# Patient Record
Sex: FEMALE | Race: WHITE | NOT HISPANIC OR LATINO
[De-identification: names, ages, dates, MRNs, and addresses within clinical notes are randomized per-mention and may not be internally consistent; named-entity substitution may affect disease eponyms.]

---

## 2015-12-22 RX ORDER — OXYCODONE HYDROCHLORIDE 5 MG/1
1 TABLET ORAL
Qty: 0 | Refills: 0 | DISCHARGE
Start: 2015-12-22

## 2019-09-07 ENCOUNTER — TRANSCRIPTION ENCOUNTER (OUTPATIENT)
Age: 64
End: 2019-09-07

## 2019-09-07 ENCOUNTER — EMERGENCY (EMERGENCY)
Facility: HOSPITAL | Age: 64
LOS: 1 days | Discharge: ANOTHER HOSPITAL | End: 2019-09-07
Attending: STUDENT IN AN ORGANIZED HEALTH CARE EDUCATION/TRAINING PROGRAM
Payer: COMMERCIAL

## 2019-09-07 ENCOUNTER — INPATIENT (INPATIENT)
Facility: HOSPITAL | Age: 64
LOS: 2 days | Discharge: HOME | DRG: 511 | End: 2019-09-10
Attending: ORTHOPAEDIC SURGERY | Admitting: ORTHOPAEDIC SURGERY
Payer: COMMERCIAL

## 2019-09-07 VITALS
WEIGHT: 134.92 LBS | SYSTOLIC BLOOD PRESSURE: 111 MMHG | TEMPERATURE: 98 F | HEART RATE: 66 BPM | OXYGEN SATURATION: 98 % | DIASTOLIC BLOOD PRESSURE: 65 MMHG | RESPIRATION RATE: 17 BRPM

## 2019-09-07 VITALS
SYSTOLIC BLOOD PRESSURE: 108 MMHG | DIASTOLIC BLOOD PRESSURE: 67 MMHG | OXYGEN SATURATION: 95 % | HEART RATE: 82 BPM | RESPIRATION RATE: 20 BRPM | TEMPERATURE: 98 F | WEIGHT: 130.07 LBS

## 2019-09-07 DIAGNOSIS — S52.91XA UNSPECIFIED FRACTURE OF RIGHT FOREARM, INITIAL ENCOUNTER FOR CLOSED FRACTURE: ICD-10-CM

## 2019-09-07 LAB
ALBUMIN SERPL ELPH-MCNC: 3.5 G/DL — SIGNIFICANT CHANGE UP (ref 3.5–5)
ALBUMIN SERPL ELPH-MCNC: 4 G/DL — SIGNIFICANT CHANGE UP (ref 3.3–5)
ALP SERPL-CCNC: 127 U/L — HIGH (ref 40–120)
ALP SERPL-CCNC: 145 U/L — HIGH (ref 40–120)
ALT FLD-CCNC: 19 U/L — SIGNIFICANT CHANGE UP (ref 10–45)
ALT FLD-CCNC: 28 U/L DA — SIGNIFICANT CHANGE UP (ref 10–60)
ANION GAP SERPL CALC-SCNC: 11 MMOL/L — SIGNIFICANT CHANGE UP (ref 5–17)
ANION GAP SERPL CALC-SCNC: 5 MMOL/L — SIGNIFICANT CHANGE UP (ref 5–17)
APTT BLD: 30.2 SEC — SIGNIFICANT CHANGE UP (ref 27.5–36.3)
APTT BLD: 32.4 SEC — SIGNIFICANT CHANGE UP (ref 27.5–36.3)
AST SERPL-CCNC: 20 U/L — SIGNIFICANT CHANGE UP (ref 10–40)
AST SERPL-CCNC: 22 U/L — SIGNIFICANT CHANGE UP (ref 10–40)
BASOPHILS # BLD AUTO: 0 K/UL — SIGNIFICANT CHANGE UP (ref 0–0.2)
BASOPHILS # BLD AUTO: 0.02 K/UL — SIGNIFICANT CHANGE UP (ref 0–0.2)
BASOPHILS NFR BLD AUTO: 0.2 % — SIGNIFICANT CHANGE UP (ref 0–2)
BASOPHILS NFR BLD AUTO: 0.7 % — SIGNIFICANT CHANGE UP (ref 0–2)
BILIRUB SERPL-MCNC: 0.2 MG/DL — SIGNIFICANT CHANGE UP (ref 0.2–1.2)
BILIRUB SERPL-MCNC: 0.3 MG/DL — SIGNIFICANT CHANGE UP (ref 0.2–1.2)
BLD GP AB SCN SERPL QL: NEGATIVE — SIGNIFICANT CHANGE UP
BUN SERPL-MCNC: 28 MG/DL — HIGH (ref 7–23)
BUN SERPL-MCNC: 31 MG/DL — HIGH (ref 7–18)
CALCIUM SERPL-MCNC: 8.6 MG/DL — SIGNIFICANT CHANGE UP (ref 8.4–10.5)
CALCIUM SERPL-MCNC: 8.8 MG/DL — SIGNIFICANT CHANGE UP (ref 8.4–10.5)
CHLORIDE SERPL-SCNC: 101 MMOL/L — SIGNIFICANT CHANGE UP (ref 96–108)
CHLORIDE SERPL-SCNC: 104 MMOL/L — SIGNIFICANT CHANGE UP (ref 96–108)
CO2 SERPL-SCNC: 26 MMOL/L — SIGNIFICANT CHANGE UP (ref 22–31)
CO2 SERPL-SCNC: 30 MMOL/L — SIGNIFICANT CHANGE UP (ref 22–31)
CREAT SERPL-MCNC: 0.68 MG/DL — SIGNIFICANT CHANGE UP (ref 0.5–1.3)
CREAT SERPL-MCNC: 0.8 MG/DL — SIGNIFICANT CHANGE UP (ref 0.5–1.3)
EOSINOPHIL # BLD AUTO: 0.1 K/UL — SIGNIFICANT CHANGE UP (ref 0–0.5)
EOSINOPHIL # BLD AUTO: 0.33 K/UL — SIGNIFICANT CHANGE UP (ref 0–0.5)
EOSINOPHIL NFR BLD AUTO: 1.8 % — SIGNIFICANT CHANGE UP (ref 0–6)
EOSINOPHIL NFR BLD AUTO: 3.6 % — SIGNIFICANT CHANGE UP (ref 0–6)
GLUCOSE SERPL-MCNC: 132 MG/DL — HIGH (ref 70–99)
GLUCOSE SERPL-MCNC: 87 MG/DL — SIGNIFICANT CHANGE UP (ref 70–99)
HCG SERPL-ACNC: <2 MIU/ML — SIGNIFICANT CHANGE UP
HCT VFR BLD CALC: 34 % — LOW (ref 34.5–45)
HCT VFR BLD CALC: 35.4 % — SIGNIFICANT CHANGE UP (ref 34.5–45)
HGB BLD-MCNC: 11.1 G/DL — LOW (ref 11.5–15.5)
HGB BLD-MCNC: 11.3 G/DL — LOW (ref 11.5–15.5)
IMM GRANULOCYTES NFR BLD AUTO: 0.3 % — SIGNIFICANT CHANGE UP (ref 0–1.5)
INR BLD: 0.95 RATIO — SIGNIFICANT CHANGE UP (ref 0.88–1.16)
INR BLD: 1.02 RATIO — SIGNIFICANT CHANGE UP (ref 0.88–1.16)
LYMPHOCYTES # BLD AUTO: 1 K/UL — SIGNIFICANT CHANGE UP (ref 1–3.3)
LYMPHOCYTES # BLD AUTO: 1.1 K/UL — SIGNIFICANT CHANGE UP (ref 1–3.3)
LYMPHOCYTES # BLD AUTO: 11.9 % — LOW (ref 13–44)
LYMPHOCYTES # BLD AUTO: 13.7 % — SIGNIFICANT CHANGE UP (ref 13–44)
MCHC RBC-ENTMCNC: 29.6 PG — SIGNIFICANT CHANGE UP (ref 27–34)
MCHC RBC-ENTMCNC: 30.9 PG — SIGNIFICANT CHANGE UP (ref 27–34)
MCHC RBC-ENTMCNC: 31.9 GM/DL — LOW (ref 32–36)
MCHC RBC-ENTMCNC: 32.7 GM/DL — SIGNIFICANT CHANGE UP (ref 32–36)
MCV RBC AUTO: 92.7 FL — SIGNIFICANT CHANGE UP (ref 80–100)
MCV RBC AUTO: 94.4 FL — SIGNIFICANT CHANGE UP (ref 80–100)
MONOCYTES # BLD AUTO: 0.4 K/UL — SIGNIFICANT CHANGE UP (ref 0–0.9)
MONOCYTES # BLD AUTO: 0.69 K/UL — SIGNIFICANT CHANGE UP (ref 0–0.9)
MONOCYTES NFR BLD AUTO: 5.8 % — SIGNIFICANT CHANGE UP (ref 2–14)
MONOCYTES NFR BLD AUTO: 7.4 % — SIGNIFICANT CHANGE UP (ref 2–14)
NEUTROPHILS # BLD AUTO: 5.5 K/UL — SIGNIFICANT CHANGE UP (ref 1.8–7.4)
NEUTROPHILS # BLD AUTO: 7.1 K/UL — SIGNIFICANT CHANGE UP (ref 1.8–7.4)
NEUTROPHILS NFR BLD AUTO: 76.6 % — SIGNIFICANT CHANGE UP (ref 43–77)
NEUTROPHILS NFR BLD AUTO: 78 % — HIGH (ref 43–77)
NRBC # BLD: 0 /100 WBCS — SIGNIFICANT CHANGE UP (ref 0–0)
PLATELET # BLD AUTO: 232 K/UL — SIGNIFICANT CHANGE UP (ref 150–400)
PLATELET # BLD AUTO: 235 K/UL — SIGNIFICANT CHANGE UP (ref 150–400)
POTASSIUM SERPL-MCNC: 3.7 MMOL/L — SIGNIFICANT CHANGE UP (ref 3.5–5.3)
POTASSIUM SERPL-MCNC: 4 MMOL/L — SIGNIFICANT CHANGE UP (ref 3.5–5.3)
POTASSIUM SERPL-SCNC: 3.7 MMOL/L — SIGNIFICANT CHANGE UP (ref 3.5–5.3)
POTASSIUM SERPL-SCNC: 4 MMOL/L — SIGNIFICANT CHANGE UP (ref 3.5–5.3)
PROT SERPL-MCNC: 6.6 G/DL — SIGNIFICANT CHANGE UP (ref 6–8.3)
PROT SERPL-MCNC: 7.1 G/DL — SIGNIFICANT CHANGE UP (ref 6–8.3)
PROTHROM AB SERPL-ACNC: 10.5 SEC — SIGNIFICANT CHANGE UP (ref 10–12.9)
PROTHROM AB SERPL-ACNC: 11.6 SEC — SIGNIFICANT CHANGE UP (ref 10–12.9)
RBC # BLD: 3.61 M/UL — LOW (ref 3.8–5.2)
RBC # BLD: 3.82 M/UL — SIGNIFICANT CHANGE UP (ref 3.8–5.2)
RBC # FLD: 12.4 % — SIGNIFICANT CHANGE UP (ref 10.3–14.5)
RBC # FLD: 13.9 % — SIGNIFICANT CHANGE UP (ref 10.3–14.5)
RH IG SCN BLD-IMP: POSITIVE — SIGNIFICANT CHANGE UP
RH IG SCN BLD-IMP: POSITIVE — SIGNIFICANT CHANGE UP
SODIUM SERPL-SCNC: 138 MMOL/L — SIGNIFICANT CHANGE UP (ref 135–145)
SODIUM SERPL-SCNC: 139 MMOL/L — SIGNIFICANT CHANGE UP (ref 135–145)
WBC # BLD: 7 K/UL — SIGNIFICANT CHANGE UP (ref 3.8–10.5)
WBC # BLD: 9.27 K/UL — SIGNIFICANT CHANGE UP (ref 3.8–10.5)
WBC # FLD AUTO: 7 K/UL — SIGNIFICANT CHANGE UP (ref 3.8–10.5)
WBC # FLD AUTO: 9.27 K/UL — SIGNIFICANT CHANGE UP (ref 3.8–10.5)

## 2019-09-07 PROCEDURE — 96376 TX/PRO/DX INJ SAME DRUG ADON: CPT

## 2019-09-07 PROCEDURE — 73090 X-RAY EXAM OF FOREARM: CPT

## 2019-09-07 PROCEDURE — 80053 COMPREHEN METABOLIC PANEL: CPT

## 2019-09-07 PROCEDURE — 71045 X-RAY EXAM CHEST 1 VIEW: CPT | Mod: 26

## 2019-09-07 PROCEDURE — 36415 COLL VENOUS BLD VENIPUNCTURE: CPT

## 2019-09-07 PROCEDURE — 73110 X-RAY EXAM OF WRIST: CPT

## 2019-09-07 PROCEDURE — 86850 RBC ANTIBODY SCREEN: CPT

## 2019-09-07 PROCEDURE — 86900 BLOOD TYPING SEROLOGIC ABO: CPT

## 2019-09-07 PROCEDURE — 86901 BLOOD TYPING SEROLOGIC RH(D): CPT

## 2019-09-07 PROCEDURE — 85730 THROMBOPLASTIN TIME PARTIAL: CPT

## 2019-09-07 PROCEDURE — 73110 X-RAY EXAM OF WRIST: CPT | Mod: 26,RT

## 2019-09-07 PROCEDURE — 73090 X-RAY EXAM OF FOREARM: CPT | Mod: 26,LT

## 2019-09-07 PROCEDURE — 96374 THER/PROPH/DIAG INJ IV PUSH: CPT

## 2019-09-07 PROCEDURE — 73090 X-RAY EXAM OF FOREARM: CPT | Mod: 26,LT,77

## 2019-09-07 PROCEDURE — 93010 ELECTROCARDIOGRAM REPORT: CPT | Mod: NC

## 2019-09-07 PROCEDURE — 99285 EMERGENCY DEPT VISIT HI MDM: CPT

## 2019-09-07 PROCEDURE — 85610 PROTHROMBIN TIME: CPT

## 2019-09-07 PROCEDURE — 99285 EMERGENCY DEPT VISIT HI MDM: CPT | Mod: 25

## 2019-09-07 PROCEDURE — 73080 X-RAY EXAM OF ELBOW: CPT

## 2019-09-07 PROCEDURE — 73080 X-RAY EXAM OF ELBOW: CPT | Mod: 26,RT

## 2019-09-07 PROCEDURE — 96375 TX/PRO/DX INJ NEW DRUG ADDON: CPT

## 2019-09-07 PROCEDURE — 85027 COMPLETE CBC AUTOMATED: CPT

## 2019-09-07 PROCEDURE — 73110 X-RAY EXAM OF WRIST: CPT | Mod: 26,RT,77

## 2019-09-07 RX ORDER — CLONAZEPAM 1 MG
0.5 TABLET ORAL EVERY 12 HOURS
Refills: 0 | Status: DISCONTINUED | OUTPATIENT
Start: 2019-09-07 | End: 2019-09-08

## 2019-09-07 RX ORDER — MORPHINE SULFATE 50 MG/1
2 CAPSULE, EXTENDED RELEASE ORAL ONCE
Refills: 0 | Status: DISCONTINUED | OUTPATIENT
Start: 2019-09-07 | End: 2019-09-07

## 2019-09-07 RX ORDER — HYDROMORPHONE HYDROCHLORIDE 2 MG/ML
1 INJECTION INTRAMUSCULAR; INTRAVENOUS; SUBCUTANEOUS EVERY 4 HOURS
Refills: 0 | Status: DISCONTINUED | OUTPATIENT
Start: 2019-09-07 | End: 2019-09-08

## 2019-09-07 RX ORDER — CEFAZOLIN SODIUM 1 G
2000 VIAL (EA) INJECTION EVERY 8 HOURS
Refills: 0 | Status: DISCONTINUED | OUTPATIENT
Start: 2019-09-07 | End: 2019-09-08

## 2019-09-07 RX ORDER — MORPHINE SULFATE 50 MG/1
4 CAPSULE, EXTENDED RELEASE ORAL ONCE
Refills: 0 | Status: DISCONTINUED | OUTPATIENT
Start: 2019-09-07 | End: 2019-09-07

## 2019-09-07 RX ORDER — SODIUM CHLORIDE 9 MG/ML
1000 INJECTION, SOLUTION INTRAVENOUS
Refills: 0 | Status: COMPLETED | OUTPATIENT
Start: 2019-09-07 | End: 2019-09-08

## 2019-09-07 RX ORDER — OXYCODONE HYDROCHLORIDE 5 MG/1
10 TABLET ORAL EVERY 4 HOURS
Refills: 0 | Status: DISCONTINUED | OUTPATIENT
Start: 2019-09-07 | End: 2019-09-08

## 2019-09-07 RX ORDER — HYDROMORPHONE HYDROCHLORIDE 2 MG/ML
1 INJECTION INTRAMUSCULAR; INTRAVENOUS; SUBCUTANEOUS ONCE
Refills: 0 | Status: DISCONTINUED | OUTPATIENT
Start: 2019-09-07 | End: 2019-09-07

## 2019-09-07 RX ORDER — CEFAZOLIN SODIUM 1 G
2000 VIAL (EA) INJECTION ONCE
Refills: 0 | Status: COMPLETED | OUTPATIENT
Start: 2019-09-07 | End: 2019-09-07

## 2019-09-07 RX ORDER — OXYCODONE HYDROCHLORIDE 5 MG/1
40 TABLET ORAL EVERY 12 HOURS
Refills: 0 | Status: DISCONTINUED | OUTPATIENT
Start: 2019-09-07 | End: 2019-09-08

## 2019-09-07 RX ORDER — OXYCODONE HYDROCHLORIDE 5 MG/1
5 TABLET ORAL EVERY 4 HOURS
Refills: 0 | Status: DISCONTINUED | OUTPATIENT
Start: 2019-09-07 | End: 2019-09-08

## 2019-09-07 RX ORDER — BUPROPION HYDROCHLORIDE 150 MG/1
150 TABLET, EXTENDED RELEASE ORAL DAILY
Refills: 0 | Status: DISCONTINUED | OUTPATIENT
Start: 2019-09-07 | End: 2019-09-08

## 2019-09-07 RX ORDER — LEVOTHYROXINE SODIUM 125 MCG
25 TABLET ORAL DAILY
Refills: 0 | Status: DISCONTINUED | OUTPATIENT
Start: 2019-09-07 | End: 2019-09-08

## 2019-09-07 RX ORDER — LAMOTRIGINE 25 MG/1
200 TABLET, ORALLY DISINTEGRATING ORAL AT BEDTIME
Refills: 0 | Status: DISCONTINUED | OUTPATIENT
Start: 2019-09-07 | End: 2019-09-08

## 2019-09-07 RX ORDER — FENTANYL CITRATE 50 UG/ML
50 INJECTION INTRAVENOUS ONCE
Refills: 0 | Status: DISCONTINUED | OUTPATIENT
Start: 2019-09-07 | End: 2019-09-07

## 2019-09-07 RX ORDER — CEFAZOLIN SODIUM 1 G
VIAL (EA) INJECTION
Refills: 0 | Status: DISCONTINUED | OUTPATIENT
Start: 2019-09-07 | End: 2019-09-08

## 2019-09-07 RX ADMIN — MORPHINE SULFATE 2 MILLIGRAM(S): 50 CAPSULE, EXTENDED RELEASE ORAL at 14:00

## 2019-09-07 RX ADMIN — MORPHINE SULFATE 4 MILLIGRAM(S): 50 CAPSULE, EXTENDED RELEASE ORAL at 12:47

## 2019-09-07 RX ADMIN — SODIUM CHLORIDE 100 MILLILITER(S): 9 INJECTION, SOLUTION INTRAVENOUS at 17:07

## 2019-09-07 RX ADMIN — Medication 100 MILLIGRAM(S): at 13:59

## 2019-09-07 RX ADMIN — SODIUM CHLORIDE 100 MILLILITER(S): 9 INJECTION, SOLUTION INTRAVENOUS at 22:53

## 2019-09-07 RX ADMIN — OXYCODONE HYDROCHLORIDE 10 MILLIGRAM(S): 5 TABLET ORAL at 23:15

## 2019-09-07 RX ADMIN — Medication 100 MILLIGRAM(S): at 22:25

## 2019-09-07 RX ADMIN — Medication 2000 MILLIGRAM(S): at 14:10

## 2019-09-07 RX ADMIN — FENTANYL CITRATE 50 MICROGRAM(S): 50 INJECTION INTRAVENOUS at 14:15

## 2019-09-07 RX ADMIN — OXYCODONE HYDROCHLORIDE 10 MILLIGRAM(S): 5 TABLET ORAL at 22:26

## 2019-09-07 RX ADMIN — MORPHINE SULFATE 2 MILLIGRAM(S): 50 CAPSULE, EXTENDED RELEASE ORAL at 13:44

## 2019-09-07 RX ADMIN — OXYCODONE HYDROCHLORIDE 40 MILLIGRAM(S): 5 TABLET ORAL at 16:32

## 2019-09-07 RX ADMIN — MORPHINE SULFATE 4 MILLIGRAM(S): 50 CAPSULE, EXTENDED RELEASE ORAL at 13:17

## 2019-09-07 RX ADMIN — FENTANYL CITRATE 50 MICROGRAM(S): 50 INJECTION INTRAVENOUS at 14:17

## 2019-09-07 RX ADMIN — LAMOTRIGINE 200 MILLIGRAM(S): 25 TABLET, ORALLY DISINTEGRATING ORAL at 22:52

## 2019-09-07 RX ADMIN — HYDROMORPHONE HYDROCHLORIDE 1 MILLIGRAM(S): 2 INJECTION INTRAMUSCULAR; INTRAVENOUS; SUBCUTANEOUS at 16:32

## 2019-09-07 NOTE — ED PROVIDER NOTE - CLINICAL SUMMARY MEDICAL DECISION MAKING FREE TEXT BOX
63F w/ hx of osteogenesis imperfecta presents as a transfer from [facility] for a R forearm fracture. WIll be admitted to ortho for operative repair. will attempt pain control with long and short acting opiates. will repeat xrays.

## 2019-09-07 NOTE — H&P ADULT - HISTORY OF PRESENT ILLNESS
63y Female RHD presents c/o R forearm pain sp mechanical fall earlier today.  Was seen at [place] and transferred to [facility]. Denies HS/LOC. Denies numbness/tingling. Denies fever/chills. Denies pain/injury elsewhere. No other complaints.     Patient has hx osteogenesis imperfect with >30 fractures. Bilateral TKAs and L CHAYO, R DHS done at [facility] for prior fractures.

## 2019-09-07 NOTE — ED PROVIDER NOTE - CLINICAL SUMMARY MEDICAL DECISION MAKING FREE TEXT BOX
62 y/o F patient presents to the ED w/ deformity. Concern for fracture. Will get X-ray. Will give pain control. Will reassess.

## 2019-09-07 NOTE — ED PROVIDER NOTE - PRINCIPAL DIAGNOSIS
Forearm fracture, right, closed, initial encounter Forearm fracture, right, open type I or II, initial encounter

## 2019-09-07 NOTE — H&P ADULT - NSHPLABSRESULTS_GEN_ALL_CORE
Xrays demonstrating comminuted both bone forearm fracture                        11.1   7.0   )-----------( 235      ( 07 Sep 2019 16:23 )             34.0       09-07    138  |  101  |  28<H>  ----------------------------<  132<H>  3.7   |  26  |  0.68    Ca    8.8      07 Sep 2019 16:23    TPro  6.6  /  Alb  4.0  /  TBili  0.2  /  DBili  x   /  AST  20  /  ALT  19  /  AlkPhos  127<H>  09-07                  PT/INR - ( 07 Sep 2019 16:21 )   PT: 11.6 sec;   INR: 1.02 ratio         PTT - ( 07 Sep 2019 16:21 )  PTT:30.2 sec    Lactate Trend            CAPILLARY BLOOD GLUCOSE

## 2019-09-07 NOTE — ED ADULT NURSE REASSESSMENT NOTE - NS ED NURSE REASSESS COMMENT FT1
Attempted to call nursing report to [facility] but nurses refused to take report, telling ED to call back after [facility] nurses are finished with their own nursing report. ED charge RN is aware of the situation. Attempted to call nursing report to [facility] but nurses refused to take report, telling ED to call back after [facility] nurses are finished with their own nursing report. ED charge RN is aware of the situation.    2015: [facility] called for report after refusing report earlier. Pt already upstairs by time report was able to be given. Report given to SHLOMO Marin Attempted to call nursing report to [facility] but nurses refused to take report, telling ED to call back after [facility] nurses are finished with their own nursing report. ED charge RN is aware of the situation.    SHLOMO Yancey @ 20:15 -  [facility] called for report after refusing report earlier. Pt already upstairs by time report was able to be given. Report given to SHLOMO Marin.

## 2019-09-07 NOTE — ED PROVIDER NOTE - PHYSICAL EXAMINATION
General: WN/WD NAD  HEENT: PERRLA, EOMI, moist mucous membranes  Neurology: A&Ox3, nonfocal, SIMMONS x 4  Respiratory: CTA B/L, normal respiratory effort, no wheezes, crackles, rales  CV: RRR, S1S2, no murmurs, rubs or gallops  Abdominal: Soft, NT, ND +BS, Last BM  Extremities: No edema, + peripheral pulses, RUE in splint, WWP, cap refil <2 sec  Tubes: PIV in LAC

## 2019-09-07 NOTE — ED PROVIDER NOTE - OBJECTIVE STATEMENT
63F w/ hx of osteogenesis imperfecta presents as a transfer from [facility] for a R forearm fracture. Pt tripped at home over a box of tiles and landed on her R arm. xrays at [facility] showed "Badly comminuted fracture of the proximal third of the radius and ulna. Gas in the soft tissues suggest an open fracture." The patient had a splint placed there and transferred here for ortho eval. Pt complains of current pain refractory to the morphine and fentanyl that she got at [facility]. Pt takes 40mg oxycontin BID as well as 8mg PO dilauded every 4hours PRN.    PMH: OI  Meds:   Allergies:  PMD:

## 2019-09-07 NOTE — ED PROVIDER NOTE - CARE PLAN
Principal Discharge DX:	Forearm fracture, right, closed, initial encounter Principal Discharge DX:	Forearm fracture, right, open type I or II, initial encounter

## 2019-09-07 NOTE — H&P ADULT - NSHPPHYSICALEXAM_GEN_ALL_CORE
Physical Exam  Gen: NAD    R UE: <1cm pokehole over proximal forearm, +deformity, no ttp wrist, +ttp radius/ulna, +u/m/ain function, EPL intact, sensation over radial distribution intact, no active extension of wrist or digits 3-5, SILT, radial pulse intact, compartments soft/compressible, warm/well perfused    Procedure: Splint removed from OSH, xeroform applied over wound, Closed reduction performed. Placed in well padded sugartong splint. Post procedure xrays obtained. Post procedure exam unchanged, NV intact except for extension of wrist and digits 3-5. Patient tolerated well.

## 2019-09-07 NOTE — ED PROVIDER NOTE - PROGRESS NOTE DETAILS
discussed with dr hoang who recommended transfer due to complex fracture. transfer initiated to dr cooper

## 2019-09-07 NOTE — ED PROVIDER NOTE - OBJECTIVE STATEMENT
62 y/o F patient w/ PMHx of Osteogenesis Imperfecta presents to the ED w/ R arm pain due to mechanical fall backwards that occurred today(9/7/2019). Patient states she hit her R arm into the kitchen table. Patient denies head injuries, pelvic pain, or any other complaints. NKDA.

## 2019-09-07 NOTE — ED PROVIDER NOTE - NS ED ROS FT
Gen: No fever, normal appetite  Eyes: No eye irritation or discharge  ENT: No ear pain, congestion, sore throat  Resp: No cough or trouble breathing  Cardiovascular: No chest pain or palpitation  Gastroenteric: No nausea/vomiting, diarrhea, constipation  :  No change in urine output; no dysuria  Skin: No rashes  Neuro: No headache; no abnormal movements  Remainder negative, except as per the HPI

## 2019-09-07 NOTE — CONSULT NOTE ADULT - SUBJECTIVE AND OBJECTIVE BOX
The patient was seen and examined tonight after an accidental fall with a right proximal 1/3 radius and ulna fractures that require orthopedic ORIF. Her comorbidities include osteogenesis imperfecta. controlled hypothyoidism, controlled familial hypercholesterolism, chronic anxiey, depression, Dilaudid and oxycontin narcotic dependency for diffuse osteoarthritis. Exam, lab, EKG and CXR are stable. The patient is medically stable, medically optimized and has no medical contraindication to surgery tomorrow as required. Exam time 70 minutes including > than 50 % for bedside discussion and counseling. Comprehensive consultation dictated #[id number]. The patient was seen and examined tonight after an accidental fall with a right proximal 1/3 radius and ulna fractures that require orthopedic ORIF. Her comorbidities include osteogenesis imperfecta. controlled hypothyoidism, controlled familial hypercholesterolism, chronic anxiety,  depression, Dilaudid and oxycontin narcotic dependency for diffuse osteoarthritis. Exam, lab, EKG and CXR are stable. The patient is medically stable, medically optimized and has no medical contraindication to surgery tomorrow as required. Exam time 70 minutes including > than 50 % for bedside discussion and counseling. Comprehensive consultation dictated #[id number].      HISTORY OF PRESENT ILLNESS:  The patient is a 63-year-old female who was in her apartment with boxes on the ground.  They were placed there by her .  She inadvertently stepped backwards, fell over the box and landed on her right arm.  She had difficulty rising and significant pain.  She went to [facility] and x-ray showed a proximal ulna and proximal radius fracture that required ORIF.  Because of the complexity of the fracture, she was transferred from [place] to [place] for definitive surgical intervention.    MEDICATIONS:  The patient's medications include Synthroid 0.25 mg once a day, Crestor 10 mg once a day, Prozac 20 mg twice a day, Wellbutrin 150 mg XL once a day, Lamictal 200 mg at bedtime, Prozac 20 mg twice a day, and Klonopin 0.5 mg twice a day.  She takes Dilaudid 0.5 mg four times a day and OxyContin 40 mg twice a day for diffuse bony pain in her wrists, elbows, knees, and feet.    PAST MEDICAL HISTORY:  The patient's past medical history is significant for osteogenesis imperfecta.  She has had multiple orthopedic surgeries secondary to fractures over the years, and as a result of her condition, she has developed significant osteoarthritis which is narcotic dependent in all of her joints.  She has hypercholesterolemia, depression, anxiety, and chronic pain disorder.    PAST SURGICAL HISTORY:  Her past surgical history is she has had most recently a right total hip replacement in 2017 with a complication of a shattered femoral shaft fracture requiring OR stimulation of her right femoral shaft.  In the past, she has had bilateral knee replacements and bilateral hip replacements.    FAMILY HISTORY:  Her family history is positive for father and sister with osteogenesis imperfecta.  Her brother, mother, and daughter all have arteriosclerotic heart disease.    DIET:  The patient's diet is normal.  She presently weighs 125 pounds.  She has lost 100 pounds through [employer].    SOCIAL HISTORY:  Socially, she is retired and lives with her .    REVIEW OF SYSTEMS:  Her review of systems, she has no headaches, no dizziness.  Positive bilateral hearing aids for deafness.  She has no visual difficulties.  She has no sinusitis.  She has positive dental disease with partial dentures.  She has no difficulty with swallowing.  She has no difficulty with breathing.  She has no cough, congestion or wheezing.  She has no chest congestion at rest or with exertion.  Her cardiac examination is normal.  Also, she has no gallops or murmurs.  She has normal sinus rhythm at the present time with normal valvular function and no murmurs.  She has diffuse joint pains as stated including her spine and all of her extremities.  She has no neurological issues with no strokes, seizures or neuropathies.    PHYSICAL EXAMINATION:  VITAL SIGNS:  At this time shows a blood pressure of 100/60, pulse of 70, respirations are 18.  She is afebrile.  HEENT:  Head and neck examination was normal.  3+ carotids with good upstrokes and no bruits.  There is no thyromegaly even though she has recent diagnosis having hypothyroidism, possibly Hashimoto's.  LUNGS:  She has good breath sounds bilaterally with no cough, congestion or wheezing.  She has also a large house next to her which has a side entrance and allowed parking.  HEART:  There is no gallops or murmurs.  ABDOMEN:  Soft with no masses, tenderness, guarding or rebound.  EXTREMITIES:  Without clubbing, cyanosis or edema at the present time.  NEUROLOGICAL:  Shows no evidence of any focal deficit.      LABORATORY DATA:  The patient had laboratories, CBC obtained shows hemoglobin of 11.1, hematocrit 34.0, white count of 7.0, platelet count is 235,000.  INR is 1.02.  PTT is 30.02.  Sodium 138, potassium 3.7, chloride 101, CO2 is 26, BUN is 28, creatinine is 0.68, blood sugar is 132.  Liver function tests are normal.  Alkaline phosphatase is minimally elevated consistent with an impacted fracture.    DIAGNOSTIC DATA:  EKG was performed with normal sinus rhythm and heart rate of 70.  There was biphasic T-waves which were nonspecific and V1 and V2. Chest x-ray was performed and lungs were clear.      ASSESSMENT AND PLAN:  The impression at this time is the patient has osteogenesis imperfecta and requires open reduction and internal fixation for her acute fall with fracture of her right proximal ulna and radius.  She is to continue her present medications without interval change except be n.p.o. after midnight and have insulin diabetic coverage.  The patient has no medical contraindications to surgery tomorrow as is required.  Examination time was 70 minutes of which greater than 50% was necessary for bedside discussion and counseling.  The patient will continue to have medical supervision throughout the course of the treatment.      _______________________    DICT:	MATHEW FREY MD ([id number]) 09/08/2019 01:13 AM  TRANS:	[id number] 09/08/2019 01:18 AM  JOB:	[id number]     Electronically Signed by:  MATHEW FREY 09/08/2019 11:02:22 AM

## 2019-09-07 NOTE — ED PROVIDER NOTE - ATTENDING CONTRIBUTION TO CARE
63y F hx of OI presented as transfer from [facility] for open comminuted radius and ulna fractures sp mechanical fall, tripped over box tiles landing on R forearm, no head strike or LOC. Currently at baseline mentation. Periph pulses intact. Able to wiggle fingers. Splinted from OSH. Will leave splint in place pending xrays and ortho eval. S/p Ancef at OSH. Ortho aware of transfer. TBA for surgical repair. Pt on chronic pain meds including LA opioids which is due now.

## 2019-09-07 NOTE — ED ADULT NURSE NOTE - NSIMPLEMENTINTERV_GEN_ALL_ED
Implemented All Fall with Harm Risk Interventions:  [place] to call system. Call bell, personal items and telephone within reach. Instruct patient to call for assistance. Room bathroom lighting operational. Non-slip footwear when patient is off stretcher. Physically safe environment: no spills, clutter or unnecessary equipment. Stretcher in lowest position, wheels locked, appropriate side rails in place. Provide visual cue, wrist band, yellow gown, etc. Monitor gait and stability. Monitor for mental status changes and reorient to person, place, and time. Review medications for side effects contributing to fall risk. Reinforce activity limits and safety measures with patient and family. Provide visual clues: red socks.

## 2019-09-07 NOTE — ED ADULT NURSE NOTE - OBJECTIVE STATEMENT
64 y/o female hx of osteogenesis imperfecta BIBA from [place] [facility] for right ulnar and radial fx secondary to a mechanical fall. As per EMS, fracture is open with small opening at site. RUE is currently stabilized in a splint that was placed at [facility]. Denies any numbness or tingling. Able to move all fingers, no discoloration of fingers noted. Patient reports she takes oxycontin and dilaudid po at home daily due to chronic pain and due to multiple fractures in the past.

## 2019-09-07 NOTE — H&P ADULT - ATTENDING COMMENTS
Pt seen and examined.  Exam and plan as above.  For I and D, ORIF.  Pt w PIN palsy preop.  Risk to PIN discussed

## 2019-09-08 LAB
ANION GAP SERPL CALC-SCNC: 11 MMOL/L — SIGNIFICANT CHANGE UP (ref 5–17)
APPEARANCE UR: CLEAR — SIGNIFICANT CHANGE UP
APTT BLD: 25.3 SEC — LOW (ref 27.5–36.3)
BACTERIA # UR AUTO: NEGATIVE — SIGNIFICANT CHANGE UP
BILIRUB UR-MCNC: NEGATIVE — SIGNIFICANT CHANGE UP
BUN SERPL-MCNC: 13 MG/DL — SIGNIFICANT CHANGE UP (ref 7–23)
CALCIUM SERPL-MCNC: 9.2 MG/DL — SIGNIFICANT CHANGE UP (ref 8.4–10.5)
CHLORIDE SERPL-SCNC: 101 MMOL/L — SIGNIFICANT CHANGE UP (ref 96–108)
CO2 SERPL-SCNC: 26 MMOL/L — SIGNIFICANT CHANGE UP (ref 22–31)
COLOR SPEC: YELLOW — SIGNIFICANT CHANGE UP
CREAT SERPL-MCNC: 0.66 MG/DL — SIGNIFICANT CHANGE UP (ref 0.5–1.3)
DIFF PNL FLD: NEGATIVE — SIGNIFICANT CHANGE UP
EPI CELLS # UR: 3 /HPF — SIGNIFICANT CHANGE UP
GLUCOSE SERPL-MCNC: 93 MG/DL — SIGNIFICANT CHANGE UP (ref 70–99)
GLUCOSE UR QL: NEGATIVE — SIGNIFICANT CHANGE UP
HCG UR QL: NEGATIVE — SIGNIFICANT CHANGE UP
HCT VFR BLD CALC: 32.3 % — LOW (ref 34.5–45)
HCV AB S/CO SERPL IA: 0.25 S/CO — SIGNIFICANT CHANGE UP (ref 0–0.99)
HCV AB SERPL-IMP: SIGNIFICANT CHANGE UP
HGB BLD-MCNC: 11.2 G/DL — LOW (ref 11.5–15.5)
HYALINE CASTS # UR AUTO: 0 /LPF — SIGNIFICANT CHANGE UP (ref 0–2)
INR BLD: 1.07 RATIO — SIGNIFICANT CHANGE UP (ref 0.88–1.16)
KETONES UR-MCNC: NEGATIVE — SIGNIFICANT CHANGE UP
LEUKOCYTE ESTERASE UR-ACNC: ABNORMAL
MCHC RBC-ENTMCNC: 32 PG — SIGNIFICANT CHANGE UP (ref 27–34)
MCHC RBC-ENTMCNC: 34.5 GM/DL — SIGNIFICANT CHANGE UP (ref 32–36)
MCV RBC AUTO: 92.5 FL — SIGNIFICANT CHANGE UP (ref 80–100)
NITRITE UR-MCNC: NEGATIVE — SIGNIFICANT CHANGE UP
PH UR: 6 — SIGNIFICANT CHANGE UP (ref 5–8)
PLATELET # BLD AUTO: 199 K/UL — SIGNIFICANT CHANGE UP (ref 150–400)
POTASSIUM SERPL-MCNC: 3.2 MMOL/L — LOW (ref 3.5–5.3)
POTASSIUM SERPL-SCNC: 3.2 MMOL/L — LOW (ref 3.5–5.3)
PROT UR-MCNC: ABNORMAL
PROTHROM AB SERPL-ACNC: 12.2 SEC — SIGNIFICANT CHANGE UP (ref 10–12.9)
RBC # BLD: 3.49 M/UL — LOW (ref 3.8–5.2)
RBC # FLD: 12.3 % — SIGNIFICANT CHANGE UP (ref 10.3–14.5)
RBC CASTS # UR COMP ASSIST: 4 /HPF — SIGNIFICANT CHANGE UP (ref 0–4)
SODIUM SERPL-SCNC: 138 MMOL/L — SIGNIFICANT CHANGE UP (ref 135–145)
SP GR SPEC: 1.03 — HIGH (ref 1.01–1.02)
UROBILINOGEN FLD QL: NEGATIVE — SIGNIFICANT CHANGE UP
WBC # BLD: 5.3 K/UL — SIGNIFICANT CHANGE UP (ref 3.8–10.5)
WBC # FLD AUTO: 5.3 K/UL — SIGNIFICANT CHANGE UP (ref 3.8–10.5)
WBC UR QL: 10 /HPF — HIGH (ref 0–5)

## 2019-09-08 PROCEDURE — 25575 OPTX RDL&ULN SHFT FX RDS&ULN: CPT | Mod: RT

## 2019-09-08 PROCEDURE — 11012 DEB SKIN BONE AT FX SITE: CPT | Mod: RT

## 2019-09-08 PROCEDURE — 64708 REVISE ARM/LEG NERVE: CPT | Mod: RT,59

## 2019-09-08 RX ORDER — CHLORHEXIDINE GLUCONATE 213 G/1000ML
1 SOLUTION TOPICAL DAILY
Refills: 0 | Status: DISCONTINUED | OUTPATIENT
Start: 2019-09-08 | End: 2019-09-08

## 2019-09-08 RX ORDER — FOLIC ACID 0.8 MG
1 TABLET ORAL DAILY
Refills: 0 | Status: DISCONTINUED | OUTPATIENT
Start: 2019-09-08 | End: 2019-09-10

## 2019-09-08 RX ORDER — ASPIRIN/CALCIUM CARB/MAGNESIUM 324 MG
325 TABLET ORAL
Refills: 0 | Status: DISCONTINUED | OUTPATIENT
Start: 2019-09-08 | End: 2019-09-10

## 2019-09-08 RX ORDER — CLONAZEPAM 1 MG
0.5 TABLET ORAL EVERY 12 HOURS
Refills: 0 | Status: DISCONTINUED | OUTPATIENT
Start: 2019-09-08 | End: 2019-09-10

## 2019-09-08 RX ORDER — ACETAMINOPHEN 500 MG
650 TABLET ORAL EVERY 6 HOURS
Refills: 0 | Status: DISCONTINUED | OUTPATIENT
Start: 2019-09-08 | End: 2019-09-10

## 2019-09-08 RX ORDER — SODIUM CHLORIDE 9 MG/ML
1000 INJECTION, SOLUTION INTRAVENOUS
Refills: 0 | Status: DISCONTINUED | OUTPATIENT
Start: 2019-09-08 | End: 2019-09-10

## 2019-09-08 RX ORDER — POTASSIUM CHLORIDE 20 MEQ
10 PACKET (EA) ORAL
Refills: 0 | Status: DISCONTINUED | OUTPATIENT
Start: 2019-09-08 | End: 2019-09-08

## 2019-09-08 RX ORDER — ASCORBIC ACID 60 MG
500 TABLET,CHEWABLE ORAL
Refills: 0 | Status: DISCONTINUED | OUTPATIENT
Start: 2019-09-08 | End: 2019-09-10

## 2019-09-08 RX ORDER — ENOXAPARIN SODIUM 100 MG/ML
40 INJECTION SUBCUTANEOUS EVERY 24 HOURS
Refills: 0 | Status: DISCONTINUED | OUTPATIENT
Start: 2019-09-08 | End: 2019-09-08

## 2019-09-08 RX ORDER — CEFAZOLIN SODIUM 1 G
2000 VIAL (EA) INJECTION EVERY 8 HOURS
Refills: 0 | Status: COMPLETED | OUTPATIENT
Start: 2019-09-08 | End: 2019-09-09

## 2019-09-08 RX ORDER — LAMOTRIGINE 25 MG/1
200 TABLET, ORALLY DISINTEGRATING ORAL AT BEDTIME
Refills: 0 | Status: DISCONTINUED | OUTPATIENT
Start: 2019-09-08 | End: 2019-09-10

## 2019-09-08 RX ORDER — ONDANSETRON 8 MG/1
4 TABLET, FILM COATED ORAL EVERY 6 HOURS
Refills: 0 | Status: DISCONTINUED | OUTPATIENT
Start: 2019-09-08 | End: 2019-09-10

## 2019-09-08 RX ORDER — FERROUS SULFATE 325(65) MG
325 TABLET ORAL
Refills: 0 | Status: DISCONTINUED | OUTPATIENT
Start: 2019-09-08 | End: 2019-09-10

## 2019-09-08 RX ORDER — DIPHENHYDRAMINE HCL 50 MG
25 CAPSULE ORAL AT BEDTIME
Refills: 0 | Status: DISCONTINUED | OUTPATIENT
Start: 2019-09-08 | End: 2019-09-10

## 2019-09-08 RX ORDER — ASPIRIN/CALCIUM CARB/MAGNESIUM 324 MG
325 TABLET ORAL DAILY
Refills: 0 | Status: DISCONTINUED | OUTPATIENT
Start: 2019-09-08 | End: 2019-09-08

## 2019-09-08 RX ORDER — LEVOTHYROXINE SODIUM 125 MCG
25 TABLET ORAL DAILY
Refills: 0 | Status: DISCONTINUED | OUTPATIENT
Start: 2019-09-08 | End: 2019-09-10

## 2019-09-08 RX ORDER — CEFAZOLIN SODIUM 1 G
2000 VIAL (EA) INJECTION EVERY 8 HOURS
Refills: 0 | Status: DISCONTINUED | OUTPATIENT
Start: 2019-09-08 | End: 2019-09-08

## 2019-09-08 RX ORDER — OXYCODONE HYDROCHLORIDE 5 MG/1
5 TABLET ORAL EVERY 4 HOURS
Refills: 0 | Status: DISCONTINUED | OUTPATIENT
Start: 2019-09-08 | End: 2019-09-10

## 2019-09-08 RX ORDER — BUPROPION HYDROCHLORIDE 150 MG/1
150 TABLET, EXTENDED RELEASE ORAL DAILY
Refills: 0 | Status: DISCONTINUED | OUTPATIENT
Start: 2019-09-08 | End: 2019-09-10

## 2019-09-08 RX ORDER — HYDROMORPHONE HYDROCHLORIDE 2 MG/ML
0.5 INJECTION INTRAMUSCULAR; INTRAVENOUS; SUBCUTANEOUS
Refills: 0 | Status: DISCONTINUED | OUTPATIENT
Start: 2019-09-08 | End: 2019-09-09

## 2019-09-08 RX ORDER — OXYCODONE HYDROCHLORIDE 5 MG/1
10 TABLET ORAL EVERY 4 HOURS
Refills: 0 | Status: DISCONTINUED | OUTPATIENT
Start: 2019-09-08 | End: 2019-09-10

## 2019-09-08 RX ORDER — DOCUSATE SODIUM 100 MG
100 CAPSULE ORAL THREE TIMES A DAY
Refills: 0 | Status: DISCONTINUED | OUTPATIENT
Start: 2019-09-08 | End: 2019-09-10

## 2019-09-08 RX ORDER — BENZOCAINE AND MENTHOL 5; 1 G/100ML; G/100ML
1 LIQUID ORAL EVERY 4 HOURS
Refills: 0 | Status: DISCONTINUED | OUTPATIENT
Start: 2019-09-08 | End: 2019-09-10

## 2019-09-08 RX ORDER — HYDROMORPHONE HYDROCHLORIDE 2 MG/ML
0.25 INJECTION INTRAMUSCULAR; INTRAVENOUS; SUBCUTANEOUS
Refills: 0 | Status: DISCONTINUED | OUTPATIENT
Start: 2019-09-08 | End: 2019-09-08

## 2019-09-08 RX ADMIN — Medication 325 MILLIGRAM(S): at 21:09

## 2019-09-08 RX ADMIN — Medication 0.5 MILLIGRAM(S): at 21:09

## 2019-09-08 RX ADMIN — HYDROMORPHONE HYDROCHLORIDE 0.5 MILLIGRAM(S): 2 INJECTION INTRAMUSCULAR; INTRAVENOUS; SUBCUTANEOUS at 15:25

## 2019-09-08 RX ADMIN — Medication 100 MILLIEQUIVALENT(S): at 10:11

## 2019-09-08 RX ADMIN — Medication 100 MILLIGRAM(S): at 21:11

## 2019-09-08 RX ADMIN — OXYCODONE HYDROCHLORIDE 10 MILLIGRAM(S): 5 TABLET ORAL at 09:26

## 2019-09-08 RX ADMIN — OXYCODONE HYDROCHLORIDE 10 MILLIGRAM(S): 5 TABLET ORAL at 03:00

## 2019-09-08 RX ADMIN — HYDROMORPHONE HYDROCHLORIDE 1 MILLIGRAM(S): 2 INJECTION INTRAMUSCULAR; INTRAVENOUS; SUBCUTANEOUS at 03:27

## 2019-09-08 RX ADMIN — OXYCODONE HYDROCHLORIDE 10 MILLIGRAM(S): 5 TABLET ORAL at 23:38

## 2019-09-08 RX ADMIN — Medication 100 MILLIGRAM(S): at 21:09

## 2019-09-08 RX ADMIN — HYDROMORPHONE HYDROCHLORIDE 0.5 MILLIGRAM(S): 2 INJECTION INTRAMUSCULAR; INTRAVENOUS; SUBCUTANEOUS at 22:30

## 2019-09-08 RX ADMIN — HYDROMORPHONE HYDROCHLORIDE 0.5 MILLIGRAM(S): 2 INJECTION INTRAMUSCULAR; INTRAVENOUS; SUBCUTANEOUS at 22:02

## 2019-09-08 RX ADMIN — CHLORHEXIDINE GLUCONATE 1 APPLICATION(S): 213 SOLUTION TOPICAL at 08:20

## 2019-09-08 RX ADMIN — Medication 100 MILLIGRAM(S): at 05:55

## 2019-09-08 RX ADMIN — OXYCODONE HYDROCHLORIDE 10 MILLIGRAM(S): 5 TABLET ORAL at 02:27

## 2019-09-08 RX ADMIN — OXYCODONE HYDROCHLORIDE 40 MILLIGRAM(S): 5 TABLET ORAL at 06:35

## 2019-09-08 RX ADMIN — SODIUM CHLORIDE 75 MILLILITER(S): 9 INJECTION, SOLUTION INTRAVENOUS at 17:14

## 2019-09-08 RX ADMIN — HYDROMORPHONE HYDROCHLORIDE 1 MILLIGRAM(S): 2 INJECTION INTRAMUSCULAR; INTRAVENOUS; SUBCUTANEOUS at 04:00

## 2019-09-08 RX ADMIN — HYDROMORPHONE HYDROCHLORIDE 0.25 MILLIGRAM(S): 2 INJECTION INTRAMUSCULAR; INTRAVENOUS; SUBCUTANEOUS at 15:10

## 2019-09-08 RX ADMIN — OXYCODONE HYDROCHLORIDE 40 MILLIGRAM(S): 5 TABLET ORAL at 05:55

## 2019-09-08 RX ADMIN — LAMOTRIGINE 200 MILLIGRAM(S): 25 TABLET, ORALLY DISINTEGRATING ORAL at 21:09

## 2019-09-08 RX ADMIN — SODIUM CHLORIDE 100 MILLILITER(S): 9 INJECTION, SOLUTION INTRAVENOUS at 05:54

## 2019-09-08 RX ADMIN — HYDROMORPHONE HYDROCHLORIDE 0.25 MILLIGRAM(S): 2 INJECTION INTRAMUSCULAR; INTRAVENOUS; SUBCUTANEOUS at 15:25

## 2019-09-08 RX ADMIN — ONDANSETRON 4 MILLIGRAM(S): 8 TABLET, FILM COATED ORAL at 17:14

## 2019-09-08 RX ADMIN — OXYCODONE HYDROCHLORIDE 5 MILLIGRAM(S): 5 TABLET ORAL at 18:40

## 2019-09-08 RX ADMIN — Medication 25 MICROGRAM(S): at 05:54

## 2019-09-08 RX ADMIN — BUPROPION HYDROCHLORIDE 150 MILLIGRAM(S): 150 TABLET, EXTENDED RELEASE ORAL at 21:10

## 2019-09-08 NOTE — BRIEF OPERATIVE NOTE - NSICDXBRIEFPREOP_GEN_ALL_CORE_FT
PRE-OP DIAGNOSIS:  Open fracture of part of radius with ulna, right, type I or II, with nonunion, subsequent encounter 08-Sep-2019 15:15:04  James Garcia

## 2019-09-08 NOTE — PROGRESS NOTE ADULT - SUBJECTIVE AND OBJECTIVE BOX
ORTHO POST OP CHECK    S: Pt seen and examined on floors. Complains of full body pain. Tolerating diet       O:   PE:  Gen: NAD  Resp: Unlabored breathing  MSK:  RUE:  Dressing c/d/i  AIN/U nerve intact, PIN not intact  SILT M/U, reports numbness in radial nerve distribution   Fingers WWP, brisk cap refill  No pain on passive stretch of fingers                          11.2   5.3   )-----------( 199      ( 08 Sep 2019 07:22 )             32.3     09-08    138  |  101  |  13  ----------------------------<  93  3.2<L>   |  26  |  0.66    Ca    9.2      08 Sep 2019 07:22    TPro  6.6  /  Alb  4.0  /  TBili  0.2  /  DBili  x   /  AST  20  /  ALT  19  /  AlkPhos  127<H>  09-07      Vital Signs Last 24 Hrs  T(C): 36.7 (08 Sep 2019 16:55), Max: 37.1 (07 Sep 2019 20:50)  T(F): 98.1 (08 Sep 2019 16:55), Max: 98.7 (07 Sep 2019 20:50)  HR: 81 (08 Sep 2019 16:55) (68 - 87)  BP: 137/79 (08 Sep 2019 16:55) (98/60 - 157/73)  BP(mean): 96 (08 Sep 2019 16:30) (89 - 113)  RR: 16 (08 Sep 2019 16:55) (14 - 18)  SpO2: 90% (08 Sep 2019 16:55) (90% - 100%)  I&O's Summary    07 Sep 2019 07:01  -  08 Sep 2019 07:00  --------------------------------------------------------  IN: 1340 mL / OUT: 400 mL / NET: 940 mL    08 Sep 2019 07:01  -  08 Sep 2019 17:33  --------------------------------------------------------  IN: 0 mL / OUT: 1100 mL / NET: -1100 mL        A/P: 63yoF s/p I and D, ORIF  R Radius and ulna open fracture and neurolysis PIN    Patient with pre and post op PIN palsy  Bulky dressing  elevation  sling  NWB RUE  Ancef x 24h   BID for DVT ppx  ROM as tolerated RUE  PT/OT consult  Ortho

## 2019-09-08 NOTE — PROGRESS NOTE ADULT - ASSESSMENT
64 y/o FM with  right proximal 1/3 radius and ulna open fractures for operative fixation today, NPO  Evy Knox PA-C  Orthopaedic Surgery  Team pager [phone]  [facility] [phone]  [phone]

## 2019-09-08 NOTE — PROGRESS NOTE ADULT - SUBJECTIVE AND OBJECTIVE BOX
Patient is a 63y old  Female who presents with a chief complaint of right proximal 1/3 radius and ulna open fractures   Patient for operative fixation today.  Patient resting without complaints.  No chest pain, SOB, N/V.    T(C): 36.9 (09-08-19 @ 05:00), Max: 37.1 (09-07-19 @ 20:50)  HR: 68 (09-08-19 @ 05:00) (66 - 82)  BP: 123/73 (09-08-19 @ 05:00) (98/60 - 127/64)  RR: 18 (09-08-19 @ 05:00) (17 - 20)  SpO2: 100% (09-08-19 @ 05:00) (95% - 100%)    Exam:  Alert and Oriented, No Acute Distress  Cards: +S1/S2, RRR  Pulm: CTAB  Right U/E in sugar tong splint, moving digits  Lower Extremities:  Calves Soft, Non-tender bilaterally  +PF/DF/EHL/FHL  SILT  +DP Pulse                        11.1   7.0   )-----------( 235      ( 07 Sep 2019 16:23 )             34.0    09-07  138  |  101  |  28<H>  ----------------------------<  132<H>  3.7   |  26  |  0.68  Ca    8.8      07 Sep 2019 16:23  TPro  6.6  /  Alb  4.0  /  TBili  0.2  /  DBili  x   /  AST  20  /  ALT  19  /  AlkPhos  127<H>  09-07

## 2019-09-08 NOTE — PROGRESS NOTE ADULT - SUBJECTIVE AND OBJECTIVE BOX
Patient is a 63y old  Female who presents with a chief complaint of right open both bone forearm fracture (08 Sep 2019 15:00)        MEDICATIONS  (STANDING):  ascorbic acid 500 milliGRAM(s) Oral two times a day  aspirin 325 milliGRAM(s) Oral two times a day  buPROPion XL . 150 milliGRAM(s) Oral daily  ceFAZolin   IVPB 2000 milliGRAM(s) IV Intermittent every 8 hours  docusate sodium 100 milliGRAM(s) Oral three times a day  ferrous    sulfate 325 milliGRAM(s) Oral three times a day with meals  folic acid 1 milliGRAM(s) Oral daily  lactated ringers. 1000 milliLiter(s) (75 mL/Hr) IV Continuous <Continuous>  lamoTRIgine 200 milliGRAM(s) Oral at bedtime  levothyroxine 25 MICROGram(s) Oral daily  multivitamin 1 Tablet(s) Oral daily    MEDICATIONS  (PRN):  acetaminophen   Tablet .. 650 milliGRAM(s) Oral every 6 hours PRN Temp greater or equal to 38C (100.4F)  benzocaine 15 mG/menthol 3.6 mG Lozenge 1 Lozenge Oral every 4 hours PRN Sore Throat  clonazePAM  Tablet 0.5 milliGRAM(s) Oral every 12 hours PRN anxiety  diphenhydrAMINE 25 milliGRAM(s) Oral at bedtime PRN Insomnia  HYDROmorphone  Injectable 0.5 milliGRAM(s) IV Push every 3 hours PRN Severe Pain (7 - 10)  HYDROmorphone  Injectable 0.25 milliGRAM(s) IV Push every 10 minutes PRN Moderate Pain (4 - 6)  ondansetron Injectable 4 milliGRAM(s) IV Push every 6 hours PRN Nausea and/or Vomiting  oxyCODONE    IR 10 milliGRAM(s) Oral every 4 hours PRN Moderate Pain (4 - 6)  oxyCODONE    IR 5 milliGRAM(s) Oral every 4 hours PRN Mild Pain (1 - 3)      Allergies    No Known Allergies    Intolerances    Vital Signs Last 24 Hrs  T(C): 37.1 (08 Sep 2019 11:06), Max: 37.1 (07 Sep 2019 20:50)  T(F): 98.7 (08 Sep 2019 08:29), Max: 98.7 (07 Sep 2019 20:50)  HR: 87 (08 Sep 2019 11:06) (68 - 87)  BP: 122/77 (08 Sep 2019 11:06) (98/60 - 127/64)  BP(mean): --  RR: 18 (08 Sep 2019 11:06) (18 - 18)  SpO2: 95% (08 Sep 2019 11:06) (95% - 100%)      PHYSICAL EXAM:  GENERAL: NAD, well nourished and conversant  HEAD:  Atraumatic  EYES: EOM, PERRLA, conjunctiva pink and sclera white  ENT: No tonsillar erythema, exudates, or enlargement, moist mucous membranes, good dentition, no lesions  NECK: Supple, No JVD, normal thyroid, carotids with normal upstrokes and no bruits  CHEST/LUNG: Clear to auscultation bilaterally, No rales, rhonchi, wheezing, or rubs  HEART: Regular rate and rhythm, No murmurs, rubs, or gallops  ABDOMEN: Soft, nondistended, no masses, guarding, tenderness or rebound, bowel sounds present  EXTREMITIES:  2+ Peripheral Pulses, No clubbing, cyanosis, or edema. No arthritis of shoulders, elbows, hands, hips, knees, ankles, or feet. No DJD C spine, T spine, or L/S spine  LYMPH: No lymphadenopathy noted  SKIN: No rashes or lesions  NERVOUS SYSTEM:  Alert & Oriented X3, normal cognitive function. Motor Strength 5/5 right upper and right lower.  5/5 left upper and left lower extremities, DTRs 2+ intact and symmetric    LABS:        CBC Full  -  ( 08 Sep 2019 07:22 )  WBC Count : 5.3 K/uL  RBC Count : 3.49 M/uL  Hemoglobin : 11.2 g/dL  Hematocrit : 32.3 %  Platelet Count - Automated : 199 K/uL  Mean Cell Volume : 92.5 fl  Mean Cell Hemoglobin : 32.0 pg  Mean Cell Hemoglobin Concentration : 34.5 gm/dL  Auto Neutrophil # : x  Auto Lymphocyte # : x  Auto Monocyte # : x  Auto Eosinophil # : x  Auto Basophil # : x  Auto Neutrophil % : x  Auto Lymphocyte % : x  Auto Monocyte % : x  Auto Eosinophil % : x  Auto Basophil % : x        138  |  101  |  13  ----------------------------<  93  3.2<L>   |  26  |  0.66    Ca    9.2      08 Sep 2019 07:22    TPro  6.6  /  Alb  4.0  /  TBili  0.2  /  DBili  x   /  AST  20  /  ALT  19  /  AlkPhos  127<H>      LIVER FUNCTIONS - ( 07 Sep 2019 16:23 )  Alb: 4.0 g/dL / Pro: 6.6 g/dL / ALK PHOS: 127 U/L / ALT: 19 U/L / AST: 20 U/L / GGT: x           PT/INR - ( 08 Sep 2019 07:23 )   PT: 12.2 sec;   INR: 1.07 ratio         PTT - ( 08 Sep 2019 07:23 )  PTT:25.3 sec  Urinalysis Basic - ( 08 Sep 2019 00:12 )    Color: Yellow / Appearance: Clear / S.032 / pH: x  Gluc: x / Ketone: Negative  / Bili: Negative / Urobili: Negative   Blood: x / Protein: Trace / Nitrite: Negative   Leuk Esterase: Large / RBC: 4 /hpf / WBC 10 /HPF   Sq Epi: x / Non Sq Epi: 3 /hpf / Bacteria: Negative      CAPILLARY BLOOD GLUCOSE          RADIOLOGY & ADDITIONAL TESTS:      Consultant(s):    Care Discussed with Consultants/Other Providers [ ] YES  [ ] NO The patient is a 63-year-old female who was in her apartment with boxes on the ground.  They were placed there by her .  She inadvertently stepped backwards, fell over the box and landed on her right arm.  She had difficulty rising and significant pain.  She went to [place] [facility] and x-ray showed a proximal ulna and proximal radius fracture that required ORIF.  Because of the complexity of the fracture, she was transferred from [place] to [place] for definitive surgical intervention. The patient underwent Right radius and ulna ORIF on 19 and was seen post -op with severe pain and to receive Dilaudid IV for pain control.           MEDICATIONS  (STANDING):  ascorbic acid 500 milliGRAM(s) Oral two times a day  aspirin 325 milliGRAM(s) Oral two times a day  buPROPion XL . 150 milliGRAM(s) Oral daily  ceFAZolin   IVPB 2000 milliGRAM(s) IV Intermittent every 8 hours  docusate sodium 100 milliGRAM(s) Oral three times a day  ferrous    sulfate 325 milliGRAM(s) Oral three times a day with meals  folic acid 1 milliGRAM(s) Oral daily  lactated ringers. 1000 milliLiter(s) (75 mL/Hr) IV Continuous <Continuous>  lamoTRIgine 200 milliGRAM(s) Oral at bedtime  levothyroxine 25 MICROGram(s) Oral daily  multivitamin 1 Tablet(s) Oral daily    MEDICATIONS  (PRN):  acetaminophen   Tablet .. 650 milliGRAM(s) Oral every 6 hours PRN Temp greater or equal to 38C (100.4F)  benzocaine 15 mG/menthol 3.6 mG Lozenge 1 Lozenge Oral every 4 hours PRN Sore Throat  clonazePAM  Tablet 0.5 milliGRAM(s) Oral every 12 hours PRN anxiety  diphenhydrAMINE 25 milliGRAM(s) Oral at bedtime PRN Insomnia  HYDROmorphone  Injectable 0.5 milliGRAM(s) IV Push every 3 hours PRN Severe Pain (7 - 10)  HYDROmorphone  Injectable 0.25 milliGRAM(s) IV Push every 10 minutes PRN Moderate Pain (4 - 6)  ondansetron Injectable 4 milliGRAM(s) IV Push every 6 hours PRN Nausea and/or Vomiting  oxyCODONE    IR 10 milliGRAM(s) Oral every 4 hours PRN Moderate Pain (4 - 6)  oxyCODONE    IR 5 milliGRAM(s) Oral every 4 hours PRN Mild Pain (1 - 3)      Allergies    No Known Allergies    Intolerances    Vital Signs Last 24 Hrs  T(C): 37.1 (08 Sep 2019 11:06), Max: 37.1 (07 Sep 2019 20:50)  T(F): 98.7 (08 Sep 2019 08:29), Max: 98.7 (07 Sep 2019 20:50)  HR: 87 (08 Sep 2019 11:06) (68 - 87)  BP: 122/77 (08 Sep 2019 11:06) (98/60 - 127/64)  BP(mean): --  RR: 18 (08 Sep 2019 11:06) (18 - 18)  SpO2: 95% (08 Sep 2019 11:06) (95% - 100%)      PHYSICAL EXAM:  GENERAL: NAD, well nourished and conversant  HEAD:  Atraumatic  EYES: EOM, PERRLA, conjunctiva pink and sclera white  ENT: No tonsillar erythema, exudates, or enlargement, moist mucous membranes, good dentition, no lesions  NECK: Supple, No JVD, normal thyroid, carotids with normal upstrokes and no bruits  CHEST/LUNG: Clear to auscultation bilaterally, No rales, rhonchi, wheezing, or rubs  HEART: Regular rate and rhythm, No murmurs, rubs, or gallops  ABDOMEN: Soft, nondistended, no masses, guarding, tenderness or rebound, bowel sounds present  EXTREMITIES:  2+ Peripheral Pulses, No clubbing, cyanosis, or edema. No arthritis of shoulders, elbows, hands, hips, knees, ankles, or feet. No DJD C spine, T spine, or L/S spine  LYMPH: No lymphadenopathy noted  SKIN: No rashes or lesions  NERVOUS SYSTEM:  Alert & Oriented X3, normal cognitive function. Motor Strength 5/5 right upper and right lower.  5/5 left upper and left lower extremities, DTRs 2+ intact and symmetric    LABS:        CBC Full  -  ( 08 Sep 2019 07:22 )  WBC Count : 5.3 K/uL  RBC Count : 3.49 M/uL  Hemoglobin : 11.2 g/dL  Hematocrit : 32.3 %  Platelet Count - Automated : 199 K/uL  Mean Cell Volume : 92.5 fl  Mean Cell Hemoglobin : 32.0 pg  Mean Cell Hemoglobin Concentration : 34.5 gm/dL  Auto Neutrophil # : x  Auto Lymphocyte # : x  Auto Monocyte # : x  Auto Eosinophil # : x  Auto Basophil # : x  Auto Neutrophil % : x  Auto Lymphocyte % : x  Auto Monocyte % : x  Auto Eosinophil % : x  Auto Basophil % : x        138  |  101  |  13  ----------------------------<  93  3.2<L>   |  26  |  0.66    Ca    9.2      08 Sep 2019 07:22    TPro  6.6  /  Alb  4.0  /  TBili  0.2  /  DBili  x   /  AST  20  /  ALT  19  /  AlkPhos  127<H>      LIVER FUNCTIONS - ( 07 Sep 2019 16:23 )  Alb: 4.0 g/dL / Pro: 6.6 g/dL / ALK PHOS: 127 U/L / ALT: 19 U/L / AST: 20 U/L / GGT: x           PT/INR - ( 08 Sep 2019 07:23 )   PT: 12.2 sec;   INR: 1.07 ratio         PTT - ( 08 Sep 2019 07:23 )  PTT:25.3 sec  Urinalysis Basic - ( 08 Sep 2019 00:12 )    Color: Yellow / Appearance: Clear / S.032 / pH: x  Gluc: x / Ketone: Negative  / Bili: Negative / Urobili: Negative   Blood: x / Protein: Trace / Nitrite: Negative   Leuk Esterase: Large / RBC: 4 /hpf / WBC 10 /HPF   Sq Epi: x / Non Sq Epi: 3 /hpf / Bacteria: Negative      CAPILLARY BLOOD GLUCOSE          RADIOLOGY & ADDITIONAL TESTS:      Consultant(s):    Care Discussed with Consultants/Other Providers [ ] YES  [ ] NO

## 2019-09-08 NOTE — PROGRESS NOTE ADULT - SUBJECTIVE AND OBJECTIVE BOX
ORTHO ATTENDING POST OP    s/p I and D, ORIF  R Radius and ulna open fracture and neurolysis PIN  Pt w pre op PIN palsy  Bulky dressing  elevation  keep dressing clean and dry until office visit  maximo LOPES RUE  Ancef x 24h   BID for DVT ppx  ROM as tolerated RUE  PT/OT consult  f/u 2 weeks

## 2019-09-08 NOTE — BRIEF OPERATIVE NOTE - NSICDXBRIEFPOSTOP_GEN_ALL_CORE_FT
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

## 2019-09-08 NOTE — PROGRESS NOTE ADULT - ASSESSMENT
Because of the complexity of the fracture, she was transferred from [place] to [place] for definitive surgical intervention. The patient underwent Right radius and ulna ORIF on 09/08/19 and was seen post -op with severe pain and to receive Dilaudid IV for pain control.

## 2019-09-09 ENCOUNTER — TRANSCRIPTION ENCOUNTER (OUTPATIENT)
Age: 64
End: 2019-09-09

## 2019-09-09 LAB
ANION GAP SERPL CALC-SCNC: 12 MMOL/L — SIGNIFICANT CHANGE UP (ref 5–17)
BASOPHILS # BLD AUTO: 0.01 K/UL — SIGNIFICANT CHANGE UP (ref 0–0.2)
BASOPHILS NFR BLD AUTO: 0.2 % — SIGNIFICANT CHANGE UP (ref 0–2)
BUN SERPL-MCNC: 5 MG/DL — LOW (ref 7–23)
CALCIUM SERPL-MCNC: 8.3 MG/DL — LOW (ref 8.4–10.5)
CHLORIDE SERPL-SCNC: 98 MMOL/L — SIGNIFICANT CHANGE UP (ref 96–108)
CO2 SERPL-SCNC: 26 MMOL/L — SIGNIFICANT CHANGE UP (ref 22–31)
CREAT SERPL-MCNC: 0.47 MG/DL — LOW (ref 0.5–1.3)
EOSINOPHIL # BLD AUTO: 0.02 K/UL — SIGNIFICANT CHANGE UP (ref 0–0.5)
EOSINOPHIL NFR BLD AUTO: 0.3 % — SIGNIFICANT CHANGE UP (ref 0–6)
GLUCOSE SERPL-MCNC: 133 MG/DL — HIGH (ref 70–99)
HCT VFR BLD CALC: 28.4 % — LOW (ref 34.5–45)
HGB BLD-MCNC: 9.2 G/DL — LOW (ref 11.5–15.5)
IMM GRANULOCYTES NFR BLD AUTO: 0.2 % — SIGNIFICANT CHANGE UP (ref 0–1.5)
LYMPHOCYTES # BLD AUTO: 1.33 K/UL — SIGNIFICANT CHANGE UP (ref 1–3.3)
LYMPHOCYTES # BLD AUTO: 22.5 % — SIGNIFICANT CHANGE UP (ref 13–44)
MCHC RBC-ENTMCNC: 29 PG — SIGNIFICANT CHANGE UP (ref 27–34)
MCHC RBC-ENTMCNC: 32.4 GM/DL — SIGNIFICANT CHANGE UP (ref 32–36)
MCV RBC AUTO: 89.6 FL — SIGNIFICANT CHANGE UP (ref 80–100)
MONOCYTES # BLD AUTO: 0.73 K/UL — SIGNIFICANT CHANGE UP (ref 0–0.9)
MONOCYTES NFR BLD AUTO: 12.4 % — SIGNIFICANT CHANGE UP (ref 2–14)
NEUTROPHILS # BLD AUTO: 3.81 K/UL — SIGNIFICANT CHANGE UP (ref 1.8–7.4)
NEUTROPHILS NFR BLD AUTO: 64.4 % — SIGNIFICANT CHANGE UP (ref 43–77)
PLATELET # BLD AUTO: 213 K/UL — SIGNIFICANT CHANGE UP (ref 150–400)
POTASSIUM SERPL-MCNC: 3.3 MMOL/L — LOW (ref 3.5–5.3)
POTASSIUM SERPL-SCNC: 3.3 MMOL/L — LOW (ref 3.5–5.3)
RBC # BLD: 3.17 M/UL — LOW (ref 3.8–5.2)
RBC # FLD: 13.4 % — SIGNIFICANT CHANGE UP (ref 10.3–14.5)
SODIUM SERPL-SCNC: 136 MMOL/L — SIGNIFICANT CHANGE UP (ref 135–145)
WBC # BLD: 5.91 K/UL — SIGNIFICANT CHANGE UP (ref 3.8–10.5)
WBC # FLD AUTO: 5.91 K/UL — SIGNIFICANT CHANGE UP (ref 3.8–10.5)

## 2019-09-09 RX ORDER — POTASSIUM CHLORIDE 20 MEQ
20 PACKET (EA) ORAL
Refills: 0 | Status: COMPLETED | OUTPATIENT
Start: 2019-09-09 | End: 2019-09-09

## 2019-09-09 RX ORDER — DOCUSATE SODIUM 100 MG
1 CAPSULE ORAL
Qty: 0 | Refills: 0 | DISCHARGE
Start: 2019-09-09

## 2019-09-09 RX ORDER — HYDROMORPHONE HYDROCHLORIDE 2 MG/ML
4 INJECTION INTRAMUSCULAR; INTRAVENOUS; SUBCUTANEOUS EVERY 4 HOURS
Refills: 0 | Status: DISCONTINUED | OUTPATIENT
Start: 2019-09-09 | End: 2019-09-10

## 2019-09-09 RX ORDER — ACETAMINOPHEN 500 MG
2 TABLET ORAL
Qty: 0 | Refills: 0 | DISCHARGE
Start: 2019-09-09

## 2019-09-09 RX ORDER — OXYCODONE HYDROCHLORIDE 5 MG/1
1 TABLET ORAL
Qty: 0 | Refills: 0 | DISCHARGE
Start: 2019-09-09

## 2019-09-09 RX ORDER — ACETAMINOPHEN 500 MG
1000 TABLET ORAL ONCE
Refills: 0 | Status: COMPLETED | OUTPATIENT
Start: 2019-09-09 | End: 2019-09-09

## 2019-09-09 RX ORDER — HYDROMORPHONE HYDROCHLORIDE 2 MG/ML
8 INJECTION INTRAMUSCULAR; INTRAVENOUS; SUBCUTANEOUS EVERY 6 HOURS
Refills: 0 | Status: DISCONTINUED | OUTPATIENT
Start: 2019-09-09 | End: 2019-09-10

## 2019-09-09 RX ORDER — FAMOTIDINE 10 MG/ML
20 INJECTION INTRAVENOUS
Refills: 0 | Status: DISCONTINUED | OUTPATIENT
Start: 2019-09-09 | End: 2019-09-10

## 2019-09-09 RX ORDER — OXYCODONE HYDROCHLORIDE 5 MG/1
40 TABLET ORAL EVERY 12 HOURS
Refills: 0 | Status: DISCONTINUED | OUTPATIENT
Start: 2019-09-09 | End: 2019-09-10

## 2019-09-09 RX ORDER — HYDROMORPHONE HYDROCHLORIDE 2 MG/ML
0.5 INJECTION INTRAMUSCULAR; INTRAVENOUS; SUBCUTANEOUS
Refills: 0 | Status: DISCONTINUED | OUTPATIENT
Start: 2019-09-09 | End: 2019-09-10

## 2019-09-09 RX ADMIN — Medication 400 MILLIGRAM(S): at 10:27

## 2019-09-09 RX ADMIN — OXYCODONE HYDROCHLORIDE 40 MILLIGRAM(S): 5 TABLET ORAL at 01:10

## 2019-09-09 RX ADMIN — Medication 1 MILLIGRAM(S): at 11:46

## 2019-09-09 RX ADMIN — OXYCODONE HYDROCHLORIDE 40 MILLIGRAM(S): 5 TABLET ORAL at 00:31

## 2019-09-09 RX ADMIN — Medication 325 MILLIGRAM(S): at 17:06

## 2019-09-09 RX ADMIN — Medication 325 MILLIGRAM(S): at 11:45

## 2019-09-09 RX ADMIN — OXYCODONE HYDROCHLORIDE 10 MILLIGRAM(S): 5 TABLET ORAL at 00:10

## 2019-09-09 RX ADMIN — Medication 25 MICROGRAM(S): at 05:47

## 2019-09-09 RX ADMIN — Medication 500 MILLIGRAM(S): at 17:06

## 2019-09-09 RX ADMIN — Medication 1 TABLET(S): at 11:45

## 2019-09-09 RX ADMIN — OXYCODONE HYDROCHLORIDE 40 MILLIGRAM(S): 5 TABLET ORAL at 17:06

## 2019-09-09 RX ADMIN — Medication 100 MILLIGRAM(S): at 22:31

## 2019-09-09 RX ADMIN — Medication 100 MILLIGRAM(S): at 05:51

## 2019-09-09 RX ADMIN — OXYCODONE HYDROCHLORIDE 40 MILLIGRAM(S): 5 TABLET ORAL at 06:20

## 2019-09-09 RX ADMIN — Medication 500 MILLIGRAM(S): at 05:47

## 2019-09-09 RX ADMIN — Medication 20 MILLIEQUIVALENT(S): at 10:27

## 2019-09-09 RX ADMIN — OXYCODONE HYDROCHLORIDE 40 MILLIGRAM(S): 5 TABLET ORAL at 17:26

## 2019-09-09 RX ADMIN — Medication 325 MILLIGRAM(S): at 05:48

## 2019-09-09 RX ADMIN — HYDROMORPHONE HYDROCHLORIDE 0.5 MILLIGRAM(S): 2 INJECTION INTRAMUSCULAR; INTRAVENOUS; SUBCUTANEOUS at 20:15

## 2019-09-09 RX ADMIN — OXYCODONE HYDROCHLORIDE 40 MILLIGRAM(S): 5 TABLET ORAL at 05:48

## 2019-09-09 RX ADMIN — Medication 400 MILLIGRAM(S): at 00:30

## 2019-09-09 RX ADMIN — FAMOTIDINE 20 MILLIGRAM(S): 10 INJECTION INTRAVENOUS at 18:13

## 2019-09-09 RX ADMIN — Medication 30 MILLILITER(S): at 18:57

## 2019-09-09 RX ADMIN — HYDROMORPHONE HYDROCHLORIDE 0.5 MILLIGRAM(S): 2 INJECTION INTRAMUSCULAR; INTRAVENOUS; SUBCUTANEOUS at 20:00

## 2019-09-09 RX ADMIN — HYDROMORPHONE HYDROCHLORIDE 0.5 MILLIGRAM(S): 2 INJECTION INTRAMUSCULAR; INTRAVENOUS; SUBCUTANEOUS at 06:58

## 2019-09-09 RX ADMIN — BUPROPION HYDROCHLORIDE 150 MILLIGRAM(S): 150 TABLET, EXTENDED RELEASE ORAL at 11:45

## 2019-09-09 RX ADMIN — Medication 325 MILLIGRAM(S): at 08:17

## 2019-09-09 RX ADMIN — Medication 20 MILLIEQUIVALENT(S): at 11:45

## 2019-09-09 RX ADMIN — HYDROMORPHONE HYDROCHLORIDE 8 MILLIGRAM(S): 2 INJECTION INTRAMUSCULAR; INTRAVENOUS; SUBCUTANEOUS at 16:50

## 2019-09-09 RX ADMIN — LAMOTRIGINE 200 MILLIGRAM(S): 25 TABLET, ORALLY DISINTEGRATING ORAL at 22:31

## 2019-09-09 RX ADMIN — HYDROMORPHONE HYDROCHLORIDE 8 MILLIGRAM(S): 2 INJECTION INTRAMUSCULAR; INTRAVENOUS; SUBCUTANEOUS at 08:39

## 2019-09-09 RX ADMIN — Medication 1000 MILLIGRAM(S): at 10:45

## 2019-09-09 RX ADMIN — HYDROMORPHONE HYDROCHLORIDE 8 MILLIGRAM(S): 2 INJECTION INTRAMUSCULAR; INTRAVENOUS; SUBCUTANEOUS at 16:19

## 2019-09-09 RX ADMIN — HYDROMORPHONE HYDROCHLORIDE 8 MILLIGRAM(S): 2 INJECTION INTRAMUSCULAR; INTRAVENOUS; SUBCUTANEOUS at 22:31

## 2019-09-09 RX ADMIN — Medication 1000 MILLIGRAM(S): at 01:10

## 2019-09-09 RX ADMIN — Medication 0.5 MILLIGRAM(S): at 22:37

## 2019-09-09 RX ADMIN — HYDROMORPHONE HYDROCHLORIDE 0.5 MILLIGRAM(S): 2 INJECTION INTRAMUSCULAR; INTRAVENOUS; SUBCUTANEOUS at 07:30

## 2019-09-09 RX ADMIN — Medication 100 MILLIGRAM(S): at 05:47

## 2019-09-09 NOTE — DISCHARGE NOTE PROVIDER - HOSPITAL COURSE
This is a 63 year old Female with PMHx of Osteogenesis Imperfecta, Depression, Chronic Pain, R hip DHS, LTHA, Bilat TKA's admitted to [facility] on 9/7/19 with an open both bone forearm fracture.  Patient evaluated and cleared by Medicine for operative procedure.  On 9/8, patient underwent an uncomplicated ORIF.  Evaluated and treated by PT, recommended for Home with outpatient PT.  Remain of hospital stay unremarkable, and patient discharged to home when PT cleared. This is a 63 year old Female with PMHx of Osteogenesis Imperfecta, Depression, Chronic Pain, R hip DHS, LTHA, Bilat TKA's admitted to [facility] on 9/7/19 with an open both bone forearm fracture.  Patient evaluated and cleared by Medicine for operative procedure.  On 9/8, patient underwent an uncomplicated ORIF.  Evaluated and treated by PT, recommended for Home with outpatient PT.  Melton placed for retention.  TOV on 9/10/19. Remain of hospital stay unremarkable, and patient discharged to home when PT cleared.

## 2019-09-09 NOTE — DISCHARGE NOTE PROVIDER - CARE PROVIDER_API CALL
Guero Morrison (MD)  Orthopaedic Surgery  [address]  [place] [address]  Phone: [phone]  Fax: [phone]  Follow Up Time:

## 2019-09-09 NOTE — PHYSICAL THERAPY INITIAL EVALUATION ADULT - ACTIVE RANGE OF MOTION EXAMINATION, REHAB EVAL
Right LE Active ROM was WFL (within functional limits)/Deferred R UE AROM secondary to pain/Left LE Active ROM was WFL (within functional limits)/Left UE Active ROM was WFL (within functional limits)

## 2019-09-09 NOTE — PHYSICAL THERAPY INITIAL EVALUATION ADULT - PRECAUTIONS/LIMITATIONS, REHAB EVAL
CXR (-) XRayRForearm: Status post splinting of ulna and radius. Correction of dorsal angulation. New medial angulation of ulnar fracture. CXR (-) XRay RForearm: Status post splinting of ulna and radius. Correction of dorsal angulation. New medial angulation of ulnar fracture./fall precautions/surgical precautions

## 2019-09-09 NOTE — PROGRESS NOTE ADULT - ASSESSMENT
Because of the complexity of the fracture, she was transferred from [place] to [place] for definitive surgical intervention. The patient underwent Right radius and ulna ORIF on 09/08/19 and was seen post -op with severe pain and to receive Dilaudid IV for pain control. Patient with continue complaint of pain. would restart home regime. continue physical therapy as tolerated. tolerated procedure well would start DC planning home.

## 2019-09-09 NOTE — PHYSICAL THERAPY INITIAL EVALUATION ADULT - ASSISTIVE DEVICE FOR TRANSFER: GAIT, REHAB EVAL
Pt refusing use of AD for amb at this time, states "she does not like the way NBQC feels" and would rather ambulate without an AD

## 2019-09-09 NOTE — DISCHARGE NOTE PROVIDER - NSDCFUADDINST_GEN_ALL_CORE_FT
Please follow up with Dr. Morrison 7-10 days after your discharge from the hospital (call for appointment).  PT/OT- non weight bearing.  Aspirin 325 twice daily x 6 weeks total for dvt prevention.  Keep dressing clean and intact, have doctor remove staples/sutures post op day 14 (if applicable) and apply steristrips.  Please follow up with your PMD within 1 month for routine checkup. Please follow up with Dr. Morrison 10 - 12 days after your discharge from the hospital (call for appointment).  PT/OT- non weight bearing.  Aspirin 325 twice daily x 6 weeks total for dvt prevention.  Keep dressing clean and intact, have doctor remove staples/sutures post op day 14 (if applicable) and apply steristrips.  Please follow up with your PMD within 1 month for routine checkup.

## 2019-09-09 NOTE — PHYSICAL THERAPY INITIAL EVALUATION ADULT - TRANSFER TRAINING, PT EVAL
GOAL: Pt will perform sit<>stand transfers independently within 3-4 wks adhering to NWB precautions of R UE.

## 2019-09-09 NOTE — PROGRESS NOTE ADULT - SUBJECTIVE AND OBJECTIVE BOX
Pt S/E at bedside, no acute events overnight, pain controlled    AVSS  Gen: NAD, AAOx3    Right Upper Extremity:  Dressing clean dry intact  +AIN/M/R/U/Msc/Ax  able to minimally extend thumb and index finger, flickers digits 3-5 in extension  SILT, decreased over radial nerve distribution  +Radial Pulse  Compartments soft  No calf TTP B/L

## 2019-09-09 NOTE — PHYSICAL THERAPY INITIAL EVALUATION ADULT - GAIT TRAINING, PT EVAL
GOAL: Pt will ambulate 150' independently with or without AD as needed in 3-4wks adhering to NWB precautions of R UE.

## 2019-09-09 NOTE — DISCHARGE NOTE PROVIDER - NSDCACTIVITY_GEN_ALL_CORE
Stairs allowed/Walking - Outdoors allowed/Do not make important decisions/Walking - Indoors allowed/No heavy lifting/straining/Showering allowed/Do not drive or operate machinery Walking - Outdoors allowed/Do not make important decisions/Stairs allowed/Walking - Indoors allowed/sponge bathe until seen by surgeon/Do not drive or operate machinery/No heavy lifting/straining

## 2019-09-09 NOTE — DISCHARGE NOTE PROVIDER - NSDCCPCAREPLAN_GEN_ALL_CORE_FT
PRINCIPAL DISCHARGE DIAGNOSIS  Diagnosis: Forearm fracture, right, open type I or II, initial encounter  Assessment and Plan of Treatment:

## 2019-09-09 NOTE — PHYSICAL THERAPY INITIAL EVALUATION ADULT - ADDITIONAL COMMENTS
Pt resides in an apartment style home with elevator/ramp access. Pt states she does not usually negotiate stairs, or use an AD in the apartment. Pt reports having a single-axis cane, RW, commode, and wheelchair at home, utilizing the SAC and RW for community ambulation. Prior to admit, patient was ambulating and performing ADLs independently. Pt reports having assist from  [place] with ADLs as needed.

## 2019-09-09 NOTE — PHYSICAL THERAPY INITIAL EVALUATION ADULT - MANUAL MUSCLE TESTING RESULTS, REHAB EVAL
grossly assessed due to/Did not formally assess strength with MMT secondary to pt diagnosis of Osteogenesis Imperfecta, patient able to perform full Active ROM with BLEs and perform sit<>stand transfer BLEs at least 3+/5

## 2019-09-09 NOTE — PROGRESS NOTE ADULT - SUBJECTIVE AND OBJECTIVE BOX
The patient is a 63-year-old female who was in her apartment with boxes on the ground.  They were placed there by her .  She inadvertently stepped backwards, fell over the box and landed on her right arm.  She had difficulty rising and significant pain.  She went to [facility] and x-ray showed a proximal ulna and proximal radius fracture that required ORIF.  Because of the complexity of the fracture, she was transferred from [place] to [place] for definitive surgical intervention. The patient underwent Right radius and ulna ORIF on 19 and was seen post -op with severe pain and to receive Dilaudid IV for pain control.       MEDICATIONS  (STANDING):  ascorbic acid 500 milliGRAM(s) Oral two times a day  aspirin 325 milliGRAM(s) Oral two times a day  buPROPion XL . 150 milliGRAM(s) Oral daily  docusate sodium 100 milliGRAM(s) Oral three times a day  ferrous    sulfate 325 milliGRAM(s) Oral three times a day with meals  folic acid 1 milliGRAM(s) Oral daily  lactated ringers. 1000 milliLiter(s) (75 mL/Hr) IV Continuous <Continuous>  lamoTRIgine 200 milliGRAM(s) Oral at bedtime  levothyroxine 25 MICROGram(s) Oral daily  multivitamin 1 Tablet(s) Oral daily  oxyCODONE  ER Tablet 40 milliGRAM(s) Oral every 12 hours    MEDICATIONS  (PRN):  acetaminophen   Tablet .. 650 milliGRAM(s) Oral every 6 hours PRN Temp greater or equal to 38C (100.4F)  benzocaine 15 mG/menthol 3.6 mG Lozenge 1 Lozenge Oral every 4 hours PRN Sore Throat  clonazePAM  Tablet 0.5 milliGRAM(s) Oral every 12 hours PRN anxiety  diphenhydrAMINE 25 milliGRAM(s) Oral at bedtime PRN Insomnia  HYDROmorphone   Tablet 4 milliGRAM(s) Oral every 4 hours PRN Moderate Pain (4 - 6)  HYDROmorphone   Tablet 8 milliGRAM(s) Oral every 6 hours PRN Severe Pain (7 - 10)  HYDROmorphone  Injectable 0.5 milliGRAM(s) IV Push every 3 hours PRN breakthru pain  ondansetron Injectable 4 milliGRAM(s) IV Push every 6 hours PRN Nausea and/or Vomiting  oxyCODONE    IR 10 milliGRAM(s) Oral every 4 hours PRN Moderate Pain (4 - 6)  oxyCODONE    IR 5 milliGRAM(s) Oral every 4 hours PRN Mild Pain (1 - 3)          VITALS:   T(C): 37.1 (19 @ 14:25), Max: 37.3 (19 @ 08:35)  HR: 76 (19 @ 14:25) (76 - 85)  BP: 107/69 (19 @ 14:25) (107/69 - 169/88)  RR: 18 (19 @ 14:25) (14 - 18)  SpO2: 93% (19 @ 14:25) (90% - 99%)  Wt(kg): --    PHYSICAL EXAM:  GENERAL: NAD, well nourished and conversant  HEAD:  Atraumatic  EYES: EOM, PERRLA, conjunctiva pink and sclera white  ENT: No tonsillar erythema, exudates, or enlargement, moist mucous membranes, good dentition, no lesions  NECK: Supple, No JVD, normal thyroid, carotids with normal upstrokes and no bruits  CHEST/LUNG: Clear to auscultation bilaterally, No rales, rhonchi, wheezing, or rubs  HEART: Regular rate and rhythm, No murmurs, rubs, or gallops  ABDOMEN: Soft, nondistended, no masses, guarding, tenderness or rebound, bowel sounds present  EXTREMITIES:  2+ Peripheral Pulses, No clubbing, cyanosis, or edema. No arthritis of shoulders, elbows, hands, hips, knees, ankles, or feet. No DJD C spine, T spine, or L/S spine  LYMPH: No lymphadenopathy noted  SKIN: No rashes or lesions  NERVOUS SYSTEM:  Alert & Oriented X3, normal cognitive function. Motor Strength 5/5 right upper and right lower.  5/5 left upper and left lower extremities, DTRs 2+ intact and symmetric    LABS:        CBC Full  -  ( 09 Sep 2019 10:10 )  WBC Count : 5.91 K/uL  RBC Count : 3.17 M/uL  Hemoglobin : 9.2 g/dL  Hematocrit : 28.4 %  Platelet Count - Automated : 213 K/uL  Mean Cell Volume : 89.6 fl  Mean Cell Hemoglobin : 29.0 pg  Mean Cell Hemoglobin Concentration : 32.4 gm/dL  Auto Neutrophil # : 3.81 K/uL  Auto Lymphocyte # : 1.33 K/uL  Auto Monocyte # : 0.73 K/uL  Auto Eosinophil # : 0.02 K/uL  Auto Basophil # : 0.01 K/uL  Auto Neutrophil % : 64.4 %  Auto Lymphocyte % : 22.5 %  Auto Monocyte % : 12.4 %  Auto Eosinophil % : 0.3 %  Auto Basophil % : 0.2 %        136  |  98  |  5<L>  ----------------------------<  133<H>  3.3<L>   |  26  |  0.47<L>    Ca    8.3<L>      09 Sep 2019 07:09    TPro  6.6  /  Alb  4.0  /  TBili  0.2  /  DBili  x   /  AST  20  /  ALT  19  /  AlkPhos  127<H>      LIVER FUNCTIONS - ( 07 Sep 2019 16:23 )  Alb: 4.0 g/dL / Pro: 6.6 g/dL / ALK PHOS: 127 U/L / ALT: 19 U/L / AST: 20 U/L / GGT: x           PT/INR - ( 08 Sep 2019 07:23 )   PT: 12.2 sec;   INR: 1.07 ratio         PTT - ( 08 Sep 2019 07:23 )  PTT:25.3 sec  Urinalysis Basic - ( 08 Sep 2019 00:12 )    Color: Yellow / Appearance: Clear / S.032 / pH: x  Gluc: x / Ketone: Negative  / Bili: Negative / Urobili: Negative   Blood: x / Protein: Trace / Nitrite: Negative   Leuk Esterase: Large / RBC: 4 /hpf / WBC 10 /HPF   Sq Epi: x / Non Sq Epi: 3 /hpf / Bacteria: Negative      CAPILLARY BLOOD GLUCOSE          RADIOLOGY & ADDITIONAL TESTS:

## 2019-09-09 NOTE — PROGRESS NOTE ADULT - ASSESSMENT
A/P: 63F s/p ORIF Gr1 Both bone forearm fracture  Analgesia  DVT ppx  NWB RUE  PT/OT  Will get urology consult for failure of voiding  DC planning.

## 2019-09-09 NOTE — PHYSICAL THERAPY INITIAL EVALUATION ADULT - BED MOBILITY TRAINING, PT EVAL
GOAL: Pt will perform all bed mobility independently within 3-4 wks adhering to NWB precautions of R UE.

## 2019-09-09 NOTE — PROGRESS NOTE ADULT - ATTENDING COMMENTS
No medical complications post-op to date and to proceed with physical therapy, as tolerated. Continues pulmonary toilet to lessen atelectasis, leg exercises as taught to lessen the risk of DVT and supervised pain medications for post-op pain control. I am a non participating [facility] physician seeing Pt in coverage for Dr Bradford No medical complications post-op to date and to proceed with physical therapy, as tolerated. Continues pulmonary toilet to lessen atelectasis, leg exercises as taught to lessen the risk of DVT and supervised pain medications for post-op pain control. I am a non participating [facility] physician seeing Pt in coverage for Dr Bradford. The above patient examination was reviewed with Dr. Garcia and I agree with his evaluation, assessment and treatment plan.  Matt Bradford M.D.

## 2019-09-10 ENCOUNTER — TRANSCRIPTION ENCOUNTER (OUTPATIENT)
Age: 64
End: 2019-09-10

## 2019-09-10 VITALS — HEART RATE: 80 BPM | SYSTOLIC BLOOD PRESSURE: 137 MMHG | DIASTOLIC BLOOD PRESSURE: 78 MMHG | OXYGEN SATURATION: 95 %

## 2019-09-10 LAB
ANION GAP SERPL CALC-SCNC: 9 MMOL/L — SIGNIFICANT CHANGE UP (ref 5–17)
BUN SERPL-MCNC: 6 MG/DL — LOW (ref 7–23)
CALCIUM SERPL-MCNC: 8.7 MG/DL — SIGNIFICANT CHANGE UP (ref 8.4–10.5)
CHLORIDE SERPL-SCNC: 98 MMOL/L — SIGNIFICANT CHANGE UP (ref 96–108)
CO2 SERPL-SCNC: 28 MMOL/L — SIGNIFICANT CHANGE UP (ref 22–31)
CREAT SERPL-MCNC: 0.48 MG/DL — LOW (ref 0.5–1.3)
GLUCOSE SERPL-MCNC: 113 MG/DL — HIGH (ref 70–99)
HCT VFR BLD CALC: 28.1 % — LOW (ref 34.5–45)
HGB BLD-MCNC: 9.4 G/DL — LOW (ref 11.5–15.5)
MCHC RBC-ENTMCNC: 30.8 PG — SIGNIFICANT CHANGE UP (ref 27–34)
MCHC RBC-ENTMCNC: 33.5 GM/DL — SIGNIFICANT CHANGE UP (ref 32–36)
MCV RBC AUTO: 92.1 FL — SIGNIFICANT CHANGE UP (ref 80–100)
PLATELET # BLD AUTO: 209 K/UL — SIGNIFICANT CHANGE UP (ref 150–400)
POTASSIUM SERPL-MCNC: 3.9 MMOL/L — SIGNIFICANT CHANGE UP (ref 3.5–5.3)
POTASSIUM SERPL-SCNC: 3.9 MMOL/L — SIGNIFICANT CHANGE UP (ref 3.5–5.3)
RBC # BLD: 3.05 M/UL — LOW (ref 3.8–5.2)
RBC # FLD: 13.6 % — SIGNIFICANT CHANGE UP (ref 10.3–14.5)
SODIUM SERPL-SCNC: 135 MMOL/L — SIGNIFICANT CHANGE UP (ref 135–145)
WBC # BLD: 6.49 K/UL — SIGNIFICANT CHANGE UP (ref 3.8–10.5)
WBC # FLD AUTO: 6.49 K/UL — SIGNIFICANT CHANGE UP (ref 3.8–10.5)

## 2019-09-10 PROCEDURE — 81001 URINALYSIS AUTO W/SCOPE: CPT

## 2019-09-10 PROCEDURE — 97165 OT EVAL LOW COMPLEX 30 MIN: CPT

## 2019-09-10 PROCEDURE — 80053 COMPREHEN METABOLIC PANEL: CPT

## 2019-09-10 PROCEDURE — 81025 URINE PREGNANCY TEST: CPT

## 2019-09-10 PROCEDURE — 71045 X-RAY EXAM CHEST 1 VIEW: CPT

## 2019-09-10 PROCEDURE — 97161 PT EVAL LOW COMPLEX 20 MIN: CPT

## 2019-09-10 PROCEDURE — 85610 PROTHROMBIN TIME: CPT

## 2019-09-10 PROCEDURE — 73090 X-RAY EXAM OF FOREARM: CPT

## 2019-09-10 PROCEDURE — 76000 FLUOROSCOPY <1 HR PHYS/QHP: CPT

## 2019-09-10 PROCEDURE — C1889: CPT

## 2019-09-10 PROCEDURE — 84702 CHORIONIC GONADOTROPIN TEST: CPT

## 2019-09-10 PROCEDURE — 86850 RBC ANTIBODY SCREEN: CPT

## 2019-09-10 PROCEDURE — 80048 BASIC METABOLIC PNL TOTAL CA: CPT

## 2019-09-10 PROCEDURE — C1713: CPT

## 2019-09-10 PROCEDURE — 86803 HEPATITIS C AB TEST: CPT

## 2019-09-10 PROCEDURE — 86900 BLOOD TYPING SEROLOGIC ABO: CPT

## 2019-09-10 PROCEDURE — 85027 COMPLETE CBC AUTOMATED: CPT

## 2019-09-10 PROCEDURE — 86901 BLOOD TYPING SEROLOGIC RH(D): CPT

## 2019-09-10 PROCEDURE — 99285 EMERGENCY DEPT VISIT HI MDM: CPT

## 2019-09-10 PROCEDURE — 73110 X-RAY EXAM OF WRIST: CPT

## 2019-09-10 PROCEDURE — 85730 THROMBOPLASTIN TIME PARTIAL: CPT

## 2019-09-10 PROCEDURE — 93005 ELECTROCARDIOGRAM TRACING: CPT

## 2019-09-10 RX ORDER — ASPIRIN/CALCIUM CARB/MAGNESIUM 324 MG
1 TABLET ORAL
Qty: 0 | Refills: 0 | DISCHARGE
Start: 2019-09-10

## 2019-09-10 RX ADMIN — Medication 0.5 MILLIGRAM(S): at 11:45

## 2019-09-10 RX ADMIN — Medication 100 MILLIGRAM(S): at 07:03

## 2019-09-10 RX ADMIN — Medication 25 MICROGRAM(S): at 07:03

## 2019-09-10 RX ADMIN — HYDROMORPHONE HYDROCHLORIDE 8 MILLIGRAM(S): 2 INJECTION INTRAMUSCULAR; INTRAVENOUS; SUBCUTANEOUS at 07:04

## 2019-09-10 RX ADMIN — OXYCODONE HYDROCHLORIDE 40 MILLIGRAM(S): 5 TABLET ORAL at 07:34

## 2019-09-10 RX ADMIN — Medication 500 MILLIGRAM(S): at 07:03

## 2019-09-10 RX ADMIN — Medication 100 MILLIGRAM(S): at 14:07

## 2019-09-10 RX ADMIN — Medication 1 TABLET(S): at 11:45

## 2019-09-10 RX ADMIN — Medication 325 MILLIGRAM(S): at 07:03

## 2019-09-10 RX ADMIN — Medication 325 MILLIGRAM(S): at 11:45

## 2019-09-10 RX ADMIN — FAMOTIDINE 20 MILLIGRAM(S): 10 INJECTION INTRAVENOUS at 07:06

## 2019-09-10 RX ADMIN — Medication 1 MILLIGRAM(S): at 11:45

## 2019-09-10 RX ADMIN — BUPROPION HYDROCHLORIDE 150 MILLIGRAM(S): 150 TABLET, EXTENDED RELEASE ORAL at 14:06

## 2019-09-10 RX ADMIN — HYDROMORPHONE HYDROCHLORIDE 8 MILLIGRAM(S): 2 INJECTION INTRAMUSCULAR; INTRAVENOUS; SUBCUTANEOUS at 07:34

## 2019-09-10 RX ADMIN — OXYCODONE HYDROCHLORIDE 40 MILLIGRAM(S): 5 TABLET ORAL at 07:07

## 2019-09-10 RX ADMIN — BENZOCAINE AND MENTHOL 1 LOZENGE: 5; 1 LIQUID ORAL at 02:34

## 2019-09-10 RX ADMIN — HYDROMORPHONE HYDROCHLORIDE 8 MILLIGRAM(S): 2 INJECTION INTRAMUSCULAR; INTRAVENOUS; SUBCUTANEOUS at 00:45

## 2019-09-10 NOTE — PROGRESS NOTE ADULT - REASON FOR ADMISSION
right open both bone forearm fracture

## 2019-09-10 NOTE — PROGRESS NOTE ADULT - SUBJECTIVE AND OBJECTIVE BOX
The patient is a 63-year-old female who was in her apartment with boxes on the ground.  They were placed there by her .  She inadvertently stepped backwards, fell over the box and landed on her right arm.  She had difficulty rising and significant pain.  She went to [place] [facility] and x-ray showed a proximal ulna and proximal radius fracture that required ORIF.  Because of the complexity of the fracture, she was transferred from [place] to [place] for definitive surgical intervention. The patient underwent Right radius and ulna ORIF on 09/08/19 and was seen post -op with severe pain and to receive Dilaudid IV for pain control.       MEDICATIONS  (STANDING):  ascorbic acid 500 milliGRAM(s) Oral two times a day  aspirin 325 milliGRAM(s) Oral two times a day  buPROPion XL . 150 milliGRAM(s) Oral daily  docusate sodium 100 milliGRAM(s) Oral three times a day  famotidine    Tablet 20 milliGRAM(s) Oral two times a day  ferrous    sulfate 325 milliGRAM(s) Oral three times a day with meals  folic acid 1 milliGRAM(s) Oral daily  lactated ringers. 1000 milliLiter(s) (75 mL/Hr) IV Continuous <Continuous>  lamoTRIgine 200 milliGRAM(s) Oral at bedtime  levothyroxine 25 MICROGram(s) Oral daily  multivitamin 1 Tablet(s) Oral daily  oxyCODONE  ER Tablet 40 milliGRAM(s) Oral every 12 hours    MEDICATIONS  (PRN):  acetaminophen   Tablet .. 650 milliGRAM(s) Oral every 6 hours PRN Temp greater or equal to 38C (100.4F)  aluminum hydroxide/magnesium hydroxide/simethicone Suspension 30 milliLiter(s) Oral every 4 hours PRN Dyspepsia  benzocaine 15 mG/menthol 3.6 mG Lozenge 1 Lozenge Oral every 4 hours PRN Sore Throat  clonazePAM  Tablet 0.5 milliGRAM(s) Oral every 12 hours PRN anxiety  diphenhydrAMINE 25 milliGRAM(s) Oral at bedtime PRN Insomnia  HYDROmorphone   Tablet 4 milliGRAM(s) Oral every 4 hours PRN Moderate Pain (4 - 6)  HYDROmorphone   Tablet 8 milliGRAM(s) Oral every 6 hours PRN Severe Pain (7 - 10)  HYDROmorphone  Injectable 0.5 milliGRAM(s) IV Push every 3 hours PRN breakthru pain  ondansetron Injectable 4 milliGRAM(s) IV Push every 6 hours PRN Nausea and/or Vomiting  oxyCODONE    IR 10 milliGRAM(s) Oral every 4 hours PRN Moderate Pain (4 - 6)  oxyCODONE    IR 5 milliGRAM(s) Oral every 4 hours PRN Mild Pain (1 - 3)          Vital Signs Last 24 Hrs  T(C): 37.4 (10 Sep 2019 07:56), Max: 37.5 (09 Sep 2019 20:00)  T(F): 99.4 (10 Sep 2019 07:56), Max: 99.5 (09 Sep 2019 20:00)  HR: 82 (10 Sep 2019 07:56) (72 - 82)  BP: 139/78 (10 Sep 2019 07:56) (101/65 - 149/84)  BP(mean): --  RR: 18 (10 Sep 2019 07:56) (18 - 18)  SpO2: 95% (10 Sep 2019 07:56) (93% - 97%)    PHYSICAL EXAM:  GENERAL: NAD, well nourished and conversant  HEAD:  Atraumatic  EYES: EOM, PERRLA, conjunctiva pink and sclera white  ENT: No tonsillar erythema, exudates, or enlargement, moist mucous membranes, good dentition, no lesions  NECK: Supple, No JVD, normal thyroid, carotids with normal upstrokes and no bruits  CHEST/LUNG: Clear to auscultation bilaterally, No rales, rhonchi, wheezing, or rubs  HEART: Regular rate and rhythm, No murmurs, rubs, or gallops  ABDOMEN: Soft, nondistended, no masses, guarding, tenderness or rebound, bowel sounds present  EXTREMITIES:  2+ Peripheral Pulses, No clubbing, cyanosis, or edema. No arthritis of shoulders, elbows, hands, hips, knees, ankles, or feet. No DJD C spine, T spine, or L/S spine  LYMPH: No lymphadenopathy noted  SKIN: No rashes or lesions  NERVOUS SYSTEM:  Alert & Oriented X3, normal cognitive function. Motor Strength 5/5 right upper and right lower.  5/5 left upper and left lower extremities, DTRs 2+ intact and symmetric             Labs:    CBC Full  -  ( 10 Sep 2019 09:28 )  WBC Count : 6.49 K/uL  RBC Count : 3.05 M/uL  Hemoglobin : 9.4 g/dL  Hematocrit : 28.1 %  Platelet Count - Automated : 209 K/uL  Mean Cell Volume : 92.1 fl  Mean Cell Hemoglobin : 30.8 pg  Mean Cell Hemoglobin Concentration : 33.5 gm/dL  Auto Neutrophil # : x  Auto Lymphocyte # : x  Auto Monocyte # : x  Auto Eosinophil # : x  Auto Basophil # : x  Auto Neutrophil % : x  Auto Lymphocyte % : x  Auto Monocyte % : x  Auto Eosinophil % : x  Auto Basophil % : x    09-10    135  |  98  |  6<L>  ----------------------------<  113<H>  3.9   |  28  |  0.48<L>    Ca    8.7      10 Sep 2019 07:11            RADIOLOGY & ADDITIONAL TESTS: The patient is a 63-year-old female who was in her apartment with boxes on the ground.  They were placed there by her .  She inadvertently stepped backwards, fell over the box and landed on her right arm.  She had difficulty rising and significant pain.  She went to [place] [facility] and x-ray showed a proximal ulna and proximal radius fracture that required ORIF.  Because of the complexity of the fracture, she was transferred from [place] to [place] for definitive surgical intervention. The patient underwent Right radius and ulna ORIF on 09/08/19 and was seen post -op with severe pain and to receive Dilaudid IV for pain control. Right arm immobilized and pain controlled. For discharge to home today, as planned.      MEDICATIONS  (STANDING):  ascorbic acid 500 milliGRAM(s) Oral two times a day  aspirin 325 milliGRAM(s) Oral two times a day  buPROPion XL . 150 milliGRAM(s) Oral daily  docusate sodium 100 milliGRAM(s) Oral three times a day  famotidine    Tablet 20 milliGRAM(s) Oral two times a day  ferrous    sulfate 325 milliGRAM(s) Oral three times a day with meals  folic acid 1 milliGRAM(s) Oral daily  lactated ringers. 1000 milliLiter(s) (75 mL/Hr) IV Continuous <Continuous>  lamoTRIgine 200 milliGRAM(s) Oral at bedtime  levothyroxine 25 MICROGram(s) Oral daily  multivitamin 1 Tablet(s) Oral daily  oxyCODONE  ER Tablet 40 milliGRAM(s) Oral every 12 hours    MEDICATIONS  (PRN):  acetaminophen   Tablet .. 650 milliGRAM(s) Oral every 6 hours PRN Temp greater or equal to 38C (100.4F)  aluminum hydroxide/magnesium hydroxide/simethicone Suspension 30 milliLiter(s) Oral every 4 hours PRN Dyspepsia  benzocaine 15 mG/menthol 3.6 mG Lozenge 1 Lozenge Oral every 4 hours PRN Sore Throat  clonazePAM  Tablet 0.5 milliGRAM(s) Oral every 12 hours PRN anxiety  diphenhydrAMINE 25 milliGRAM(s) Oral at bedtime PRN Insomnia  HYDROmorphone   Tablet 4 milliGRAM(s) Oral every 4 hours PRN Moderate Pain (4 - 6)  HYDROmorphone   Tablet 8 milliGRAM(s) Oral every 6 hours PRN Severe Pain (7 - 10)  HYDROmorphone  Injectable 0.5 milliGRAM(s) IV Push every 3 hours PRN breakthru pain  ondansetron Injectable 4 milliGRAM(s) IV Push every 6 hours PRN Nausea and/or Vomiting  oxyCODONE    IR 10 milliGRAM(s) Oral every 4 hours PRN Moderate Pain (4 - 6)  oxyCODONE    IR 5 milliGRAM(s) Oral every 4 hours PRN Mild Pain (1 - 3)          Vital Signs Last 24 Hrs  T(C): 37.4 (10 Sep 2019 07:56), Max: 37.5 (09 Sep 2019 20:00)  T(F): 99.4 (10 Sep 2019 07:56), Max: 99.5 (09 Sep 2019 20:00)  HR: 82 (10 Sep 2019 07:56) (72 - 82)  BP: 139/78 (10 Sep 2019 07:56) (101/65 - 149/84)  BP(mean): --  RR: 18 (10 Sep 2019 07:56) (18 - 18)  SpO2: 95% (10 Sep 2019 07:56) (93% - 97%)    PHYSICAL EXAM:  GENERAL: NAD, well nourished and conversant  HEAD:  Atraumatic  EYES: EOM, PERRLA, conjunctiva pink and sclera white  ENT: No tonsillar erythema, exudates, or enlargement, moist mucous membranes, good dentition, no lesions  NECK: Supple, No JVD, normal thyroid, carotids with normal upstrokes and no bruits  CHEST/LUNG: Clear to auscultation bilaterally, No rales, rhonchi, wheezing, or rubs  HEART: Regular rate and rhythm, No murmurs, rubs, or gallops  ABDOMEN: Soft, nondistended, no masses, guarding, tenderness or rebound, bowel sounds present  EXTREMITIES:  2+ Peripheral Pulses, No clubbing, cyanosis, or edema. No arthritis of shoulders, elbows, hands, hips, knees, ankles, or feet. No DJD C spine, T spine, or L/S spine  LYMPH: No lymphadenopathy noted  SKIN: No rashes or lesions  NERVOUS SYSTEM:  Alert & Oriented X3, normal cognitive function. Motor Strength 5/5 right upper and right lower.  5/5 left upper and left lower extremities, DTRs 2+ intact and symmetric             Labs:    CBC Full  -  ( 10 Sep 2019 09:28 )  WBC Count : 6.49 K/uL  RBC Count : 3.05 M/uL  Hemoglobin : 9.4 g/dL  Hematocrit : 28.1 %  Platelet Count - Automated : 209 K/uL  Mean Cell Volume : 92.1 fl  Mean Cell Hemoglobin : 30.8 pg  Mean Cell Hemoglobin Concentration : 33.5 gm/dL  Auto Neutrophil # : x  Auto Lymphocyte # : x  Auto Monocyte # : x  Auto Eosinophil # : x  Auto Basophil # : x  Auto Neutrophil % : x  Auto Lymphocyte % : x  Auto Monocyte % : x  Auto Eosinophil % : x  Auto Basophil % : x    09-10    135  |  98  |  6<L>  ----------------------------<  113<H>  3.9   |  28  |  0.48<L>    Ca    8.7      10 Sep 2019 07:11            RADIOLOGY & ADDITIONAL TESTS:

## 2019-09-10 NOTE — PROGRESS NOTE ADULT - ATTENDING COMMENTS
No medical complications post-op to date and to proceed with physical therapy, as tolerated. Continues pulmonary toilet to lessen atelectasis, leg exercises as taught to lessen the risk of DVT and supervised pain medications for post-op pain control. I am a non participating [facility] physician seeing Pt in coverage for Dr Bradford. The above patient examination was reviewed with Dr. Garcia and I agree with his evaluation, assessment and treatment plan.  Matt Bradford M.D. Cleared by orthopedics for discharge to home today.  Full instruction provided to patient regarding diagnosis, treatment, discharge medications, diet and follow up care. Medically stable and for discharge to home today as planned.

## 2019-09-10 NOTE — PROGRESS NOTE ADULT - ASSESSMENT
A/P: 63F s/p ORIF Gr1 Both bone forearm fracture  Analgesia  DVT ppx  NWB RUE  PT/OT  Will get urology consult for failure of voiding  DC planning. A/P: 63F s/p ORIF Gr1 Both bone forearm fracture  Analgesia  DVT ppx  NWB RUE  PT/OT  Trial of Void this AM  DC planning.

## 2019-09-10 NOTE — PROGRESS NOTE ADULT - NSHPATTENDINGPLANDISCUSS_GEN_ALL_CORE
the patient and the orthopedic P.A.

## 2019-09-10 NOTE — OCCUPATIONAL THERAPY INITIAL EVALUATION ADULT - PERTINENT HX OF CURRENT PROBLEM, REHAB EVAL
63F RHD presents c/o R forearm pain sp mechanical fall earlier today. Was seen at [facility] and transferred to [facility]. 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 Bilateral TKAs and L CHAYO, R DHS done at [facility] for prior fractures.

## 2019-09-10 NOTE — DISCHARGE NOTE NURSING/CASE MANAGEMENT/SOCIAL WORK - PATIENT PORTAL LINK FT
You can access the FollowMyHealth Patient Portal offered by [facility] by registering at the following website: [email or web address]. By joining [facility]’s FollowMyHealth portal, you will also be able to view your health information using other applications (apps) compatible with our system.

## 2019-09-10 NOTE — PROGRESS NOTE ADULT - PROVIDER SPECIALTY LIST ADULT
Internal Medicine
Orthopedics

## 2019-09-10 NOTE — OCCUPATIONAL THERAPY INITIAL EVALUATION ADULT - DIAGNOSIS, OT EVAL
Pt presents with impaired balance, decreased strength, decreased AROM, decreased endurance, decreased vision, cognitive impairments, impacting independence with ADLs and ability to perform functional mobility.

## 2019-09-10 NOTE — PROGRESS NOTE ADULT - ASSESSMENT
Because of the complexity of the fracture, she was transferred from [place] to [place] for definitive surgical intervention. The patient underwent Right radius and ulna ORIF on 09/08/19 and was seen post -op with severe pain and to receive Dilaudid IV for pain control. Patient with continue complaint of pain. would restart home regime. continue physical therapy as tolerated. tolerated procedure well would start DC planning home. Because of the complexity of the fracture, she was transferred from [place] to [place] for definitive surgical intervention. The patient underwent Right radius and ulna ORIF on 09/08/19 and was seen post -op with severe pain and to receive Dilaudid IV for pain control. Patient with continue complaint of pain. Would restart home regime. continue physical therapy as tolerated. tolerated procedure well would proceed  with DC planning home.

## 2019-09-10 NOTE — OCCUPATIONAL THERAPY INITIAL EVALUATION ADULT - PRECAUTIONS/LIMITATIONS, REHAB EVAL
Pt is s/p ORIF R radius and R ulna on 9/8.  XRayRWrist: Status post casting of proximal right radial and ulnar fractures. No evidence of acute fracture or dislocation of wrist. CXR (-) XRayRForearm: Status post splinting of ulna and radius. Correction of dorsal angulation. New medial angulation of ulnar fracture./fall precautions

## 2019-09-10 NOTE — OCCUPATIONAL THERAPY INITIAL EVALUATION ADULT - LIVES WITH, PROFILE
spouse/Pt lives in apartment with spouse with elevator access.  Pt was independent with self care tasks. Pt uses cane for ambulation.

## 2019-09-12 ENCOUNTER — INBOUND DOCUMENT (OUTPATIENT)
Age: 64
End: 2019-09-12

## 2019-09-18 ENCOUNTER — APPOINTMENT (OUTPATIENT)
Dept: ORTHOPEDIC SURGERY | Facility: CLINIC | Age: 64
End: 2019-09-18
Payer: COMMERCIAL

## 2019-09-18 PROCEDURE — 99024 POSTOP FOLLOW-UP VISIT: CPT

## 2019-09-27 ENCOUNTER — RX RENEWAL (OUTPATIENT)
Age: 64
End: 2019-09-27

## 2019-09-27 RX ORDER — HYDROMORPHONE HYDROCHLORIDE 4 MG/1
4 TABLET ORAL
Qty: 80 | Refills: 0 | Status: ACTIVE | COMMUNITY
Start: 2019-09-18 | End: 1900-01-01

## 2019-10-02 ENCOUNTER — APPOINTMENT (OUTPATIENT)
Dept: ORTHOPEDIC SURGERY | Facility: CLINIC | Age: 64
End: 2019-10-02
Payer: COMMERCIAL

## 2019-10-02 PROCEDURE — 99024 POSTOP FOLLOW-UP VISIT: CPT

## 2019-10-02 PROCEDURE — 73090 X-RAY EXAM OF FOREARM: CPT | Mod: RT

## 2019-10-06 ENCOUNTER — TRANSCRIPTION ENCOUNTER (OUTPATIENT)
Age: 64
End: 2019-10-06

## 2019-10-06 ENCOUNTER — INPATIENT (INPATIENT)
Facility: HOSPITAL | Age: 64
LOS: 2 days | Discharge: HOME HEALTH | DRG: 511 | End: 2019-10-09
Attending: ORTHOPAEDIC SURGERY | Admitting: ORTHOPAEDIC SURGERY
Payer: COMMERCIAL

## 2019-10-06 VITALS
SYSTOLIC BLOOD PRESSURE: 150 MMHG | WEIGHT: 130.07 LBS | OXYGEN SATURATION: 98 % | RESPIRATION RATE: 18 BRPM | DIASTOLIC BLOOD PRESSURE: 77 MMHG | HEART RATE: 82 BPM | TEMPERATURE: 98 F

## 2019-10-06 DIAGNOSIS — S52.601A UNSPECIFIED FRACTURE OF LOWER END OF RIGHT ULNA, INITIAL ENCOUNTER FOR CLOSED FRACTURE: ICD-10-CM

## 2019-10-06 LAB
ALBUMIN SERPL ELPH-MCNC: 4 G/DL — SIGNIFICANT CHANGE UP (ref 3.3–5)
ALP SERPL-CCNC: 135 U/L — HIGH (ref 40–120)
ALT FLD-CCNC: 16 U/L — SIGNIFICANT CHANGE UP (ref 10–45)
ANION GAP SERPL CALC-SCNC: 12 MMOL/L — SIGNIFICANT CHANGE UP (ref 5–17)
APTT BLD: 32.8 SEC — SIGNIFICANT CHANGE UP (ref 27.5–36.3)
AST SERPL-CCNC: 27 U/L — SIGNIFICANT CHANGE UP (ref 10–40)
BASOPHILS # BLD AUTO: 0.02 K/UL — SIGNIFICANT CHANGE UP (ref 0–0.2)
BASOPHILS NFR BLD AUTO: 0.4 % — SIGNIFICANT CHANGE UP (ref 0–2)
BILIRUB SERPL-MCNC: 0.9 MG/DL — SIGNIFICANT CHANGE UP (ref 0.2–1.2)
BLD GP AB SCN SERPL QL: NEGATIVE — SIGNIFICANT CHANGE UP
BUN SERPL-MCNC: 11 MG/DL — SIGNIFICANT CHANGE UP (ref 7–23)
CALCIUM SERPL-MCNC: 8.6 MG/DL — SIGNIFICANT CHANGE UP (ref 8.4–10.5)
CHLORIDE SERPL-SCNC: 101 MMOL/L — SIGNIFICANT CHANGE UP (ref 96–108)
CO2 SERPL-SCNC: 25 MMOL/L — SIGNIFICANT CHANGE UP (ref 22–31)
CREAT SERPL-MCNC: 0.46 MG/DL — LOW (ref 0.5–1.3)
EOSINOPHIL # BLD AUTO: 0.05 K/UL — SIGNIFICANT CHANGE UP (ref 0–0.5)
EOSINOPHIL NFR BLD AUTO: 1 % — SIGNIFICANT CHANGE UP (ref 0–6)
GLUCOSE SERPL-MCNC: 102 MG/DL — HIGH (ref 70–99)
HCT VFR BLD CALC: 31.1 % — LOW (ref 34.5–45)
HGB BLD-MCNC: 10.1 G/DL — LOW (ref 11.5–15.5)
IMM GRANULOCYTES NFR BLD AUTO: 0.4 % — SIGNIFICANT CHANGE UP (ref 0–1.5)
INR BLD: 1.04 RATIO — SIGNIFICANT CHANGE UP (ref 0.88–1.16)
LYMPHOCYTES # BLD AUTO: 0.85 K/UL — LOW (ref 1–3.3)
LYMPHOCYTES # BLD AUTO: 16.8 % — SIGNIFICANT CHANGE UP (ref 13–44)
MCHC RBC-ENTMCNC: 29.6 PG — SIGNIFICANT CHANGE UP (ref 27–34)
MCHC RBC-ENTMCNC: 32.5 GM/DL — SIGNIFICANT CHANGE UP (ref 32–36)
MCV RBC AUTO: 91.2 FL — SIGNIFICANT CHANGE UP (ref 80–100)
MONOCYTES # BLD AUTO: 0.37 K/UL — SIGNIFICANT CHANGE UP (ref 0–0.9)
MONOCYTES NFR BLD AUTO: 7.3 % — SIGNIFICANT CHANGE UP (ref 2–14)
NEUTROPHILS # BLD AUTO: 3.74 K/UL — SIGNIFICANT CHANGE UP (ref 1.8–7.4)
NEUTROPHILS NFR BLD AUTO: 74.1 % — SIGNIFICANT CHANGE UP (ref 43–77)
NRBC # BLD: 0 /100 WBCS — SIGNIFICANT CHANGE UP (ref 0–0)
PLATELET # BLD AUTO: 197 K/UL — SIGNIFICANT CHANGE UP (ref 150–400)
POTASSIUM SERPL-MCNC: 3.3 MMOL/L — LOW (ref 3.5–5.3)
POTASSIUM SERPL-SCNC: 3.3 MMOL/L — LOW (ref 3.5–5.3)
PROT SERPL-MCNC: 6.9 G/DL — SIGNIFICANT CHANGE UP (ref 6–8.3)
PROTHROM AB SERPL-ACNC: 12 SEC — SIGNIFICANT CHANGE UP (ref 10–12.9)
RBC # BLD: 3.41 M/UL — LOW (ref 3.8–5.2)
RBC # FLD: 14 % — SIGNIFICANT CHANGE UP (ref 10.3–14.5)
RH IG SCN BLD-IMP: POSITIVE — SIGNIFICANT CHANGE UP
SODIUM SERPL-SCNC: 138 MMOL/L — SIGNIFICANT CHANGE UP (ref 135–145)
WBC # BLD: 5.05 K/UL — SIGNIFICANT CHANGE UP (ref 3.8–10.5)
WBC # FLD AUTO: 5.05 K/UL — SIGNIFICANT CHANGE UP (ref 3.8–10.5)

## 2019-10-06 PROCEDURE — 73090 X-RAY EXAM OF FOREARM: CPT | Mod: 26,LT

## 2019-10-06 PROCEDURE — 93010 ELECTROCARDIOGRAM REPORT: CPT | Mod: NC

## 2019-10-06 PROCEDURE — 99285 EMERGENCY DEPT VISIT HI MDM: CPT

## 2019-10-06 PROCEDURE — 73080 X-RAY EXAM OF ELBOW: CPT | Mod: 26,RT

## 2019-10-06 PROCEDURE — 71045 X-RAY EXAM CHEST 1 VIEW: CPT | Mod: 26

## 2019-10-06 PROCEDURE — 70450 CT HEAD/BRAIN W/O DYE: CPT | Mod: 26

## 2019-10-06 PROCEDURE — 73110 X-RAY EXAM OF WRIST: CPT | Mod: 26,RT,77

## 2019-10-06 PROCEDURE — 73090 X-RAY EXAM OF FOREARM: CPT | Mod: 26,LT,77

## 2019-10-06 PROCEDURE — 73110 X-RAY EXAM OF WRIST: CPT | Mod: 26,RT

## 2019-10-06 PROCEDURE — 73060 X-RAY EXAM OF HUMERUS: CPT | Mod: 26,RT

## 2019-10-06 PROCEDURE — 73130 X-RAY EXAM OF HAND: CPT | Mod: 26,RT

## 2019-10-06 RX ORDER — SENNA PLUS 8.6 MG/1
2 TABLET ORAL AT BEDTIME
Refills: 0 | Status: DISCONTINUED | OUTPATIENT
Start: 2019-10-06 | End: 2019-10-07

## 2019-10-06 RX ORDER — OXYCODONE HYDROCHLORIDE 5 MG/1
40 TABLET ORAL EVERY 12 HOURS
Refills: 0 | Status: DISCONTINUED | OUTPATIENT
Start: 2019-10-06 | End: 2019-10-07

## 2019-10-06 RX ORDER — SODIUM CHLORIDE 9 MG/ML
1000 INJECTION INTRAMUSCULAR; INTRAVENOUS; SUBCUTANEOUS
Refills: 0 | Status: DISCONTINUED | OUTPATIENT
Start: 2019-10-06 | End: 2019-10-07

## 2019-10-06 RX ORDER — DOCUSATE SODIUM 100 MG
100 CAPSULE ORAL THREE TIMES A DAY
Refills: 0 | Status: DISCONTINUED | OUTPATIENT
Start: 2019-10-06 | End: 2019-10-07

## 2019-10-06 RX ORDER — SODIUM CHLORIDE 9 MG/ML
1000 INJECTION, SOLUTION INTRAVENOUS
Refills: 0 | Status: DISCONTINUED | OUTPATIENT
Start: 2019-10-06 | End: 2019-10-07

## 2019-10-06 RX ORDER — ACETAMINOPHEN 500 MG
975 TABLET ORAL EVERY 8 HOURS
Refills: 0 | Status: DISCONTINUED | OUTPATIENT
Start: 2019-10-06 | End: 2019-10-07

## 2019-10-06 RX ORDER — LAMOTRIGINE 25 MG/1
200 TABLET, ORALLY DISINTEGRATING ORAL AT BEDTIME
Refills: 0 | Status: DISCONTINUED | OUTPATIENT
Start: 2019-10-06 | End: 2019-10-07

## 2019-10-06 RX ORDER — BUPROPION HYDROCHLORIDE 150 MG/1
150 TABLET, EXTENDED RELEASE ORAL DAILY
Refills: 0 | Status: DISCONTINUED | OUTPATIENT
Start: 2019-10-06 | End: 2019-10-07

## 2019-10-06 RX ORDER — HYDROMORPHONE HYDROCHLORIDE 2 MG/ML
1 INJECTION INTRAMUSCULAR; INTRAVENOUS; SUBCUTANEOUS EVERY 6 HOURS
Refills: 0 | Status: DISCONTINUED | OUTPATIENT
Start: 2019-10-06 | End: 2019-10-07

## 2019-10-06 RX ORDER — ACETAMINOPHEN 500 MG
975 TABLET ORAL ONCE
Refills: 0 | Status: COMPLETED | OUTPATIENT
Start: 2019-10-06 | End: 2019-10-06

## 2019-10-06 RX ORDER — OXYCODONE HYDROCHLORIDE 5 MG/1
5 TABLET ORAL EVERY 4 HOURS
Refills: 0 | Status: DISCONTINUED | OUTPATIENT
Start: 2019-10-06 | End: 2019-10-07

## 2019-10-06 RX ORDER — ASPIRIN/CALCIUM CARB/MAGNESIUM 324 MG
325 TABLET ORAL
Refills: 0 | Status: DISCONTINUED | OUTPATIENT
Start: 2019-10-06 | End: 2019-10-07

## 2019-10-06 RX ORDER — OXYCODONE HYDROCHLORIDE 5 MG/1
5 TABLET ORAL ONCE
Refills: 0 | Status: DISCONTINUED | OUTPATIENT
Start: 2019-10-06 | End: 2019-10-06

## 2019-10-06 RX ORDER — OXYCODONE HYDROCHLORIDE 5 MG/1
10 TABLET ORAL EVERY 4 HOURS
Refills: 0 | Status: DISCONTINUED | OUTPATIENT
Start: 2019-10-06 | End: 2019-10-07

## 2019-10-06 RX ORDER — MORPHINE SULFATE 50 MG/1
4 CAPSULE, EXTENDED RELEASE ORAL ONCE
Refills: 0 | Status: DISCONTINUED | OUTPATIENT
Start: 2019-10-06 | End: 2019-10-06

## 2019-10-06 RX ORDER — CLONAZEPAM 1 MG
0.5 TABLET ORAL EVERY 12 HOURS
Refills: 0 | Status: DISCONTINUED | OUTPATIENT
Start: 2019-10-06 | End: 2019-10-07

## 2019-10-06 RX ADMIN — Medication 975 MILLIGRAM(S): at 22:10

## 2019-10-06 RX ADMIN — OXYCODONE HYDROCHLORIDE 5 MILLIGRAM(S): 5 TABLET ORAL at 20:12

## 2019-10-06 RX ADMIN — MORPHINE SULFATE 4 MILLIGRAM(S): 50 CAPSULE, EXTENDED RELEASE ORAL at 22:10

## 2019-10-06 RX ADMIN — OXYCODONE HYDROCHLORIDE 5 MILLIGRAM(S): 5 TABLET ORAL at 22:10

## 2019-10-06 RX ADMIN — MORPHINE SULFATE 4 MILLIGRAM(S): 50 CAPSULE, EXTENDED RELEASE ORAL at 21:36

## 2019-10-06 RX ADMIN — Medication 975 MILLIGRAM(S): at 20:12

## 2019-10-06 NOTE — CONSULT NOTE ADULT - ATTENDING COMMENTS
I am a non participating [employer] physician seeing Pt in coverage for Dr Bradford I am a non participating [facility] physician seeing Pt in coverage for Dr Bradford. The above patient examination was reviewed with Dr. Garcia and I agree with his evaluation, assessment and treatment plan.  Matt Bradford M.D.

## 2019-10-06 NOTE — ED PROVIDER NOTE - OBJECTIVE STATEMENT
64yo female pt with PMHx of chronic pain (on Dilaudid, Oxycodone), Arthritis, ambulatory c/o right forearm fx s/p mechanical fall this morning. Pt stated she had recent right forearm surgery s/p fracture (9/7/2019) by Dr. Morrison and she tripped and fell again this morning landing on right hand. She's evaluated bu  and sent to ED for ulna fx. Pt also hit head on the floor but denied headache. Denies LOC, dizziness, visual changes or N/V. Denies neck or back pain. Denies CP/SOB/ABD pain. Denies sensory changes or weakness to extremities. Denies fever, chills or cough/ congestion. Denies pelvic or hip pain. Denies urinary problems.

## 2019-10-06 NOTE — H&P ADULT - HISTORY OF PRESENT ILLNESS
Orthopaedic Surgery History and Physical    Patient is a 63y old  Female who presents with a chief complaint of right forearm pain after a fall today while in house. Patient had recently undergone surgery on 9/8/2019 with Dr. Morrison for both bone forearm fracture. Of note, patient has osteogenesis imperfecta. She has no numbness or tingling, no chills, no fevers. She has not other bony complaints.       PAST MEDICAL & SURGICAL HISTORY:  Osteogenesis imperfecta  No significant past surgical history    [] No significant past history as reviewed with the patient and family    FAMILY HISTORY:    [] Family history not pertinent as reviewed with the patient and family    SOCIAL HISTORY:    MEDICATIONS  (STANDING):  lactated ringers. 1000 milliLiter(s) (100 mL/Hr) IV Continuous <Continuous>    MEDICATIONS  (PRN):    Allergies    No Known Allergies    Intolerances    Vital Signs Last 24 Hrs  T(C): 36.7 (06 Oct 2019 19:04), Max: 36.7 (06 Oct 2019 19:04)  T(F): 98.1 (06 Oct 2019 19:04), Max: 98.1 (06 Oct 2019 19:04)  HR: 82 (06 Oct 2019 19:04) (82 - 82)  BP: 150/77 (06 Oct 2019 19:04) (150/77 - 150/77)  BP(mean): --  RR: 18 (06 Oct 2019 19:04) (18 - 18)  SpO2: 98% (06 Oct 2019 19:04) (98% - 98%)      PHYSICAL EXAM:  Right arm gross deformity  incision c/d/i, mild erythema over incisions but no purulence  motor AIN/PIN/U intact  SILT M/U/R  wwp                         10.1   5.05  )-----------( 197      ( 06 Oct 2019 21:43 )             31.1       IMAGING STUDIES: Right radius and ulna fracture distal to impants    63y Female with right evy-implant distal radius and ulna fracture   OR with Dr. Morrison on 10/6/19, NPO after midnight  Hold   Medical clearance  FU labs  Consent'd  Discussed with attending

## 2019-10-06 NOTE — ED ADULT NURSE NOTE - NSIMPLEMENTINTERV_GEN_ALL_ED
"Chief complaint:   Chief Complaint   Patient presents with   â¢ fatigue   â¢ Perspiration   â¢ Dizziness       Vitals:  Visit Vitals  /76 (BP Location: [facility], Patient Position: Sitting, Cuff Size: Regular)   Pulse 73   Ht 5' 3"" (1.6 m)   Wt 93.3 kg   SpO2 94%   BMI 36.46 kg/mÂ²       HISTORY OF PRESENT ILLNESS     HPI  This is a 73-year-old gentleman with history of chronic pancreatitis, lumbar disc disease, depression and anxiety, presented with complaints of dizziness, nausea, fatigue and excessive sweating. Patient has been following with pain management and he did feel better after a few ESIs, about 6 days ago his duloxetine 30 mg was increased to twice a day, since then he has been feeling nauseous, having fatigue, dizziness and frequent heavy sweating spells. Patient was quite concerned that his disability application was denied. Patient had gone through assessment by physicians associated with [employer]. They had sent him a letter stating that he was denied and may appeal the decision. I tried to explain the patient that disability assessments are not associated with primary care physicians. It is performed by independent physicians. He also has erectile dysfunction and he was taking Viagra when he was back in [facility]. He would like to get a prescription for Viagra.   Other significant problems:  Patient Active Problem List    Diagnosis Date Noted   â¢ Depression 12/13/2017     Priority: Low   â¢ Pancreatitis, recurrent (CMS/[facility]) 07/01/2014     Priority: Low       PAST MEDICAL, FAMILY AND SOCIAL HISTORY     Medications:  Current Outpatient Medications   Medication   â¢ DULoxetine (CYMBALTA) 30 MG capsule   â¢ HYDROcodone-acetaminophen (NORCO) 5-325 MG per tablet   â¢ baclofen (LIORESAL) 10 MG tablet   â¢ meloxicam (MOBIC) 15 MG tablet   â¢ pancrelipase, lipase-protease-amylase, 23648 units per capsule   â¢ acetaminophen (TYLENOL) 500 MG tablet   â¢ Naproxen Sod-Diphenhydramine (ALEVE PM) 220-25 MG Tab " "  â¢ amitriptyline (ELAVIL) 25 MG tablet   â¢ sildenafil (REVATIO) 20 MG tablet     No current facility-administered medications for this visit. Allergies:  ALLERGIES:   Allergen Reactions   â¢ Reglan Other (See Comments)     Restlessness, ""I feel like crazy\""       Past Medical  History/Surgeries:  Past Medical History:   Diagnosis Date   â¢ Acute recurrent pancreatitis 2005    episodes since 2005, Admit 6/2014 and 11/2014   â¢ Anxiety    â¢ Chronic pain     back, abdominal(pancreatitis)   â¢ Depressive disorder, not elsewhere classified 01/01/02    PT FOLLOWED BY DR Franchesca Davis   â¢ Latent tuberculosis     treated with isoniazid   â¢ Sciatica 7/13/01    SURGERY FOLLOWED-DOERS   â¢ Sinusitis, chronic     allergic rhinitis   â¢ Wears partial dentures     upper       Past Surgical History:   Procedure Laterality Date   â¢ Back surgery  10/18/2001    L4-5 Microdiscectomy   â¢ Colonoscopy w biopsy  3/2/2016    Dr. Sacha Jenkins. Sessile serrated polyp   â¢ Esophagogastroduodenoscopy w/endoscopic us eso stomach duod  02/05/15    Heterogenous appearance to pancreas. Dr. Waleska Sal. â¢ Removal gallbladder     â¢ Service to gastroenterology  2005    EUS/ERCP/EVENS, normal pancreatic pressures       Family History:  History reviewed. No pertinent family history. Social History:  Social History     Tobacco Use   â¢ Smoking status: Never Smoker   â¢ Smokeless tobacco: Never Used   Substance Use Topics   â¢ Alcohol use: No     Comment: rarely       REVIEW OF SYSTEMS     Review of Systems  Constitutional:  Denies fever, chills. Respiratory:  Denies cough or shortness of breath. Cardiovascular:  Denies chest pain or edema. Gastrointestinal:  Denies abdominal pain, N/V, constipation or diarrhea, melena. Genitourinary:  Denies dysuria, frequency of urination, hematuria. Musculoskeletal:  Positive for back pain  Neurologic:  Denies any weakness or numbness.   Psychiatric:  Positive for depression and anxiety    PHYSICAL EXAM " Physical Exam  General:  Well developed, well nourished. In no apparent distress. Eyes:  PERRL, EOMI. Conjunctivae pink. Sclerae anicteric. HENT:  Normocephalic. Bilateral external ears are normal.  Mucosal membranes moist.  External nose is normal.  Oropharynx is clear. Neck:  Supple. No masses. No thyromegaly. Trachea midline. Respiratory:  Normal respiratory effort. Lungs clear to auscultation bilaterally. Symmetrical chest expansion. No dullness on percussion. Cardiovascular:  Regular rate and rhythm. Normal S1 and S2. No JVD. No carotid bruits. No peripheral edema. Gastrointestinal:  Soft. Nontender. Normal bowel sounds. No  abdominal masses. No hepatosplenomegaly. Genitourinary:  No costovertebral angle tenderness. Musculoskeletal:  No clubbing or cyanosis. Neurologic:  Lower extremity strength is normal. Patellar reflexes are intact. Integumentary:  Warm. Dry. Pink. No rashes or lesions. Lymphatic:  No lymphadenopathy in submental, submandibular or cervical chain. No supraclavicular or infraclavicular lymphadenopathy. Psychiatric:  Cooperative. Appropriate mood and affect. Normal judgment. ASSESSMENT/PLAN     1. Dizziness  2. Perspiration excessive  3. Nausea  These symptoms are associated with side effects of the duloxetine, patient advised to go back to 30 mg once daily only. 4. Idiopathic chronic pancreatitis (CMS/HCC)  Patient is on pancrelipase with meals. 5. Chronic left-sided low back pain without sciatica  He will continue following with Dr. Sebastian Harvey. 6. Depression, unspecified depression type  Patient will soon follow-up with the psychiatrist, until then, he will continue duloxetine 30 mg daily. 7. Tension headache  I will give a trial of amitriptyline 25 mg at bedtime. 8. Erectile dysfunction, unspecified erectile dysfunction type  - sildenafil (REVATIO) 20 MG tablet;  Take 3 tabs 1 hr prior to sexual activity  Dispense: 12 tablet;  Refill: 2    Zoila Hinds MD Implemented All Universal Safety Interventions:  [place] to call system. Call bell, personal items and telephone within reach. Instruct patient to call for assistance. Room bathroom lighting operational. Non-slip footwear when patient is off stretcher. Physically safe environment: no spills, clutter or unnecessary equipment. Stretcher in lowest position, wheels locked, appropriate side rails in place.

## 2019-10-06 NOTE — ED PROVIDER NOTE - CLINICAL SUMMARY MEDICAL DECISION MAKING FREE TEXT BOX
Attending MD Crisostomo: Pt with recent history of ORIF of right both bone forearm fractures presenting after fall from standing, XR from urgent care with new fractures of distal ulnar and mid shaft radius. No other injuries, Cervical spine cleared clinically of fracture without need for imaging according to [facility] Criteria. Head strike but no LOC, CT head negative for ICH. No NV compromise of RUE. Patient has improved red rash around incision that is pruritic likely dermatitis.

## 2019-10-06 NOTE — ED PROVIDER NOTE - ATTENDING CONTRIBUTION TO CARE
Attending MD Crisostomo:  I personally have seen and examined this patient.  NP note reviewed and agree on plan of care and except where noted.  See HPI, PE, and MDM for details.

## 2019-10-06 NOTE — CONSULT NOTE ADULT - ASSESSMENT
62 yo woman with a hx of osteogenesis imperfecta present with right radius and ulnar fx with hardware failure. scheduled for removal of hardware and Open reduction and internal fixation of radius and ulnar. Patient denies any CP or DIXON

## 2019-10-06 NOTE — ED PROVIDER NOTE - CROS ED ENMT ALL NEG
negative...
I have personally performed a face to face diagnostic evaluation on this patient. I have reviewed the ACP note and agree with the history, exam and plan of care, except as noted.

## 2019-10-06 NOTE — CONSULT NOTE ADULT - SUBJECTIVE AND OBJECTIVE BOX
62yo female pt with PMHx of chronic pain due to a hx of osteogenesis imperfecta  (on Dilaudid, Oxycodone), Arthritis, ambulatory c/o right forearm fx s/p mechanical fall this morning. Pt stated she had recent right forearm surgery s/p fracture (9/7/2019) by Dr. Morrison and she tripped and fell again this morning landing on right hand. She's evaluated bu  and sent to ED for ulna fx. Pt also hit head on the floor but denied headache. Denies LOC, dizziness, visual changes or N/V. Denies neck or back pain. Denies CP/SOB/ABD pain. Denies sensory changes or weakness to extremities. Denies fever, chills or cough/ congestion. Denies pelvic or hip pain. Denies urinary problems. Patient found to have  a fractured ulnar and radius at the previous suite with broken hardware. Patient scheduled for JESS as well as Open reduction and internal fixation of radius and ulnar of the right arm.        PAST MEDICAL & SURGICAL HISTORY:  Osteogenesis imperfecta  multiple surgeries in the past for fractures  B/L THR        MEDICATIONS  (STANDING):  acetaminophen   Tablet .. 975 milliGRAM(s) Oral every 8 hours  aspirin 325 milliGRAM(s) Oral two times a day  buPROPion XL . 150 milliGRAM(s) Oral daily  docusate sodium 100 milliGRAM(s) Oral three times a day  lactated ringers. 1000 milliLiter(s) (100 mL/Hr) IV Continuous <Continuous>  lamoTRIgine 200 milliGRAM(s) Oral at bedtime  oxyCODONE  ER Tablet 40 milliGRAM(s) Oral every 12 hours  senna 2 Tablet(s) Oral at bedtime  sodium chloride 0.9%. 1000 milliLiter(s) (100 mL/Hr) IV Continuous <Continuous>    MEDICATIONS  (PRN):  clonazePAM  Tablet 0.5 milliGRAM(s) Oral every 12 hours PRN anxiety  HYDROmorphone  Injectable 1 milliGRAM(s) IV Push every 6 hours PRN Breakthrough pain  oxyCODONE    IR 5 milliGRAM(s) Oral every 4 hours PRN Moderate Pain (4 - 6)  oxyCODONE    IR 10 milliGRAM(s) Oral every 4 hours PRN Severe Pain (7 - 10)    Social Hx:  Tobacco: Neg  ETOH: Neg  Drugs:  Neg    Family Hx  CAD    CONSTITUTIONAL: No weakness, fevers or chills  EYES/ENT: No visual changes;  No vertigo or throat pain   NECK: No pain or stiffness  RESPIRATORY: No cough, wheezing, hemoptysis; No shortness of breath  CARDIOVASCULAR: No chest pain or palpitations  GASTROINTESTINAL: No abdominal or epigastric pain. No nausea, vomiting, or hematemesis; No diarrhea or constipation. No melena or hematochezia.  GENITOURINARY: No dysuria, frequency or hematuria  NEUROLOGICAL: No numbness or weakness  SKIN: No itching, burning, rashes, or lesions   MUSCULOSKELETAL: right arm pain    INTERVAL HPI/OVERNIGHT EVENTS:  T(C): 36.8 (10-06-19 @ 23:40), Max: 36.8 (10-06-19 @ 23:40)  HR: 81 (10-06-19 @ 23:40) (81 - 82)  BP: 153/79 (10-06-19 @ 23:40) (150/77 - 153/79)  RR: 18 (10-06-19 @ 23:40) (18 - 18)  SpO2: 95% (10-06-19 @ 23:40) (95% - 98%)  Wt(kg): --  I&O's Summary      PHYSICAL EXAM:  GENERAL: NAD, well-groomed, well-developed  HEAD:  Atraumatic, Normocephalic  EYES: EOMI, PERRLA, conjunctiva and Blue sclera  ENMT: No tonsillar erythema, exudates, or enlargement; Moist mucous membranes, Good dentition, No lesions  NECK: Supple, No JVD, Normal thyroid  NERVOUS SYSTEM:  Alert & Oriented X3, Good concentration; Motor Strength 5/5 B/L upper and lower extremities; DTRs 2+ intact and symmetric  CHEST/LUNG: Clear to percussion bilaterally; No rales, rhonchi, wheezing, or rubs  HEART: Regular rate and rhythm; No murmurs, rubs, or gallops  ABDOMEN: Soft, Nontender, Nondistended; Bowel sounds present  EXTREMITIES:  2+ Peripheral Pulses, No clubbing, cyanosis, or edema  LYMPH: No lymphadenopathy noted  SKIN: No rashes or lesions        LABS:                        10.1   5.05  )-----------( 197      ( 06 Oct 2019 21:43 )             31.1     10-06    138  |  101  |  11  ----------------------------<  102<H>  3.3<L>   |  25  |  0.46<L>    Ca    8.6      06 Oct 2019 21:43    TPro  6.9  /  Alb  4.0  /  TBili  0.9  /  DBili  x   /  AST  27  /  ALT  16  /  AlkPhos  135<H>  10-06    PT/INR - ( 06 Oct 2019 21:43 )   PT: 12.0 sec;   INR: 1.04 ratio         PTT - ( 06 Oct 2019 21:43 )  PTT:32.8 sec    EKG  < from: 12 Lead ECG (09.07.19 @ 15:31) >  Ventricular Rate 74 BPM    Atrial Rate 74 BPM    P-R Interval 126 ms    QRS Duration 88 ms    Q-T Interval 428 ms    QTC Calculation(Bezet) 475 ms    P Axis 61 degrees    R Axis 19 degrees    T Axis 40 degrees    Diagnosis Line NORMAL SINUS RHYTHM  NONSPECIFIC ST AND T WAVE ABNORMALITY  ABNORMAL ECG    Confirmed by ATTENDING, ED ([id number]),  JORGE ANTONIO ([id number]) on 9/8/2019 4:43:15 AM    < end of copied text >                  Radiology reports:

## 2019-10-06 NOTE — ED PROVIDER NOTE - CARE PLAN
Principal Discharge DX:	Closed fracture of distal end of right ulna, unspecified fracture morphology, initial encounter  Secondary Diagnosis:	Closed fracture of shaft of right radius, unspecified fracture morphology, initial encounter

## 2019-10-06 NOTE — ED PROVIDER NOTE - PHYSICAL EXAMINATION
NAD, VSS, Afebrile, No facial or scalp tender. No spinal tender. Lungs clear. No rib or CVA tender. No shoulder tender. + Mild Right humerus/ elbow tender, + Eryth with warmth around op incision without discharge (no changes after op, evaluated by her surgeon Dr. Morrison and was told 'fine') + Generalized swelling to right forearm, wrist and hand with tenderness. N/V- intact.

## 2019-10-06 NOTE — H&P ADULT - ATTENDING COMMENTS
Pt w Fracture just distal to previously placed implants.  For ORIF.  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  R/B/A discussed

## 2019-10-07 DIAGNOSIS — Q78.0 OSTEOGENESIS IMPERFECTA: ICD-10-CM

## 2019-10-07 DIAGNOSIS — R52 PAIN, UNSPECIFIED: ICD-10-CM

## 2019-10-07 DIAGNOSIS — S52.601A UNSPECIFIED FRACTURE OF LOWER END OF RIGHT ULNA, INITIAL ENCOUNTER FOR CLOSED FRACTURE: ICD-10-CM

## 2019-10-07 LAB
ANION GAP SERPL CALC-SCNC: 12 MMOL/L — SIGNIFICANT CHANGE UP (ref 5–17)
APPEARANCE UR: CLEAR — SIGNIFICANT CHANGE UP
APTT BLD: 29.8 SEC — SIGNIFICANT CHANGE UP (ref 27.5–36.3)
BACTERIA # UR AUTO: NEGATIVE — SIGNIFICANT CHANGE UP
BILIRUB UR-MCNC: NEGATIVE — SIGNIFICANT CHANGE UP
BLD GP AB SCN SERPL QL: NEGATIVE — SIGNIFICANT CHANGE UP
BUN SERPL-MCNC: 10 MG/DL — SIGNIFICANT CHANGE UP (ref 7–23)
CALCIUM SERPL-MCNC: 8.1 MG/DL — LOW (ref 8.4–10.5)
CHLORIDE SERPL-SCNC: 103 MMOL/L — SIGNIFICANT CHANGE UP (ref 96–108)
CO2 SERPL-SCNC: 23 MMOL/L — SIGNIFICANT CHANGE UP (ref 22–31)
COLOR SPEC: SIGNIFICANT CHANGE UP
CREAT SERPL-MCNC: 0.49 MG/DL — LOW (ref 0.5–1.3)
DIFF PNL FLD: NEGATIVE — SIGNIFICANT CHANGE UP
EPI CELLS # UR: 1 /HPF — SIGNIFICANT CHANGE UP
GLUCOSE SERPL-MCNC: 91 MG/DL — SIGNIFICANT CHANGE UP (ref 70–99)
GLUCOSE UR QL: NEGATIVE — SIGNIFICANT CHANGE UP
HCT VFR BLD CALC: 29.4 % — LOW (ref 34.5–45)
HGB BLD-MCNC: 9.8 G/DL — LOW (ref 11.5–15.5)
HYALINE CASTS # UR AUTO: 0 /LPF — SIGNIFICANT CHANGE UP (ref 0–2)
INR BLD: 1.07 RATIO — SIGNIFICANT CHANGE UP (ref 0.88–1.16)
KETONES UR-MCNC: ABNORMAL
LEUKOCYTE ESTERASE UR-ACNC: NEGATIVE — SIGNIFICANT CHANGE UP
MCHC RBC-ENTMCNC: 30.2 PG — SIGNIFICANT CHANGE UP (ref 27–34)
MCHC RBC-ENTMCNC: 33.3 GM/DL — SIGNIFICANT CHANGE UP (ref 32–36)
MCV RBC AUTO: 90.5 FL — SIGNIFICANT CHANGE UP (ref 80–100)
NITRITE UR-MCNC: NEGATIVE — SIGNIFICANT CHANGE UP
NRBC # BLD: 0 /100 WBCS — SIGNIFICANT CHANGE UP (ref 0–0)
PH UR: 7 — SIGNIFICANT CHANGE UP (ref 5–8)
PLATELET # BLD AUTO: 213 K/UL — SIGNIFICANT CHANGE UP (ref 150–400)
POTASSIUM SERPL-MCNC: 3.2 MMOL/L — LOW (ref 3.5–5.3)
POTASSIUM SERPL-SCNC: 3.2 MMOL/L — LOW (ref 3.5–5.3)
PROT UR-MCNC: NEGATIVE — SIGNIFICANT CHANGE UP
PROTHROM AB SERPL-ACNC: 12.3 SEC — SIGNIFICANT CHANGE UP (ref 10–12.9)
RBC # BLD: 3.25 M/UL — LOW (ref 3.8–5.2)
RBC # FLD: 13.8 % — SIGNIFICANT CHANGE UP (ref 10.3–14.5)
RBC CASTS # UR COMP ASSIST: 2 /HPF — SIGNIFICANT CHANGE UP (ref 0–4)
RH IG SCN BLD-IMP: POSITIVE — SIGNIFICANT CHANGE UP
SODIUM SERPL-SCNC: 138 MMOL/L — SIGNIFICANT CHANGE UP (ref 135–145)
SP GR SPEC: 1.01 — SIGNIFICANT CHANGE UP (ref 1.01–1.02)
UROBILINOGEN FLD QL: NEGATIVE — SIGNIFICANT CHANGE UP
WBC # BLD: 4.42 K/UL — SIGNIFICANT CHANGE UP (ref 3.8–10.5)
WBC # FLD AUTO: 4.42 K/UL — SIGNIFICANT CHANGE UP (ref 3.8–10.5)
WBC UR QL: 0 /HPF — SIGNIFICANT CHANGE UP (ref 0–5)

## 2019-10-07 PROCEDURE — 25515 OPTX RADIAL SHAFT FRACTURE: CPT | Mod: RT,78

## 2019-10-07 RX ORDER — LANOLIN ALCOHOL/MO/W.PET/CERES
3 CREAM (GRAM) TOPICAL AT BEDTIME
Refills: 0 | Status: DISCONTINUED | OUTPATIENT
Start: 2019-10-07 | End: 2019-10-07

## 2019-10-07 RX ORDER — ALPRAZOLAM 0.25 MG
0.5 TABLET ORAL ONCE
Refills: 0 | Status: DISCONTINUED | OUTPATIENT
Start: 2019-10-07 | End: 2019-10-07

## 2019-10-07 RX ORDER — ACETAMINOPHEN 500 MG
1000 TABLET ORAL ONCE
Refills: 0 | Status: COMPLETED | OUTPATIENT
Start: 2019-10-07 | End: 2019-10-07

## 2019-10-07 RX ORDER — FERROUS SULFATE 325(65) MG
325 TABLET ORAL
Refills: 0 | Status: DISCONTINUED | OUTPATIENT
Start: 2019-10-07 | End: 2019-10-09

## 2019-10-07 RX ORDER — HYDROMORPHONE HYDROCHLORIDE 2 MG/ML
0.5 INJECTION INTRAMUSCULAR; INTRAVENOUS; SUBCUTANEOUS
Refills: 0 | Status: DISCONTINUED | OUTPATIENT
Start: 2019-10-07 | End: 2019-10-07

## 2019-10-07 RX ORDER — OXYCODONE HYDROCHLORIDE 5 MG/1
10 TABLET ORAL EVERY 4 HOURS
Refills: 0 | Status: DISCONTINUED | OUTPATIENT
Start: 2019-10-07 | End: 2019-10-08

## 2019-10-07 RX ORDER — LANOLIN ALCOHOL/MO/W.PET/CERES
3 CREAM (GRAM) TOPICAL AT BEDTIME
Refills: 0 | Status: DISCONTINUED | OUTPATIENT
Start: 2019-10-07 | End: 2019-10-09

## 2019-10-07 RX ORDER — OXYCODONE HYDROCHLORIDE 5 MG/1
5 TABLET ORAL EVERY 4 HOURS
Refills: 0 | Status: DISCONTINUED | OUTPATIENT
Start: 2019-10-07 | End: 2019-10-08

## 2019-10-07 RX ORDER — ZOLPIDEM TARTRATE 10 MG/1
5 TABLET ORAL AT BEDTIME
Refills: 0 | Status: DISCONTINUED | OUTPATIENT
Start: 2019-10-07 | End: 2019-10-07

## 2019-10-07 RX ORDER — BUPROPION HYDROCHLORIDE 150 MG/1
150 TABLET, EXTENDED RELEASE ORAL DAILY
Refills: 0 | Status: DISCONTINUED | OUTPATIENT
Start: 2019-10-07 | End: 2019-10-08

## 2019-10-07 RX ORDER — FAMOTIDINE 10 MG/ML
20 INJECTION INTRAVENOUS DAILY
Refills: 0 | Status: DISCONTINUED | OUTPATIENT
Start: 2019-10-07 | End: 2019-10-09

## 2019-10-07 RX ORDER — LAMOTRIGINE 25 MG/1
200 TABLET, ORALLY DISINTEGRATING ORAL AT BEDTIME
Refills: 0 | Status: DISCONTINUED | OUTPATIENT
Start: 2019-10-07 | End: 2019-10-09

## 2019-10-07 RX ORDER — HYDROMORPHONE HYDROCHLORIDE 2 MG/ML
1 INJECTION INTRAMUSCULAR; INTRAVENOUS; SUBCUTANEOUS
Refills: 0 | Status: DISCONTINUED | OUTPATIENT
Start: 2019-10-07 | End: 2019-10-08

## 2019-10-07 RX ORDER — SODIUM CHLORIDE 9 MG/ML
1000 INJECTION, SOLUTION INTRAVENOUS
Refills: 0 | Status: DISCONTINUED | OUTPATIENT
Start: 2019-10-07 | End: 2019-10-09

## 2019-10-07 RX ORDER — ASCORBIC ACID 60 MG
500 TABLET,CHEWABLE ORAL
Refills: 0 | Status: DISCONTINUED | OUTPATIENT
Start: 2019-10-07 | End: 2019-10-09

## 2019-10-07 RX ORDER — CEFAZOLIN SODIUM 1 G
2000 VIAL (EA) INJECTION EVERY 8 HOURS
Refills: 0 | Status: COMPLETED | OUTPATIENT
Start: 2019-10-07 | End: 2019-10-08

## 2019-10-07 RX ORDER — DOCUSATE SODIUM 100 MG
100 CAPSULE ORAL THREE TIMES A DAY
Refills: 0 | Status: DISCONTINUED | OUTPATIENT
Start: 2019-10-07 | End: 2019-10-09

## 2019-10-07 RX ORDER — ASPIRIN/CALCIUM CARB/MAGNESIUM 324 MG
325 TABLET ORAL DAILY
Refills: 0 | Status: DISCONTINUED | OUTPATIENT
Start: 2019-10-07 | End: 2019-10-08

## 2019-10-07 RX ORDER — FOLIC ACID 0.8 MG
1 TABLET ORAL DAILY
Refills: 0 | Status: DISCONTINUED | OUTPATIENT
Start: 2019-10-07 | End: 2019-10-09

## 2019-10-07 RX ORDER — MORPHINE SULFATE 50 MG/1
4 CAPSULE, EXTENDED RELEASE ORAL EVERY 4 HOURS
Refills: 0 | Status: DISCONTINUED | OUTPATIENT
Start: 2019-10-07 | End: 2019-10-08

## 2019-10-07 RX ORDER — CLONAZEPAM 1 MG
0.5 TABLET ORAL EVERY 12 HOURS
Refills: 0 | Status: DISCONTINUED | OUTPATIENT
Start: 2019-10-07 | End: 2019-10-09

## 2019-10-07 RX ORDER — INFLUENZA VIRUS VACCINE 15; 15; 15; 15 UG/.5ML; UG/.5ML; UG/.5ML; UG/.5ML
0.5 SUSPENSION INTRAMUSCULAR ONCE
Refills: 0 | Status: COMPLETED | OUTPATIENT
Start: 2019-10-07 | End: 2019-10-07

## 2019-10-07 RX ORDER — ONDANSETRON 8 MG/1
4 TABLET, FILM COATED ORAL EVERY 6 HOURS
Refills: 0 | Status: DISCONTINUED | OUTPATIENT
Start: 2019-10-07 | End: 2019-10-09

## 2019-10-07 RX ORDER — POTASSIUM CHLORIDE 20 MEQ
40 PACKET (EA) ORAL EVERY 4 HOURS
Refills: 0 | Status: DISCONTINUED | OUTPATIENT
Start: 2019-10-07 | End: 2019-10-07

## 2019-10-07 RX ORDER — ACETAMINOPHEN 500 MG
650 TABLET ORAL EVERY 6 HOURS
Refills: 0 | Status: DISCONTINUED | OUTPATIENT
Start: 2019-10-07 | End: 2019-10-08

## 2019-10-07 RX ORDER — BENZOCAINE AND MENTHOL 5; 1 G/100ML; G/100ML
1 LIQUID ORAL EVERY 4 HOURS
Refills: 0 | Status: DISCONTINUED | OUTPATIENT
Start: 2019-10-07 | End: 2019-10-09

## 2019-10-07 RX ORDER — ZOLPIDEM TARTRATE 10 MG/1
5 TABLET ORAL AT BEDTIME
Refills: 0 | Status: DISCONTINUED | OUTPATIENT
Start: 2019-10-07 | End: 2019-10-09

## 2019-10-07 RX ORDER — ONDANSETRON 8 MG/1
4 TABLET, FILM COATED ORAL ONCE
Refills: 0 | Status: DISCONTINUED | OUTPATIENT
Start: 2019-10-07 | End: 2019-10-07

## 2019-10-07 RX ADMIN — Medication 0.5 MILLIGRAM(S): at 04:10

## 2019-10-07 RX ADMIN — Medication 40 MILLIEQUIVALENT(S): at 08:21

## 2019-10-07 RX ADMIN — LAMOTRIGINE 200 MILLIGRAM(S): 25 TABLET, ORALLY DISINTEGRATING ORAL at 21:15

## 2019-10-07 RX ADMIN — OXYCODONE HYDROCHLORIDE 40 MILLIGRAM(S): 5 TABLET ORAL at 05:58

## 2019-10-07 RX ADMIN — OXYCODONE HYDROCHLORIDE 10 MILLIGRAM(S): 5 TABLET ORAL at 18:52

## 2019-10-07 RX ADMIN — HYDROMORPHONE HYDROCHLORIDE 1 MILLIGRAM(S): 2 INJECTION INTRAMUSCULAR; INTRAVENOUS; SUBCUTANEOUS at 21:16

## 2019-10-07 RX ADMIN — ZOLPIDEM TARTRATE 5 MILLIGRAM(S): 10 TABLET ORAL at 04:40

## 2019-10-07 RX ADMIN — Medication 500 MILLIGRAM(S): at 18:21

## 2019-10-07 RX ADMIN — Medication 1000 MILLIGRAM(S): at 18:52

## 2019-10-07 RX ADMIN — Medication 400 MILLIGRAM(S): at 18:21

## 2019-10-07 RX ADMIN — Medication 100 MILLIGRAM(S): at 21:15

## 2019-10-07 RX ADMIN — Medication 325 MILLIGRAM(S): at 05:58

## 2019-10-07 RX ADMIN — Medication 100 MILLIGRAM(S): at 18:21

## 2019-10-07 RX ADMIN — OXYCODONE HYDROCHLORIDE 10 MILLIGRAM(S): 5 TABLET ORAL at 08:52

## 2019-10-07 RX ADMIN — ZOLPIDEM TARTRATE 5 MILLIGRAM(S): 10 TABLET ORAL at 23:44

## 2019-10-07 RX ADMIN — Medication 975 MILLIGRAM(S): at 05:58

## 2019-10-07 RX ADMIN — OXYCODONE HYDROCHLORIDE 40 MILLIGRAM(S): 5 TABLET ORAL at 06:30

## 2019-10-07 RX ADMIN — HYDROMORPHONE HYDROCHLORIDE 1 MILLIGRAM(S): 2 INJECTION INTRAMUSCULAR; INTRAVENOUS; SUBCUTANEOUS at 21:46

## 2019-10-07 RX ADMIN — OXYCODONE HYDROCHLORIDE 10 MILLIGRAM(S): 5 TABLET ORAL at 08:22

## 2019-10-07 RX ADMIN — OXYCODONE HYDROCHLORIDE 10 MILLIGRAM(S): 5 TABLET ORAL at 01:25

## 2019-10-07 RX ADMIN — Medication 325 MILLIGRAM(S): at 18:21

## 2019-10-07 RX ADMIN — Medication 100 MILLIGRAM(S): at 05:58

## 2019-10-07 RX ADMIN — OXYCODONE HYDROCHLORIDE 10 MILLIGRAM(S): 5 TABLET ORAL at 01:02

## 2019-10-07 RX ADMIN — Medication 975 MILLIGRAM(S): at 06:30

## 2019-10-07 RX ADMIN — Medication 0.5 MILLIGRAM(S): at 01:52

## 2019-10-07 RX ADMIN — OXYCODONE HYDROCHLORIDE 10 MILLIGRAM(S): 5 TABLET ORAL at 18:22

## 2019-10-07 RX ADMIN — SODIUM CHLORIDE 100 MILLILITER(S): 9 INJECTION INTRAMUSCULAR; INTRAVENOUS; SUBCUTANEOUS at 01:02

## 2019-10-07 NOTE — PROGRESS NOTE ADULT - PROBLEM SELECTOR PLAN 1
Patient tolerated procedure well  DVT and GI prophylaxis  physical therapy complaining of urinary retention may need a pardo cat

## 2019-10-07 NOTE — CHART NOTE - NSCHARTNOTEFT_GEN_A_CORE
Seen in PACU  Pt somnolent yet arousable  Block still in effect  in NAD    T(C): 36.2 (10-07-19 @ 14:00)  T(F): 97.2 (10-07-19 @ 14:00)  HR: 91 (10-07-19 @ 14:00)  BP: 154/68 (10-07-19 @ 14:00)  RR: 16 (10-07-19 @ 14:00)  SpO2: 97% (10-07-19 @ 14:00)  Wt(kg): --    Physical Exam  Gen: NAD    RUE:   Dressing CDI; Sling on  Decreased motor and sensory 2/2 anesthesia / block  digits: warm / well-perfused  (+) swelling  cap refill brisk @ 2-3 secs   + Radial pulse     A/P: 63y Female s/p  Open Reduction Internal Fixation / Surgical Repair R radius    -Pain control; Ice prn; Elevate  -DVT ppx; ASA BID  -Am labs  -PT eval: NWB shoulder/elbow / digital ROM  -Dispo planning: anticipate home in am      ***See Above  Dewey QUINONES  Orthopedics  B: [phone]  S: [phone]

## 2019-10-07 NOTE — PROGRESS NOTE ADULT - SUBJECTIVE AND OBJECTIVE BOX
Ortho Progress Note    S: Patient seen and examined. No acute events overnight. Pain well controlled with current regimen. NPO for OR today       O:  Physical Exam:  Gen: Laying in bed, NAD, alert and oriented.   Resp: Unlabored breathing  RUE:  ST splint c/d/i  some swelling in hand  AIN/U intact, (PIN not intact)  SILT M/R/U  WWP    Vital Signs Last 24 Hrs  T(C): 37.1 (07 Oct 2019 04:45), Max: 37.2 (07 Oct 2019 00:20)  T(F): 98.7 (07 Oct 2019 04:45), Max: 99 (07 Oct 2019 00:20)  HR: 77 (07 Oct 2019 04:45) (77 - 89)  BP: 151/74 (07 Oct 2019 04:45) (150/77 - 163/90)  BP(mean): --  RR: 18 (07 Oct 2019 04:45) (18 - 18)  SpO2: 95% (07 Oct 2019 04:45) (95% - 98%)                          9.8    4.42  )-----------( 213      ( 07 Oct 2019 04:33 )             29.4                         10.1   5.05  )-----------( 197      ( 06 Oct 2019 21:43 )             31.1       10-07    138  |  103  |  10  ----------------------------<  91  3.2<L>   |  23  |  0.49<L>        PT/INR - ( 07 Oct 2019 04:33 )   PT: 12.3 sec;   INR: 1.07 ratio         PTT - ( 07 Oct 2019 04:33 )  PTT:29.8 sec      A/P: 63yoF with R evy-implant radius and ulna fx    NPO for OR today   IVF  NWB RUE in ST splint  Pain control  IS  DVT ppx: Venodynes

## 2019-10-07 NOTE — PRE-ANESTHESIA EVALUATION ADULT - NSANTHPMHFT_GEN_ALL_CORE
chart, prior record reviewed. recent orif forearm now s/p fall. no hx cv pulm dz. et ~1 flight no cp/sob. hypothyroid

## 2019-10-07 NOTE — BRIEF OPERATIVE NOTE - NSICDXBRIEFPREOP_GEN_ALL_CORE_FT
PRE-OP DIAGNOSIS:  Forearm fractures, both bones, closed, right, initial encounter 07-Oct-2019 12:19:27 periimplant James Garcia

## 2019-10-07 NOTE — PROGRESS NOTE ADULT - SUBJECTIVE AND OBJECTIVE BOX
ORTHO ATTENDING POST OP    s/p ORIF R radius  Bulky dressing  elevation  keep dressing clean and dry until office visit  sling  NWB RUE  ROM as tolerated RUE  ancef x 24h  venodynes  asa 325 BID  OOB  PT for mobilization  f/u 2 weeks  encourage ROM fingers

## 2019-10-07 NOTE — PROGRESS NOTE ADULT - SUBJECTIVE AND OBJECTIVE BOX
64yo female pt with PMHx of chronic pain due to a hx of osteogenesis imperfecta  (on Dilaudid, Oxycodone), Arthritis, ambulatory c/o right forearm fx s/p mechanical fall this morning. Pt stated she had recent right forearm surgery s/p fracture (9/7/2019) by Dr. Morrison and she tripped and fell again this morning landing on right hand. She's evaluated bu  and sent to ED for ulna fx. Pt also hit head on the floor but denied headache. Denies LOC, dizziness, visual changes or N/V. Denies neck or back pain. Denies CP/SOB/ABD pain. Denies sensory changes or weakness to extremities. Denies fever, chills or cough/ congestion. Denies pelvic or hip pain. Denies urinary problems. Patient found to have  a fractured ulnar and radius at the previous suite with broken hardware. Patient is s/p  JESS as well as Open reduction and internal fixation of radius and ulnar of the right arm. tolerated procedure well    MEDICATIONS  (STANDING):  acetaminophen  IVPB .. 1000 milliGRAM(s) IV Intermittent once  ascorbic acid 500 milliGRAM(s) Oral two times a day  aspirin 325 milliGRAM(s) Oral daily  buPROPion XL . 150 milliGRAM(s) Oral daily  ceFAZolin   IVPB 2000 milliGRAM(s) IV Intermittent every 8 hours  docusate sodium 100 milliGRAM(s) Oral three times a day  famotidine    Tablet 20 milliGRAM(s) Oral daily  ferrous    sulfate 325 milliGRAM(s) Oral three times a day with meals  folic acid 1 milliGRAM(s) Oral daily  influenza   Vaccine 0.5 milliLiter(s) IntraMuscular once  lactated ringers. 1000 milliLiter(s) (50 mL/Hr) IV Continuous <Continuous>  lamoTRIgine 200 milliGRAM(s) Oral at bedtime  melatonin 3 milliGRAM(s) Oral at bedtime  multivitamin 1 Tablet(s) Oral daily    MEDICATIONS  (PRN):  acetaminophen   Tablet .. 650 milliGRAM(s) Oral every 6 hours PRN Temp greater or equal to 38C (100.4F)  benzocaine 15 mG/menthol 3.6 mG Lozenge 1 Lozenge Oral every 4 hours PRN Sore Throat  clonazePAM  Tablet 0.5 milliGRAM(s) Oral every 12 hours PRN anxiety  HYDROmorphone  Injectable 1 milliGRAM(s) IV Push every 3 hours PRN Severe Pain (7 - 10)  morphine  - Injectable 4 milliGRAM(s) IV Push every 4 hours PRN breakthrough pain  ondansetron Injectable 4 milliGRAM(s) IV Push every 6 hours PRN Nausea and/or Vomiting  oxyCODONE    IR 10 milliGRAM(s) Oral every 4 hours PRN Moderate Pain (4 - 6)  oxyCODONE    IR 5 milliGRAM(s) Oral every 4 hours PRN Mild Pain (1 - 3)  zolpidem 5 milliGRAM(s) Oral at bedtime PRN Insomnia          VITALS:   T(C): 36.8 (10-07-19 @ 15:00), Max: 37.2 (10-07-19 @ 00:20)  HR: 74 (10-07-19 @ 15:00) (69 - 91)  BP: 153/74 (10-07-19 @ 15:00) (119/58 - 163/90)  RR: 17 (10-07-19 @ 15:00) (14 - 18)  SpO2: 90% (10-07-19 @ 15:00) (90% - 100%)  Wt(kg): --        PHYSICAL EXAM:  GENERAL: NAD, well-groomed, well-developed  HEAD:  Atraumatic, Normocephalic  EYES: EOMI, PERRLA, conjunctiva and Blue sclera  ENMT: No tonsillar erythema, exudates, or enlargement; Moist mucous membranes, Good dentition, No lesions  NECK: Supple, No JVD, Normal thyroid  NERVOUS SYSTEM:  Alert & Oriented X3, Good concentration; Motor Strength 5/5 B/L upper and lower extremities; DTRs 2+ intact and symmetric  CHEST/LUNG: Clear to percussion bilaterally; No rales, rhonchi, wheezing, or rubs  HEART: Regular rate and rhythm; No murmurs, rubs, or gallops  ABDOMEN: Soft, Nontender, Nondistended; Bowel sounds present  EXTREMITIES:  2+ Peripheral Pulses, No clubbing, cyanosis, or edema  LYMPH: No lymphadenopathy noted  SKIN: No rashes or lesions      LABS:      CBC Full  -  ( 07 Oct 2019 04:33 )  WBC Count : 4.42 K/uL  RBC Count : 3.25 M/uL  Hemoglobin : 9.8 g/dL  Hematocrit : 29.4 %  Platelet Count - Automated : 213 K/uL  Mean Cell Volume : 90.5 fl  Mean Cell Hemoglobin : 30.2 pg  Mean Cell Hemoglobin Concentration : 33.3 gm/dL  Auto Neutrophil # : x  Auto Lymphocyte # : x  Auto Monocyte # : x  Auto Eosinophil # : x  Auto Basophil # : x  Auto Neutrophil % : x  Auto Lymphocyte % : x  Auto Monocyte % : x  Auto Eosinophil % : x  Auto Basophil % : x    10-07    138  |  103  |  10  ----------------------------<  91  3.2<L>   |  23  |  0.49<L>    Ca    8.1<L>      07 Oct 2019 04:33    TPro  6.9  /  Alb  4.0  /  TBili  0.9  /  DBili  x   /  AST  27  /  ALT  16  /  AlkPhos  135<H>  10-06    LIVER FUNCTIONS - ( 06 Oct 2019 21:43 )  Alb: 4.0 g/dL / Pro: 6.9 g/dL / ALK PHOS: 135 U/L / ALT: 16 U/L / AST: 27 U/L / GGT: x           PT/INR - ( 07 Oct 2019 04:33 )   PT: 12.3 sec;   INR: 1.07 ratio         PTT - ( 07 Oct 2019 04:33 )  PTT:29.8 sec    CAPILLARY BLOOD GLUCOSE          RADIOLOGY & ADDITIONAL TESTS:

## 2019-10-08 ENCOUNTER — TRANSCRIPTION ENCOUNTER (OUTPATIENT)
Age: 64
End: 2019-10-08

## 2019-10-08 LAB
ANION GAP SERPL CALC-SCNC: 12 MMOL/L — SIGNIFICANT CHANGE UP (ref 5–17)
BASOPHILS # BLD AUTO: 0.01 K/UL — SIGNIFICANT CHANGE UP (ref 0–0.2)
BASOPHILS NFR BLD AUTO: 0.2 % — SIGNIFICANT CHANGE UP (ref 0–2)
BUN SERPL-MCNC: 8 MG/DL — SIGNIFICANT CHANGE UP (ref 7–23)
CALCIUM SERPL-MCNC: 8.3 MG/DL — LOW (ref 8.4–10.5)
CHLORIDE SERPL-SCNC: 102 MMOL/L — SIGNIFICANT CHANGE UP (ref 96–108)
CO2 SERPL-SCNC: 23 MMOL/L — SIGNIFICANT CHANGE UP (ref 22–31)
CREAT SERPL-MCNC: 0.44 MG/DL — LOW (ref 0.5–1.3)
EOSINOPHIL # BLD AUTO: 0.01 K/UL — SIGNIFICANT CHANGE UP (ref 0–0.5)
EOSINOPHIL NFR BLD AUTO: 0.2 % — SIGNIFICANT CHANGE UP (ref 0–6)
GLUCOSE SERPL-MCNC: 99 MG/DL — SIGNIFICANT CHANGE UP (ref 70–99)
HCT VFR BLD CALC: 30.9 % — LOW (ref 34.5–45)
HGB BLD-MCNC: 10.1 G/DL — LOW (ref 11.5–15.5)
IMM GRANULOCYTES NFR BLD AUTO: 0.3 % — SIGNIFICANT CHANGE UP (ref 0–1.5)
LYMPHOCYTES # BLD AUTO: 1.43 K/UL — SIGNIFICANT CHANGE UP (ref 1–3.3)
LYMPHOCYTES # BLD AUTO: 24.5 % — SIGNIFICANT CHANGE UP (ref 13–44)
MCHC RBC-ENTMCNC: 29.5 PG — SIGNIFICANT CHANGE UP (ref 27–34)
MCHC RBC-ENTMCNC: 32.7 GM/DL — SIGNIFICANT CHANGE UP (ref 32–36)
MCV RBC AUTO: 90.4 FL — SIGNIFICANT CHANGE UP (ref 80–100)
MONOCYTES # BLD AUTO: 0.85 K/UL — SIGNIFICANT CHANGE UP (ref 0–0.9)
MONOCYTES NFR BLD AUTO: 14.6 % — HIGH (ref 2–14)
NEUTROPHILS # BLD AUTO: 3.52 K/UL — SIGNIFICANT CHANGE UP (ref 1.8–7.4)
NEUTROPHILS NFR BLD AUTO: 60.2 % — SIGNIFICANT CHANGE UP (ref 43–77)
PLATELET # BLD AUTO: 235 K/UL — SIGNIFICANT CHANGE UP (ref 150–400)
POTASSIUM SERPL-MCNC: 3.1 MMOL/L — LOW (ref 3.5–5.3)
POTASSIUM SERPL-SCNC: 3.1 MMOL/L — LOW (ref 3.5–5.3)
RBC # BLD: 3.42 M/UL — LOW (ref 3.8–5.2)
RBC # FLD: 13.9 % — SIGNIFICANT CHANGE UP (ref 10.3–14.5)
SODIUM SERPL-SCNC: 137 MMOL/L — SIGNIFICANT CHANGE UP (ref 135–145)
WBC # BLD: 5.84 K/UL — SIGNIFICANT CHANGE UP (ref 3.8–10.5)
WBC # FLD AUTO: 5.84 K/UL — SIGNIFICANT CHANGE UP (ref 3.8–10.5)

## 2019-10-08 RX ORDER — OXYCODONE HYDROCHLORIDE 5 MG/1
40 TABLET ORAL EVERY 12 HOURS
Refills: 0 | Status: DISCONTINUED | OUTPATIENT
Start: 2019-10-08 | End: 2019-10-09

## 2019-10-08 RX ORDER — POTASSIUM CHLORIDE 20 MEQ
20 PACKET (EA) ORAL
Refills: 0 | Status: COMPLETED | OUTPATIENT
Start: 2019-10-08 | End: 2019-10-08

## 2019-10-08 RX ORDER — MORPHINE SULFATE 50 MG/1
15 CAPSULE, EXTENDED RELEASE ORAL ONCE
Refills: 0 | Status: DISCONTINUED | OUTPATIENT
Start: 2019-10-08 | End: 2019-10-08

## 2019-10-08 RX ORDER — OXYCODONE HYDROCHLORIDE 5 MG/1
10 TABLET ORAL
Refills: 0 | Status: DISCONTINUED | OUTPATIENT
Start: 2019-10-08 | End: 2019-10-09

## 2019-10-08 RX ORDER — OXYCODONE HYDROCHLORIDE 5 MG/1
5 TABLET ORAL
Refills: 0 | Status: DISCONTINUED | OUTPATIENT
Start: 2019-10-08 | End: 2019-10-09

## 2019-10-08 RX ORDER — OXYCODONE HYDROCHLORIDE 5 MG/1
40 TABLET ORAL ONCE
Refills: 0 | Status: DISCONTINUED | OUTPATIENT
Start: 2019-10-08 | End: 2019-10-08

## 2019-10-08 RX ORDER — LEVOTHYROXINE SODIUM 125 MCG
50 TABLET ORAL DAILY
Refills: 0 | Status: DISCONTINUED | OUTPATIENT
Start: 2019-10-08 | End: 2019-10-09

## 2019-10-08 RX ORDER — MORPHINE SULFATE 50 MG/1
15 CAPSULE, EXTENDED RELEASE ORAL EVERY 8 HOURS
Refills: 0 | Status: DISCONTINUED | OUTPATIENT
Start: 2019-10-08 | End: 2019-10-08

## 2019-10-08 RX ORDER — ACETAMINOPHEN 500 MG
1000 TABLET ORAL ONCE
Refills: 0 | Status: COMPLETED | OUTPATIENT
Start: 2019-10-08 | End: 2019-10-08

## 2019-10-08 RX ORDER — BUPROPION HYDROCHLORIDE 150 MG/1
300 TABLET, EXTENDED RELEASE ORAL DAILY
Refills: 0 | Status: DISCONTINUED | OUTPATIENT
Start: 2019-10-08 | End: 2019-10-09

## 2019-10-08 RX ORDER — ROSUVASTATIN CALCIUM 5 MG/1
10 TABLET ORAL AT BEDTIME
Refills: 0 | Status: DISCONTINUED | OUTPATIENT
Start: 2019-10-08 | End: 2019-10-09

## 2019-10-08 RX ORDER — ACETAMINOPHEN 500 MG
1000 TABLET ORAL ONCE
Refills: 0 | Status: COMPLETED | OUTPATIENT
Start: 2019-10-09 | End: 2019-10-09

## 2019-10-08 RX ORDER — ASPIRIN/CALCIUM CARB/MAGNESIUM 324 MG
325 TABLET ORAL
Refills: 0 | Status: DISCONTINUED | OUTPATIENT
Start: 2019-10-08 | End: 2019-10-09

## 2019-10-08 RX ORDER — MAGNESIUM HYDROXIDE 400 MG/1
30 TABLET, CHEWABLE ORAL DAILY
Refills: 0 | Status: DISCONTINUED | OUTPATIENT
Start: 2019-10-08 | End: 2019-10-09

## 2019-10-08 RX ORDER — HYDROMORPHONE HYDROCHLORIDE 2 MG/ML
8 INJECTION INTRAMUSCULAR; INTRAVENOUS; SUBCUTANEOUS EVERY 6 HOURS
Refills: 0 | Status: DISCONTINUED | OUTPATIENT
Start: 2019-10-08 | End: 2019-10-09

## 2019-10-08 RX ORDER — GABAPENTIN 400 MG/1
100 CAPSULE ORAL THREE TIMES A DAY
Refills: 0 | Status: DISCONTINUED | OUTPATIENT
Start: 2019-10-08 | End: 2019-10-09

## 2019-10-08 RX ORDER — GABAPENTIN 400 MG/1
100 CAPSULE ORAL ONCE
Refills: 0 | Status: COMPLETED | OUTPATIENT
Start: 2019-10-08 | End: 2019-10-08

## 2019-10-08 RX ADMIN — MAGNESIUM HYDROXIDE 30 MILLILITER(S): 400 TABLET, CHEWABLE ORAL at 17:34

## 2019-10-08 RX ADMIN — GABAPENTIN 100 MILLIGRAM(S): 400 CAPSULE ORAL at 14:56

## 2019-10-08 RX ADMIN — Medication 1000 MILLIGRAM(S): at 10:15

## 2019-10-08 RX ADMIN — LAMOTRIGINE 200 MILLIGRAM(S): 25 TABLET, ORALLY DISINTEGRATING ORAL at 22:36

## 2019-10-08 RX ADMIN — Medication 400 MILLIGRAM(S): at 17:36

## 2019-10-08 RX ADMIN — Medication 1 MILLIGRAM(S): at 12:34

## 2019-10-08 RX ADMIN — HYDROMORPHONE HYDROCHLORIDE 8 MILLIGRAM(S): 2 INJECTION INTRAMUSCULAR; INTRAVENOUS; SUBCUTANEOUS at 16:34

## 2019-10-08 RX ADMIN — MORPHINE SULFATE 4 MILLIGRAM(S): 50 CAPSULE, EXTENDED RELEASE ORAL at 02:31

## 2019-10-08 RX ADMIN — OXYCODONE HYDROCHLORIDE 10 MILLIGRAM(S): 5 TABLET ORAL at 12:46

## 2019-10-08 RX ADMIN — Medication 325 MILLIGRAM(S): at 17:34

## 2019-10-08 RX ADMIN — Medication 400 MILLIGRAM(S): at 01:36

## 2019-10-08 RX ADMIN — OXYCODONE HYDROCHLORIDE 10 MILLIGRAM(S): 5 TABLET ORAL at 05:45

## 2019-10-08 RX ADMIN — Medication 20 MILLIEQUIVALENT(S): at 11:03

## 2019-10-08 RX ADMIN — ZOLPIDEM TARTRATE 5 MILLIGRAM(S): 10 TABLET ORAL at 22:41

## 2019-10-08 RX ADMIN — GABAPENTIN 100 MILLIGRAM(S): 400 CAPSULE ORAL at 22:37

## 2019-10-08 RX ADMIN — Medication 100 MILLIGRAM(S): at 02:32

## 2019-10-08 RX ADMIN — OXYCODONE HYDROCHLORIDE 10 MILLIGRAM(S): 5 TABLET ORAL at 13:15

## 2019-10-08 RX ADMIN — OXYCODONE HYDROCHLORIDE 40 MILLIGRAM(S): 5 TABLET ORAL at 17:34

## 2019-10-08 RX ADMIN — OXYCODONE HYDROCHLORIDE 40 MILLIGRAM(S): 5 TABLET ORAL at 09:06

## 2019-10-08 RX ADMIN — GABAPENTIN 100 MILLIGRAM(S): 400 CAPSULE ORAL at 10:00

## 2019-10-08 RX ADMIN — Medication 0.5 MILLIGRAM(S): at 17:34

## 2019-10-08 RX ADMIN — FAMOTIDINE 20 MILLIGRAM(S): 10 INJECTION INTRAVENOUS at 12:34

## 2019-10-08 RX ADMIN — OXYCODONE HYDROCHLORIDE 10 MILLIGRAM(S): 5 TABLET ORAL at 05:16

## 2019-10-08 RX ADMIN — Medication 1000 MILLIGRAM(S): at 02:06

## 2019-10-08 RX ADMIN — Medication 100 MILLIGRAM(S): at 05:16

## 2019-10-08 RX ADMIN — OXYCODONE HYDROCHLORIDE 40 MILLIGRAM(S): 5 TABLET ORAL at 09:36

## 2019-10-08 RX ADMIN — HYDROMORPHONE HYDROCHLORIDE 8 MILLIGRAM(S): 2 INJECTION INTRAMUSCULAR; INTRAVENOUS; SUBCUTANEOUS at 22:37

## 2019-10-08 RX ADMIN — Medication 100 MILLIGRAM(S): at 13:29

## 2019-10-08 RX ADMIN — Medication 20 MILLIEQUIVALENT(S): at 13:29

## 2019-10-08 RX ADMIN — OXYCODONE HYDROCHLORIDE 10 MILLIGRAM(S): 5 TABLET ORAL at 00:58

## 2019-10-08 RX ADMIN — HYDROMORPHONE HYDROCHLORIDE 8 MILLIGRAM(S): 2 INJECTION INTRAMUSCULAR; INTRAVENOUS; SUBCUTANEOUS at 23:07

## 2019-10-08 RX ADMIN — Medication 400 MILLIGRAM(S): at 10:00

## 2019-10-08 RX ADMIN — OXYCODONE HYDROCHLORIDE 10 MILLIGRAM(S): 5 TABLET ORAL at 01:28

## 2019-10-08 RX ADMIN — Medication 500 MILLIGRAM(S): at 05:16

## 2019-10-08 RX ADMIN — HYDROMORPHONE HYDROCHLORIDE 8 MILLIGRAM(S): 2 INJECTION INTRAMUSCULAR; INTRAVENOUS; SUBCUTANEOUS at 08:45

## 2019-10-08 RX ADMIN — Medication 325 MILLIGRAM(S): at 12:34

## 2019-10-08 RX ADMIN — Medication 0.5 MILLIGRAM(S): at 04:51

## 2019-10-08 RX ADMIN — Medication 325 MILLIGRAM(S): at 05:21

## 2019-10-08 RX ADMIN — BUPROPION HYDROCHLORIDE 300 MILLIGRAM(S): 150 TABLET, EXTENDED RELEASE ORAL at 12:34

## 2019-10-08 RX ADMIN — Medication 1 TABLET(S): at 12:34

## 2019-10-08 RX ADMIN — MORPHINE SULFATE 4 MILLIGRAM(S): 50 CAPSULE, EXTENDED RELEASE ORAL at 03:00

## 2019-10-08 RX ADMIN — ROSUVASTATIN CALCIUM 10 MILLIGRAM(S): 5 TABLET ORAL at 22:42

## 2019-10-08 RX ADMIN — Medication 100 MILLIGRAM(S): at 22:36

## 2019-10-08 RX ADMIN — HYDROMORPHONE HYDROCHLORIDE 8 MILLIGRAM(S): 2 INJECTION INTRAMUSCULAR; INTRAVENOUS; SUBCUTANEOUS at 08:15

## 2019-10-08 NOTE — PHYSICAL THERAPY INITIAL EVALUATION ADULT - ACTIVE RANGE OF MOTION EXAMINATION, REHAB EVAL
R shoulder flex WFL/Left UE Active ROM was WNL (within normal limits)/bilateral  lower extremity Active ROM was WFL (within functional limits)

## 2019-10-08 NOTE — PHYSICAL THERAPY INITIAL EVALUATION ADULT - GAIT DEVIATIONS NOTED, PT EVAL
decreased stride length/decreased weight-shifting ability/unsteady. Pt declined to try and use a cane or hemicane at this time/decreased alex/decreased step length

## 2019-10-08 NOTE — PROGRESS NOTE ADULT - SUBJECTIVE AND OBJECTIVE BOX
62yo female pt with PMHx of chronic pain due to a hx of osteogenesis imperfecta  (on Dilaudid, Oxycodone), Arthritis, ambulatory c/o right forearm fx s/p mechanical fall this morning. Pt stated she had recent right forearm surgery s/p fracture (2019) by Dr. Morrison and she tripped and fell again this morning landing on right hand. She's evaluated bu  and sent to ED for ulna fx. Pt also hit head on the floor but denied headache. Denies LOC, dizziness, visual changes or N/V. Denies neck or back pain. Denies CP/SOB/ABD pain. Denies sensory changes or weakness to extremities. Denies fever, chills or cough/ congestion. Denies pelvic or hip pain. Denies urinary problems. Patient found to have  a fractured ulnar and radius at the previous suite with broken hardware. Patient is s/p  JESS as well as Open reduction and internal fixation of radius and ulnar of the right arm. tolerated procedure well      MEDICATIONS  (STANDING):  acetaminophen  IVPB .. 1000 milliGRAM(s) IV Intermittent once  ascorbic acid 500 milliGRAM(s) Oral two times a day  aspirin enteric coated 325 milliGRAM(s) Oral two times a day  buPROPion XL . 300 milliGRAM(s) Oral daily  docusate sodium 100 milliGRAM(s) Oral three times a day  famotidine    Tablet 20 milliGRAM(s) Oral daily  ferrous    sulfate 325 milliGRAM(s) Oral three times a day with meals  folic acid 1 milliGRAM(s) Oral daily  gabapentin 100 milliGRAM(s) Oral three times a day  influenza   Vaccine 0.5 milliLiter(s) IntraMuscular once  lactated ringers. 1000 milliLiter(s) (50 mL/Hr) IV Continuous <Continuous>  lamoTRIgine 200 milliGRAM(s) Oral at bedtime  melatonin 3 milliGRAM(s) Oral at bedtime  multivitamin 1 Tablet(s) Oral daily  oxyCODONE  ER Tablet 40 milliGRAM(s) Oral every 12 hours    MEDICATIONS  (PRN):  benzocaine 15 mG/menthol 3.6 mG Lozenge 1 Lozenge Oral every 4 hours PRN Sore Throat  clonazePAM  Tablet 0.5 milliGRAM(s) Oral every 12 hours PRN anxiety  HYDROmorphone   Tablet 8 milliGRAM(s) Oral every 6 hours PRN Severe Pain (7 - 10)  ondansetron Injectable 4 milliGRAM(s) IV Push every 6 hours PRN Nausea and/or Vomiting  oxyCODONE    IR 5 milliGRAM(s) Oral every 3 hours PRN Mild Pain (1 - 3)  oxyCODONE    IR 10 milliGRAM(s) Oral every 3 hours PRN Moderate Pain (4 - 6)  zolpidem 5 milliGRAM(s) Oral at bedtime PRN Insomnia          VITALS:   T(C): 36.8 (10-08-19 @ 15:30), Max: 37.1 (10-07-19 @ 23:42)  HR: 79 (10-08-19 @ 15:30) (73 - 95)  BP: 127/67 (10-08-19 @ 15:30) (109/67 - 156/79)  RR: 18 (10-08-19 @ 15:30) (17 - 18)  SpO2: 94% (10-08-19 @ 15:30) (92% - 95%)  Wt(kg): --    PHYSICAL EXAM:  GENERAL: NAD, well-groomed, well-developed  HEAD:  Atraumatic, Normocephalic  EYES: EOMI, PERRLA, conjunctiva and Blue sclera  ENMT: No tonsillar erythema, exudates, or enlargement; Moist mucous membranes, Good dentition, No lesions  NECK: Supple, No JVD, Normal thyroid  NERVOUS SYSTEM:  Alert & Oriented X3, Good concentration; Motor Strength 5/5 B/L upper and lower extremities; DTRs 2+ intact and symmetric  CHEST/LUNG: Clear to percussion bilaterally; No rales, rhonchi, wheezing, or rubs  HEART: Regular rate and rhythm; No murmurs, rubs, or gallops  ABDOMEN: Soft, Nontender, Nondistended; Bowel sounds present  EXTREMITIES: decreased ROM of right arm  LYMPH: No lymphadenopathy noted  SKIN: No rashes or lesions    LABS:        CBC Full  -  ( 08 Oct 2019 09:52 )  WBC Count : 5.84 K/uL  RBC Count : 3.42 M/uL  Hemoglobin : 10.1 g/dL  Hematocrit : 30.9 %  Platelet Count - Automated : 235 K/uL  Mean Cell Volume : 90.4 fl  Mean Cell Hemoglobin : 29.5 pg  Mean Cell Hemoglobin Concentration : 32.7 gm/dL  Auto Neutrophil # : 3.52 K/uL  Auto Lymphocyte # : 1.43 K/uL  Auto Monocyte # : 0.85 K/uL  Auto Eosinophil # : 0.01 K/uL  Auto Basophil # : 0.01 K/uL  Auto Neutrophil % : 60.2 %  Auto Lymphocyte % : 24.5 %  Auto Monocyte % : 14.6 %  Auto Eosinophil % : 0.2 %  Auto Basophil % : 0.2 %    10-08    137  |  102  |  8   ----------------------------<  99  3.1<L>   |  23  |  0.44<L>    Ca    8.3<L>      08 Oct 2019 07:14    TPro  6.9  /  Alb  4.0  /  TBili  0.9  /  DBili  x   /  AST  27  /  ALT  16  /  AlkPhos  135<H>  10-    LIVER FUNCTIONS - ( 06 Oct 2019 21:43 )  Alb: 4.0 g/dL / Pro: 6.9 g/dL / ALK PHOS: 135 U/L / ALT: 16 U/L / AST: 27 U/L / GGT: x           PT/INR - ( 07 Oct 2019 04:33 )   PT: 12.3 sec;   INR: 1.07 ratio         PTT - ( 07 Oct 2019 04:33 )  PTT:29.8 sec  Urinalysis Basic - ( 07 Oct 2019 17:21 )    Color: Light Yellow / Appearance: Clear / S.013 / pH: x  Gluc: x / Ketone: Large  / Bili: Negative / Urobili: Negative   Blood: x / Protein: Negative / Nitrite: Negative   Leuk Esterase: Negative / RBC: 2 /hpf / WBC 0 /HPF   Sq Epi: x / Non Sq Epi: 1 /hpf / Bacteria: Negative      CAPILLARY BLOOD GLUCOSE          RADIOLOGY & ADDITIONAL TESTS:

## 2019-10-08 NOTE — PROGRESS NOTE ADULT - PROBLEM SELECTOR PLAN 1
tolerated procedure   pain meds as needed  Patient has a hx of chronic pain and may need increased doses of pain medication to appropriately treat pain

## 2019-10-08 NOTE — DISCHARGE NOTE NURSING/CASE MANAGEMENT/SOCIAL WORK - NSSCCARECORD_GEN_ALL_CORE
Pt given D/C, FU and RX information. Verbalized understanding. Denies questions. PIV and telemetry removed. NAD noted. Pt wheeled to exit by transport.   
[facility]

## 2019-10-08 NOTE — PHYSICAL THERAPY INITIAL EVALUATION ADULT - LIVES WITH, PROFILE
Spouse works, but arranging work hours to try and be home more with pt. Lives in an apt with no steps. Ambulatory with a rolling walker PTA./spouse

## 2019-10-08 NOTE — DISCHARGE NOTE NURSING/CASE MANAGEMENT/SOCIAL WORK - PATIENT PORTAL LINK FT
You can access the FollowMyHealth Patient Portal offered by [facility] by registering at the following website: [email or web address]. By joining [employer]’s FollowMyHealth portal, you will also be able to view your health information using other applications (apps) compatible with our system.

## 2019-10-08 NOTE — PHYSICAL THERAPY INITIAL EVALUATION ADULT - PERTINENT HX OF CURRENT PROBLEM, REHAB EVAL
Patient is a 63y old  Female who presents with a chief complaint of right forearm pain after a fall today while in house. Patient had recently undergone surgery on 9/8/2019 with Dr. Morrison for both bone forearm fracture. Of note, patient has osteogenesis imperfecta. She has no numbness or tingling, no chills, no fevers. She has not other bony complaints.

## 2019-10-08 NOTE — PROGRESS NOTE ADULT - SUBJECTIVE AND OBJECTIVE BOX
Orthopaedic Surgery Progress Note    Subjective:   Patient seen and examined  Pain moderately controlled  Dressing changed  No acute events overnight    Objective:  T(C): 37.1 (10-08-19 @ 04:00), Max: 37.1 (10-07-19 @ 23:42)  HR: 87 (10-08-19 @ 04:00) (69 - 95)  BP: 133/74 (10-08-19 @ 04:00) (109/67 - 160/73)  RR: 18 (10-08-19 @ 04:00) (14 - 18)  SpO2: 95% (10-08-19 @ 04:00) (90% - 100%)  Wt(kg): --    10-06 @ 07:01  -  10-07 @ 07:00  --------------------------------------------------------  IN: 0 mL / OUT: 400 mL / NET: -400 mL    10-07 @ 07:01  -  10-08 @ 06:37  --------------------------------------------------------  IN: 1000 mL / OUT: 2200 mL / NET: -1200 mL    PE    NAD  RUE  skin intact, dressing removed, compartments soft but indurated  motor AIN/PIN/U intact  SILT M/U/R  wwp                          9.8    4.42  )-----------( 213      ( 07 Oct 2019 04:33 )             29.4     10-07    138  |  103  |  10  ----------------------------<  91  3.2<L>   |  23  |  0.49<L>    Ca    8.1<L>      07 Oct 2019 04:33    TPro  6.9  /  Alb  4.0  /  TBili  0.9  /  DBili  x   /  AST  27  /  ALT  16  /  AlkPhos  135<H>  10-06    PT/INR - ( 07 Oct 2019 04:33 )   PT: 12.3 sec;   INR: 1.07 ratio      PTT - ( 07 Oct 2019 04:33 )  PTT:29.8 sec  Urinalysis Basic - ( 07 Oct 2019 17:21 )    Color: Light Yellow / Appearance: Clear / S.013 / pH: x  Gluc: x / Ketone: Large  / Bili: Negative / Urobili: Negative   Blood: x / Protein: Negative / Nitrite: Negative   Leuk Esterase: Negative / RBC: 2 /hpf / WBC 0 /HPF   Sq Epi: x / Non Sq Epi: 1 /hpf / Bacteria: Negative    63y Female s/p Right forearm ORIF POD1  - Pain control  - NWB RUE in sling  - Ice, elevation  - Dispo: Home

## 2019-10-09 ENCOUNTER — TRANSCRIPTION ENCOUNTER (OUTPATIENT)
Age: 64
End: 2019-10-09

## 2019-10-09 VITALS
DIASTOLIC BLOOD PRESSURE: 83 MMHG | SYSTOLIC BLOOD PRESSURE: 144 MMHG | HEART RATE: 85 BPM | TEMPERATURE: 98 F | RESPIRATION RATE: 18 BRPM | OXYGEN SATURATION: 95 %

## 2019-10-09 LAB
ANION GAP SERPL CALC-SCNC: 11 MMOL/L — SIGNIFICANT CHANGE UP (ref 5–17)
BUN SERPL-MCNC: 6 MG/DL — LOW (ref 7–23)
CALCIUM SERPL-MCNC: 8.3 MG/DL — LOW (ref 8.4–10.5)
CHLORIDE SERPL-SCNC: 102 MMOL/L — SIGNIFICANT CHANGE UP (ref 96–108)
CO2 SERPL-SCNC: 26 MMOL/L — SIGNIFICANT CHANGE UP (ref 22–31)
CREAT SERPL-MCNC: 0.48 MG/DL — LOW (ref 0.5–1.3)
GLUCOSE SERPL-MCNC: 96 MG/DL — SIGNIFICANT CHANGE UP (ref 70–99)
HCT VFR BLD CALC: 32.5 % — LOW (ref 34.5–45)
HGB BLD-MCNC: 10.6 G/DL — LOW (ref 11.5–15.5)
MCHC RBC-ENTMCNC: 29.4 PG — SIGNIFICANT CHANGE UP (ref 27–34)
MCHC RBC-ENTMCNC: 32.6 GM/DL — SIGNIFICANT CHANGE UP (ref 32–36)
MCV RBC AUTO: 90 FL — SIGNIFICANT CHANGE UP (ref 80–100)
NRBC # BLD: 0 /100 WBCS — SIGNIFICANT CHANGE UP (ref 0–0)
PLATELET # BLD AUTO: 240 K/UL — SIGNIFICANT CHANGE UP (ref 150–400)
POTASSIUM SERPL-MCNC: 3.7 MMOL/L — SIGNIFICANT CHANGE UP (ref 3.5–5.3)
POTASSIUM SERPL-SCNC: 3.7 MMOL/L — SIGNIFICANT CHANGE UP (ref 3.5–5.3)
RBC # BLD: 3.61 M/UL — LOW (ref 3.8–5.2)
RBC # FLD: 14.3 % — SIGNIFICANT CHANGE UP (ref 10.3–14.5)
SODIUM SERPL-SCNC: 139 MMOL/L — SIGNIFICANT CHANGE UP (ref 135–145)
WBC # BLD: 5.18 K/UL — SIGNIFICANT CHANGE UP (ref 3.8–10.5)
WBC # FLD AUTO: 5.18 K/UL — SIGNIFICANT CHANGE UP (ref 3.8–10.5)

## 2019-10-09 PROCEDURE — 73130 X-RAY EXAM OF HAND: CPT

## 2019-10-09 PROCEDURE — 97530 THERAPEUTIC ACTIVITIES: CPT

## 2019-10-09 PROCEDURE — 70450 CT HEAD/BRAIN W/O DYE: CPT

## 2019-10-09 PROCEDURE — 86901 BLOOD TYPING SEROLOGIC RH(D): CPT

## 2019-10-09 PROCEDURE — 85610 PROTHROMBIN TIME: CPT

## 2019-10-09 PROCEDURE — 73090 X-RAY EXAM OF FOREARM: CPT

## 2019-10-09 PROCEDURE — 93005 ELECTROCARDIOGRAM TRACING: CPT

## 2019-10-09 PROCEDURE — 85730 THROMBOPLASTIN TIME PARTIAL: CPT

## 2019-10-09 PROCEDURE — 81001 URINALYSIS AUTO W/SCOPE: CPT

## 2019-10-09 PROCEDURE — 99285 EMERGENCY DEPT VISIT HI MDM: CPT | Mod: 25

## 2019-10-09 PROCEDURE — 73080 X-RAY EXAM OF ELBOW: CPT

## 2019-10-09 PROCEDURE — 73110 X-RAY EXAM OF WRIST: CPT

## 2019-10-09 PROCEDURE — 86850 RBC ANTIBODY SCREEN: CPT

## 2019-10-09 PROCEDURE — 80053 COMPREHEN METABOLIC PANEL: CPT

## 2019-10-09 PROCEDURE — 71045 X-RAY EXAM CHEST 1 VIEW: CPT

## 2019-10-09 PROCEDURE — 76000 FLUOROSCOPY <1 HR PHYS/QHP: CPT

## 2019-10-09 PROCEDURE — 96374 THER/PROPH/DIAG INJ IV PUSH: CPT

## 2019-10-09 PROCEDURE — 97165 OT EVAL LOW COMPLEX 30 MIN: CPT

## 2019-10-09 PROCEDURE — 85027 COMPLETE CBC AUTOMATED: CPT

## 2019-10-09 PROCEDURE — C1889: CPT

## 2019-10-09 PROCEDURE — 73060 X-RAY EXAM OF HUMERUS: CPT

## 2019-10-09 PROCEDURE — 97161 PT EVAL LOW COMPLEX 20 MIN: CPT

## 2019-10-09 PROCEDURE — 97116 GAIT TRAINING THERAPY: CPT

## 2019-10-09 PROCEDURE — 80048 BASIC METABOLIC PNL TOTAL CA: CPT

## 2019-10-09 PROCEDURE — C1713: CPT

## 2019-10-09 PROCEDURE — 86900 BLOOD TYPING SEROLOGIC ABO: CPT

## 2019-10-09 RX ORDER — ROSUVASTATIN CALCIUM 5 MG/1
1 TABLET ORAL
Qty: 0 | Refills: 0 | DISCHARGE
Start: 2019-10-09

## 2019-10-09 RX ORDER — GABAPENTIN 400 MG/1
1 CAPSULE ORAL
Qty: 42 | Refills: 0
Start: 2019-10-09 | End: 2019-10-22

## 2019-10-09 RX ORDER — FLUOXETINE HCL 10 MG
20 CAPSULE ORAL DAILY
Refills: 0 | Status: DISCONTINUED | OUTPATIENT
Start: 2019-10-09 | End: 2019-10-09

## 2019-10-09 RX ORDER — FAMOTIDINE 10 MG/ML
1 INJECTION INTRAVENOUS
Qty: 30 | Refills: 0
Start: 2019-10-09 | End: 2019-11-07

## 2019-10-09 RX ORDER — LAMOTRIGINE 25 MG/1
1 TABLET, ORALLY DISINTEGRATING ORAL
Qty: 0 | Refills: 0 | DISCHARGE
Start: 2019-10-09

## 2019-10-09 RX ORDER — ZOLPIDEM TARTRATE 10 MG/1
1 TABLET ORAL
Qty: 0 | Refills: 0 | DISCHARGE
Start: 2019-10-09

## 2019-10-09 RX ORDER — FLUOXETINE HCL 10 MG
1 CAPSULE ORAL
Qty: 0 | Refills: 0 | DISCHARGE
Start: 2019-10-09

## 2019-10-09 RX ADMIN — Medication 100 MILLIGRAM(S): at 05:55

## 2019-10-09 RX ADMIN — Medication 0.5 MILLIGRAM(S): at 15:51

## 2019-10-09 RX ADMIN — OXYCODONE HYDROCHLORIDE 40 MILLIGRAM(S): 5 TABLET ORAL at 05:55

## 2019-10-09 RX ADMIN — Medication 1 TABLET(S): at 11:39

## 2019-10-09 RX ADMIN — HYDROMORPHONE HYDROCHLORIDE 8 MILLIGRAM(S): 2 INJECTION INTRAMUSCULAR; INTRAVENOUS; SUBCUTANEOUS at 11:40

## 2019-10-09 RX ADMIN — GABAPENTIN 100 MILLIGRAM(S): 400 CAPSULE ORAL at 05:55

## 2019-10-09 RX ADMIN — BUPROPION HYDROCHLORIDE 300 MILLIGRAM(S): 150 TABLET, EXTENDED RELEASE ORAL at 11:28

## 2019-10-09 RX ADMIN — Medication 500 MILLIGRAM(S): at 05:55

## 2019-10-09 RX ADMIN — Medication 325 MILLIGRAM(S): at 05:55

## 2019-10-09 RX ADMIN — Medication 400 MILLIGRAM(S): at 02:07

## 2019-10-09 RX ADMIN — Medication 1000 MILLIGRAM(S): at 02:37

## 2019-10-09 RX ADMIN — FAMOTIDINE 20 MILLIGRAM(S): 10 INJECTION INTRAVENOUS at 11:39

## 2019-10-09 RX ADMIN — OXYCODONE HYDROCHLORIDE 5 MILLIGRAM(S): 5 TABLET ORAL at 16:11

## 2019-10-09 RX ADMIN — OXYCODONE HYDROCHLORIDE 40 MILLIGRAM(S): 5 TABLET ORAL at 06:25

## 2019-10-09 RX ADMIN — MAGNESIUM HYDROXIDE 30 MILLILITER(S): 400 TABLET, CHEWABLE ORAL at 11:39

## 2019-10-09 RX ADMIN — Medication 1 MILLIGRAM(S): at 11:39

## 2019-10-09 RX ADMIN — HYDROMORPHONE HYDROCHLORIDE 8 MILLIGRAM(S): 2 INJECTION INTRAMUSCULAR; INTRAVENOUS; SUBCUTANEOUS at 06:25

## 2019-10-09 RX ADMIN — HYDROMORPHONE HYDROCHLORIDE 8 MILLIGRAM(S): 2 INJECTION INTRAMUSCULAR; INTRAVENOUS; SUBCUTANEOUS at 15:41

## 2019-10-09 RX ADMIN — HYDROMORPHONE HYDROCHLORIDE 8 MILLIGRAM(S): 2 INJECTION INTRAMUSCULAR; INTRAVENOUS; SUBCUTANEOUS at 05:55

## 2019-10-09 RX ADMIN — Medication 50 MICROGRAM(S): at 05:57

## 2019-10-09 RX ADMIN — GABAPENTIN 100 MILLIGRAM(S): 400 CAPSULE ORAL at 14:27

## 2019-10-09 NOTE — DISCHARGE NOTE PROVIDER - CARE PROVIDERS DIRECT ADDRESSES
[email or web address]

## 2019-10-09 NOTE — DISCHARGE NOTE PROVIDER - CARE PROVIDER_API CALL
Guero Morrison (MD)  Orthopaedic Surgery  [address], [address]  [place] [address]  Phone: [phone]  Fax: [phone]  Follow Up Time:     Clark Garcia (DO)  [facility]  [address] [place] [address]  Phone: [phone]  Fax: [phone]  Follow Up Time:

## 2019-10-09 NOTE — DISCHARGE NOTE PROVIDER - HOSPITAL COURSE
History of Present Illness:     Orthopaedic Surgery History and Physical        Patient is a 63y old  Female who presents with a chief complaint of right forearm pain after a fall today while in house. Patient had recently undergone surgery on 9/8/2019 with Dr. Morrison for both bone forearm fracture. Of note, patient has osteogenesis imperfecta. She has no numbness or tingling, no chills, no fevers. She has not other bony complaints.             PAST MEDICAL & SURGICAL HISTORY:    Osteogenesis imperfecta    No significant past surgical history        10/6/19- Patient presents to the hospital after a fall with right upper extremity pain. X-ray reveals periimplant fracture, admitted and medically cleared for surgery.    10/7/19- Patient taken to surgery underwent, ORIF Radius and Ulna- tolerated procedure without complications.     Patient was evaluated by Physical and Occupational therapy whom recommended home for discharge plan.    Patient stable for discharge when cleared by PT.

## 2019-10-09 NOTE — PROGRESS NOTE ADULT - ATTENDING COMMENTS
Pt seen and examined.  Exam and plan as above
Pt seen and examined.  Exam and plan as above.
Pt seen and examined.  Exam and plan as above
I am a non participating [facility] physician seeing Pt in coverage for Dr Bradford. The above patient examination was reviewed with Dr. Garcia and I agree with his evaluation, assessment and treatment plan.  Matt Bradford M.D.
I am a non participating [facility] physician seeing Pt in coverage for Dr Bradford. The above patient examination was reviewed with Dr. Garcia and I agree with his evaluation, assessment and treatment plan.  Matt Bradfodr M.D.
Patient discharged home.  Continue current medications.  Activity as tolerated.  By mouth as tolerated.  Followup with orthopedics.  Patient aware of plan.  I am a non participating [facility] physician seeing Pt in coverage for Dr Bradford. The above patient examination was reviewed with Dr. Garcia and I agree with his evaluation, assessment and treatment plan.  Matt Bradford M.D.

## 2019-10-09 NOTE — PROVIDER CONTACT NOTE (OTHER) - ASSESSMENT
Pt in bed c/o abdominal discomfort stating that she feels like her bladder is full and she wants to urinate. Bladder scan revealed 760cc urine.  Pt assisted OOB into the chair to try and void, however pt still unable to urinate.

## 2019-10-09 NOTE — DISCHARGE NOTE PROVIDER - NSDCFUADDINST_GEN_ALL_CORE_FT
Dressing will be changed during office visit. Out of bed, ambulate, non-weight bearing right UE- Occupational therapy to assist with exercise and help increase endurance

## 2019-10-09 NOTE — PROGRESS NOTE ADULT - SUBJECTIVE AND OBJECTIVE BOX
64yo female pt with PMHx of chronic pain due to a hx of osteogenesis imperfecta  (on Dilaudid, Oxycodone), Arthritis, ambulatory c/o right forearm fx s/p mechanical fall this morning. Pt stated she had recent right forearm surgery s/p fracture (2019) by Dr. Morrison and she tripped and fell again this morning landing on right hand. She's evaluated bu  and sent to ED for ulna fx. Pt also hit head on the floor but denied headache. Denies LOC, dizziness, visual changes or N/V. Denies neck or back pain. Denies CP/SOB/ABD pain. Denies sensory changes or weakness to extremities. Denies fever, chills or cough/ congestion. Denies pelvic or hip pain. Denies urinary problems. Patient found to have  a fractured ulnar and radius at the previous suite with broken hardware. Patient is s/p  JESS as well as Open reduction and internal fixation of radius and ulnar of the right arm. tolerated procedure well      MEDICATIONS  (STANDING):  acetaminophen  IVPB .. 1000 milliGRAM(s) IV Intermittent once  ascorbic acid 500 milliGRAM(s) Oral two times a day  aspirin enteric coated 325 milliGRAM(s) Oral two times a day  buPROPion XL . 300 milliGRAM(s) Oral daily  docusate sodium 100 milliGRAM(s) Oral three times a day  famotidine    Tablet 20 milliGRAM(s) Oral daily  ferrous    sulfate 325 milliGRAM(s) Oral three times a day with meals  folic acid 1 milliGRAM(s) Oral daily  gabapentin 100 milliGRAM(s) Oral three times a day  influenza   Vaccine 0.5 milliLiter(s) IntraMuscular once  lactated ringers. 1000 milliLiter(s) (50 mL/Hr) IV Continuous <Continuous>  lamoTRIgine 200 milliGRAM(s) Oral at bedtime  melatonin 3 milliGRAM(s) Oral at bedtime  multivitamin 1 Tablet(s) Oral daily  oxyCODONE  ER Tablet 40 milliGRAM(s) Oral every 12 hours    MEDICATIONS  (PRN):  benzocaine 15 mG/menthol 3.6 mG Lozenge 1 Lozenge Oral every 4 hours PRN Sore Throat  clonazePAM  Tablet 0.5 milliGRAM(s) Oral every 12 hours PRN anxiety  HYDROmorphone   Tablet 8 milliGRAM(s) Oral every 6 hours PRN Severe Pain (7 - 10)  ondansetron Injectable 4 milliGRAM(s) IV Push every 6 hours PRN Nausea and/or Vomiting  oxyCODONE    IR 5 milliGRAM(s) Oral every 3 hours PRN Mild Pain (1 - 3)  oxyCODONE    IR 10 milliGRAM(s) Oral every 3 hours PRN Moderate Pain (4 - 6)  zolpidem 5 milliGRAM(s) Oral at bedtime PRN Insomnia          VITALS:   T(C): 36.8 (10-08-19 @ 15:30), Max: 37.1 (10-07-19 @ 23:42)  HR: 79 (10-08-19 @ 15:30) (73 - 95)  BP: 127/67 (10-08-19 @ 15:30) (109/67 - 156/79)  RR: 18 (10-08-19 @ 15:30) (17 - 18)  SpO2: 94% (10-08-19 @ 15:30) (92% - 95%)  Wt(kg): --    PHYSICAL EXAM:  GENERAL: NAD, well-groomed, well-developed  HEAD:  Atraumatic, Normocephalic  EYES: EOMI, PERRLA, conjunctiva and Blue sclera  ENMT: No tonsillar erythema, exudates, or enlargement; Moist mucous membranes, Good dentition, No lesions  NECK: Supple, No JVD, Normal thyroid  NERVOUS SYSTEM:  Alert & Oriented X3, Good concentration; Motor Strength 5/5 B/L upper and lower extremities; DTRs 2+ intact and symmetric  CHEST/LUNG: Clear to percussion bilaterally; No rales, rhonchi, wheezing, or rubs  HEART: Regular rate and rhythm; No murmurs, rubs, or gallops  ABDOMEN: Soft, Nontender, Nondistended; Bowel sounds present  EXTREMITIES: decreased ROM of right arm  LYMPH: No lymphadenopathy noted  SKIN: No rashes or lesions    LABS:        CBC Full  -  ( 08 Oct 2019 09:52 )  WBC Count : 5.84 K/uL  RBC Count : 3.42 M/uL  Hemoglobin : 10.1 g/dL  Hematocrit : 30.9 %  Platelet Count - Automated : 235 K/uL  Mean Cell Volume : 90.4 fl  Mean Cell Hemoglobin : 29.5 pg  Mean Cell Hemoglobin Concentration : 32.7 gm/dL  Auto Neutrophil # : 3.52 K/uL  Auto Lymphocyte # : 1.43 K/uL  Auto Monocyte # : 0.85 K/uL  Auto Eosinophil # : 0.01 K/uL  Auto Basophil # : 0.01 K/uL  Auto Neutrophil % : 60.2 %  Auto Lymphocyte % : 24.5 %  Auto Monocyte % : 14.6 %  Auto Eosinophil % : 0.2 %  Auto Basophil % : 0.2 %    10-08    137  |  102  |  8   ----------------------------<  99  3.1<L>   |  23  |  0.44<L>    Ca    8.3<L>      08 Oct 2019 07:14    TPro  6.9  /  Alb  4.0  /  TBili  0.9  /  DBili  x   /  AST  27  /  ALT  16  /  AlkPhos  135<H>  10-    LIVER FUNCTIONS - ( 06 Oct 2019 21:43 )  Alb: 4.0 g/dL / Pro: 6.9 g/dL / ALK PHOS: 135 U/L / ALT: 16 U/L / AST: 27 U/L / GGT: x           PT/INR - ( 07 Oct 2019 04:33 )   PT: 12.3 sec;   INR: 1.07 ratio         PTT - ( 07 Oct 2019 04:33 )  PTT:29.8 sec  Urinalysis Basic - ( 07 Oct 2019 17:21 )    Color: Light Yellow / Appearance: Clear / S.013 / pH: x  Gluc: x / Ketone: Large  / Bili: Negative / Urobili: Negative   Blood: x / Protein: Negative / Nitrite: Negative   Leuk Esterase: Negative / RBC: 2 /hpf / WBC 0 /HPF   Sq Epi: x / Non Sq Epi: 1 /hpf / Bacteria: Negative      CAPILLARY BLOOD GLUCOSE          RADIOLOGY & ADDITIONAL TESTS:

## 2019-10-09 NOTE — PROGRESS NOTE ADULT - ASSESSMENT
Impression: Stable       Plan:   Continue present treatment                 Out of bed, ambulate, non-weight bearing R UE                  Occupational therapy follow up                  Continue to monitor    Rangel Zelaya PA-C  Orthopaedic Surgery  Team pager [phone]  [phone]

## 2019-10-09 NOTE — PROGRESS NOTE ADULT - SUBJECTIVE AND OBJECTIVE BOX
ORTHO  Patient is a 63y old  Female who presents with a chief complaint of   Pt. resting without complaint    VS-  T(C): 36.8 (10-09-19 @ 05:35), Max: 37.1 (10-08-19 @ 07:41)  HR: 79 (10-09-19 @ 05:35) (77 - 83)  BP: 106/82 (10-09-19 @ 05:35) (106/82 - 156/79)  RR: 18 (10-09-19 @ 05:35) (18 - 18)  SpO2: 96% (10-09-19 @ 05:35) (93% - 96%)  Wt(kg): --    M.S. A&O  Extremity- Right UE - dressing C/D/I  Neuro-              Motor- min motion digits              Sensation- grossly intact to light touch                                 10.1   5.84  )-----------( 235      ( 08 Oct 2019 09:52 )             30.9     10-08    137  |  102  |  8   ----------------------------<  99  3.1<L>   |  23  |  0.44<L>    Ca    8.3<L>      08 Oct 2019 07:14

## 2019-10-09 NOTE — OCCUPATIONAL THERAPY INITIAL EVALUATION ADULT - LIVES WITH, PROFILE
Pt lives in an apartment with her spouse with no steps to negotiate, +shower tub with tub transfer bench & grab bars. Assist from   as needed.

## 2019-10-09 NOTE — OCCUPATIONAL THERAPY INITIAL EVALUATION ADULT - PERTINENT HX OF CURRENT PROBLEM, REHAB EVAL
63y old F complaint of right forearm pain after a fall today while in house. Patient had recently undergone surgery on 9/8/2019 with Dr. Morrison for both bone forearm fracture. Of note, patient has osteogenesis imperfecta. Now s/p R forearm radial/ulnar ORIF 10/7.

## 2019-10-09 NOTE — PROVIDER CONTACT NOTE (OTHER) - ASSESSMENT
Pt resting in bed DTV at this time but states she is unable to void. Bladder scan revealed 297cc of urine. Pt denies any discomfort, no other complaints

## 2019-10-09 NOTE — DISCHARGE NOTE PROVIDER - NSDCACTIVITY_GEN_ALL_CORE
Stairs allowed/No heavy lifting/straining/Do not drive or operate machinery/Do not make important decisions/Walking - Outdoors allowed/Walking - Indoors allowed

## 2019-10-09 NOTE — DISCHARGE NOTE PROVIDER - NSDCCPCAREPLAN_GEN_ALL_CORE_FT
PRINCIPAL DISCHARGE DIAGNOSIS  Diagnosis: Closed fracture of distal end of right ulna, unspecified fracture morphology, initial encounter  Assessment and Plan of Treatment:

## 2019-10-09 NOTE — DISCHARGE NOTE PROVIDER - NSDCFUADDAPPT_GEN_ALL_CORE_FT
Please call Dr. Morrison 's office within next few days to schedule a follow up appointment about 14 days after surgery.

## 2019-10-09 NOTE — OCCUPATIONAL THERAPY INITIAL EVALUATION ADULT - RANGE OF MOTION EXAMINATION, UPPER EXTREMITY
except R wrist limited by splint, & R digit extension limited due to decreased use./bilateral UE Active ROM was WFL  (within functional limits)

## 2019-10-09 NOTE — PROVIDER CONTACT NOTE (OTHER) - ACTION/TREATMENT ORDERED:
Rangel QUINONES made aware of situation, at bedside. As per provider pt refusing straight cath at this time, OK to give pt more time to try to void, will monitor.

## 2019-10-10 ENCOUNTER — INBOUND DOCUMENT (OUTPATIENT)
Age: 64
End: 2019-10-10

## 2019-10-15 RX ORDER — HYDROMORPHONE HYDROCHLORIDE 4 MG/1
4 TABLET ORAL EVERY 6 HOURS
Qty: 30 | Refills: 0 | Status: ACTIVE | COMMUNITY
Start: 2019-10-15 | End: 1900-01-01

## 2019-10-29 ENCOUNTER — APPOINTMENT (OUTPATIENT)
Dept: ORTHOPEDIC SURGERY | Facility: CLINIC | Age: 64
End: 2019-10-29
Payer: COMMERCIAL

## 2019-10-29 PROCEDURE — 73090 X-RAY EXAM OF FOREARM: CPT | Mod: RT

## 2019-10-29 PROCEDURE — 99024 POSTOP FOLLOW-UP VISIT: CPT

## 2019-11-19 ENCOUNTER — APPOINTMENT (OUTPATIENT)
Dept: ORTHOPEDIC SURGERY | Facility: CLINIC | Age: 64
End: 2019-11-19
Payer: COMMERCIAL

## 2019-11-19 PROCEDURE — 99024 POSTOP FOLLOW-UP VISIT: CPT

## 2019-11-19 PROCEDURE — 73090 X-RAY EXAM OF FOREARM: CPT | Mod: RT

## 2019-12-18 ENCOUNTER — APPOINTMENT (OUTPATIENT)
Dept: ORTHOPEDIC SURGERY | Facility: CLINIC | Age: 64
End: 2019-12-18

## 2020-01-13 NOTE — PHYSICAL THERAPY INITIAL EVALUATION ADULT - PERTINENT HX OF CURRENT PROBLEM, REHAB EVAL
I spoke with pt  Finished abx on Friday  Pt made aware of infusion date and labs  64y/o F RHD presents c/o R forearm pain sp mechanical fall earlier today.  Was seen at [place] and transferred to [facility]. 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 Bilateral TKAs and L CHAYO, R DHS done at [facility] for prior fractures.XRayRWrist: Status post casting of proximal right radial and ulnar fractures. No evidence of acute fracture or dislocation of wrist. 64y/o F RHD presents c/o R forearm pain s/p mechanical fall earlier today.  Was seen at [place] and transferred to [facility]. 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 Bilateral TKAs and L CHAYO, R DHS done at [facility] for prior fractures.XRayRWrist: Status post casting of proximal right radial and ulnar fractures. No evidence of acute fracture or dislocation of wrist.

## 2020-01-14 ENCOUNTER — APPOINTMENT (OUTPATIENT)
Dept: ORTHOPEDIC SURGERY | Facility: CLINIC | Age: 65
End: 2020-01-14
Payer: COMMERCIAL

## 2020-01-14 DIAGNOSIS — S52.91XE: ICD-10-CM

## 2020-01-14 DIAGNOSIS — S52.201E: ICD-10-CM

## 2020-01-14 DIAGNOSIS — S54.8X1D: ICD-10-CM

## 2020-01-14 PROCEDURE — 99213 OFFICE O/P EST LOW 20 MIN: CPT

## 2020-01-14 PROCEDURE — 73090 X-RAY EXAM OF FOREARM: CPT | Mod: RT

## 2020-03-17 ENCOUNTER — APPOINTMENT (OUTPATIENT)
Dept: ORTHOPEDIC SURGERY | Facility: CLINIC | Age: 65
End: 2020-03-17

## 2020-08-05 NOTE — ED ADULT TRIAGE NOTE - NSWEIGHTCALCTOOLDRUG_GEN_A_CORE
Quality 130: Documentation Of Current Medications In The Medical Record: Current Medications Documented Detail Level: Detailed Quality 110: Preventive Care And Screening: Influenza Immunization: Influenza Immunization Administered during Influenza season Quality 226: Preventive Care And Screening: Tobacco Use: Screening And Cessation Intervention: Patient screened for tobacco use and is an ex/non-smoker  used

## 2020-08-26 NOTE — PATIENT PROFILE ADULT - NSPROMUTANXFEARADDRESSFT_GEN_A_NUR
[FreeTextEntry1] : 66 year old male with low back and lumbar facet arthropathy.  We did discuss a left-sided facet injection.  He will think about whether or not to proceed and return to see me for a repeat injection.
na

## 2021-03-12 ENCOUNTER — INPATIENT (INPATIENT)
Facility: HOSPITAL | Age: 66
LOS: 3 days | Discharge: HOME | DRG: 536 | End: 2021-03-16
Attending: INTERNAL MEDICINE | Admitting: INTERNAL MEDICINE
Payer: COMMERCIAL

## 2021-03-12 VITALS
HEIGHT: 70 IN | RESPIRATION RATE: 16 BRPM | DIASTOLIC BLOOD PRESSURE: 99 MMHG | OXYGEN SATURATION: 99 % | TEMPERATURE: 98 F | HEART RATE: 88 BPM | SYSTOLIC BLOOD PRESSURE: 176 MMHG

## 2021-03-12 DIAGNOSIS — Z98.890 OTHER SPECIFIED POSTPROCEDURAL STATES: Chronic | ICD-10-CM

## 2021-03-12 LAB
ALBUMIN SERPL ELPH-MCNC: 3.7 G/DL — SIGNIFICANT CHANGE UP (ref 3.5–5)
ALP SERPL-CCNC: 153 U/L — HIGH (ref 40–120)
ALT FLD-CCNC: 27 U/L DA — SIGNIFICANT CHANGE UP (ref 10–60)
ANION GAP SERPL CALC-SCNC: 5 MMOL/L — SIGNIFICANT CHANGE UP (ref 5–17)
AST SERPL-CCNC: 31 U/L — SIGNIFICANT CHANGE UP (ref 10–40)
BILIRUB SERPL-MCNC: 0.7 MG/DL — SIGNIFICANT CHANGE UP (ref 0.2–1.2)
BLD GP AB SCN SERPL QL: SIGNIFICANT CHANGE UP
BUN SERPL-MCNC: 14 MG/DL — SIGNIFICANT CHANGE UP (ref 7–18)
CALCIUM SERPL-MCNC: 8.8 MG/DL — SIGNIFICANT CHANGE UP (ref 8.4–10.5)
CHLORIDE SERPL-SCNC: 105 MMOL/L — SIGNIFICANT CHANGE UP (ref 96–108)
CO2 SERPL-SCNC: 30 MMOL/L — SIGNIFICANT CHANGE UP (ref 22–31)
CREAT SERPL-MCNC: 0.57 MG/DL — SIGNIFICANT CHANGE UP (ref 0.5–1.3)
GLUCOSE SERPL-MCNC: 98 MG/DL — SIGNIFICANT CHANGE UP (ref 70–99)
HCT VFR BLD CALC: 34.6 % — SIGNIFICANT CHANGE UP (ref 34.5–45)
HGB BLD-MCNC: 11.5 G/DL — SIGNIFICANT CHANGE UP (ref 11.5–15.5)
MCHC RBC-ENTMCNC: 29.3 PG — SIGNIFICANT CHANGE UP (ref 27–34)
MCHC RBC-ENTMCNC: 33.2 GM/DL — SIGNIFICANT CHANGE UP (ref 32–36)
MCV RBC AUTO: 88 FL — SIGNIFICANT CHANGE UP (ref 80–100)
NRBC # BLD: 0 /100 WBCS — SIGNIFICANT CHANGE UP (ref 0–0)
PLATELET # BLD AUTO: 178 K/UL — SIGNIFICANT CHANGE UP (ref 150–400)
POTASSIUM SERPL-MCNC: 3.4 MMOL/L — LOW (ref 3.5–5.3)
POTASSIUM SERPL-SCNC: 3.4 MMOL/L — LOW (ref 3.5–5.3)
PROT SERPL-MCNC: 7.5 G/DL — SIGNIFICANT CHANGE UP (ref 6–8.3)
RBC # BLD: 3.93 M/UL — SIGNIFICANT CHANGE UP (ref 3.8–5.2)
RBC # FLD: 13.5 % — SIGNIFICANT CHANGE UP (ref 10.3–14.5)
SODIUM SERPL-SCNC: 140 MMOL/L — SIGNIFICANT CHANGE UP (ref 135–145)
WBC # BLD: 6.9 K/UL — SIGNIFICANT CHANGE UP (ref 3.8–10.5)
WBC # FLD AUTO: 6.9 K/UL — SIGNIFICANT CHANGE UP (ref 3.8–10.5)

## 2021-03-12 PROCEDURE — 73552 X-RAY EXAM OF FEMUR 2/>: CPT | Mod: 26,LT

## 2021-03-12 PROCEDURE — 99285 EMERGENCY DEPT VISIT HI MDM: CPT

## 2021-03-12 PROCEDURE — 73502 X-RAY EXAM HIP UNI 2-3 VIEWS: CPT | Mod: 26,LT

## 2021-03-12 RX ORDER — MORPHINE SULFATE 50 MG/1
6 CAPSULE, EXTENDED RELEASE ORAL ONCE
Refills: 0 | Status: DISCONTINUED | OUTPATIENT
Start: 2021-03-12 | End: 2021-03-12

## 2021-03-12 RX ADMIN — MORPHINE SULFATE 6 MILLIGRAM(S): 50 CAPSULE, EXTENDED RELEASE ORAL at 22:37

## 2021-03-12 NOTE — ED PROVIDER NOTE - PHYSICAL EXAMINATION
PHYSICAL EXAM:    Constitutional: NAD, awake and alert, well-developed  HEENT: PERR, EOMI, Normal Hearing, MMM  Neck: Soft and supple, No LAD, No JVD  Respiratory: Breath sounds are clear bilaterally, No wheezing, rales or rhonchi  Cardiovascular: S1 and S2, regular rate and rhythm, no Murmurs, gallops or rubs  Gastrointestinal: Bowel Sounds present, soft, nontender, nondistended, no guarding, no rebound  Extremities: No peripheral edema  Vascular: 2+ peripheral pulses  Neurological: A/O x 3, no focal deficits  Musculoskeletal: Numerous bony deformities in feet, Hips externally rotated bilaterally, no tenderness to palpation on left hip joint, no visible leg length discrepancy, no visible displacement of the left leg.   Skin: No rashes

## 2021-03-12 NOTE — ED PROVIDER NOTE - OBJECTIVE STATEMENT
66 y/o female with a history of osteogenesis imperfecta complicated by numerous fractures, presents with hip pain following a fall this afternoon. Patient was in the kitchen when she bumped into the sink and fell on her butt. Immediately after, she developed a severe pain which she describes as a pulling sensation extending from her groin into her butt. Also endorses weakness and limited ROM in the hip. Denies distal extremity weakness, numbness, tingling, or skin changes.

## 2021-03-12 NOTE — ED PROVIDER NOTE - NS ED ROS FT
REVIEW OF SYSTEMS:    CONSTITUTIONAL: No weakness, fevers or chills  EYES/ENT: No visual changes;  No vertigo or throat pain   NECK: No pain or stiffness  RESPIRATORY: No cough, wheezing, hemoptysis; No shortness of breath  CARDIOVASCULAR: No chest pain or palpitations  GASTROINTESTINAL: No abdominal or epigastric pain. No nausea, vomiting, or hematemesis; No diarrhea or constipation. No melena or hematochezia.  GENITOURINARY: No dysuria, frequency or hematuria  NEUROLOGICAL: No numbness   MSK: [+] Hip pain, [+] Limited ROM left hip   SKIN: No itching, burning, rashes, or lesions   All other review of systems is negative unless indicated above

## 2021-03-12 NOTE — ED PROVIDER NOTE - CLINICAL SUMMARY MEDICAL DECISION MAKING FREE TEXT BOX
Given history of osteogenesis imperfecta, need to r/o fractures vs dislocation. No concerns at this point for vascular compromise. Pending xray and pre-op labs. Once xray is done, will consult with orthopedist. Given Morphine for pain. Given history of osteogenesis imperfecta, need to r/o fractures vs dislocation. No concerns at this point for vascular compromise. Pending xray and pre-op labs. Once xray is done, will consult with orthopedist. Given Morphine for pain.    CT showing acute pelvic rami fx. Pt requiring multiple doses of narcotics. Admitting to medicine for PT and further mgmt. Pt stable.

## 2021-03-13 DIAGNOSIS — S32.592A OTHER SPECIFIED FRACTURE OF LEFT PUBIS, INITIAL ENCOUNTER FOR CLOSED FRACTURE: ICD-10-CM

## 2021-03-13 DIAGNOSIS — Q78.0 OSTEOGENESIS IMPERFECTA: ICD-10-CM

## 2021-03-13 DIAGNOSIS — G89.4 CHRONIC PAIN SYNDROME: ICD-10-CM

## 2021-03-13 DIAGNOSIS — E03.9 HYPOTHYROIDISM, UNSPECIFIED: ICD-10-CM

## 2021-03-13 DIAGNOSIS — F41.8 OTHER SPECIFIED ANXIETY DISORDERS: ICD-10-CM

## 2021-03-13 DIAGNOSIS — Z29.9 ENCOUNTER FOR PROPHYLACTIC MEASURES, UNSPECIFIED: ICD-10-CM

## 2021-03-13 DIAGNOSIS — G89.11 ACUTE PAIN DUE TO TRAUMA: ICD-10-CM

## 2021-03-13 DIAGNOSIS — F41.9 ANXIETY DISORDER, UNSPECIFIED: ICD-10-CM

## 2021-03-13 DIAGNOSIS — E78.5 HYPERLIPIDEMIA, UNSPECIFIED: ICD-10-CM

## 2021-03-13 DIAGNOSIS — F32.9 MAJOR DEPRESSIVE DISORDER, SINGLE EPISODE, UNSPECIFIED: ICD-10-CM

## 2021-03-13 LAB
SARS-COV-2 IGG SERPL QL IA: NEGATIVE — SIGNIFICANT CHANGE UP
SARS-COV-2 IGM SERPL IA-ACNC: 0.08 INDEX — SIGNIFICANT CHANGE UP
SARS-COV-2 RNA SPEC QL NAA+PROBE: SIGNIFICANT CHANGE UP

## 2021-03-13 PROCEDURE — 99222 1ST HOSP IP/OBS MODERATE 55: CPT

## 2021-03-13 PROCEDURE — 72192 CT PELVIS W/O DYE: CPT | Mod: 26,MA

## 2021-03-13 RX ORDER — LEVOTHYROXINE SODIUM 125 MCG
50 TABLET ORAL DAILY
Refills: 0 | Status: DISCONTINUED | OUTPATIENT
Start: 2021-03-13 | End: 2021-03-16

## 2021-03-13 RX ORDER — MORPHINE SULFATE 50 MG/1
4 CAPSULE, EXTENDED RELEASE ORAL ONCE
Refills: 0 | Status: DISCONTINUED | OUTPATIENT
Start: 2021-03-13 | End: 2021-03-13

## 2021-03-13 RX ORDER — ZOLPIDEM TARTRATE 10 MG/1
5 TABLET ORAL AT BEDTIME
Refills: 0 | Status: DISCONTINUED | OUTPATIENT
Start: 2021-03-13 | End: 2021-03-16

## 2021-03-13 RX ORDER — FLUOXETINE HCL 10 MG
20 CAPSULE ORAL EVERY 12 HOURS
Refills: 0 | Status: DISCONTINUED | OUTPATIENT
Start: 2021-03-13 | End: 2021-03-16

## 2021-03-13 RX ORDER — DIAZEPAM 5 MG
5 TABLET ORAL ONCE
Refills: 0 | Status: DISCONTINUED | OUTPATIENT
Start: 2021-03-13 | End: 2021-03-13

## 2021-03-13 RX ORDER — ACETAMINOPHEN 500 MG
1000 TABLET ORAL ONCE
Refills: 0 | Status: COMPLETED | OUTPATIENT
Start: 2021-03-13 | End: 2021-03-13

## 2021-03-13 RX ORDER — HYDROMORPHONE HYDROCHLORIDE 2 MG/ML
8 INJECTION INTRAMUSCULAR; INTRAVENOUS; SUBCUTANEOUS EVERY 6 HOURS
Refills: 0 | Status: DISCONTINUED | OUTPATIENT
Start: 2021-03-13 | End: 2021-03-15

## 2021-03-13 RX ORDER — POTASSIUM CHLORIDE 20 MEQ
40 PACKET (EA) ORAL ONCE
Refills: 0 | Status: COMPLETED | OUTPATIENT
Start: 2021-03-13 | End: 2021-03-13

## 2021-03-13 RX ORDER — HYDROMORPHONE HYDROCHLORIDE 2 MG/ML
1 INJECTION INTRAMUSCULAR; INTRAVENOUS; SUBCUTANEOUS ONCE
Refills: 0 | Status: DISCONTINUED | OUTPATIENT
Start: 2021-03-13 | End: 2021-03-13

## 2021-03-13 RX ORDER — BUPROPION HYDROCHLORIDE 150 MG/1
300 TABLET, EXTENDED RELEASE ORAL DAILY
Refills: 0 | Status: DISCONTINUED | OUTPATIENT
Start: 2021-03-13 | End: 2021-03-13

## 2021-03-13 RX ORDER — CLONAZEPAM 1 MG
0.5 TABLET ORAL THREE TIMES A DAY
Refills: 0 | Status: DISCONTINUED | OUTPATIENT
Start: 2021-03-13 | End: 2021-03-16

## 2021-03-13 RX ORDER — LAMOTRIGINE 25 MG/1
100 TABLET, ORALLY DISINTEGRATING ORAL
Refills: 0 | Status: DISCONTINUED | OUTPATIENT
Start: 2021-03-13 | End: 2021-03-16

## 2021-03-13 RX ORDER — ACETAMINOPHEN 500 MG
650 TABLET ORAL EVERY 6 HOURS
Refills: 0 | Status: DISCONTINUED | OUTPATIENT
Start: 2021-03-13 | End: 2021-03-13

## 2021-03-13 RX ORDER — HYDROMORPHONE HYDROCHLORIDE 2 MG/ML
1 INJECTION INTRAMUSCULAR; INTRAVENOUS; SUBCUTANEOUS EVERY 4 HOURS
Refills: 0 | Status: DISCONTINUED | OUTPATIENT
Start: 2021-03-13 | End: 2021-03-15

## 2021-03-13 RX ORDER — ENOXAPARIN SODIUM 100 MG/ML
40 INJECTION SUBCUTANEOUS DAILY
Refills: 0 | Status: DISCONTINUED | OUTPATIENT
Start: 2021-03-13 | End: 2021-03-16

## 2021-03-13 RX ORDER — ATORVASTATIN CALCIUM 80 MG/1
40 TABLET, FILM COATED ORAL AT BEDTIME
Refills: 0 | Status: DISCONTINUED | OUTPATIENT
Start: 2021-03-13 | End: 2021-03-16

## 2021-03-13 RX ORDER — LEVOTHYROXINE SODIUM 125 MCG
1 TABLET ORAL
Qty: 0 | Refills: 0 | DISCHARGE

## 2021-03-13 RX ORDER — HYDROMORPHONE HYDROCHLORIDE 2 MG/ML
8 INJECTION INTRAMUSCULAR; INTRAVENOUS; SUBCUTANEOUS EVERY 6 HOURS
Refills: 0 | Status: DISCONTINUED | OUTPATIENT
Start: 2021-03-13 | End: 2021-03-13

## 2021-03-13 RX ORDER — SENNA PLUS 8.6 MG/1
2 TABLET ORAL AT BEDTIME
Refills: 0 | Status: DISCONTINUED | OUTPATIENT
Start: 2021-03-13 | End: 2021-03-16

## 2021-03-13 RX ORDER — SIMETHICONE 80 MG/1
80 TABLET, CHEWABLE ORAL ONCE
Refills: 0 | Status: COMPLETED | OUTPATIENT
Start: 2021-03-13 | End: 2021-03-13

## 2021-03-13 RX ORDER — ACETAMINOPHEN 500 MG
1000 TABLET ORAL EVERY 6 HOURS
Refills: 0 | Status: COMPLETED | OUTPATIENT
Start: 2021-03-13 | End: 2021-03-16

## 2021-03-13 RX ORDER — POLYETHYLENE GLYCOL 3350 17 G/17G
17 POWDER, FOR SOLUTION ORAL DAILY
Refills: 0 | Status: DISCONTINUED | OUTPATIENT
Start: 2021-03-13 | End: 2021-03-16

## 2021-03-13 RX ORDER — SODIUM CHLORIDE 9 MG/ML
1000 INJECTION INTRAMUSCULAR; INTRAVENOUS; SUBCUTANEOUS
Refills: 0 | Status: DISCONTINUED | OUTPATIENT
Start: 2021-03-13 | End: 2021-03-16

## 2021-03-13 RX ORDER — OXYCODONE HYDROCHLORIDE 5 MG/1
40 TABLET ORAL EVERY 12 HOURS
Refills: 0 | Status: DISCONTINUED | OUTPATIENT
Start: 2021-03-13 | End: 2021-03-16

## 2021-03-13 RX ADMIN — OXYCODONE HYDROCHLORIDE 40 MILLIGRAM(S): 5 TABLET ORAL at 21:02

## 2021-03-13 RX ADMIN — HYDROMORPHONE HYDROCHLORIDE 8 MILLIGRAM(S): 2 INJECTION INTRAMUSCULAR; INTRAVENOUS; SUBCUTANEOUS at 21:11

## 2021-03-13 RX ADMIN — SIMETHICONE 80 MILLIGRAM(S): 80 TABLET, CHEWABLE ORAL at 10:44

## 2021-03-13 RX ADMIN — OXYCODONE HYDROCHLORIDE 40 MILLIGRAM(S): 5 TABLET ORAL at 17:12

## 2021-03-13 RX ADMIN — Medication 20 MILLIGRAM(S): at 17:12

## 2021-03-13 RX ADMIN — HYDROMORPHONE HYDROCHLORIDE 1 MILLIGRAM(S): 2 INJECTION INTRAMUSCULAR; INTRAVENOUS; SUBCUTANEOUS at 02:02

## 2021-03-13 RX ADMIN — MORPHINE SULFATE 4 MILLIGRAM(S): 50 CAPSULE, EXTENDED RELEASE ORAL at 00:19

## 2021-03-13 RX ADMIN — ATORVASTATIN CALCIUM 40 MILLIGRAM(S): 80 TABLET, FILM COATED ORAL at 21:11

## 2021-03-13 RX ADMIN — HYDROMORPHONE HYDROCHLORIDE 8 MILLIGRAM(S): 2 INJECTION INTRAMUSCULAR; INTRAVENOUS; SUBCUTANEOUS at 12:22

## 2021-03-13 RX ADMIN — ZOLPIDEM TARTRATE 5 MILLIGRAM(S): 10 TABLET ORAL at 21:11

## 2021-03-13 RX ADMIN — Medication 0.5 MILLIGRAM(S): at 13:19

## 2021-03-13 RX ADMIN — Medication 400 MILLIGRAM(S): at 03:24

## 2021-03-13 RX ADMIN — MORPHINE SULFATE 6 MILLIGRAM(S): 50 CAPSULE, EXTENDED RELEASE ORAL at 00:00

## 2021-03-13 RX ADMIN — LAMOTRIGINE 100 MILLIGRAM(S): 25 TABLET, ORALLY DISINTEGRATING ORAL at 17:13

## 2021-03-13 RX ADMIN — HYDROMORPHONE HYDROCHLORIDE 8 MILLIGRAM(S): 2 INJECTION INTRAMUSCULAR; INTRAVENOUS; SUBCUTANEOUS at 13:16

## 2021-03-13 RX ADMIN — SENNA PLUS 2 TABLET(S): 8.6 TABLET ORAL at 21:11

## 2021-03-13 RX ADMIN — Medication 1000 MILLIGRAM(S): at 03:30

## 2021-03-13 RX ADMIN — HYDROMORPHONE HYDROCHLORIDE 1 MILLIGRAM(S): 2 INJECTION INTRAMUSCULAR; INTRAVENOUS; SUBCUTANEOUS at 05:49

## 2021-03-13 RX ADMIN — Medication 1000 MILLIGRAM(S): at 17:13

## 2021-03-13 RX ADMIN — Medication 15 MILLIGRAM(S): at 17:13

## 2021-03-13 RX ADMIN — Medication 1000 MILLIGRAM(S): at 21:02

## 2021-03-13 RX ADMIN — Medication 0.5 MILLIGRAM(S): at 21:11

## 2021-03-13 RX ADMIN — Medication 5 MILLIGRAM(S): at 02:02

## 2021-03-13 RX ADMIN — HYDROMORPHONE HYDROCHLORIDE 1 MILLIGRAM(S): 2 INJECTION INTRAMUSCULAR; INTRAVENOUS; SUBCUTANEOUS at 02:30

## 2021-03-13 RX ADMIN — HYDROMORPHONE HYDROCHLORIDE 8 MILLIGRAM(S): 2 INJECTION INTRAMUSCULAR; INTRAVENOUS; SUBCUTANEOUS at 22:02

## 2021-03-13 RX ADMIN — MORPHINE SULFATE 4 MILLIGRAM(S): 50 CAPSULE, EXTENDED RELEASE ORAL at 00:57

## 2021-03-13 RX ADMIN — Medication 40 MILLIEQUIVALENT(S): at 09:45

## 2021-03-13 RX ADMIN — HYDROMORPHONE HYDROCHLORIDE 1 MILLIGRAM(S): 2 INJECTION INTRAMUSCULAR; INTRAVENOUS; SUBCUTANEOUS at 06:43

## 2021-03-13 NOTE — H&P ADULT - PROBLEM SELECTOR PLAN 2
H/O Osteogenesis imperfecta with multiple fractures and orthopedic surgeries in the past  c/w Pain meds Tylenol, Oxycontin and Dilaudid

## 2021-03-13 NOTE — H&P ADULT - HISTORY OF PRESENT ILLNESS
65 yr female , ambulates with cane/walker with PMH of osteogenesis imperfecta complicated by numerous fractures, Anxiety, depression, HLD  presents to ED with hip pain following a fall yesterday afternoon. Patient reports she was coming out of bathroom when she tripped over an edge and fell and immediately after, she developed a severe pain which she describes as a pulling sensation extending from her groin into her butt on the left side. Pt reportss weakness and limited ROM in the hip. Pt denies distal extremity weakness, numbness, tingling or any other acute complaints.    In ED, /77, HR 80

## 2021-03-13 NOTE — H&P ADULT - NSHPPHYSICALEXAM_GEN_ALL_CORE
General - NAD  Eyes - PERRLA, EOM intact  ENT - Nonicteric sclerae, PERRLA, EOMI. Oropharynx clear. Moist mucous membranes. Conjunctivae appear well perfused.   Neck - No noticeable or palpable swelling, redness or rash around throat or on face  Lymph Nodes - No lymphadenopathy  Cardiovascular - RRR no m/r/g, no JVD, no carotid bruits  Lungs - Clear to ascultation, no use of accessory muscles, no crackles or wheezes.  Skin - No rashes, skin warm and dry, no erythematous areas  Abdomen - Normal bowel sounds, abdomen soft and nontender, no hepatosplenomegaly.  Extremities - No edema, cyanosis or clubbing  MusculoSkeletal - Limited ROM at f/u Lipid profile hip joint due to pain  Neurological – Alert and oriented x 3, CN 2-12 grossly intact.

## 2021-03-13 NOTE — H&P ADULT - ASSESSMENT
65 yr female , ambulates with cane/walker with PMH of osteogenesis imperfecta complicated by numerous fractures, Anxiety, depression, HLD  presents to ED with hip pain following a fall yesterday afternoon. Patient reports she was coming out of bathroom when she tripped over an edge and fell and immediately after, she developed a severe pain which she describes as a pulling sensation extending from her groin into her butt on the left side. Pt reportss weakness and limited ROM in the hip. Pt denies distal extremity weakness, numbness, tingling or any other acute complaints.    In ED, /77, HR 80      Pt admitted for Acute left pubic rami fracture

## 2021-03-13 NOTE — H&P ADULT - PROBLEM SELECTOR PLAN 1
p/w left hip pain after mechanical fall  CT Pelvis shows 1. Acute left pubic rami fractures. The superior pubic ramus fracture extends to the acetabular margin, it is indeterminate whether it extends to the periprosthetic margin.  2. Age-indeterminate anterior cortical buckle fracture in S3.  c/w pain medications  Ortho consult  PT consult

## 2021-03-13 NOTE — CONSULT NOTE ADULT - PROBLEM SELECTOR RECOMMENDATION 9
Pt with acute left hip pain c/b generalized chronic body pain which is somatic in nature due to acute left pubic rami fracture and hx osteogenesis imperfecta with hx multiple fractures.    Opioid pain recommendations   - Dilaudid 1mg IV q4h PRN severe pain.   - Continue Dilaudid 8mg PO q 6 hours PRN moderate pain (pt's home medication). Monitor for sedation/ respiratory depression.   Non-opioid pain recommendations   - Avoid NSAIDs  - Acetaminophen 1G PO q 6 hours for x 3 days.  Bowel Regimen  - Continue Miralax 17G PO daily PRN constipation   - Continue Senna 2 tablets at bedtime for constipation  Mild pain   - Non-pharmacological pain treatment recommendations  - Warm/ Cool packs PRN   - Repositioning extremity, elevation, imagery, relaxation, distraction.  - Physical therapy OOB if no contraindications   Recommendations discussed with primary team and RN

## 2021-03-13 NOTE — H&P ADULT - ATTENDING COMMENTS
Pt seen and examined  Case discussed with MAR.  65 year old woman with PMH of OSTEOGENESIS imperfecta with history of multiple bony fractures and orthopedic procedures presenting after a mechanical fall on her backside hours before presentation. No LOC, no cardiac or neurologic symptoms preceding.    Vital Signs Last 24 Hrs  T(C): 36.9 (13 Mar 2021 00:43), Max: 36.9 (13 Mar 2021 00:43)  T(F): 98.5 (13 Mar 2021 00:43), Max: 98.5 (13 Mar 2021 00:43)  HR: 91 (13 Mar 2021 00:43) (88 - 91)  BP: 167/77 (13 Mar 2021 00:43) (167/77 - 176/99)  RR: 18 (13 Mar 2021 00:43) (16 - 18)  SpO2: 99% (13 Mar 2021 00:43) (99% - 99%)    Labs                         11.5   6.90  )-----------( 178      ( 12 Mar 2021 22:36 )             34.6     03-12    140  |  105  |  14  ----------------------------<  98  3.4<L>   |  30  |  0.57    Ca    8.8      12 Mar 2021 22:36  TPro  7.5  /  Alb  3.7  /  TBili  0.7  /  DBili  x   /  AST  31  /  ALT  27  /  AlkPhos  153<H>  03-12    CT pelvis  1. Acute left pubic rami fractures. The superior pubic ramus fracture extends to the acetabular margin, it is indeterminate whether it extends to the periprosthetic margin.  2. Age-indeterminate anterior cortical buckle fracture in S3.    Plan   Admit to Medicine   Pain control   Orthopedic consult- unsure what management would be at this time.  PT evaluation   Review home meds- lamotrigine. percocet. fluoxetine. Seroquel. clonazepam. zolpidem. docusate. senna. pantoprazole. conjugated estrogen-medroxyprog

## 2021-03-13 NOTE — CONSULT NOTE ADULT - SUBJECTIVE AND OBJECTIVE BOX
I saw and evaluated pt in bed with new left groin pain s/p fall. She has osteogenesis imperfecta and ambulates with walker.  She has had multiple bone fractures and surgeries over the years but still ambulates independently.      History of Present Illness:  Reason for Admission: Fall  History of Present Illness:   65 yr female , ambulates with cane/walker with PMH of osteogenesis imperfecta complicated by numerous fractures, Anxiety, depression, HLD  presents to ED with hip pain following a fall yesterday afternoon. Patient reports she was coming out of bathroom when she tripped over an edge and fell and immediately after, she developed a severe pain which she describes as a pulling sensation extending from her groin into her butt on the left side. Pt reportss weakness and limited ROM in the hip. Pt denies distal extremity weakness, numbness, tingling or any other acute complaints.    In ED, /77, HR 80       Review of Systems:  Other Review of Systems: All other review of systems negative, except as noted in HPI       Allergies and Intolerances:        Allergies:  	No Known Allergies:     Home Medications:   * Patient Currently Takes Medications as of 09-Oct-2019 08:50 documented in Structured Notes  · 	famotidine 20 mg oral tablet: 1 tab(s) orally once a day  · 	gabapentin 100 mg oral capsule: 1 cap(s) orally 3 times a day MDD:3  · 	zolpidem 5 mg oral tablet: 1 tab(s) orally once a day (at bedtime), As needed, Insomnia  · 	rosuvastatin 10 mg oral tablet: 1 tab(s) orally once a day (at bedtime)  · 	FLUoxetine 20 mg oral capsule: 1 cap(s) orally once a day  · 	lamoTRIgine 200 mg oral tablet: 1 tab(s) orally once a day (at bedtime)  · 	aspirin 325 mg oral tablet: 1 tab(s) orally 2 times a day x 6 weeks for prevention of clots  · 	acetaminophen 325 mg oral tablet: 2 tab(s) orally every 6 hours, As needed, Temp greater or equal to 38C (100.4F), mild pain  · 	oxyCODONE 40 mg oral tablet, extended release: 1 tab(s) orally every 12 hours  · 	docusate sodium 100 mg oral capsule: 1 cap(s) orally 3 times a day  · 	Multiple Vitamins oral tablet: 1 tab(s) orally once a day  · 	HYDROmorphone 8 mg oral tablet: 1 tab(s) orally every 6 hours, As Needed for severe pain  	  	recent script filled on 9/5/19 for 120 tabs  · 	Wellbutrin 75 mg oral tablet: 1 tab(s) orally 3 times a day  · 	levothyroxine 13 mcg (0.013 mg) oral capsule: 1 cap(s) orally once a day    .    Patient History:    Past Medical, Past Surgical, and Family History:  PAST MEDICAL HISTORY:  Osteogenesis imperfecta.     PAST SURGICAL HISTORY:  History of open reduction and internal fixation (ORIF) procedure.     FAMILY HISTORY:  No pertinent family history in first degree relatives.     No Pertinent Family History in first degree relatives of: pt.     Social History:  Social History (marital status, living situation, occupation, tobacco use, alcohol and drug use, and sexual history): Denies smoking, alcohol or illicit drug use    PE: Awake and alert following commands.       Has some tenderness left groin with some groin pain on rotation left hip.       Moving toes and ankle with gross sensation.    X-rays: Multiple x-rays were reviewed and includes CT pelvis which shows reported left superior and inferior acute rami fractures with old right rami fractures. There are bialteral THR with x-rays of left femur showing a TKR with femoral plates present.    A/P: Osteogenesis imperfecta with multiple orthpedic surgery s/p fall with acute left pelvic rami fractures.         Recommend pain control and mobilization, PT ambulation WBAT         I told pt she was raquel to only fracture the pelvic rami seen at this time as they are treated non-operatively and other potential fractures could involve extensive surgery and she muct be very careful.         Discussed with pt at length.
  Source of information: BELINDA ROMERO, Chart review  Patient language: English  : n/a    HPI:  65 yr female , ambulates with cane/walker with PMH of osteogenesis imperfecta complicated by numerous fractures, Anxiety, depression, HLD  presents to ED with hip pain following a fall yesterday afternoon. Patient reports she was coming out of bathroom when she tripped over an edge and fell and immediately after, she developed a severe pain which she describes as a pulling sensation extending from her groin into her butt on the left side. Pt reportss weakness and limited ROM in the hip. Pt denies distal extremity weakness, numbness, tingling or any other acute complaints.    In ED, /77, HR 80 (13 Mar 2021 06:06)      Patient is a 65y old  Female with significant PMH for osteogenesis imperfecta and multiple fractures who presents with a chief complaint of left hip pain s/p fall at home. Found to have acute left pubic rami fracture. Ortho consult pending.  Pt seen and examined at bedside. Pt laying in stretcher, reports pain score 7-8/10 and not tolerable SCALE USED: (1-10 VNRS). Last received dilaudid 8mg PO at 12:30. Pt describes pain as sharp, radiating to left buttocks and left groin, alleviated by pain medication, exacerbated by movement. Pt tolerating PO diet. Denies lethargy, nausea, vomiting, constipation, itchiness. Reports last BM 3/12. Patient stated goal for pain control: to be able to take deep breaths, get out of bed to chair and ambulate with tolerable pain control. Pt takes oxycodone ER 40mg 2x/day and dialudid 8mg PO x 6h PRN for pain at home. (verified on istop)     PAST MEDICAL & SURGICAL HISTORY:  Osteogenesis imperfecta    History of open reduction and internal fixation (ORIF) procedure        FAMILY HISTORY:  Osteogenesis imperfecta- Father and sister  Cardiac valve- Brother         Social History:  Denies alcohol or illicit drug use (13 Mar 2021 06:06)   [X ] Former smoking    Allergies    No Known Allergies    MEDICATIONS  (STANDING):  atorvastatin 40 milliGRAM(s) Oral at bedtime  busPIRone 15 milliGRAM(s) Oral two times a day  clonazePAM  Tablet 0.5 milliGRAM(s) Oral three times a day  enoxaparin Injectable 40 milliGRAM(s) SubCutaneous daily  FLUoxetine 20 milliGRAM(s) Oral every 12 hours  lamoTRIgine 100 milliGRAM(s) Oral two times a day  levothyroxine 50 MICROGram(s) Oral daily  oxyCODONE  ER Tablet 40 milliGRAM(s) Oral every 12 hours    MEDICATIONS  (PRN):  acetaminophen   Tablet .. 650 milliGRAM(s) Oral every 6 hours PRN Mild Pain (1 - 3)  HYDROmorphone   Tablet 8 milliGRAM(s) Oral every 6 hours PRN Severe Pain (7 - 10)  zolpidem 5 milliGRAM(s) Oral at bedtime PRN Insomnia      Vital Signs Last 24 Hrs  T(C): 36.9 (13 Mar 2021 11:20), Max: 37 (13 Mar 2021 06:16)  T(F): 98.4 (13 Mar 2021 11:20), Max: 98.6 (13 Mar 2021 06:16)  HR: 76 (13 Mar 2021 11:20) (76 - 91)  BP: 155/75 (13 Mar 2021 11:20) (128/79 - 176/99)  BP(mean): --  RR: 18 (13 Mar 2021 11:20) (16 - 18)  SpO2: 96% (13 Mar 2021 11:20) (96% - 99%)    LABS: Reviewed.                          11.5   6.90  )-----------( 178      ( 12 Mar 2021 22:36 )             34.6     03-12    140  |  105  |  14  ----------------------------<  98  3.4<L>   |  30  |  0.57    Ca    8.8      12 Mar 2021 22:36    TPro  7.5  /  Alb  3.7  /  TBili  0.7  /  DBili  x   /  AST  31  /  ALT  27  /  AlkPhos  153<H>  03-12      LIVER FUNCTIONS - ( 12 Mar 2021 22:36 )  Alb: 3.7 g/dL / Pro: 7.5 g/dL / ALK PHOS: 153 U/L / ALT: 27 U/L DA / AST: 31 U/L / GGT: x             CAPILLARY BLOOD GLUCOSE        COVID-19 PCR: NotDetec (13 Mar 2021 06:26)      Radiology: Reviewed.   < from: CT Pelvis Bony Only No Cont (03.13.21 @ 02:53) >    EXAM:  CT PELVIS BONY ONLY                            PROCEDURE DATE:  03/13/2021          INTERPRETATION:  Indication:   Trauma, left hip pain    Technique: Helical CT of the bony pelvis obtained without contrast, and multiplanar reformats submitted.  CT dose lowering techniques were used, to include: automated exposure control, adjustment for patient size, and or use of iterative reconstruction.    Comparison: Radiograph from December 21, 2015    Findings:  Acute, mildly displaced fracture ofthe left inferior pubic ramus.  Nondisplaced fracture is present in the medial left superior pubic ramus, extends to the junction with the anterior acetabulum. It is unclear if it reaches the periprosthetic margin.  Cortical buckling involving the anterior aspect of S3, without presacral fat stranding, is age-indeterminate.  Chronic fracture deformities of the right superior and inferior pubic rami.  Bilateral total hip arthroplasties, the femoral stems are incompletely imaged. Imaged portions ofthe hardware are intact without evidence of loosening.    Impression:  1. Acute left pubic rami fractures. The superior pubic ramus fracture extends to the acetabular margin, it is indeterminate whether it extends to the periprosthetic margin.  2. Age-indeterminate anterior cortical buckle fracture in S3.                CHELY ADRIAN MD; Attending Radiologist  This document has been electronically signed. Mar 13 2021  4:16AM    < end of copied text >    < from: Xray Femur 2 Views, Left (03.12.21 @ 23:51) >  EXAM:  XR HIP 2-3V LT                          EXAM:  XR FEMUR 2 VIEWS LT                          EXAM:  PELVIS AP ONLY                            PROCEDURE DATE:  03/12/2021          INTERPRETATION:  Radiographs of the left hip     CPT 79598    CLINICAL INFORMATION: Left hip pain status post fall.    TECHNIQUE:   Frontal and extension views of the hip as well as an AP view of the pelvis were obtained.    FINDINGS:   Prior pelvic x-ray study dated 12/21/2015 was available for comparison.    The patient is status post total left hip replacement. Visualized hardware appears intact. Diffuse osteopenia of the visualized osseous structures is noted. There is a cortical step off seen along the left superior pubic ramus suspicious for fracture. Avertical linear lucency is seen in the inferior left pubic ramus suspicious for fracture. The sacroiliac joints appear symmetric bilaterally.    IMPRESSION:    Osteopenia. Fractures of the left superior and inferior pubic rami as noted above. Status post total left hip replacement. Visualized hardware appears intact.              Radiographs of the left femur     CPT 33635    CLINICAL INFORMATION:   Status post fall, pain.    TECHNIQUE:  Frontal views of the femur were obtained.    FINDINGS:   Noprevious examinations are available for review.    Patient is status post total left hip replacement and ORIF of the left femur. The visualized hardware appears intact. Linear lucencies seen in the mid shaft portion of the left femur which may represent fracture of indeterminate age. Patient is status post total left knee replacement.        IMPRESSION:   Status post total left hip replacement, total left knee replacement, and ORIF of the left femur. Age indeterminate fracture of the midshaft cortex of the left femur as noted above.                      NANCY BOATENG MD; Attending Radiologist  This document has been electronically signed. Mar 13 2021  1:36PM    < end of copied text >      ORT Score -   Family Hx of substance abuse	Female	      Male  Alcohol 	                                           1                     3  Illegal drugs	                                   2                     3  Rx drugs                                           4 	                  4  Personal Hx of substance abuse		  Alcohol 	                                          3	                  3  Illegal drugs                                     4	                  4  Rx drugs                                            5 	                  5  Age between 16- 45 years	           1                     1  hx preadolescent sexual abuse	   3 	                  0  Psychological disease		  ADD, OCD, bipolar, schizophrenia   2	          2  Depression                                           1 	          1  Total: 1    a score of 3 or lower indicates low risk for opioid abuse		  a score of 4-7 indicates moderate risk for opioid abuse		  a score of 8 or higher indicates high risk for opioid abuse  	  REVIEW OF SYSTEMS:  CONSTITUTIONAL: No fever + fatigue  HEENT:  + difficulty hearing, no change in vision  RESPIRATORY: No cough, wheezing, chills or hemoptysis; No shortness of breath  CARDIOVASCULAR: No chest pain, palpitations, dizziness, + moderate b/l leg swelling (chronic)   GASTROINTESTINAL: No loss of appetite, decreased PO intake. No abdominal or epigastric pain. No nausea, vomiting; No diarrhea or constipation. Last BM 3/12  GENITOURINARY: No dysuria, frequency, hematuria, retention or incontinence  MUSCULOSKELETAL: + generalized joint pain + generalized muscle, back and joint pains (chronic)  no upper motor strength weakness, + lower extremity weakness; no saddle anesthesia, bowel/bladder incontinence, +falls   NEURO: No headaches, No numbness/tingling b/l LE  PSYCHIATRIC: + depression, anxiety and difficulty sleeping    PHYSICAL EXAM:  GENERAL:  Alert & Oriented X4, cooperative, NAD, + anxious, + appears fatigued; Good concentration. Speech is clear.   RESPIRATORY: Respirations even and unlabored. Clear to auscultation bilaterally; No rales, rhonchi, wheezing, or rubs  CARDIOVASCULAR: Normal S1/S2, regular rate and rhythm; No murmurs, rubs, or gallops. No JVD.   GASTROINTESTINAL:  Soft, Nontender, Nondistended; Bowel sounds present  GENITOURINARY: Urinary catheter draining clear yellow urine   PERIPHERAL VASCULAR:  Extremities warm, +moderate b/l LE edema; 2+ Peripheral Pulses, No cyanosis, No calf tenderness  MUSCULOSKELETAL: + left hip and left thigh tender to palpation; + generalized joint back and muscle pain;  Motor Strength 5/5 B/L upper + weakness b/l lower extremity; + unable to lift b/l LE.   SKIN: Warm, dry, intact. No rashes, lesions, scars or wounds.     Risk factors associated with adverse outcomes related to opioid treatment  [X ]  Concurrent benzodiazepine use  [ ]  History/ Active substance use or alcohol use disorder  [X ] Psychiatric co-morbidity  [ ] Sleep apnea  [ ] COPD  [ ] BMI> 35  [ ] Liver dysfunction  [ ] Renal dysfunction  [ ] CHF  [X ] Former Smoker (quit 15 years ago)   [X ]  Age > 60 years    [X ]  [place]  Reviewed and Copied to Chart. See below.    Plan of care and goal oriented pain management treatment options were discussed with patient and /or primary care giver; all questions and concerns were addressed and care was aligned with patient's wishes.    Educated patient on goal oriented pain management treatment options

## 2021-03-13 NOTE — CONSULT NOTE ADULT - ASSESSMENT
Patient Name: Linda Tsang   YOB: 1955   Address: [address] [place] [address]   Sex: Female   Rx Written	Rx Dispensed	Drug	Quantity	Days Supply	Prescriber Name	Payment Method	Dispenser  02/23/2021	03/08/2021	zolpidem tartrate 10 mg tablet 	30	30	Glenys Pratt MD	[facility]  03/07/2021	03/07/2021	hydromorphone 8 mg tablet 	60	8	FuzaylDell zarateLexa	[employer]	[facility] [employer]  02/21/2021	02/28/2021	oxycontin er 40 mg tablet 	60	30	Fuzaylov, Lexa	[facility] [employer]  02/23/2021	02/24/2021	clonazepam 0.5 mg tablet 	90	30	Glenys Pratt MD	[facility]  02/21/2021	02/21/2021	hydromorphone 8 mg tablet 	60	8	Fuzayltessa, Lexa	[employer]	[facility] [employer]  11/30/2020	02/08/2021	zolpidem tartrate 10 mg tablet 	30	30	Rosado, Rebecca	[facility]  02/07/2021	02/07/2021	hydromorphone 8 mg tablet 	60	8	Fuzayletssa, Lexa	[employer]	[facility] [employer]  01/24/2021	01/31/2021	oxycontin er 40 mg tablet 	60	30	Fuzayltessa, Lexa	[facility] [employer]  08/04/2020	01/27/2021	diphenoxylate-atropine 2.5-0.025 mg tablet 	20	5	Bruce Yen MD	[facility]  01/27/2021	01/27/2021	clonazepam 0.5 mg tablet 	90	30	Glenys Pratt MD	[facility]  01/24/2021	01/24/2021	hydromorphone 8 mg tablet 	60	8	FuzaylDell zarateLexa	[employer]	[facility] [employer]  01/10/2021	01/10/2021	hydromorphone 8 mg tablet 	60	8	Fuzayltessa, Lexa	[employer]	[facility] [employer]  11/30/2020	01/08/2021	zolpidem tartrate 10 mg tablet 	30	30	Rosado, Rebecca	[place]	[facility]  12/27/2020	01/03/2021	oxycontin er 40 mg tablet 	60	30	Lexa Otoole	[facility]  12/28/2020	12/29/2020	clonazepam 0.5 mg tablet 	90	30	Glenys Pratt MD	[facility]

## 2021-03-14 LAB
A1C WITH ESTIMATED AVERAGE GLUCOSE RESULT: 5 % — SIGNIFICANT CHANGE UP (ref 4–5.6)
ALBUMIN SERPL ELPH-MCNC: 3.2 G/DL — LOW (ref 3.5–5)
ALP SERPL-CCNC: 146 U/L — HIGH (ref 40–120)
ALT FLD-CCNC: 22 U/L DA — SIGNIFICANT CHANGE UP (ref 10–60)
ANION GAP SERPL CALC-SCNC: 8 MMOL/L — SIGNIFICANT CHANGE UP (ref 5–17)
AST SERPL-CCNC: 24 U/L — SIGNIFICANT CHANGE UP (ref 10–40)
BASOPHILS # BLD AUTO: 0.01 K/UL — SIGNIFICANT CHANGE UP (ref 0–0.2)
BASOPHILS NFR BLD AUTO: 0.2 % — SIGNIFICANT CHANGE UP (ref 0–2)
BILIRUB SERPL-MCNC: 0.7 MG/DL — SIGNIFICANT CHANGE UP (ref 0.2–1.2)
BUN SERPL-MCNC: 6 MG/DL — LOW (ref 7–18)
CALCIUM SERPL-MCNC: 8.8 MG/DL — SIGNIFICANT CHANGE UP (ref 8.4–10.5)
CHLORIDE SERPL-SCNC: 105 MMOL/L — SIGNIFICANT CHANGE UP (ref 96–108)
CHOLEST SERPL-MCNC: 227 MG/DL — HIGH
CO2 SERPL-SCNC: 25 MMOL/L — SIGNIFICANT CHANGE UP (ref 22–31)
CREAT SERPL-MCNC: 0.49 MG/DL — LOW (ref 0.5–1.3)
EOSINOPHIL # BLD AUTO: 0.02 K/UL — SIGNIFICANT CHANGE UP (ref 0–0.5)
EOSINOPHIL NFR BLD AUTO: 0.4 % — SIGNIFICANT CHANGE UP (ref 0–6)
ESTIMATED AVERAGE GLUCOSE: 97 MG/DL — SIGNIFICANT CHANGE UP (ref 68–114)
FOLATE SERPL-MCNC: >20 NG/ML — SIGNIFICANT CHANGE UP
GLUCOSE SERPL-MCNC: 98 MG/DL — SIGNIFICANT CHANGE UP (ref 70–99)
HCT VFR BLD CALC: 35 % — SIGNIFICANT CHANGE UP (ref 34.5–45)
HDLC SERPL-MCNC: 77 MG/DL — SIGNIFICANT CHANGE UP
HGB BLD-MCNC: 11.9 G/DL — SIGNIFICANT CHANGE UP (ref 11.5–15.5)
IMM GRANULOCYTES NFR BLD AUTO: 0.4 % — SIGNIFICANT CHANGE UP (ref 0–1.5)
LIPID PNL WITH DIRECT LDL SERPL: 141 MG/DL — HIGH
LYMPHOCYTES # BLD AUTO: 1.04 K/UL — SIGNIFICANT CHANGE UP (ref 1–3.3)
LYMPHOCYTES # BLD AUTO: 21.6 % — SIGNIFICANT CHANGE UP (ref 13–44)
MAGNESIUM SERPL-MCNC: 2.2 MG/DL — SIGNIFICANT CHANGE UP (ref 1.6–2.6)
MCHC RBC-ENTMCNC: 29.6 PG — SIGNIFICANT CHANGE UP (ref 27–34)
MCHC RBC-ENTMCNC: 34 GM/DL — SIGNIFICANT CHANGE UP (ref 32–36)
MCV RBC AUTO: 87.1 FL — SIGNIFICANT CHANGE UP (ref 80–100)
MONOCYTES # BLD AUTO: 0.42 K/UL — SIGNIFICANT CHANGE UP (ref 0–0.9)
MONOCYTES NFR BLD AUTO: 8.7 % — SIGNIFICANT CHANGE UP (ref 2–14)
NEUTROPHILS # BLD AUTO: 3.3 K/UL — SIGNIFICANT CHANGE UP (ref 1.8–7.4)
NEUTROPHILS NFR BLD AUTO: 68.7 % — SIGNIFICANT CHANGE UP (ref 43–77)
NON HDL CHOLESTEROL: 150 MG/DL — HIGH
NRBC # BLD: 0 /100 WBCS — SIGNIFICANT CHANGE UP (ref 0–0)
PHOSPHATE SERPL-MCNC: 2.9 MG/DL — SIGNIFICANT CHANGE UP (ref 2.5–4.5)
PLATELET # BLD AUTO: 195 K/UL — SIGNIFICANT CHANGE UP (ref 150–400)
POTASSIUM SERPL-MCNC: 3.3 MMOL/L — LOW (ref 3.5–5.3)
POTASSIUM SERPL-SCNC: 3.3 MMOL/L — LOW (ref 3.5–5.3)
PROT SERPL-MCNC: 7.3 G/DL — SIGNIFICANT CHANGE UP (ref 6–8.3)
RBC # BLD: 4.02 M/UL — SIGNIFICANT CHANGE UP (ref 3.8–5.2)
RBC # FLD: 13.6 % — SIGNIFICANT CHANGE UP (ref 10.3–14.5)
SODIUM SERPL-SCNC: 138 MMOL/L — SIGNIFICANT CHANGE UP (ref 135–145)
TRIGL SERPL-MCNC: 47 MG/DL — SIGNIFICANT CHANGE UP
TSH SERPL-MCNC: 1.25 UU/ML — SIGNIFICANT CHANGE UP (ref 0.34–4.82)
VIT B12 SERPL-MCNC: 746 PG/ML — SIGNIFICANT CHANGE UP (ref 232–1245)
WBC # BLD: 4.81 K/UL — SIGNIFICANT CHANGE UP (ref 3.8–10.5)
WBC # FLD AUTO: 4.81 K/UL — SIGNIFICANT CHANGE UP (ref 3.8–10.5)

## 2021-03-14 PROCEDURE — 99232 SBSQ HOSP IP/OBS MODERATE 35: CPT

## 2021-03-14 RX ADMIN — OXYCODONE HYDROCHLORIDE 40 MILLIGRAM(S): 5 TABLET ORAL at 07:16

## 2021-03-14 RX ADMIN — SODIUM CHLORIDE 70 MILLILITER(S): 9 INJECTION INTRAMUSCULAR; INTRAVENOUS; SUBCUTANEOUS at 08:27

## 2021-03-14 RX ADMIN — Medication 15 MILLIGRAM(S): at 17:33

## 2021-03-14 RX ADMIN — OXYCODONE HYDROCHLORIDE 40 MILLIGRAM(S): 5 TABLET ORAL at 18:02

## 2021-03-14 RX ADMIN — Medication 0.5 MILLIGRAM(S): at 14:20

## 2021-03-14 RX ADMIN — OXYCODONE HYDROCHLORIDE 40 MILLIGRAM(S): 5 TABLET ORAL at 05:44

## 2021-03-14 RX ADMIN — ATORVASTATIN CALCIUM 40 MILLIGRAM(S): 80 TABLET, FILM COATED ORAL at 21:10

## 2021-03-14 RX ADMIN — Medication 1000 MILLIGRAM(S): at 05:45

## 2021-03-14 RX ADMIN — LAMOTRIGINE 100 MILLIGRAM(S): 25 TABLET, ORALLY DISINTEGRATING ORAL at 05:45

## 2021-03-14 RX ADMIN — OXYCODONE HYDROCHLORIDE 40 MILLIGRAM(S): 5 TABLET ORAL at 17:32

## 2021-03-14 RX ADMIN — LAMOTRIGINE 100 MILLIGRAM(S): 25 TABLET, ORALLY DISINTEGRATING ORAL at 17:34

## 2021-03-14 RX ADMIN — ENOXAPARIN SODIUM 40 MILLIGRAM(S): 100 INJECTION SUBCUTANEOUS at 11:02

## 2021-03-14 RX ADMIN — Medication 1000 MILLIGRAM(S): at 23:38

## 2021-03-14 RX ADMIN — Medication 1000 MILLIGRAM(S): at 17:32

## 2021-03-14 RX ADMIN — SENNA PLUS 2 TABLET(S): 8.6 TABLET ORAL at 21:10

## 2021-03-14 RX ADMIN — Medication 20 MILLIGRAM(S): at 17:34

## 2021-03-14 RX ADMIN — Medication 1000 MILLIGRAM(S): at 12:02

## 2021-03-14 RX ADMIN — HYDROMORPHONE HYDROCHLORIDE 1 MILLIGRAM(S): 2 INJECTION INTRAMUSCULAR; INTRAVENOUS; SUBCUTANEOUS at 11:54

## 2021-03-14 RX ADMIN — HYDROMORPHONE HYDROCHLORIDE 1 MILLIGRAM(S): 2 INJECTION INTRAMUSCULAR; INTRAVENOUS; SUBCUTANEOUS at 11:03

## 2021-03-14 RX ADMIN — HYDROMORPHONE HYDROCHLORIDE 8 MILLIGRAM(S): 2 INJECTION INTRAMUSCULAR; INTRAVENOUS; SUBCUTANEOUS at 14:50

## 2021-03-14 RX ADMIN — Medication 50 MICROGRAM(S): at 05:45

## 2021-03-14 RX ADMIN — Medication 0.5 MILLIGRAM(S): at 21:10

## 2021-03-14 RX ADMIN — Medication 1000 MILLIGRAM(S): at 07:16

## 2021-03-14 RX ADMIN — Medication 20 MILLIGRAM(S): at 05:45

## 2021-03-14 RX ADMIN — Medication 1000 MILLIGRAM(S): at 18:02

## 2021-03-14 RX ADMIN — Medication 15 MILLIGRAM(S): at 05:45

## 2021-03-14 RX ADMIN — ZOLPIDEM TARTRATE 5 MILLIGRAM(S): 10 TABLET ORAL at 21:25

## 2021-03-14 RX ADMIN — Medication 1000 MILLIGRAM(S): at 11:02

## 2021-03-14 RX ADMIN — HYDROMORPHONE HYDROCHLORIDE 8 MILLIGRAM(S): 2 INJECTION INTRAMUSCULAR; INTRAVENOUS; SUBCUTANEOUS at 14:20

## 2021-03-14 RX ADMIN — Medication 0.5 MILLIGRAM(S): at 05:45

## 2021-03-14 NOTE — PHYSICAL THERAPY INITIAL EVALUATION ADULT - ADDITIONAL COMMENTS
Pt reports being independent with all mobility using rollator walker. However, pt reports her rollator is breaking. Pt reports she also has a cane at home. Pt states she has had a home health aide in the past after breaking her arm ([facility] about 2 years ago up until the pandemic began last year)

## 2021-03-14 NOTE — PHYSICAL THERAPY INITIAL EVALUATION ADULT - ACTIVE RANGE OF MOTION EXAMINATION, REHAB EVAL
LLE AROM limited due to pain/bilateral upper extremity Active ROM was WFL (within functional limits)/bilateral  lower extremity Active ROM was WFL (within functional limits)

## 2021-03-14 NOTE — PROGRESS NOTE ADULT - SUBJECTIVE AND OBJECTIVE BOX
MEDICAL ATTENDING NOTE  Patient is a 65y old  Female who presents with a chief complaint of Fall (13 Mar 2021 17:00)      INTERVAL HPI/OVERNIGHT EVENTS: no new complaints    MEDICATIONS  (STANDING):  acetaminophen   Tablet .. 1000 milliGRAM(s) Oral every 6 hours  atorvastatin 40 milliGRAM(s) Oral at bedtime  busPIRone 15 milliGRAM(s) Oral two times a day  clonazePAM  Tablet 0.5 milliGRAM(s) Oral three times a day  enoxaparin Injectable 40 milliGRAM(s) SubCutaneous daily  FLUoxetine 20 milliGRAM(s) Oral every 12 hours  lamoTRIgine 100 milliGRAM(s) Oral two times a day  levothyroxine 50 MICROGram(s) Oral daily  oxyCODONE  ER Tablet 40 milliGRAM(s) Oral every 12 hours  senna 2 Tablet(s) Oral at bedtime  sodium chloride 0.9%. 1000 milliLiter(s) (70 mL/Hr) IV Continuous <Continuous>    MEDICATIONS  (PRN):  HYDROmorphone   Tablet 8 milliGRAM(s) Oral every 6 hours PRN Moderate Pain (4 - 6)  HYDROmorphone  Injectable 1 milliGRAM(s) IV Push every 4 hours PRN Severe Pain (7 - 10)  polyethylene glycol 3350 17 Gram(s) Oral daily PRN Constipation  zolpidem 5 milliGRAM(s) Oral at bedtime PRN Insomnia      __________________________________________________  REVIEW OF SYSTEMS:    CONSTITUTIONAL: No fever,   EYES: no acute visual disturbances  NECK: No pain or stiffness  RESPIRATORY: No cough; No shortness of breath  CARDIOVASCULAR: No chest pain, no palpitations  GASTROINTESTINAL: No pain. No nausea or vomiting; No diarrhea   NEUROLOGICAL: No headache or numbness, no tremors  MUSCULOSKELETAL: No joint pain, no muscle pain  GENITOURINARY: no dysuria, no frequency, no hesitancy  PSYCHIATRY: no depression , no anxiety  ALL OTHER  ROS negative        Vital Signs Last 24 Hrs  T(C): 37 (14 Mar 2021 17:19), Max: 37 (14 Mar 2021 17:19)  T(F): 98.6 (14 Mar 2021 17:19), Max: 98.6 (14 Mar 2021 17:19)  HR: 76 (14 Mar 2021 17:19) (75 - 87)  BP: 147/73 (14 Mar 2021 17:19) (134/74 - 164/83)  BP(mean): --  RR: 18 (14 Mar 2021 17:19) (18 - 18)  SpO2: 97% (14 Mar 2021 17:19) (95% - 97%)    ________________________________________________  PHYSICAL EXAM:  GENERAL: NAD  HEENT: Normocephalic;  conjunctivae and sclerae clear; moist mucous membranes;   NECK : supple  CHEST/LUNG: Clear to auscultation bilaterally with good air entry   HEART: S1 S2  regular; no murmurs, gallops or rubs  ABDOMEN: Soft, Nontender, Nondistended; Bowel sounds present  EXTREMITIES: no cyanosis; no edema; no calf tenderness  SKIN: warm and dry; no rash  NERVOUS SYSTEM:  Awake and alert; Oriented  to place, person and time ; no new deficits    _________________________________________________  LABS:                        11.9   4.81  )-----------( 195      ( 14 Mar 2021 06:32 )             35.0     03-14    138  |  105  |  6<L>  ----------------------------<  98  3.3<L>   |  25  |  0.49<L>    Ca    8.8      14 Mar 2021 06:32  Phos  2.9     03-14  Mg     2.2     03-14    TPro  7.3  /  Alb  3.2<L>  /  TBili  0.7  /  DBili  x   /  AST  24  /  ALT  22  /  AlkPhos  146<H>  03-14          RADIOLOGY & ADDITIONAL TESTS:    Imaging Personally Reviewed:  YES/NO    Consultant(s) Notes Reviewed:   YES/ No    Care Discussed with Consultants :     Plan of care was discussed with patient and /or primary care giver; all questions and concerns were addressed and care was aligned with patient's wishes.       MEDICAL ATTENDING NOTE  Patient is a 65y old  Female who presents with a chief complaint of Fall (13 Mar 2021 17:00)    Patient admitted with pubic ramus and S3 fracture    INTERVAL HPI/OVERNIGHT EVENTS: Patient seen and examined htis afternoon. no new complaints; clinically doing better. pain controlled; seen by PT earlier today.     MEDICATIONS  (STANDING):  acetaminophen   Tablet .. 1000 milliGRAM(s) Oral every 6 hours  atorvastatin 40 milliGRAM(s) Oral at bedtime  busPIRone 15 milliGRAM(s) Oral two times a day  clonazePAM  Tablet 0.5 milliGRAM(s) Oral three times a day  enoxaparin Injectable 40 milliGRAM(s) SubCutaneous daily  FLUoxetine 20 milliGRAM(s) Oral every 12 hours  lamoTRIgine 100 milliGRAM(s) Oral two times a day  levothyroxine 50 MICROGram(s) Oral daily  oxyCODONE  ER Tablet 40 milliGRAM(s) Oral every 12 hours  senna 2 Tablet(s) Oral at bedtime  sodium chloride 0.9%. 1000 milliLiter(s) (70 mL/Hr) IV Continuous <Continuous>    MEDICATIONS  (PRN):  HYDROmorphone   Tablet 8 milliGRAM(s) Oral every 6 hours PRN Moderate Pain (4 - 6)  HYDROmorphone  Injectable 1 milliGRAM(s) IV Push every 4 hours PRN Severe Pain (7 - 10)  polyethylene glycol 3350 17 Gram(s) Oral daily PRN Constipation  zolpidem 5 milliGRAM(s) Oral at bedtime PRN Insomnia      __________________________________________________  REVIEW OF SYSTEMS:    CONSTITUTIONAL: No fever,   RESPIRATORY: No cough; No shortness of breath  CARDIOVASCULAR: No chest pain, no palpitations  GASTROINTESTINAL: No pain. No nausea or vomiting; No diarrhea   NEUROLOGICAL: No headache or numbness, no tremors  MUSCULOSKELETAL: Hip pain+  GENITOURINARY: no dysuria, no frequency, no hesitancy  PSYCHIATRY: Hx depression, + anxiety  ALL OTHER  ROS negative        Vital Signs Last 24 Hrs  T(C): 37 (14 Mar 2021 17:19), Max: 37 (14 Mar 2021 17:19)  T(F): 98.6 (14 Mar 2021 17:19), Max: 98.6 (14 Mar 2021 17:19)  HR: 76 (14 Mar 2021 17:19) (75 - 87)  BP: 147/73 (14 Mar 2021 17:19) (134/74 - 164/83)  RR: 18 (14 Mar 2021 17:19) (18 - 18)  SpO2: 97% (14 Mar 2021 17:19) (95% - 97%)    ________________________________________________  PHYSICAL EXAM:  GENERAL: NAD  HEENT: conjunctivae and sclerae clear; moist mucous membranes;   CHEST/LUNG: Clear to auscultation bilaterally with good air entry   HEART: S1 S2  regular; no murmurs, gallops or rubs  ABDOMEN: Soft, Nontender, Nondistended; Bowel sounds present  EXTREMITIES: no cyanosis; no edema; no calf tenderness  SKIN: warm and dry; no rash  NERVOUS SYSTEM:  Awake and alert; Oriented  to place, person and time ; no new deficits    _________________________________________________  LABS:                        11.9   4.81  )-----------( 195      ( 14 Mar 2021 06:32 )             35.0     03-14    138  |  105  |  6<L>  ----------------------------<  98  3.3<L>   |  25  |  0.49<L>    Ca    8.8      14 Mar 2021 06:32  Phos  2.9     03-14  Mg     2.2     03-14    TPro  7.3  /  Alb  3.2<L>  /  TBili  0.7  /  DBili  x   /  AST  24  /  ALT  22  /  AlkPhos  146<H>  03-14    Thyroid Stimulating Hormone, Serum (03.14.21 @ 06:32) Thyroid Stimulating Hormone, Serum: 1.25 uU/mL       RADIOLOGY & ADDITIONAL TESTS:  CT Pelvis Bony Only No Cont (03.13.21 @ 02:53)   Impression:  1. Acute left pubic rami fractures. The superior pubic ramus fracture extends to the acetabular margin, it is indeterminate whether it extends to the periprosthetic margin.  2. Age-indeterminate anterior cortical buckle fracture in S3.      Xray Femur 2 Views, Left (03.12.21 @ 23:51)  IMPRESSION:   Status post total left hip replacement, total left knee replacement, and ORIF of the left femur. Age indeterminate fracture of the midshaft cortex of the left femur as noted above.        Consultant(s) Notes Reviewed:   YES    Care Discussed with Consultants : Ortho Dr. Mccracken    Plan of care was discussed with patient and /or primary care giver; all questions and concerns were addressed and care was aligned with patient's wishes.

## 2021-03-14 NOTE — PHYSICAL THERAPY INITIAL EVALUATION ADULT - PERTINENT HX OF CURRENT PROBLEM, REHAB EVAL
Pt admitted s/p fall. PMHx osteogenesis imperfecta. Pt sustained L pubic ramus fracture, conservative management per ortho, WBAT BLE.

## 2021-03-14 NOTE — CHART NOTE - NSCHARTNOTEFT_GEN_A_CORE
Pt. c/o urinary urgency despite having Pardo catheter in draining appropriately.  Pardo was placed when she was in excrutiating pain and she was in initial assessment.    Pain is better controlled now with current pain regimen.    ICU Vital Signs Last 24 Hrs  T(C): 36.4 (14 Mar 2021 07:25), Max: 37.1 (13 Mar 2021 20:02)  T(F): 97.5 (14 Mar 2021 07:25), Max: 98.8 (13 Mar 2021 20:02)  HR: 77 (14 Mar 2021 07:25) (76 - 87)  BP: 148/86 (14 Mar 2021 07:25) (148/86 - 166/80)  RR: 18 (14 Mar 2021 07:25) (18 - 18)  SpO2: 96% (14 Mar 2021 07:25) (96% - 98%)    PE: AAOx3, NAD  S1, S2, rrr, no MGR  Lungs CTA b/l, resp even and unlabored  +BS, abd s/nt/nd s bno/guarding  No LE Edema.    1) Urinary urgency; Will d/c pardo, trial of void.  If still symptomatic then will get UA/UC.    Juan Becker NP

## 2021-03-14 NOTE — PROGRESS NOTE ADULT - PROBLEM SELECTOR PLAN 1
p/w left hip pain after mechanical fall  c/w pain medications  Pain mgmt follow up  Ortho consult appreciated; recommend PT and WBAT as tolerated  PT consult

## 2021-03-14 NOTE — CHART NOTE - NSCHARTNOTEFT_GEN_A_CORE
Patient was sen and examined 3/13/21 around 2 PM;   Hx Osteogenesis Imperfacta and chronic pain syndrome admitted s/p Fall with new Pubic lali and S3 fracture  Ortho consult; d/w STACIE Kulkarni; Dr. Mccracken to follow; conservative mgmt likely  PT eval after Ortho clearance  Continue Dilaudid oral and IV as per Pain mgmt for moderate to severe pain  Tylenol for mild pain    Discussed with Patient and ED Hold RN

## 2021-03-15 ENCOUNTER — TRANSCRIPTION ENCOUNTER (OUTPATIENT)
Age: 66
End: 2021-03-15

## 2021-03-15 LAB
ALBUMIN SERPL ELPH-MCNC: 3.2 G/DL — LOW (ref 3.5–5)
ALP SERPL-CCNC: 145 U/L — HIGH (ref 40–120)
ALT FLD-CCNC: 22 U/L DA — SIGNIFICANT CHANGE UP (ref 10–60)
ANION GAP SERPL CALC-SCNC: 8 MMOL/L — SIGNIFICANT CHANGE UP (ref 5–17)
AST SERPL-CCNC: 22 U/L — SIGNIFICANT CHANGE UP (ref 10–40)
BASOPHILS # BLD AUTO: 0.01 K/UL — SIGNIFICANT CHANGE UP (ref 0–0.2)
BASOPHILS NFR BLD AUTO: 0.2 % — SIGNIFICANT CHANGE UP (ref 0–2)
BILIRUB SERPL-MCNC: 0.6 MG/DL — SIGNIFICANT CHANGE UP (ref 0.2–1.2)
BUN SERPL-MCNC: 8 MG/DL — SIGNIFICANT CHANGE UP (ref 7–18)
CALCIUM SERPL-MCNC: 9.3 MG/DL — SIGNIFICANT CHANGE UP (ref 8.4–10.5)
CHLORIDE SERPL-SCNC: 104 MMOL/L — SIGNIFICANT CHANGE UP (ref 96–108)
CO2 SERPL-SCNC: 28 MMOL/L — SIGNIFICANT CHANGE UP (ref 22–31)
CREAT SERPL-MCNC: 0.53 MG/DL — SIGNIFICANT CHANGE UP (ref 0.5–1.3)
EOSINOPHIL # BLD AUTO: 0.12 K/UL — SIGNIFICANT CHANGE UP (ref 0–0.5)
EOSINOPHIL NFR BLD AUTO: 2.3 % — SIGNIFICANT CHANGE UP (ref 0–6)
GLUCOSE SERPL-MCNC: 80 MG/DL — SIGNIFICANT CHANGE UP (ref 70–99)
HCT VFR BLD CALC: 36.1 % — SIGNIFICANT CHANGE UP (ref 34.5–45)
HGB BLD-MCNC: 12.3 G/DL — SIGNIFICANT CHANGE UP (ref 11.5–15.5)
IMM GRANULOCYTES NFR BLD AUTO: 0.4 % — SIGNIFICANT CHANGE UP (ref 0–1.5)
LYMPHOCYTES # BLD AUTO: 1.26 K/UL — SIGNIFICANT CHANGE UP (ref 1–3.3)
LYMPHOCYTES # BLD AUTO: 24.6 % — SIGNIFICANT CHANGE UP (ref 13–44)
MAGNESIUM SERPL-MCNC: 2.5 MG/DL — SIGNIFICANT CHANGE UP (ref 1.6–2.6)
MCHC RBC-ENTMCNC: 29.5 PG — SIGNIFICANT CHANGE UP (ref 27–34)
MCHC RBC-ENTMCNC: 34.1 GM/DL — SIGNIFICANT CHANGE UP (ref 32–36)
MCV RBC AUTO: 86.6 FL — SIGNIFICANT CHANGE UP (ref 80–100)
MONOCYTES # BLD AUTO: 0.48 K/UL — SIGNIFICANT CHANGE UP (ref 0–0.9)
MONOCYTES NFR BLD AUTO: 9.4 % — SIGNIFICANT CHANGE UP (ref 2–14)
NEUTROPHILS # BLD AUTO: 3.23 K/UL — SIGNIFICANT CHANGE UP (ref 1.8–7.4)
NEUTROPHILS NFR BLD AUTO: 63.1 % — SIGNIFICANT CHANGE UP (ref 43–77)
NRBC # BLD: 0 /100 WBCS — SIGNIFICANT CHANGE UP (ref 0–0)
PHOSPHATE SERPL-MCNC: 3.8 MG/DL — SIGNIFICANT CHANGE UP (ref 2.5–4.5)
PLATELET # BLD AUTO: 221 K/UL — SIGNIFICANT CHANGE UP (ref 150–400)
POTASSIUM SERPL-MCNC: 3.4 MMOL/L — LOW (ref 3.5–5.3)
POTASSIUM SERPL-SCNC: 3.4 MMOL/L — LOW (ref 3.5–5.3)
PROT SERPL-MCNC: 7.3 G/DL — SIGNIFICANT CHANGE UP (ref 6–8.3)
RBC # BLD: 4.17 M/UL — SIGNIFICANT CHANGE UP (ref 3.8–5.2)
RBC # FLD: 13.4 % — SIGNIFICANT CHANGE UP (ref 10.3–14.5)
SODIUM SERPL-SCNC: 140 MMOL/L — SIGNIFICANT CHANGE UP (ref 135–145)
WBC # BLD: 5.12 K/UL — SIGNIFICANT CHANGE UP (ref 3.8–10.5)
WBC # FLD AUTO: 5.12 K/UL — SIGNIFICANT CHANGE UP (ref 3.8–10.5)

## 2021-03-15 PROCEDURE — 99233 SBSQ HOSP IP/OBS HIGH 50: CPT | Mod: GC

## 2021-03-15 PROCEDURE — 99233 SBSQ HOSP IP/OBS HIGH 50: CPT

## 2021-03-15 RX ORDER — OXYCODONE HYDROCHLORIDE 5 MG/1
1 TABLET ORAL
Qty: 8 | Refills: 0
Start: 2021-03-15 | End: 2021-03-18

## 2021-03-15 RX ORDER — DOCUSATE SODIUM 100 MG
1 CAPSULE ORAL
Qty: 21 | Refills: 0
Start: 2021-03-15 | End: 2021-03-21

## 2021-03-15 RX ORDER — HYDROMORPHONE HYDROCHLORIDE 2 MG/ML
4 INJECTION INTRAMUSCULAR; INTRAVENOUS; SUBCUTANEOUS EVERY 6 HOURS
Refills: 0 | Status: DISCONTINUED | OUTPATIENT
Start: 2021-03-15 | End: 2021-03-16

## 2021-03-15 RX ORDER — HYDROMORPHONE HYDROCHLORIDE 2 MG/ML
1 INJECTION INTRAMUSCULAR; INTRAVENOUS; SUBCUTANEOUS
Qty: 0 | Refills: 0 | DISCHARGE

## 2021-03-15 RX ORDER — POLYETHYLENE GLYCOL 3350 17 G/17G
17 POWDER, FOR SOLUTION ORAL
Qty: 119 | Refills: 0
Start: 2021-03-15 | End: 2021-03-21

## 2021-03-15 RX ORDER — POTASSIUM CHLORIDE 20 MEQ
40 PACKET (EA) ORAL ONCE
Refills: 0 | Status: COMPLETED | OUTPATIENT
Start: 2021-03-15 | End: 2021-03-15

## 2021-03-15 RX ORDER — JNJ-78436735 50000000000 [PFU]/.5ML
0.5 SUSPENSION INTRAMUSCULAR ONCE
Refills: 0 | Status: COMPLETED | OUTPATIENT
Start: 2021-03-15 | End: 2021-03-15

## 2021-03-15 RX ORDER — JNJ-78436735 50000000000 [PFU]/.5ML
0.5 SUSPENSION INTRAMUSCULAR ONCE
Refills: 0 | Status: DISCONTINUED | OUTPATIENT
Start: 2021-03-15 | End: 2021-03-15

## 2021-03-15 RX ORDER — HYDROMORPHONE HYDROCHLORIDE 2 MG/ML
1 INJECTION INTRAMUSCULAR; INTRAVENOUS; SUBCUTANEOUS
Qty: 16 | Refills: 0
Start: 2021-03-15 | End: 2021-03-18

## 2021-03-15 RX ORDER — SENNA PLUS 8.6 MG/1
2 TABLET ORAL
Qty: 10 | Refills: 0
Start: 2021-03-15 | End: 2021-03-19

## 2021-03-15 RX ADMIN — Medication 15 MILLIGRAM(S): at 19:20

## 2021-03-15 RX ADMIN — LAMOTRIGINE 100 MILLIGRAM(S): 25 TABLET, ORALLY DISINTEGRATING ORAL at 19:20

## 2021-03-15 RX ADMIN — OXYCODONE HYDROCHLORIDE 40 MILLIGRAM(S): 5 TABLET ORAL at 07:05

## 2021-03-15 RX ADMIN — Medication 40 MILLIEQUIVALENT(S): at 15:19

## 2021-03-15 RX ADMIN — HYDROMORPHONE HYDROCHLORIDE 4 MILLIGRAM(S): 2 INJECTION INTRAMUSCULAR; INTRAVENOUS; SUBCUTANEOUS at 23:40

## 2021-03-15 RX ADMIN — Medication 20 MILLIGRAM(S): at 06:03

## 2021-03-15 RX ADMIN — Medication 0.5 MILLIGRAM(S): at 06:02

## 2021-03-15 RX ADMIN — JNJ-78436735 0.5 MILLILITER(S): 50000000000 SUSPENSION INTRAMUSCULAR at 15:30

## 2021-03-15 RX ADMIN — HYDROMORPHONE HYDROCHLORIDE 4 MILLIGRAM(S): 2 INJECTION INTRAMUSCULAR; INTRAVENOUS; SUBCUTANEOUS at 23:01

## 2021-03-15 RX ADMIN — ZOLPIDEM TARTRATE 5 MILLIGRAM(S): 10 TABLET ORAL at 23:01

## 2021-03-15 RX ADMIN — HYDROMORPHONE HYDROCHLORIDE 8 MILLIGRAM(S): 2 INJECTION INTRAMUSCULAR; INTRAVENOUS; SUBCUTANEOUS at 13:10

## 2021-03-15 RX ADMIN — OXYCODONE HYDROCHLORIDE 40 MILLIGRAM(S): 5 TABLET ORAL at 20:20

## 2021-03-15 RX ADMIN — Medication 1000 MILLIGRAM(S): at 06:03

## 2021-03-15 RX ADMIN — LAMOTRIGINE 100 MILLIGRAM(S): 25 TABLET, ORALLY DISINTEGRATING ORAL at 06:03

## 2021-03-15 RX ADMIN — Medication 1000 MILLIGRAM(S): at 13:43

## 2021-03-15 RX ADMIN — OXYCODONE HYDROCHLORIDE 40 MILLIGRAM(S): 5 TABLET ORAL at 06:03

## 2021-03-15 RX ADMIN — Medication 1000 MILLIGRAM(S): at 07:05

## 2021-03-15 RX ADMIN — HYDROMORPHONE HYDROCHLORIDE 8 MILLIGRAM(S): 2 INJECTION INTRAMUSCULAR; INTRAVENOUS; SUBCUTANEOUS at 13:40

## 2021-03-15 RX ADMIN — Medication 1000 MILLIGRAM(S): at 19:21

## 2021-03-15 RX ADMIN — Medication 1000 MILLIGRAM(S): at 00:28

## 2021-03-15 RX ADMIN — OXYCODONE HYDROCHLORIDE 40 MILLIGRAM(S): 5 TABLET ORAL at 19:19

## 2021-03-15 RX ADMIN — SENNA PLUS 2 TABLET(S): 8.6 TABLET ORAL at 22:38

## 2021-03-15 RX ADMIN — Medication 50 MICROGRAM(S): at 06:02

## 2021-03-15 RX ADMIN — Medication 1000 MILLIGRAM(S): at 20:19

## 2021-03-15 RX ADMIN — ENOXAPARIN SODIUM 40 MILLIGRAM(S): 100 INJECTION SUBCUTANEOUS at 13:10

## 2021-03-15 RX ADMIN — Medication 0.5 MILLIGRAM(S): at 15:19

## 2021-03-15 RX ADMIN — Medication 1000 MILLIGRAM(S): at 13:13

## 2021-03-15 RX ADMIN — ATORVASTATIN CALCIUM 40 MILLIGRAM(S): 80 TABLET, FILM COATED ORAL at 22:38

## 2021-03-15 RX ADMIN — Medication 15 MILLIGRAM(S): at 06:53

## 2021-03-15 RX ADMIN — Medication 0.5 MILLIGRAM(S): at 22:38

## 2021-03-15 RX ADMIN — Medication 20 MILLIGRAM(S): at 19:20

## 2021-03-15 NOTE — DISCHARGE NOTE PROVIDER - CARE PROVIDER_API CALL
Clark Mccracken  ORTHOPAEDIC SURGERY  [id number] [place] [address]  Phone: [phone]  Fax: [phone]  Follow Up Time:

## 2021-03-15 NOTE — DISCHARGE NOTE PROVIDER - NSDCCPCAREPLAN_GEN_ALL_CORE_FT
PRINCIPAL DISCHARGE DIAGNOSIS  Diagnosis: Pelvic fracture  Assessment and Plan of Treatment: You presented after fall  - X-ray and CT pelvis showed superior pubic ramus fracture extending to th acetabular margin  - orthopedic surgeon was consulted, and they recommended conservative management, pain control, and pain management  - you will be discharged with pain medication and home PT      SECONDARY DISCHARGE DIAGNOSES  Diagnosis: Anxiety with depression  Assessment and Plan of Treatment: You have a history of depression and anxiety, on Buspirone, Klonopin, lamotrigine, Wellbutrin and Fluoxetine.  - we continued treated you with your home medications  - please continue to take your home medications     PRINCIPAL DISCHARGE DIAGNOSIS  Diagnosis: Pelvic fracture  Assessment and Plan of Treatment: You presented after fall  - X-ray and CT pelvis showed superior pubic ramus fracture extending to th acetabular margin  - orthopedic surgeon was consulted, and they recommended conservative management and pain management  - you will be discharged with pain medication and home PT      SECONDARY DISCHARGE DIAGNOSES  Diagnosis: Anxiety with depression  Assessment and Plan of Treatment: You have a history of depression and anxiety, on Buspirone, Klonopin, lamotrigine, Wellbutrin and Fluoxetine.  - we continued treated you with your home medications  - please continue to take your home medications

## 2021-03-15 NOTE — DISCHARGE NOTE PROVIDER - NSDCMRMEDTOKEN_GEN_ALL_CORE_FT
acetaminophen 325 mg oral tablet: 2 tab(s) orally every 6 hours, As needed, Temp greater or equal to 38C (100.4F), mild pain  Ambien 5 mg oral tablet: 1 tab(s) orally once a day (at bedtime)  busPIRone 15 mg oral tablet: 1 tab(s) orally 2 times a day  clonazePAM 0.5 mg oral tablet: 1 tab(s) orally 3 times a day  docusate sodium 100 mg oral capsule: 1 cap(s) orally 3 times a day  FLUoxetine 20 mg oral capsule: 1 cap(s) orally 3 times a day  HYDROmorphone 8 mg oral tablet: 1 tab(s) orally every 6 hours, As Needed for severe pain    recent script filled on 9/5/19 for 120 tabs  lamoTRIgine 100 mg oral tablet: 1 tab(s) orally 2 times a day  levothyroxine 50 mcg (0.05 mg) oral tablet: 1 tab(s) orally once a day  Multiple Vitamins oral tablet: 1 tab(s) orally once a day  oxyCODONE 40 mg oral tablet, extended release: 1 tab(s) orally every 12 hours  rosuvastatin 10 mg oral tablet: 1 tab(s) orally once a day (at bedtime)  Wellbutrin 75 mg oral tablet: 1 tab(s) orally 3 times a day   acetaminophen 325 mg oral tablet: 2 tab(s) orally every 6 hours, As needed, Temp greater or equal to 38C (100.4F), mild pain  Ambien 5 mg oral tablet: 1 tab(s) orally once a day (at bedtime)  busPIRone 15 mg oral tablet: 1 tab(s) orally 2 times a day  clonazePAM 0.5 mg oral tablet: 1 tab(s) orally 3 times a day  FLUoxetine 20 mg oral capsule: 1 cap(s) orally 3 times a day  HYDROmorphone 4 mg oral tablet: 1 tab(s) orally every 6 hours -for severe pain MDD:4 tabs   lamoTRIgine 100 mg oral tablet: 1 tab(s) orally 2 times a day  levothyroxine 50 mcg (0.05 mg) oral tablet: 1 tab(s) orally once a day  Multiple Vitamins oral tablet: 1 tab(s) orally once a day  oxyCODONE 40 mg oral tablet, extended release: 1 tab(s) orally every 12 hours MDD:2 tabs/ day  polyethylene glycol 3350 oral powder for reconstitution: 17 gram(s) orally once a day, As needed, Constipation MDD:1 packet/ day  rosuvastatin 10 mg oral tablet: 1 tab(s) orally once a day (at bedtime)  senna 8.6 mg oral tablet: 2 tab(s) orally once a day (at bedtime) MDD:2 tabs per day  Wellbutrin 75 mg oral tablet: 1 tab(s) orally 3 times a day

## 2021-03-15 NOTE — PROGRESS NOTE ADULT - SUBJECTIVE AND OBJECTIVE BOX
Source of information: BELINDA ROMERO, Chart review  Patient language: English  : n/a    HPI:  65 yr female , ambulates with cane/walker with PMH of osteogenesis imperfecta complicated by numerous fractures, Anxiety, depression, HLD  presents to ED with hip pain following a fall yesterday afternoon. Patient reports she was coming out of bathroom when she tripped over an edge and fell and immediately after, she developed a severe pain which she describes as a pulling sensation extending from her groin into her butt on the left side. Pt reportss weakness and limited ROM in the hip. Pt denies distal extremity weakness, numbness, tingling or any other acute complaints.    In ED, /77, HR 80 (13 Mar 2021 06:06)      Patient is a 65y old  Female with significant PMH for osteogenesis imperfecta and multiple fractures who presents with a chief complaint of left hip pain s/p fall at home. Found to have acute left pubic rami fracture. Ortho consulted,, recommend pain control and mobilization, PT ambulation WBAT. Non- operative management. Pt seen and examined at bedside. Pt laying in bed, reports pain score 5/10 and tolerable SCALE USED: (1-10 VNRS).  Pt describes pain as sharp, radiating to left buttocks and left groin, alleviated by pain medication, exacerbated by movement. Pt tolerating PO diet. Denies lethargy, nausea, vomiting, constipation, itchiness. Reports last BM 3/14. Patient stated goal for pain control: to be able to take deep breaths, get out of bed to chair and ambulate with tolerable pain control. Pt reports she was able to sit at the edge of bed with PT yesterday. Pt takes oxycodone ER 40mg 2x/day and dialudid 8mg PO x 6h PRN for pain at home. (verified on istop)     PAST MEDICAL & SURGICAL HISTORY:  Osteogenesis imperfecta    History of open reduction and internal fixation (ORIF) procedure        FAMILY HISTORY:  Osteogenesis imperfecta- Father and sister  Cardiac valve- Brother         Social History:  Denies alcohol or illicit drug use (13 Mar 2021 06:06)   [X ] Former smoking    Allergies    No Known Allergies        MEDICATIONS  (STANDING):  acetaminophen   Tablet .. 1000 milliGRAM(s) Oral every 6 hours  atorvastatin 40 milliGRAM(s) Oral at bedtime  busPIRone 15 milliGRAM(s) Oral two times a day  clonazePAM  Tablet 0.5 milliGRAM(s) Oral three times a day  coronavirus (EUA) Vaccine (YAIAM) 0.5 milliLiter(s) IntraMuscular once  enoxaparin Injectable 40 milliGRAM(s) SubCutaneous daily  FLUoxetine 20 milliGRAM(s) Oral every 12 hours  lamoTRIgine 100 milliGRAM(s) Oral two times a day  levothyroxine 50 MICROGram(s) Oral daily  oxyCODONE  ER Tablet 40 milliGRAM(s) Oral every 12 hours  potassium chloride    Tablet ER 40 milliEquivalent(s) Oral once  senna 2 Tablet(s) Oral at bedtime  sodium chloride 0.9%. 1000 milliLiter(s) (70 mL/Hr) IV Continuous <Continuous>    MEDICATIONS  (PRN):  HYDROmorphone   Tablet 8 milliGRAM(s) Oral every 6 hours PRN Moderate Pain (4 - 6)  HYDROmorphone  Injectable 1 milliGRAM(s) IV Push every 4 hours PRN Severe Pain (7 - 10)  polyethylene glycol 3350 17 Gram(s) Oral daily PRN Constipation  zolpidem 5 milliGRAM(s) Oral at bedtime PRN Insomnia      Vital Signs Last 24 Hrs  T(C): 37.1 (15 Mar 2021 06:31), Max: 37.1 (15 Mar 2021 06:31)  T(F): 98.7 (15 Mar 2021 06:31), Max: 98.7 (15 Mar 2021 06:31)  HR: 78 (15 Mar 2021 06:31) (73 - 81)  BP: 160/66 (15 Mar 2021 06:31) (128/68 - 164/83)  BP(mean): --  RR: 18 (15 Mar 2021 06:31) (18 - 18)  SpO2: 95% (15 Mar 2021 06:31) (95% - 97%)  COVID-19 PCR: NotDetec (13 Mar 2021 06:26)    LABS: Reviewed                          12.3   5.12  )-----------( 221      ( 15 Mar 2021 07:04 )             36.1     03-15    140  |  104  |  8   ----------------------------<  80  3.4<L>   |  28  |  0.53    Ca    9.3      15 Mar 2021 07:04  Phos  3.8     03-15  Mg     2.5     03-15    TPro  7.3  /  Alb  3.2<L>  /  TBili  0.6  /  DBili  x   /  AST  22  /  ALT  22  /  AlkPhos  145<H>  03-15      LIVER FUNCTIONS - ( 15 Mar 2021 07:04 )  Alb: 3.2 g/dL / Pro: 7.3 g/dL / ALK PHOS: 145 U/L / ALT: 22 U/L DA / AST: 22 U/L / GGT: x           COVID-19 PCR: NotDetec (13 Mar 2021 06:26)      Radiology: Reviewed.   < from: CT Pelvis Bony Only No Cont (03.13.21 @ 02:53) >    EXAM:  CT PELVIS BONY ONLY                            PROCEDURE DATE:  03/13/2021          INTERPRETATION:  Indication:   Trauma, left hip pain    Technique: Helical CT of the bony pelvis obtained without contrast, and multiplanar reformats submitted.  CT dose lowering techniques were used, to include: automated exposure control, adjustment for patient size, and or use of iterative reconstruction.    Comparison: Radiograph from December 21, 2015    Findings:  Acute, mildly displaced fracture ofthe left inferior pubic ramus.  Nondisplaced fracture is present in the medial left superior pubic ramus, extends to the junction with the anterior acetabulum. It is unclear if it reaches the periprosthetic margin.  Cortical buckling involving the anterior aspect of S3, without presacral fat stranding, is age-indeterminate.  Chronic fracture deformities of the right superior and inferior pubic rami.  Bilateral total hip arthroplasties, the femoral stems are incompletely imaged. Imaged portions ofthe hardware are intact without evidence of loosening.    Impression:  1. Acute left pubic rami fractures. The superior pubic ramus fracture extends to the acetabular margin, it is indeterminate whether it extends to the periprosthetic margin.  2. Age-indeterminate anterior cortical buckle fracture in S3.                CHELY ADRIAN MD; Attending Radiologist  This document has been electronically signed. Mar 13 2021  4:16AM    < end of copied text >    < from: Xray Femur 2 Views, Left (03.12.21 @ 23:51) >  EXAM:  XR HIP 2-3V LT                          EXAM:  XR FEMUR 2 VIEWS LT                          EXAM:  PELVIS AP ONLY                            PROCEDURE DATE:  03/12/2021          INTERPRETATION:  Radiographs of the left hip     CPT 92068    CLINICAL INFORMATION: Left hip pain status post fall.    TECHNIQUE:   Frontal and extension views of the hip as well as an AP view of the pelvis were obtained.    FINDINGS:   Prior pelvic x-ray study dated 12/21/2015 was available for comparison.    The patient is status post total left hip replacement. Visualized hardware appears intact. Diffuse osteopenia of the visualized osseous structures is noted. There is a cortical step off seen along the left superior pubic ramus suspicious for fracture. Avertical linear lucency is seen in the inferior left pubic ramus suspicious for fracture. The sacroiliac joints appear symmetric bilaterally.    IMPRESSION:    Osteopenia. Fractures of the left superior and inferior pubic rami as noted above. Status post total left hip replacement. Visualized hardware appears intact.              Radiographs of the left femur     CPT 51687    CLINICAL INFORMATION:   Status post fall, pain.    TECHNIQUE:  Frontal views of the femur were obtained.    FINDINGS:   Noprevious examinations are available for review.    Patient is status post total left hip replacement and ORIF of the left femur. The visualized hardware appears intact. Linear lucencies seen in the mid shaft portion of the left femur which may represent fracture of indeterminate age. Patient is status post total left knee replacement.        IMPRESSION:   Status post total left hip replacement, total left knee replacement, and ORIF of the left femur. Age indeterminate fracture of the midshaft cortex of the left femur as noted above.                      NANCY BOATENG MD; Attending Radiologist  This document has been electronically signed. Mar 13 2021  1:36PM    < end of copied text >      ORT Score -   Family Hx of substance abuse	Female	      Male  Alcohol 	                                           1                     3  Illegal drugs	                                   2                     3  Rx drugs                                           4 	                  4  Personal Hx of substance abuse		  Alcohol 	                                          3	                  3  Illegal drugs                                     4	                  4  Rx drugs                                            5 	                  5  Age between 16- 45 years	           1                     1  hx preadolescent sexual abuse	   3 	                  0  Psychological disease		  ADD, OCD, bipolar, schizophrenia   2	          2  Depression                                           1 	          1  Total: 1    a score of 3 or lower indicates low risk for opioid abuse		  a score of 4-7 indicates moderate risk for opioid abuse		  a score of 8 or higher indicates high risk for opioid abuse  	  REVIEW OF SYSTEMS:  CONSTITUTIONAL: No fever + fatigue  HEENT:  + difficulty hearing, no change in vision  RESPIRATORY: No cough, wheezing, chills or hemoptysis; No shortness of breath  CARDIOVASCULAR: No chest pain, palpitations, dizziness, + moderate b/l leg swelling (chronic)   GASTROINTESTINAL: No loss of appetite, decreased PO intake. No abdominal or epigastric pain. No nausea, vomiting; No diarrhea or constipation. Last BM 3/12  GENITOURINARY: No dysuria, frequency, hematuria, retention or incontinence  MUSCULOSKELETAL: + generalized joint pain + generalized muscle, back and joint pains (chronic)  no upper motor strength weakness, + lower extremity weakness; no saddle anesthesia, bowel/bladder incontinence, +falls   NEURO: No headaches, No numbness/tingling b/l LE  PSYCHIATRIC: + depression, anxiety and difficulty sleeping    PHYSICAL EXAM:  GENERAL:  Alert & Oriented X4, cooperative, NAD, + anxious, + appears fatigued; Good concentration. Speech is clear.   RESPIRATORY: Respirations even and unlabored. Clear to auscultation bilaterally; No rales, rhonchi, wheezing, or rubs  CARDIOVASCULAR: Normal S1/S2, regular rate and rhythm; No murmurs, rubs, or gallops. No JVD.   GASTROINTESTINAL:  Soft, Nontender, Nondistended; Bowel sounds present  GENITOURINARY: Urinary catheter draining clear yellow urine   PERIPHERAL VASCULAR:  Extremities warm, +moderate b/l LE edema; 2+ Peripheral Pulses, No cyanosis, No calf tenderness  MUSCULOSKELETAL: + left hip and left thigh tender to palpation; + generalized joint back and muscle pain;  Motor Strength 5/5 B/L upper + weakness b/l lower extremity; + unable to lift b/l LE.   SKIN: Warm, dry, intact. No rashes, lesions, scars or wounds.     Risk factors associated with adverse outcomes related to opioid treatment  [X ]  Concurrent benzodiazepine use  [ ]  History/ Active substance use or alcohol use disorder  [X ] Psychiatric co-morbidity  [ ] Sleep apnea  [ ] COPD  [ ] BMI> 35  [ ] Liver dysfunction  [ ] Renal dysfunction  [ ] CHF  [X ] Former Smoker (quit 15 years ago)   [X ]  Age > 60 years    [X ]  [facility]  Reviewed and Copied to Chart. See below.    Plan of care and goal oriented pain management treatment options were discussed with patient and /or primary care giver; all questions and concerns were addressed and care was aligned with patient's wishes.    03-15-21 @ 12:02         Source of information: BELINDA ROMERO, Chart review  Patient language: English  : n/a    HPI:  65 yr female , ambulates with cane/walker with PMH of osteogenesis imperfecta complicated by numerous fractures, Anxiety, depression, HLD  presents to ED with hip pain following a fall yesterday afternoon. Patient reports she was coming out of bathroom when she tripped over an edge and fell and immediately after, she developed a severe pain which she describes as a pulling sensation extending from her groin into her butt on the left side. Pt reportss weakness and limited ROM in the hip. Pt denies distal extremity weakness, numbness, tingling or any other acute complaints.    In ED, /77, HR 80 (13 Mar 2021 06:06)      Patient is a 65y old  Female with significant PMH for osteogenesis imperfecta and multiple fractures who presents with a chief complaint of left hip pain s/p fall at home. Found to have acute left pubic rami fracture. Ortho consulted,, recommend pain control and mobilization, PT ambulation WBAT. Non- operative management. Pt seen and examined at bedside. Pt laying in bed, reports left buttocks pain score 5/10 and tolerable SCALE USED: (1-10 VNRS).  Pt describes pain as sharp, radiating to left buttocks and left groin, alleviated by pain medication, exacerbated by movement. reports her pain travels and is not always in one location. Pt tolerating PO diet. Denies lethargy, nausea, vomiting, constipation, itchiness. Reports last BM 3/14. Patient stated goal for pain control: to be able to take deep breaths, get out of bed to chair and ambulate with tolerable pain control. Pt reports she was able to sit at the edge of bed with PT yesterday. Pt takes oxycodone ER 40mg 2x/day and dialudid 8mg PO x 6h PRN for pain at home. (verified on istop)     PAST MEDICAL & SURGICAL HISTORY:  Osteogenesis imperfecta    History of open reduction and internal fixation (ORIF) procedure        FAMILY HISTORY:  Osteogenesis imperfecta- Father and sister  Cardiac valve- Brother         Social History:  Denies alcohol or illicit drug use (13 Mar 2021 06:06)   [X ] Former smoking    Allergies    No Known Allergies        MEDICATIONS  (STANDING):  acetaminophen   Tablet .. 1000 milliGRAM(s) Oral every 6 hours  atorvastatin 40 milliGRAM(s) Oral at bedtime  busPIRone 15 milliGRAM(s) Oral two times a day  clonazePAM  Tablet 0.5 milliGRAM(s) Oral three times a day  coronavirus (EUA) Vaccine (YAIMA) 0.5 milliLiter(s) IntraMuscular once  enoxaparin Injectable 40 milliGRAM(s) SubCutaneous daily  FLUoxetine 20 milliGRAM(s) Oral every 12 hours  lamoTRIgine 100 milliGRAM(s) Oral two times a day  levothyroxine 50 MICROGram(s) Oral daily  oxyCODONE  ER Tablet 40 milliGRAM(s) Oral every 12 hours  potassium chloride    Tablet ER 40 milliEquivalent(s) Oral once  senna 2 Tablet(s) Oral at bedtime  sodium chloride 0.9%. 1000 milliLiter(s) (70 mL/Hr) IV Continuous <Continuous>    MEDICATIONS  (PRN):  HYDROmorphone   Tablet 8 milliGRAM(s) Oral every 6 hours PRN Moderate Pain (4 - 6)  HYDROmorphone  Injectable 1 milliGRAM(s) IV Push every 4 hours PRN Severe Pain (7 - 10)  polyethylene glycol 3350 17 Gram(s) Oral daily PRN Constipation  zolpidem 5 milliGRAM(s) Oral at bedtime PRN Insomnia      Vital Signs Last 24 Hrs  T(C): 37.1 (15 Mar 2021 06:31), Max: 37.1 (15 Mar 2021 06:31)  T(F): 98.7 (15 Mar 2021 06:31), Max: 98.7 (15 Mar 2021 06:31)  HR: 78 (15 Mar 2021 06:31) (73 - 81)  BP: 160/66 (15 Mar 2021 06:31) (128/68 - 164/83)  BP(mean): --  RR: 18 (15 Mar 2021 06:31) (18 - 18)  SpO2: 95% (15 Mar 2021 06:31) (95% - 97%)  COVID-19 PCR: NotDetec (13 Mar 2021 06:26)    LABS: Reviewed                          12.3   5.12  )-----------( 221      ( 15 Mar 2021 07:04 )             36.1     03-15    140  |  104  |  8   ----------------------------<  80  3.4<L>   |  28  |  0.53    Ca    9.3      15 Mar 2021 07:04  Phos  3.8     03-15  Mg     2.5     03-15    TPro  7.3  /  Alb  3.2<L>  /  TBili  0.6  /  DBili  x   /  AST  22  /  ALT  22  /  AlkPhos  145<H>  03-15      LIVER FUNCTIONS - ( 15 Mar 2021 07:04 )  Alb: 3.2 g/dL / Pro: 7.3 g/dL / ALK PHOS: 145 U/L / ALT: 22 U/L DA / AST: 22 U/L / GGT: x           COVID-19 PCR: NotDetec (13 Mar 2021 06:26)      Radiology: Reviewed.   < from: CT Pelvis Bony Only No Cont (03.13.21 @ 02:53) >    EXAM:  CT PELVIS BONY ONLY                            PROCEDURE DATE:  03/13/2021          INTERPRETATION:  Indication:   Trauma, left hip pain    Technique: Helical CT of the bony pelvis obtained without contrast, and multiplanar reformats submitted.  CT dose lowering techniques were used, to include: automated exposure control, adjustment for patient size, and or use of iterative reconstruction.    Comparison: Radiograph from December 21, 2015    Findings:  Acute, mildly displaced fracture ofthe left inferior pubic ramus.  Nondisplaced fracture is present in the medial left superior pubic ramus, extends to the junction with the anterior acetabulum. It is unclear if it reaches the periprosthetic margin.  Cortical buckling involving the anterior aspect of S3, without presacral fat stranding, is age-indeterminate.  Chronic fracture deformities of the right superior and inferior pubic rami.  Bilateral total hip arthroplasties, the femoral stems are incompletely imaged. Imaged portions ofthe hardware are intact without evidence of loosening.    Impression:  1. Acute left pubic rami fractures. The superior pubic ramus fracture extends to the acetabular margin, it is indeterminate whether it extends to the periprosthetic margin.  2. Age-indeterminate anterior cortical buckle fracture in S3.                CHELY ADRIAN MD; Attending Radiologist  This document has been electronically signed. Mar 13 2021  4:16AM    < end of copied text >    < from: Xray Femur 2 Views, Left (03.12.21 @ 23:51) >  EXAM:  XR HIP 2-3V LT                          EXAM:  XR FEMUR 2 VIEWS LT                          EXAM:  PELVIS AP ONLY                            PROCEDURE DATE:  03/12/2021          INTERPRETATION:  Radiographs of the left hip     CPT 95491    CLINICAL INFORMATION: Left hip pain status post fall.    TECHNIQUE:   Frontal and extension views of the hip as well as an AP view of the pelvis were obtained.    FINDINGS:   Prior pelvic x-ray study dated 12/21/2015 was available for comparison.    The patient is status post total left hip replacement. Visualized hardware appears intact. Diffuse osteopenia of the visualized osseous structures is noted. There is a cortical step off seen along the left superior pubic ramus suspicious for fracture. Avertical linear lucency is seen in the inferior left pubic ramus suspicious for fracture. The sacroiliac joints appear symmetric bilaterally.    IMPRESSION:    Osteopenia. Fractures of the left superior and inferior pubic rami as noted above. Status post total left hip replacement. Visualized hardware appears intact.              Radiographs of the left femur     CPT [id number]    CLINICAL INFORMATION:   Status post fall, pain.    TECHNIQUE:  Frontal views of the femur were obtained.    FINDINGS:   Noprevious examinations are available for review.    Patient is status post total left hip replacement and ORIF of the left femur. The visualized hardware appears intact. Linear lucencies seen in the mid shaft portion of the left femur which may represent fracture of indeterminate age. Patient is status post total left knee replacement.        IMPRESSION:   Status post total left hip replacement, total left knee replacement, and ORIF of the left femur. Age indeterminate fracture of the midshaft cortex of the left femur as noted above.                      NANCY BOATENG MD; Attending Radiologist  This document has been electronically signed. Mar 13 2021  1:36PM    < end of copied text >      ORT Score -   Family Hx of substance abuse	Female	      Male  Alcohol 	                                           1                     3  Illegal drugs	                                   2                     3  Rx drugs                                           4 	                  4  Personal Hx of substance abuse		  Alcohol 	                                          3	                  3  Illegal drugs                                     4	                  4  Rx drugs                                            5 	                  5  Age between 16- 45 years	           1                     1  hx preadolescent sexual abuse	   3 	                  0  Psychological disease		  ADD, OCD, bipolar, schizophrenia   2	          2  Depression                                           1 	          1  Total: 1    a score of 3 or lower indicates low risk for opioid abuse		  a score of 4-7 indicates moderate risk for opioid abuse		  a score of 8 or higher indicates high risk for opioid abuse  	  REVIEW OF SYSTEMS:  CONSTITUTIONAL: No fever + fatigue  HEENT:  + difficulty hearing, no change in vision  RESPIRATORY: No cough, wheezing, chills or hemoptysis; No shortness of breath  CARDIOVASCULAR: No chest pain, palpitations, dizziness, + moderate b/l leg swelling (chronic)   GASTROINTESTINAL: No loss of appetite, decreased PO intake. No abdominal or epigastric pain. No nausea, vomiting; No diarrhea or constipation. Last BM 3/12  GENITOURINARY: No dysuria, frequency, hematuria, retention or incontinence  MUSCULOSKELETAL: + generalized joint pain + generalized muscle, back and joint pains (chronic)  no upper motor strength weakness, + lower extremity weakness; no saddle anesthesia, bowel/bladder incontinence, +falls   NEURO: No headaches, No numbness/tingling b/l LE  PSYCHIATRIC: + depression, anxiety and difficulty sleeping    PHYSICAL EXAM:  GENERAL:  Alert & Oriented X4, cooperative, NAD, + anxious, + appears fatigued; Good concentration. Speech is clear.   RESPIRATORY: Respirations even and unlabored. Clear to auscultation bilaterally; No rales, rhonchi, wheezing, or rubs  CARDIOVASCULAR: Normal S1/S2, regular rate and rhythm; No murmurs, rubs, or gallops. No JVD.   GASTROINTESTINAL:  Soft, Nontender, Nondistended; Bowel sounds present  GENITOURINARY: Urinary catheter draining clear yellow urine   PERIPHERAL VASCULAR:  Extremities warm, +moderate b/l LE edema; 2+ Peripheral Pulses, No cyanosis, No calf tenderness  MUSCULOSKELETAL: + left hip and left thigh tender to palpation; + generalized joint back and muscle pain;  Motor Strength 5/5 B/L upper + weakness b/l lower extremity; + unable to lift b/l LE; +prominent left elbow     SKIN: Warm, dry, intact. + left hip, left knee, right hip healed scars. No rashes, lesions, or wounds.     Risk factors associated with adverse outcomes related to opioid treatment  [X ]  Concurrent benzodiazepine use  [ ]  History/ Active substance use or alcohol use disorder  [X ] Psychiatric co-morbidity  [ ] Sleep apnea  [ ] COPD  [ ] BMI> 35  [ ] Liver dysfunction  [ ] Renal dysfunction  [ ] CHF  [X ] Former Smoker (quit 15 years ago)   [X ]  Age > 60 years    [X ]  [place]  Reviewed and Copied to Chart. See below.    Plan of care and goal oriented pain management treatment options were discussed with patient and /or primary care giver; all questions and concerns were addressed and care was aligned with patient's wishes.    03-15-21 @ 12:02         Source of information: BELINDA ROMERO, Chart review  Patient language: English  : n/a    HPI:  65 yr female , ambulates with cane/walker with PMH of osteogenesis imperfecta complicated by numerous fractures, Anxiety, depression, HLD  presents to ED with hip pain following a fall yesterday afternoon. Patient reports she was coming out of bathroom when she tripped over an edge and fell and immediately after, she developed a severe pain which she describes as a pulling sensation extending from her groin into her butt on the left side. Pt reportss weakness and limited ROM in the hip. Pt denies distal extremity weakness, numbness, tingling or any other acute complaints.    In ED, /77, HR 80 (13 Mar 2021 06:06)      Patient is a 65y old  Female with significant PMH for osteogenesis imperfecta and multiple fractures who presents with a chief complaint of left hip pain s/p fall at home. Found to have acute left pubic rami fracture. Ortho consulted,, recommend pain control and mobilization, PT ambulation WBAT. Non- operative management. Pt seen and examined at bedside. Pt laying in bed, reports left buttocks pain score 5/10 and tolerable SCALE USED: (1-10 VNRS).  Pt describes pain as sharp, radiating to left buttocks and left groin, alleviated by pain medication, exacerbated by movement. reports her pain travels and is not always in one location. Pt has not utilized PRN IV dilaudid dose in 24 hours.    Pt tolerating PO diet. Denies lethargy, nausea, vomiting, constipation, itchiness. Reports last BM 3/14. Patient stated goal for pain control: to be able to take deep breaths, get out of bed to chair and ambulate with tolerable pain control. Pt reports she was able to sit at the edge of bed with PT yesterday. Pt takes oxycodone ER 40mg 2x/day and dialudid 8mg PO x 12h PRN for pain at home. (verified on istop).      PAST MEDICAL & SURGICAL HISTORY:  Osteogenesis imperfecta    History of open reduction and internal fixation (ORIF) procedure        FAMILY HISTORY:  Osteogenesis imperfecta- Father and sister  Cardiac valve- Brother         Social History:  Denies alcohol or illicit drug use (13 Mar 2021 06:06)   [X ] Former smoking    Allergies    No Known Allergies        MEDICATIONS  (STANDING):  acetaminophen   Tablet .. 1000 milliGRAM(s) Oral every 6 hours  atorvastatin 40 milliGRAM(s) Oral at bedtime  busPIRone 15 milliGRAM(s) Oral two times a day  clonazePAM  Tablet 0.5 milliGRAM(s) Oral three times a day  coronavirus (EUA) Vaccine ([employer]) 0.5 milliLiter(s) IntraMuscular once  enoxaparin Injectable 40 milliGRAM(s) SubCutaneous daily  FLUoxetine 20 milliGRAM(s) Oral every 12 hours  lamoTRIgine 100 milliGRAM(s) Oral two times a day  levothyroxine 50 MICROGram(s) Oral daily  oxyCODONE  ER Tablet 40 milliGRAM(s) Oral every 12 hours  potassium chloride    Tablet ER 40 milliEquivalent(s) Oral once  senna 2 Tablet(s) Oral at bedtime  sodium chloride 0.9%. 1000 milliLiter(s) (70 mL/Hr) IV Continuous <Continuous>    MEDICATIONS  (PRN):  HYDROmorphone   Tablet 8 milliGRAM(s) Oral every 6 hours PRN Moderate Pain (4 - 6)  HYDROmorphone  Injectable 1 milliGRAM(s) IV Push every 4 hours PRN Severe Pain (7 - 10)  polyethylene glycol 3350 17 Gram(s) Oral daily PRN Constipation  zolpidem 5 milliGRAM(s) Oral at bedtime PRN Insomnia      Vital Signs Last 24 Hrs  T(C): 37.1 (15 Mar 2021 06:31), Max: 37.1 (15 Mar 2021 06:31)  T(F): 98.7 (15 Mar 2021 06:31), Max: 98.7 (15 Mar 2021 06:31)  HR: 78 (15 Mar 2021 06:31) (73 - 81)  BP: 160/66 (15 Mar 2021 06:31) (128/68 - 164/83)  BP(mean): --  RR: 18 (15 Mar 2021 06:31) (18 - 18)  SpO2: 95% (15 Mar 2021 06:31) (95% - 97%)  COVID-19 PCR: NotDetec (13 Mar 2021 06:26)    LABS: Reviewed                          12.3   5.12  )-----------( 221      ( 15 Mar 2021 07:04 )             36.1     03-15    140  |  104  |  8   ----------------------------<  80  3.4<L>   |  28  |  0.53    Ca    9.3      15 Mar 2021 07:04  Phos  3.8     03-15  Mg     2.5     03-15    TPro  7.3  /  Alb  3.2<L>  /  TBili  0.6  /  DBili  x   /  AST  22  /  ALT  22  /  AlkPhos  145<H>  03-15      LIVER FUNCTIONS - ( 15 Mar 2021 07:04 )  Alb: 3.2 g/dL / Pro: 7.3 g/dL / ALK PHOS: 145 U/L / ALT: 22 U/L DA / AST: 22 U/L / GGT: x           COVID-19 PCR: NotDetec (13 Mar 2021 06:26)      Radiology: Reviewed.   < from: CT Pelvis Bony Only No Cont (03.13.21 @ 02:53) >    EXAM:  CT PELVIS BONY ONLY                            PROCEDURE DATE:  03/13/2021          INTERPRETATION:  Indication:   Trauma, left hip pain    Technique: Helical CT of the bony pelvis obtained without contrast, and multiplanar reformats submitted.  CT dose lowering techniques were used, to include: automated exposure control, adjustment for patient size, and or use of iterative reconstruction.    Comparison: Radiograph from December 21, 2015    Findings:  Acute, mildly displaced fracture ofthe left inferior pubic ramus.  Nondisplaced fracture is present in the medial left superior pubic ramus, extends to the junction with the anterior acetabulum. It is unclear if it reaches the periprosthetic margin.  Cortical buckling involving the anterior aspect of S3, without presacral fat stranding, is age-indeterminate.  Chronic fracture deformities of the right superior and inferior pubic rami.  Bilateral total hip arthroplasties, the femoral stems are incompletely imaged. Imaged portions ofthe hardware are intact without evidence of loosening.    Impression:  1. Acute left pubic rami fractures. The superior pubic ramus fracture extends to the acetabular margin, it is indeterminate whether it extends to the periprosthetic margin.  2. Age-indeterminate anterior cortical buckle fracture in S3.                CHELY ADRIAN MD; Attending Radiologist  This document has been electronically signed. Mar 13 2021  4:16AM    < end of copied text >    < from: Xray Femur 2 Views, Left (03.12.21 @ 23:51) >  EXAM:  XR HIP 2-3V LT                          EXAM:  XR FEMUR 2 VIEWS LT                          EXAM:  PELVIS AP ONLY                            PROCEDURE DATE:  03/12/2021          INTERPRETATION:  Radiographs of the left hip     CPT 94106    CLINICAL INFORMATION: Left hip pain status post fall.    TECHNIQUE:   Frontal and extension views of the hip as well as an AP view of the pelvis were obtained.    FINDINGS:   Prior pelvic x-ray study dated 12/21/2015 was available for comparison.    The patient is status post total left hip replacement. Visualized hardware appears intact. Diffuse osteopenia of the visualized osseous structures is noted. There is a cortical step off seen along the left superior pubic ramus suspicious for fracture. Avertical linear lucency is seen in the inferior left pubic ramus suspicious for fracture. The sacroiliac joints appear symmetric bilaterally.    IMPRESSION:    Osteopenia. Fractures of the left superior and inferior pubic rami as noted above. Status post total left hip replacement. Visualized hardware appears intact.              Radiographs of the left femur     CPT 98377    CLINICAL INFORMATION:   Status post fall, pain.    TECHNIQUE:  Frontal views of the femur were obtained.    FINDINGS:   Noprevious examinations are available for review.    Patient is status post total left hip replacement and ORIF of the left femur. The visualized hardware appears intact. Linear lucencies seen in the mid shaft portion of the left femur which may represent fracture of indeterminate age. Patient is status post total left knee replacement.        IMPRESSION:   Status post total left hip replacement, total left knee replacement, and ORIF of the left femur. Age indeterminate fracture of the midshaft cortex of the left femur as noted above.                      NANCY BOATENG MD; Attending Radiologist  This document has been electronically signed. Mar 13 2021  1:36PM    < end of copied text >      ORT Score -   Family Hx of substance abuse	Female	      Male  Alcohol 	                                           1                     3  Illegal drugs	                                   2                     3  Rx drugs                                           4 	                  4  Personal Hx of substance abuse		  Alcohol 	                                          3	                  3  Illegal drugs                                     4	                  4  Rx drugs                                            5 	                  5  Age between 16- 45 years	           1                     1  hx preadolescent sexual abuse	   3 	                  0  Psychological disease		  ADD, OCD, bipolar, schizophrenia   2	          2  Depression                                           1 	          1  Total: 1    a score of 3 or lower indicates low risk for opioid abuse		  a score of 4-7 indicates moderate risk for opioid abuse		  a score of 8 or higher indicates high risk for opioid abuse  	  REVIEW OF SYSTEMS:  CONSTITUTIONAL: No fever + fatigue  HEENT:  + difficulty hearing, no change in vision  RESPIRATORY: No cough, wheezing, chills or hemoptysis; No shortness of breath  CARDIOVASCULAR: No chest pain, palpitations, dizziness, + moderate b/l leg swelling (chronic)   GASTROINTESTINAL: No loss of appetite, decreased PO intake. No abdominal or epigastric pain. No nausea, vomiting; No diarrhea or constipation. Last BM 3/12  GENITOURINARY: No dysuria, frequency, hematuria, retention or incontinence  MUSCULOSKELETAL: + generalized joint pain + generalized muscle, back and joint pains (chronic)  no upper motor strength weakness, + lower extremity weakness; no saddle anesthesia, bowel/bladder incontinence, +falls   NEURO: No headaches, No numbness/tingling b/l LE  PSYCHIATRIC: + depression, anxiety and difficulty sleeping    PHYSICAL EXAM:  GENERAL:  Alert & Oriented X4, cooperative, NAD, + anxious, + appears fatigued; Good concentration. Speech is clear.   RESPIRATORY: Respirations even and unlabored. Clear to auscultation bilaterally; No rales, rhonchi, wheezing, or rubs  CARDIOVASCULAR: Normal S1/S2, regular rate and rhythm; No murmurs, rubs, or gallops. No JVD.   GASTROINTESTINAL:  Soft, Nontender, Nondistended; Bowel sounds present  GENITOURINARY: Urinary catheter draining clear yellow urine   PERIPHERAL VASCULAR:  Extremities warm, +moderate b/l LE edema; 2+ Peripheral Pulses, No cyanosis, No calf tenderness  MUSCULOSKELETAL: + left hip and left thigh tender to palpation; + generalized joint back and muscle pain;  Motor Strength 5/5 B/L upper + weakness b/l lower extremity; + unable to lift b/l LE; +prominent left elbow     SKIN: Warm, dry, intact. + left hip, left knee, right hip healed scars. No rashes, lesions, or wounds.     Risk factors associated with adverse outcomes related to opioid treatment  [X ]  Concurrent benzodiazepine use  [ ]  History/ Active substance use or alcohol use disorder  [X ] Psychiatric co-morbidity  [ ] Sleep apnea  [ ] COPD  [ ] BMI> 35  [ ] Liver dysfunction  [ ] Renal dysfunction  [ ] CHF  [X ] Former Smoker (quit 15 years ago)   [X ]  Age > 60 years    [X ]  [facility]  Reviewed and Copied to Chart. See below.    Plan of care and goal oriented pain management treatment options were discussed with patient and /or primary care giver; all questions and concerns were addressed and care was aligned with patient's wishes.    03-15-21 @ 12:02

## 2021-03-15 NOTE — PROGRESS NOTE ADULT - PROBLEM SELECTOR PLAN 1
- p/w left hip pain after mechanical fall  - CT Pelvis  superior pubic ramus fracture extends to the acetabular margin  - c/w supportive care and pain management    - Ortho rec'd conservative management  - PT rec'd home PT

## 2021-03-15 NOTE — PROGRESS NOTE ADULT - PROBLEM SELECTOR PLAN 1
Pt with acute left hip pain c/b generalized chronic body pain which is somatic in nature due to acute left pubic rami fracture and hx osteogenesis imperfecta with hx multiple fractures.  Nonoperative management.   Opioid pain recommendations   - Dilaudid 1mg IV q4h PRN severe pain.   - Continue Dilaudid 8mg PO q 6 hours PRN moderate pain (pt's home medication). Monitor for sedation/ respiratory depression.   Non-opioid pain recommendations   - Avoid NSAIDs  - Acetaminophen 1G PO q 6 hours for x 3 days.  Bowel Regimen  - Continue Miralax 17G PO daily PRN constipation   - Continue Senna 2 tablets at bedtime for constipation  Mild pain   - Non-pharmacological pain treatment recommendations  - Warm/ Cool packs PRN   - Repositioning extremity, elevation, imagery, relaxation, distraction.  - Physical therapy OOB if no contraindications   Recommendations discussed with primary team and RN. Pt with acute left hip pain c/b generalized chronic body pain which is somatic in nature due to acute left pubic rami fracture and hx osteogenesis imperfecta with hx multiple fractures.  Nonoperative management.   Opioid pain recommendations   - Dilaudid 1mg IV q4h PRN severe pain.   - Continue Dilaudid 8mg PO q 6 hours PRN moderate pain (pt's home medication). Monitor for sedation/ respiratory depression.   Non-opioid pain recommendations   - Avoid NSAIDs  - Acetaminophen 1G PO q 6 hours for x 3 days.  Bowel Regimen  - Continue Miralax 17G PO daily PRN constipation   - Continue Senna 2 tablets at bedtime for constipation  Mild pain   - Non-pharmacological pain treatment recommendations  - Warm/ Cool packs PRN   - Repositioning extremity, elevation, imagery, relaxation, distraction.  - Physical therapy OOB if no contraindications   Recommendations discussed with primary team and RN.  Attempted to reach outpatient pain management provider Dr. Lexa Otoole at [phone], left message with . Pt unsure of how much pain medications she has left at home. States her next appointment with Dr. Ramachandran is in 2 weeks. Advised to see if she can see provider within 1 week if possible. Will prescribe 4 day supply of oxycodone ER 40mg POq12h and hydromorphone 4mg PO q6h PRN severe pain x 4 days. Pt with acute left hip pain c/b generalized chronic body pain which is somatic in nature due to acute left pubic rami fracture and hx osteogenesis imperfecta with hx multiple fractures.  Nonoperative management.   Opioid pain recommendations   - Will discontinue Dilaudid 1mg IV q4h PRN severe pain.   - Change Dilaudid 4mg PO q 6 hours PRN moderate pain (pt's home medication). Monitor for sedation/ respiratory depression.   Non-opioid pain recommendations   - Avoid NSAIDs  - Acetaminophen 1G PO q 6 hours for x 3 days.  Bowel Regimen  - Continue Miralax 17G PO daily PRN constipation   - Continue Senna 2 tablets at bedtime for constipation  Mild pain   - Non-pharmacological pain treatment recommendations  - Warm/ Cool packs PRN   - Repositioning extremity, elevation, imagery, relaxation, distraction.  - Physical therapy OOB if no contraindications   Recommendations discussed with primary team and RN.  Attempted to reach outpatient pain management provider Dr. Lexa Otoole at [phone], left message with . Pt unsure of how much pain medications she has left at home. States her next appointment with Dr. Ramachandran is in 2 weeks. Advised to see if she can see provider within 1 week if possible. Will prescribe 4 day supply of oxycodone ER 40mg POq12h and hydromorphone 4mg PO q6h PRN severe pain x 4 days.

## 2021-03-15 NOTE — DISCHARGE NOTE PROVIDER - HOSPITAL COURSE
Patient is a 65 year old female, ambulates with cane/walker with PMH of osteogenesis imperfecta complicated by numerous fractures, Anxiety, depression, HLD  presents to ED with hip pain following a fall yesterday afternoon. Patient was admitted for superior pubic ramus fracture extending to th acetabular margin, seen in CT pelvis. Orthopedic recommend conservative care, supportive care and WBAT.    Patient has a history of hypothyroidism, on levothyroxine at home. TSH was wnl. Patient was treated with home medications.    Patient has a history of anxiety, on Buspirone and Klonopin. Patient was treated with home medications    Patient has a history of depression, on lamotrigine, wellbutrin and fluoxetine. Patient was treated with home medications    Patient has a history of hyperlipidemia, on Rosuvastatin. Patient was treated with Atorvastatin.

## 2021-03-15 NOTE — CHART NOTE - NSCHARTNOTEFT_GEN_A_CORE
Spoke with patient at bedside and informed about Covid vaccine. Consent taken and placed in the chart. Spoke with patient at bedside and informed about Covid vaccine. Consent taken and placed in the chart. (Order placed to be given before discharge

## 2021-03-15 NOTE — PROGRESS NOTE ADULT - SUBJECTIVE AND OBJECTIVE BOX
PGY-1 Progress Note discussed with attending    PAGER #: [[phone]] TILL 5:00 PM  PLEASE CONTACT ON CALL TEAM:  - On Call Team (Please refer to [employer]) FROM 5:00 PM - 8:30PM  - Nightfloat Team FROM 8:30 -7:30 AM    INTERVAL HPI  - Patient is a 65 year old Female with significant PMH for osteogenesis imperfecta and multiple fractures who presents with a chief complaint of left hip pain s/p fall at home. Found to have acute left pubic rami fracture. Ortho consulted,, recommend pain control and mobilization, PT ambulation WBAT. Non- operative management. Pt seen and examined at bedside. Pt laying in bed, reports left buttocks pain score 5/10 and tolerable SCALE USED: (1-10 VNRS).  Pt describes pain as sharp, radiating to left buttocks and left groin, alleviated by pain medication, exacerbated by movement. reports her pain travels and is not always in one location. Pt has not utilized PRN IV dilaudid dose in 24 hours.    Pt tolerating PO diet. Denies lethargy, nausea, vomiting, constipation, itchiness. Reports last BM 3/14. Patient stated goal for pain control: to be able to take deep breaths, get out of bed to chair and ambulate with tolerable pain control. Pt reports she was able to sit at the edge of bed with PT yesterday. Pt takes oxycodone ER 40mg 2x/day and dialudid 8mg PO x 12h PRN for pain at home. (verified on istop).      OVERNIGHT EVENTS:   - No acute events overnight. Patient reports well-controlled pain at rest, and mild pain w/ movement. She denies fever, chills, n/v, chest pain, sob, abdominal pain, or urinary changes.    REVIEW OF SYSTEMS:  CONSTITUTIONAL: No fever, weight loss, or fatigue  RESPIRATORY: No cough, wheezing, chills or hemoptysis; No shortness of breath  CARDIOVASCULAR: No chest pain, palpitations, dizziness, or leg swelling  GASTROINTESTINAL: No abdominal pain. No nausea, vomiting, or hematemesis; No diarrhea or constipation. No melena or hematochezia.  GENITOURINARY: No dysuria or hematuria, urinary frequency  NEUROLOGICAL: No headaches, memory loss, loss of strength, numbness, or tremors  SKIN: No itching, burning, rashes, or lesions     MEDICATIONS  (STANDING):  acetaminophen   Tablet .. 1000 milliGRAM(s) Oral every 6 hours  atorvastatin 40 milliGRAM(s) Oral at bedtime  busPIRone 15 milliGRAM(s) Oral two times a day  clonazePAM  Tablet 0.5 milliGRAM(s) Oral three times a day  enoxaparin Injectable 40 milliGRAM(s) SubCutaneous daily  FLUoxetine 20 milliGRAM(s) Oral every 12 hours  lamoTRIgine 100 milliGRAM(s) Oral two times a day  levothyroxine 50 MICROGram(s) Oral daily  oxyCODONE  ER Tablet 40 milliGRAM(s) Oral every 12 hours  senna 2 Tablet(s) Oral at bedtime  sodium chloride 0.9%. 1000 milliLiter(s) (70 mL/Hr) IV Continuous <Continuous>    MEDICATIONS  (PRN):  HYDROmorphone   Tablet 4 milliGRAM(s) Oral every 6 hours PRN Severe Pain (7 - 10)  polyethylene glycol 3350 17 Gram(s) Oral daily PRN Constipation  zolpidem 5 milliGRAM(s) Oral at bedtime PRN Insomnia      Vital Signs Last 24 Hrs  T(C): 37.1 (15 Mar 2021 14:32), Max: 37.1 (15 Mar 2021 06:31)  T(F): 98.7 (15 Mar 2021 14:32), Max: 98.7 (15 Mar 2021 06:31)  HR: 90 (15 Mar 2021 14:32) (73 - 90)  BP: 168/96 (15 Mar 2021 14:32) (128/68 - 168/96)  BP(mean): --  RR: 18 (15 Mar 2021 14:32) (18 - 18)  SpO2: 97% (15 Mar 2021 14:32) (95% - 97%)    PHYSICAL EXAMINATION:  GENERAL: NAD, AAOx3  HEAD: AT/NC  EYES: conjunctiva and sclera clear  NECK: supple, No JVD noted, Normal thyroid  CHEST/LUNG: CTABL; no rales, rhonchi, wheezing, or rubs  HEART: regular rate and rhythm; no murmurs, rubs, or gallops  ABDOMEN: soft, nontender, nondistended; Bowel sounds present  EXTREMITIES:  2+ Peripheral Pulses, No clubbing, cyanosis, or edema  SKIN: warm dry                          12.3   5.12  )-----------( 221      ( 15 Mar 2021 07:04 )             36.1     03-15    140  |  104  |  8   ----------------------------<  80  3.4<L>   |  28  |  0.53    Ca    9.3      15 Mar 2021 07:04  Phos  3.8     03-15  Mg     2.5     03-15    TPro  7.3  /  Alb  3.2<L>  /  TBili  0.6  /  DBili  x   /  AST  22  /  ALT  22  /  AlkPhos  145<H>  03-15    LIVER FUNCTIONS - ( 15 Mar 2021 07:04 )  Alb: 3.2 g/dL / Pro: 7.3 g/dL / ALK PHOS: 145 U/L / ALT: 22 U/L DA / AST: 22 U/L / GGT: x                 COVID-19 PCR: NotDetec (13 Mar 2021 06:26)      CAPILLARY BLOOD GLUCOSE          RADIOLOGY & ADDITIONAL TESTS:

## 2021-03-15 NOTE — PROGRESS NOTE ADULT - ATTENDING COMMENTS
Patient seen and examined; Agree with PGY1 A/P above with editing as needed. My independent assessment, findings on exam, diagnosis and plan of care as listed below    Vital Signs Last 24 Hrs  T(C): 37.1 (15 Mar 2021 14:32), Max: 37.1 (15 Mar 2021 06:31)  T(F): 98.7 (15 Mar 2021 14:32), Max: 98.7 (15 Mar 2021 06:31)  HR: 90 (15 Mar 2021 14:32) (73 - 90)  BP: 168/96 (15 Mar 2021 14:32) (128/68 - 168/96)  RR: 18 (15 Mar 2021 14:32) (18 - 18)  SpO2: 97% (15 Mar 2021 14:32) (95% - 97%) Patient seen and examined this afternoon.  Agree with PGY1 A/P above with editing as needed. My independent assessment, findings on exam, diagnosis and plan of care as listed below. Discussed with Dr. Lopez and PGY2 Dr. Seaman.     65 yr female , ambulates with cane/walker with PMH of osteogenesis imperfecta complicated by numerous fractures, Anxiety, depression, HLD  presents to ED with hip pain following a fall yesterday afternoon. Admitted for acute left pubic rami fracture.  Pain controlled. seen by PT recommended Home PT.   Patient was referred for Home PT and Home Care by AWGNER Joyce.  As per patient she will need DME equipments ([employer]  and showerchair) as  goes to work and she is alone. States did not even walk few steps and requested to be discharged tomorrow. Patient was explained she has right for 24 hour notice if she does not wish to leave although she is stable from medical standpoint.     Vital Signs Last 24 Hrs  T(C): 37.1 (15 Mar 2021 14:32), Max: 37.1 (15 Mar 2021 06:31)  T(F): 98.7 (15 Mar 2021 14:32), Max: 98.7 (15 Mar 2021 06:31)  HR: 90 (15 Mar 2021 14:32) (73 - 90)  BP: 168/96 (15 Mar 2021 14:32) (128/68 - 168/96)  RR: 18 (15 Mar 2021 14:32) (18 - 18)  SpO2: 97% (15 Mar 2021 14:32) (95% - 97%)    P/E: As above    Labs:                        12.3   5.12  )-----------( 221      ( 15 Mar 2021 07:04 )             36.1   03-15    140  |  104  |  8   ----------------------------<  80  3.4<L>   |  28  |  0.53    Ca    9.3      15 Mar 2021 07:04  Phos  3.8     03-15  Mg     2.5     03-15    TPro  7.3  /  Alb  3.2<L>  /  TBili  0.6  /  DBili  x   /  AST  22  /  ALT  22  /  AlkPhos  145<H>  03-15    D/D:  Pevic Pubic rami fracture with  Acute on Chronic pain  Osteogenesis imperfecta  Depressin with Anxiety    Plan:  Pain mgmt follow up and med as recommended  Home Care referal for Home PT and DME  Continue home meds  DVT ppx  24 hr notice  DC Home AM    I will be away from 3/16/21 onwards; Dr. Fitzgerald assume care in AM

## 2021-03-16 ENCOUNTER — TRANSCRIPTION ENCOUNTER (OUTPATIENT)
Age: 66
End: 2021-03-16

## 2021-03-16 VITALS
OXYGEN SATURATION: 95 % | HEART RATE: 93 BPM | TEMPERATURE: 98 F | RESPIRATION RATE: 18 BRPM | SYSTOLIC BLOOD PRESSURE: 158 MMHG | DIASTOLIC BLOOD PRESSURE: 80 MMHG

## 2021-03-16 LAB
ALBUMIN SERPL ELPH-MCNC: 3 G/DL — LOW (ref 3.5–5)
ALP SERPL-CCNC: 134 U/L — HIGH (ref 40–120)
ALT FLD-CCNC: 22 U/L DA — SIGNIFICANT CHANGE UP (ref 10–60)
ANION GAP SERPL CALC-SCNC: 9 MMOL/L — SIGNIFICANT CHANGE UP (ref 5–17)
AST SERPL-CCNC: 26 U/L — SIGNIFICANT CHANGE UP (ref 10–40)
BILIRUB SERPL-MCNC: 0.6 MG/DL — SIGNIFICANT CHANGE UP (ref 0.2–1.2)
BUN SERPL-MCNC: 10 MG/DL — SIGNIFICANT CHANGE UP (ref 7–18)
CALCIUM SERPL-MCNC: 8.7 MG/DL — SIGNIFICANT CHANGE UP (ref 8.4–10.5)
CHLORIDE SERPL-SCNC: 103 MMOL/L — SIGNIFICANT CHANGE UP (ref 96–108)
CO2 SERPL-SCNC: 26 MMOL/L — SIGNIFICANT CHANGE UP (ref 22–31)
CREAT SERPL-MCNC: 0.46 MG/DL — LOW (ref 0.5–1.3)
GLUCOSE SERPL-MCNC: 86 MG/DL — SIGNIFICANT CHANGE UP (ref 70–99)
HCT VFR BLD CALC: 35.2 % — SIGNIFICANT CHANGE UP (ref 34.5–45)
HGB BLD-MCNC: 12.1 G/DL — SIGNIFICANT CHANGE UP (ref 11.5–15.5)
MAGNESIUM SERPL-MCNC: 2.2 MG/DL — SIGNIFICANT CHANGE UP (ref 1.6–2.6)
MCHC RBC-ENTMCNC: 29.8 PG — SIGNIFICANT CHANGE UP (ref 27–34)
MCHC RBC-ENTMCNC: 34.4 GM/DL — SIGNIFICANT CHANGE UP (ref 32–36)
MCV RBC AUTO: 86.7 FL — SIGNIFICANT CHANGE UP (ref 80–100)
NRBC # BLD: 0 /100 WBCS — SIGNIFICANT CHANGE UP (ref 0–0)
PHOSPHATE SERPL-MCNC: 3.3 MG/DL — SIGNIFICANT CHANGE UP (ref 2.5–4.5)
PLATELET # BLD AUTO: 198 K/UL — SIGNIFICANT CHANGE UP (ref 150–400)
POTASSIUM SERPL-MCNC: 3.4 MMOL/L — LOW (ref 3.5–5.3)
POTASSIUM SERPL-SCNC: 3.4 MMOL/L — LOW (ref 3.5–5.3)
PROT SERPL-MCNC: 6.8 G/DL — SIGNIFICANT CHANGE UP (ref 6–8.3)
RBC # BLD: 4.06 M/UL — SIGNIFICANT CHANGE UP (ref 3.8–5.2)
RBC # FLD: 13.7 % — SIGNIFICANT CHANGE UP (ref 10.3–14.5)
SODIUM SERPL-SCNC: 138 MMOL/L — SIGNIFICANT CHANGE UP (ref 135–145)
WBC # BLD: 4.81 K/UL — SIGNIFICANT CHANGE UP (ref 3.8–10.5)
WBC # FLD AUTO: 4.81 K/UL — SIGNIFICANT CHANGE UP (ref 3.8–10.5)

## 2021-03-16 PROCEDURE — 97530 THERAPEUTIC ACTIVITIES: CPT

## 2021-03-16 PROCEDURE — 80053 COMPREHEN METABOLIC PANEL: CPT

## 2021-03-16 PROCEDURE — 85025 COMPLETE CBC W/AUTO DIFF WBC: CPT

## 2021-03-16 PROCEDURE — 99239 HOSP IP/OBS DSCHRG MGMT >30: CPT

## 2021-03-16 PROCEDURE — 85027 COMPLETE CBC AUTOMATED: CPT

## 2021-03-16 PROCEDURE — 86769 SARS-COV-2 COVID-19 ANTIBODY: CPT

## 2021-03-16 PROCEDURE — 84100 ASSAY OF PHOSPHORUS: CPT

## 2021-03-16 PROCEDURE — 0031A: CPT

## 2021-03-16 PROCEDURE — 97162 PT EVAL MOD COMPLEX 30 MIN: CPT

## 2021-03-16 PROCEDURE — 84443 ASSAY THYROID STIM HORMONE: CPT

## 2021-03-16 PROCEDURE — 72192 CT PELVIS W/O DYE: CPT

## 2021-03-16 PROCEDURE — 80061 LIPID PANEL: CPT

## 2021-03-16 PROCEDURE — 96376 TX/PRO/DX INJ SAME DRUG ADON: CPT

## 2021-03-16 PROCEDURE — 86850 RBC ANTIBODY SCREEN: CPT

## 2021-03-16 PROCEDURE — U0005: CPT

## 2021-03-16 PROCEDURE — 83735 ASSAY OF MAGNESIUM: CPT

## 2021-03-16 PROCEDURE — 86901 BLOOD TYPING SEROLOGIC RH(D): CPT

## 2021-03-16 PROCEDURE — 36415 COLL VENOUS BLD VENIPUNCTURE: CPT

## 2021-03-16 PROCEDURE — 99285 EMERGENCY DEPT VISIT HI MDM: CPT

## 2021-03-16 PROCEDURE — 96375 TX/PRO/DX INJ NEW DRUG ADDON: CPT

## 2021-03-16 PROCEDURE — 83036 HEMOGLOBIN GLYCOSYLATED A1C: CPT

## 2021-03-16 PROCEDURE — 82607 VITAMIN B-12: CPT

## 2021-03-16 PROCEDURE — 82746 ASSAY OF FOLIC ACID SERUM: CPT

## 2021-03-16 PROCEDURE — 86900 BLOOD TYPING SEROLOGIC ABO: CPT

## 2021-03-16 PROCEDURE — 73502 X-RAY EXAM HIP UNI 2-3 VIEWS: CPT

## 2021-03-16 PROCEDURE — 73552 X-RAY EXAM OF FEMUR 2/>: CPT

## 2021-03-16 PROCEDURE — 96374 THER/PROPH/DIAG INJ IV PUSH: CPT

## 2021-03-16 PROCEDURE — 87635 SARS-COV-2 COVID-19 AMP PRB: CPT

## 2021-03-16 PROCEDURE — 72170 X-RAY EXAM OF PELVIS: CPT

## 2021-03-16 RX ORDER — HYDROMORPHONE HYDROCHLORIDE 2 MG/ML
1 INJECTION INTRAMUSCULAR; INTRAVENOUS; SUBCUTANEOUS ONCE
Refills: 0 | Status: DISCONTINUED | OUTPATIENT
Start: 2021-03-16 | End: 2021-03-16

## 2021-03-16 RX ORDER — OXYCODONE HYDROCHLORIDE 5 MG/1
1 TABLET ORAL
Qty: 8 | Refills: 0
Start: 2021-03-16 | End: 2021-03-19

## 2021-03-16 RX ORDER — SENNA PLUS 8.6 MG/1
2 TABLET ORAL
Qty: 10 | Refills: 0
Start: 2021-03-16 | End: 2021-03-20

## 2021-03-16 RX ORDER — POLYETHYLENE GLYCOL 3350 17 G/17G
17 POWDER, FOR SOLUTION ORAL
Qty: 119 | Refills: 0
Start: 2021-03-16 | End: 2021-03-22

## 2021-03-16 RX ORDER — HYDROMORPHONE HYDROCHLORIDE 2 MG/ML
1 INJECTION INTRAMUSCULAR; INTRAVENOUS; SUBCUTANEOUS
Qty: 16 | Refills: 0
Start: 2021-03-16 | End: 2021-03-19

## 2021-03-16 RX ADMIN — Medication 0.5 MILLIGRAM(S): at 14:25

## 2021-03-16 RX ADMIN — Medication 1000 MILLIGRAM(S): at 05:36

## 2021-03-16 RX ADMIN — LAMOTRIGINE 100 MILLIGRAM(S): 25 TABLET, ORALLY DISINTEGRATING ORAL at 05:36

## 2021-03-16 RX ADMIN — Medication 1000 MILLIGRAM(S): at 02:13

## 2021-03-16 RX ADMIN — Medication 50 MICROGRAM(S): at 05:36

## 2021-03-16 RX ADMIN — OXYCODONE HYDROCHLORIDE 40 MILLIGRAM(S): 5 TABLET ORAL at 06:15

## 2021-03-16 RX ADMIN — HYDROMORPHONE HYDROCHLORIDE 4 MILLIGRAM(S): 2 INJECTION INTRAMUSCULAR; INTRAVENOUS; SUBCUTANEOUS at 11:16

## 2021-03-16 RX ADMIN — HYDROMORPHONE HYDROCHLORIDE 1 MILLIGRAM(S): 2 INJECTION INTRAMUSCULAR; INTRAVENOUS; SUBCUTANEOUS at 15:48

## 2021-03-16 RX ADMIN — Medication 1000 MILLIGRAM(S): at 06:15

## 2021-03-16 RX ADMIN — ENOXAPARIN SODIUM 40 MILLIGRAM(S): 100 INJECTION SUBCUTANEOUS at 11:16

## 2021-03-16 RX ADMIN — HYDROMORPHONE HYDROCHLORIDE 4 MILLIGRAM(S): 2 INJECTION INTRAMUSCULAR; INTRAVENOUS; SUBCUTANEOUS at 06:15

## 2021-03-16 RX ADMIN — Medication 1000 MILLIGRAM(S): at 00:38

## 2021-03-16 RX ADMIN — HYDROMORPHONE HYDROCHLORIDE 1 MILLIGRAM(S): 2 INJECTION INTRAMUSCULAR; INTRAVENOUS; SUBCUTANEOUS at 15:33

## 2021-03-16 RX ADMIN — Medication 20 MILLIGRAM(S): at 05:36

## 2021-03-16 RX ADMIN — HYDROMORPHONE HYDROCHLORIDE 4 MILLIGRAM(S): 2 INJECTION INTRAMUSCULAR; INTRAVENOUS; SUBCUTANEOUS at 05:37

## 2021-03-16 RX ADMIN — OXYCODONE HYDROCHLORIDE 40 MILLIGRAM(S): 5 TABLET ORAL at 05:37

## 2021-03-16 RX ADMIN — Medication 15 MILLIGRAM(S): at 05:36

## 2021-03-16 RX ADMIN — Medication 0.5 MILLIGRAM(S): at 05:35

## 2021-03-16 RX ADMIN — HYDROMORPHONE HYDROCHLORIDE 4 MILLIGRAM(S): 2 INJECTION INTRAMUSCULAR; INTRAVENOUS; SUBCUTANEOUS at 12:16

## 2021-03-16 NOTE — DISCHARGE NOTE NURSING/CASE MANAGEMENT/SOCIAL WORK - PATIENT PORTAL LINK FT
You can access the FollowMyHealth Patient Portal offered by [facility] by registering at the following website: [email or web address]. By joining [employer]’s FollowMyHealth portal, you will also be able to view your health information using other applications (apps) compatible with our system.

## 2021-03-16 NOTE — PROGRESS NOTE ADULT - SUBJECTIVE AND OBJECTIVE BOX
MEDICAL ATTENDING NOTE  Patient is a 65y old  Female who presents with a chief complaint of Fall (15 Mar 2021 15:47)      HPI:  65 yr female , ambulates with cane/walker with PMH of osteogenesis imperfecta complicated by numerous fractures, Anxiety, depression, HLD  presents to ED with hip pain following a fall yesterday afternoon. Patient reports she was coming out of bathroom when she tripped over an edge and fell and immediately after, she developed a severe pain which she describes as a pulling sensation extending from her groin into her butt on the left side. Pt reportss weakness and limited ROM in the hip. Pt denies distal extremity weakness, numbness, tingling or any other acute complaints.    In ED, /77, HR 80 (13 Mar 2021 06:06)      INTERVAL HPI/OVERNIGHT EVENTS: no new complaints    MEDICATIONS  (STANDING):  atorvastatin 40 milliGRAM(s) Oral at bedtime  busPIRone 15 milliGRAM(s) Oral two times a day  clonazePAM  Tablet 0.5 milliGRAM(s) Oral three times a day  enoxaparin Injectable 40 milliGRAM(s) SubCutaneous daily  FLUoxetine 20 milliGRAM(s) Oral every 12 hours  lamoTRIgine 100 milliGRAM(s) Oral two times a day  levothyroxine 50 MICROGram(s) Oral daily  oxyCODONE  ER Tablet 40 milliGRAM(s) Oral every 12 hours  senna 2 Tablet(s) Oral at bedtime  sodium chloride 0.9%. 1000 milliLiter(s) (70 mL/Hr) IV Continuous <Continuous>    MEDICATIONS  (PRN):  HYDROmorphone   Tablet 4 milliGRAM(s) Oral every 6 hours PRN Severe Pain (7 - 10)  polyethylene glycol 3350 17 Gram(s) Oral daily PRN Constipation  zolpidem 5 milliGRAM(s) Oral at bedtime PRN Insomnia      __________________________________________________  REVIEW OF SYSTEMS:    CONSTITUTIONAL: No fever,   EYES: no acute visual disturbances  NECK: No pain or stiffness  RESPIRATORY: No cough; No shortness of breath  CARDIOVASCULAR: No chest pain, no palpitations  GASTROINTESTINAL: No pain. No nausea or vomiting; No diarrhea   NEUROLOGICAL: No headache or numbness, no tremors  MUSCULOSKELETAL: No joint pain, no muscle pain  GENITOURINARY: no dysuria, no frequency, no hesitancy  PSYCHIATRY: no depression , no anxiety  ALL OTHER  ROS negative        Vital Signs Last 24 Hrs  T(C): 36.9 (16 Mar 2021 14:40), Max: 36.9 (16 Mar 2021 14:40)  T(F): 98.4 (16 Mar 2021 14:40), Max: 98.4 (16 Mar 2021 14:40)  HR: 93 (16 Mar 2021 14:40) (81 - 93)  BP: 158/80 (16 Mar 2021 14:40) (139/79 - 158/80)  BP(mean): --  RR: 18 (16 Mar 2021 14:40) (18 - 18)  SpO2: 95% (16 Mar 2021 14:40) (95% - 97%)    ________________________________________________  PHYSICAL EXAM:  GENERAL: NAD  HEENT: Normocephalic;  conjunctivae and sclerae clear; moist mucous membranes;   NECK : supple  CHEST/LUNG: Clear to auscultation bilaterally with good air entry   HEART: S1 S2  regular; no murmurs, gallops or rubs  ABDOMEN: Soft, Nontender, Nondistended; Bowel sounds present  EXTREMITIES: no cyanosis; no edema; no calf tenderness  SKIN: warm and dry; no rash  NERVOUS SYSTEM:  Awake and alert; Oriented  to place, person and time ; no new deficits    _________________________________________________  LABS:                        12.1   4.81  )-----------( 198      ( 16 Mar 2021 07:09 )             35.2     03-16    138  |  103  |  10  ----------------------------<  86  3.4<L>   |  26  |  0.46<L>    Ca    8.7      16 Mar 2021 07:09  Phos  3.3     03-16  Mg     2.2     03-16    TPro  6.8  /  Alb  3.0<L>  /  TBili  0.6  /  DBili  x   /  AST  26  /  ALT  22  /  AlkPhos  134<H>  03-16

## 2021-03-16 NOTE — PROGRESS NOTE ADULT - PROBLEM SELECTOR PLAN 3
c/w Levothyroxine  TSH WNL; f/u q 3 months
- h/o hypothyroidism  - c/w Levothyroxine
- h/o hypothyroidism  - c/w Levothyroxine

## 2021-03-16 NOTE — PROGRESS NOTE ADULT - ASSESSMENT
65 yr female , ambulates with cane/walker with PMH of osteogenesis imperfecta complicated by numerous fractures, Anxiety, depression, HLD  presents to ED with hip pain following a fall yesterday afternoon. Admitted for acute left pubic rami fracture
65 yr female , ambulates with cane/walker with PMH of osteogenesis imperfecta complicated by numerous fractures, Anxiety, depression, HLD  presents to ED with hip pain following a fall yesterday afternoon.     Pt admitted for Acute left pubic rami fracture
Patient Name: Linda Tsang   YOB: 1955   Address: [address] [place] [address]   Sex: Female   Rx Written	Rx Dispensed	Drug	Quantity	Days Supply	Prescriber Name	Payment Method	Dispenser  02/23/2021	03/08/2021	zolpidem tartrate 10 mg tablet 	30	30	Glenys Pratt MD	[facility]  03/07/2021	03/07/2021	hydromorphone 8 mg tablet 	60	8	FuzaylDell zarateLexa	[employer]	[facility] [employer]  02/21/2021	02/28/2021	oxycontin er 40 mg tablet 	60	30	Fuzaylov, Lexa	[facility] [employer]  02/23/2021	02/24/2021	clonazepam 0.5 mg tablet 	90	30	Glenys Pratt MD	[facility]  02/21/2021	02/21/2021	hydromorphone 8 mg tablet 	60	8	Fuzayltessa, Lexa	[employer]	[facility] [employer]  11/30/2020	02/08/2021	zolpidem tartrate 10 mg tablet 	30	30	Rosado, Rebecca	[facility]  02/07/2021	02/07/2021	hydromorphone 8 mg tablet 	60	8	Fuzayltessa, Lexa	[employer]	[facility] [employer]  01/24/2021	01/31/2021	oxycontin er 40 mg tablet 	60	30	Fuzayltessa, Lexa	[facility] [employer]  08/04/2020	01/27/2021	diphenoxylate-atropine 2.5-0.025 mg tablet 	20	5	Bruce Yen MD	[facility]  01/27/2021	01/27/2021	clonazepam 0.5 mg tablet 	90	30	Glenys Pratt MD	[facility]  01/24/2021	01/24/2021	hydromorphone 8 mg tablet 	60	8	FuzaylDell zarateLexa	[employer]	[facility] [employer]  01/10/2021	01/10/2021	hydromorphone 8 mg tablet 	60	8	Fuzayltessa, Lexa	[employer]	[facility] [employer]  11/30/2020	01/08/2021	zolpidem tartrate 10 mg tablet 	30	30	Rosado, Rebecca	[place]	[facility]  12/27/2020	01/03/2021	oxycontin er 40 mg tablet 	60	30	Lexa Otoole	[facility]  [place]  12/28/2020	12/29/2020	clonazepam 0.5 mg tablet 	90	30	Glenys Pratt MD	[facility]     
65 yr female , ambulates with cane/walker with PMH of osteogenesis imperfecta complicated by numerous fractures, Anxiety, depression, HLD  presents to ED with hip pain following a fall yesterday afternoon. Admitted for acute left pubic rami fracture

## 2021-03-16 NOTE — PROGRESS NOTE ADULT - PROBLEM/PLAN-5
DISPLAY PLAN FREE TEXT
Detail Level: Detailed
DISPLAY PLAN FREE TEXT

## 2021-03-16 NOTE — PROGRESS NOTE ADULT - ATTENDING COMMENTS
Patient seen and examined this afternoon.  Discussed with Dr. Lopez and PGY2 Dr. Seaman, pain management and Case Management.     65 yr female , ambulates with cane/walker with PMH of osteogenesis imperfecta complicated by numerous fractures, Anxiety, depression, HLD  presents to ED with hip pain following a fall yesterday afternoon. Admitted for acute left pubic rami fracture.  Pain controlled. seen by PT recommended Home PT.   Patient was referred for Home PT and Home Care by [facility].  As per patient she will need DME equipments ([employer]  and showerchair) as  goes to work and she is alone. States did not even walk few steps and requested to be discharged today.  She declined referral to [facility].         D/D:  [place] Pubic rami fracture with  Acute on Chronic pain  Osteogenesis imperfecta  Depression with Anxiety    Analgesics have been sent to and received by patient's pharmacy.

## 2021-03-16 NOTE — DISCHARGE NOTE NURSING/CASE MANAGEMENT/SOCIAL WORK - NSDCVIVACCINE_GEN_ALL_CORE_FT
Severe acute respiratory syndrome coronavirus 2 (SARS-CoV-2) (Coronavirus disease [COVID-19]) vaccine , 2021/3/15 15:30 , Adelia Lora (SHLOMO)

## 2021-03-16 NOTE — CHART NOTE - NSCHARTNOTEFT_GEN_A_CORE
Informed by attending, patients pain medication did not go through to her pharmacy. Resent prescriptions for oxycodone ER and Dilaudid PO. [facility] that [facility]. Per pharmacy pt was recently dispensed oxycodone ER on 2/28, not due for a refill. Spoke with patient's  who confirmed she has a few days supply of oxycodone ER at home.

## 2021-03-16 NOTE — PROGRESS NOTE ADULT - PROBLEM SELECTOR PROBLEM 7
Hyperlipidemia
Need for prophylactic measure

## 2021-03-16 NOTE — PROGRESS NOTE ADULT - PROBLEM SELECTOR PLAN 2
- h/o osteogenesis imperfecta with multiple fractures and orthopedic surgeries in the past  - plan as above
- h/o osteogenesis imperfecta with multiple fractures and orthopedic surgeries in the past  - plan as above
H/O Osteogenesis imperfecta with multiple fractures and orthopedic surgeries in the past  c/w Pain meds Tylenol, Oxycontin and Dilaudid

## 2021-03-16 NOTE — PROGRESS NOTE ADULT - PROBLEM SELECTOR PLAN 4
- h/o anxiety  - c/w Buspirone and Klonopin
- h/o anxiety  - c/w Buspirone and Klonopin
c/w Buspirone and Klonopin

## 2021-03-16 NOTE — PROGRESS NOTE ADULT - PROBLEM SELECTOR PLAN 6
- h/o HLD, on rosuvastatin  - c/w atorvastatin
c/w rosuvastatin
- h/o HLD, on rosuvastatin  - c/w atorvastatin

## 2021-03-16 NOTE — PROGRESS NOTE ADULT - PROBLEM SELECTOR PLAN 7
RISK                                                          Points  [  ] Previous VTE                                                3  [  ] Thrombophilia                                             2  [  ] Lower limb paralysis                                   2        (unable to hold up >15 seconds)    [  ] Current Cancer                                             2         (within 6 months)  [ x ] Immobilization > 24 hrs                              1  [  ] ICU/CCU stay > 24 hours                             1  [ x ] Age > 60                                                         1    IMPROVE VTE Score: 2  lovenox for VTE prophylaxis.
Lovenox for VTE prophylaxis.
RISK                                                          Points  [  ] Previous VTE                                                3  [  ] Thrombophilia                                             2  [  ] Lower limb paralysis                                   2        (unable to hold up >15 seconds)    [  ] Current Cancer                                             2         (within 6 months)  [ x ] Immobilization > 24 hrs                              1  [  ] ICU/CCU stay > 24 hours                             1  [ x ] Age > 60                                                         1    IMPROVE VTE Score: 2  lovenox for VTE prophylaxis.

## 2021-03-16 NOTE — PROGRESS NOTE ADULT - PROBLEM SELECTOR PLAN 5
- h/o depression  - c/w Lamotrigine, Wellbutrin and fluoxetine
- h/o depression  - c/w Lamotrigine, Wellbutrin and fluoxetine
c/w Lamotrigine, Wellbutrin and fluoxetine

## 2021-03-16 NOTE — PROGRESS NOTE ADULT - PROBLEM SELECTOR PROBLEM 1
Pubic ramus fracture, left, closed, initial encounter
Acute pain due to trauma
Pubic ramus fracture, left, closed, initial encounter
Pubic ramus fracture, left, closed, initial encounter

## 2021-03-16 NOTE — PROGRESS NOTE ADULT - PROBLEM SELECTOR PROBLEM 2
Osteogenesis imperfecta
Pubic ramus fracture, left, closed, initial encounter
Osteogenesis imperfecta
Osteogenesis imperfecta

## 2021-06-18 NOTE — ED PROVIDER NOTE - CARE PLAN
Your pain is likely to be related to your yard work and is musculoskeletal in nature.  You may use topical agents such as capsaicin cream or Voltaren gel topically you may take Tylenol 1000 mg up to 4 times daily as needed for pain management.  Follow-up if you should develop shortness of air, swelling or bruising or change in bowel or bladder habits.   Principal Discharge DX:	Acute pain due to trauma

## 2021-06-23 NOTE — PROGRESS NOTE ADULT - PROBLEM SELECTOR PROBLEM 2
"ADULT HEART TRANSPLANT CLINIC      HPI:    \"Anju Sharp is a 59yr old male with a history of CAD (STEMI with ALYSSA to LAD 6/27/18), ICM (LVEF 20-25%) s/p HM3 LVAD (12/31/18), monomorphic VT (s/p ICD with shocks), LV thrombus, CKD, elevated PSA, pAF, HTN, HL, and DMII, now s/p OHT 5/18/20, who presents to clinic for ongoing evaluation and management.    His post-transplant course was c/b NSVT (with no hemodynamic compromise), leukocytosis with C Acnes growing from his former LVAD site (thought to be a contaminant, but treated with IV vanco --> po doxy through 6/1/20, 14d course total), and in the setting of donor blood growing S anginosus and Veillonella (also thought to be a contaminant, but treated with Vanco/zosyn --> Vanco/Flagyl for a total of 7 days).  He also had hyperkalemia, which improved following kayexalate and stopping bactrim.  He ultimately discharged on 5/29/20.    He was readmitted 8/31-9/24 with fever, cough, diarrhea, and syncope.  He was found to have a post-obstructive vs aspiration pneumonia, RUL/suprahilar mass, and narrowing of multiple RUL bronchi.  He was initially treated as a fungal infection as his fungitell was +ve, but he did not clinically improve, nor did the size of the mass change.  Ultimately, tissue culture from the RUL mass showed abiotrophia defectiva (oral microbe), which was thought to be a contaminant, but did respond to antibiotics.  His MMF was decreased to 500mg twice daily, as ImmuKnow was noted to be low.    His biopsies have remained negative for rejection.  He has no DSAs.     Pt reports that overall he is feeling well.  He denies any chest pain or pressure, sob/faustin, orthopnea, pnd, palpitations, syncope/presyncope, nausea, vomiting, diarrhea, headaches, fevers, and signs of bleeding.  He does note some pedal edema at that end of the day.  He denies any problems with his medications and reports compliance.      Serostatus:  CMV D+/R-  EBV D-/R+  Toxo D-/R-    Patient " Active Problem List    Diagnosis Date Noted     Status post coronary angiogram 06/23/2021     Priority: Medium     Elevated prostate specific antigen (PSA) 01/07/2021     Priority: Medium     Acute kidney failure, unspecified (H) 09/18/2020     Priority: Medium     Fever 08/31/2020     Priority: Medium     Lung mass 08/31/2020     Priority: Medium     Added automatically from request for surgery [id number]       Kidney stone 08/28/2020     Priority: Medium     Rhinovirus infection 08/20/2020     Priority: Medium     Chronic prostatitis without hematuria 08/19/2020     Priority: Medium     Prophylactic antibiotic 08/19/2020     Priority: Medium     Pneumonia 08/18/2020     Priority: Medium     Heart replaced by transplant (H) 05/26/2020     Priority: Medium     Added automatically from request for surgery [id number]       Biventricular heart failure (H) 05/17/2020     Priority: Medium     Added automatically from request for surgery [id number]       Status post heart transplantation (H) 05/17/2020     Priority: Medium     Added automatically from request for surgery [id number]       Hydronephrosis 05/12/2020     Priority: Medium     Chronic systolic congestive heart failure (H) 02/13/2019     Priority: Medium     Added automatically from request for surgery [id number]       Weakness 01/11/2019     Priority: Medium     Type 2 diabetes mellitus with hyperglycemia, with long-term current use of insulin (H) 07/20/2018     Priority: Medium     Left ventricular apical thrombus following MI (H) 07/06/2018     Priority: Medium     Hematuria 07/04/2018     Priority: Medium     Long term current use of anticoagulant 07/03/2018     Priority: Medium     Hypotension, unspecified hypotension type 07/02/2018     Priority: Medium     Monomorphic ventricular tachycardia (H) 07/02/2018     Priority: Medium     Overview:   Added automatically from request for surgery [id number]       ASCVD (arteriosclerotic cardiovascular disease) 06/27/2018     Priority:  Medium     CKD (chronic kidney disease) stage 3, GFR 30-59 ml/min 06/27/2018     Priority: Medium     Dyslipidemia 06/27/2018     Priority: Medium     ST elevation myocardial infarction involving left anterior descending (LAD) coronary artery (H) 06/27/2018     Priority: Medium     Type 2 diabetes mellitus with hyperglycemia (H) 06/27/2018     Priority: Medium     ANDREW (acute kidney injury) (H) 10/06/2016     Priority: Medium     Ureteral obstruction 10/06/2016     Priority: Medium     Unilateral inguinal hernia 09/09/2009     Priority: Medium     Overview:   IMO Update 10/11         PAST MEDICAL HISTORY:  Past Medical History:   Diagnosis Date     Acute kidney injury (H)      CKD (chronic kidney disease)      Coronary artery disease      Diabetes mellitus (H)      HTN (hypertension)      Hyperlipidemia      ICD (implantable cardioverter-defibrillator), single chamber- NOT dependent 7/17/2018     Ischemic cardiomyopathy      LV (left ventricular) mural thrombus      Paroxysmal atrial flutter (H)      RVF (right ventricular failure) (H)      Systolic heart failure (H)      Ventricular tachycardia (H)        CURRENT MEDICATIONS:  Prescription Medications as of 6/23/2021       Rx Number Disp Refills Start End Last Dispensed Date Next Fill Date Owning Pharmacy    ACCU-CHEK [place] PLUS test strip  300 strip 0 6/22/2020    [facility] [place], [place] - [address] [place] [address] [phone]    Sig: Check BG 3 times daily at meals and at bedtime.    Class: E-Prescribe    acetaminophen (TYLENOL) 325 MG tablet     5/29/2020        Sig: Take 2 tablets (650 mg) by mouth every 4 hours as needed for other (multimodal surgical pain management along with NSAIDS and opioid medication as indicated based on pain control and physical function.)    Class: OTC    Route: Oral    amLODIPine (NORVASC) 5 MG tablet  90 tablet 3 9/28/2020    [facility] [place], [place] - [address] [facility]    Sig: Take 1  tablet (5 mg) by mouth daily    Class: E-Prescribe    Route: Oral    BD SILVANA U/F 32G X 4 MM insulin pen needle  600 each 3 10/9/2020    [facility] [place] [address]    Sig: Use 6 times daily.    Class: E-Prescribe    calcium carbonate 600 mg-vitamin D 400 units (CALTRATE) 600-400 MG-UNIT per tablet     2020        Sig: Take 1 tablet by mouth 2 times daily (with meals)    Class: OTC    Route: Oral    Renewals     Renewal requests to authorizing provider (Damari Ohara, CARMEN MARKHAM) <b>prohibited</b>          Continuous Blood Gluc Sensor (FREESTYLE DARRELL 2 SENSOR) MISC  8 each 4 3/18/2021    [facility] [address] [place] [address] AVE AT [address]    Si Application every 14 days    Class: E-Prescribe    Route: Does not apply    everolimus (ZORTRESS) 0.5 MG tablet  210 tablet 11 2021    [id number] - [address] AT [address]    Sig: Take 4 tablets (2 mg) by mouth every morning AND 3 tablets (1.5 mg) every evening.    Class: No Print Out    Notes to Pharmacy: TXP DT 2020 (Heart) TXP Dischg DT 2020 DX Heart transplant Z94.1 [facility] ([place], [place])    Route: Oral    hydrALAZINE (APRESOLINE) 50 MG tablet  540 tablet 3 10/13/2020    [facility] [address]    Sig: Take 2 tablets (100 mg) by mouth 3 times daily    Class: E-Prescribe    Route: Oral    insulin aspart (NOVOLOG PEN) 100 UNIT/ML pen  3 mL 0 10/27/2020    [facility] [address] [place]    Sig: Inject 1-14 units per custom scale  For Pre-Meal BG less than 140, no additional insulin needed   to 159 give 1 units.    to 179 give 2 units.    to 199 give 3 units.    to 219 give 4 units.    to 239 give 5 units.    to 259 give 6 units.    to 279 give 7 units.    to 299 give 8  units.    to 319 give 9 units.    to 339 give 10 units.    to 359 give 11 units.    to 379 give 12 units.   to 399 give 13 units.  BG >/=400 give 14 units.    Class: Local Print    insulin aspart (NOVOLOG PEN) 100 UNIT/ML pen  3 mL 0 9/24/2020    [address]    Sig: Inject 1-11 Units Subcutaneous At Bedtime Inject 1-11 units at bedtime per custom scale   For Bedtime BG less than 200, no additional insulin needed   to 219 give 1 units.    to 239 give 2 units.    to 259 give 3 units.    to 279 give 4 units.    to 299 give 5 units.    to 319 give 6 units.    to 339 give 7 units.    to 359 give 8 units    to 379 give 9 units.   to 399 give 10 units.  BG >/=400 give 11 units.    Class: Historical    Route: Subcutaneous    insulin glargine (LANTUS PEN) 100 UNIT/ML pen  15 mL 3 1/6/2021    [facility]/[address]    Sig: Inject 28 units subcutaneous at .    Class: E-Prescribe    Notes to Pharmacy: If Lantus is not covered by insurance, may substitute Basaglar at same dose and frequency.      loperamide (IMODIUM) 2 MG capsule  60 capsule 0 9/24/2020    [address]    Sig: Take 1 capsule (2 mg) by mouth 4 times daily as needed for diarrhea    Class: E-Prescribe    Route: Oral    magnesium oxide (MAG-OX) 400 (241.3 Mg) MG tablet  90 tablet 3 7/15/2020    [facility]/[address]    Sig: Take 1 tablet (400 mg) by mouth 2 times daily    Class: E-Prescribe    Route: Oral    multivitamin w/minerals (THERA-VIT-M) tablet  90 tablet 3 9/24/2020    [address]    Sig: Take 1 tablet by mouth daily    Class: E-Prescribe    Route: Oral    NOVOLOG FLEXPEN 100 UNIT/ML soln  15 mL 3  11/27/2020    [facility] - [place] [address]    Sig: INJECT 1 TO 14 UNITS UNDER THE SKIN  WITH MEALS AND 1 TO 11 UNITS AT BEDTIME PER SLIDING SCALES, AND INJECT 1 UNIT UNDER THE SKIN PER 5 GRAMS OF CARBOHYDRATES WITH MEALS AND SNACKS    Class: E-Prescribe    pantoprazole (PROTONIX) 40 MG EC tablet  90 tablet 3 9/28/2020    [facility] - [place] [address]    Sig: Take 1 tablet (40 mg) by mouth every morning (before breakfast)    Class: E-Prescribe    Route: Oral    rosuvastatin (CRESTOR) 10 MG tablet  90 tablet 3 7/27/2020    [facility] [place] [address]    Sig: TAKE ONE TABLET BY MOUTH ONCE DAILY    Class: E-Prescribe    tacrolimus (GENERIC EQUIVALENT) 1 MG capsule  180 capsule 11 5/19/2021    [facility][id number] - [address] AT [facility] [address]    Sig: Take 3 capsules (3 mg) by mouth every morning AND 3 capsules (3 mg) every evening.    Class: E-Prescribe    Notes to Pharmacy: TXP DT 5/18/2020 (Heart) TXP Dischg DT 5/29/2020 DX Heart transplant Z94.1 TX [facility] ([place], [place])    Route: Oral      Hospital Medications as of 6/23/2021       Dose Frequency Start End    fentaNYL (PF) (SUBLIMAZE) injection 25-50 mcg (Ended) 25-50 mcg EVERY 15 MIN PRN 6/23/2021 6/23/2021    Admin Instructions: Start at lowest dose.May repeat x 1 after 15 minutes. For severe pain related to pulling the FEMORAL sheath out ONLY.Maximum total dose is 50 mcg.For ordered IV doses 1-100 mcg give IV Push undiluted over a minimum of 3-5 minutes.    Route: Intravenous    perflutren diluted 1mL to 2mL with saline (OPTISON) diluted injection 5 mL (Completed) 5 mL ONCE 6/23/2021 6/23/2021    Class: E-Prescribe    Route: Intravenous    potassium chloride ER (KLOR-CON M) CR tablet 20 mEq (Completed) 20 mEq ONCE PRN 6/23/2021 6/23/2021    Admin Instructions: DO NOT  "CRUSH.    Class: E-Prescribe    Route: Oral    sodium chloride (PF) 0.9% PF flush 10 mL 10 mL ONCE 6/23/2021     Class: E-Prescribe    Route: Intravenous            Exam:  BP (!) 128/92 (BP Location: Right arm, Patient Position: Chair, Cuff Size: Adult Regular)   Pulse 104   Ht 1.615 m (5' 3.58\")   Wt 84.8 kg (187 lb)   SpO2 97%   BMI 32.52 kg/m    In general, the patient is a pleasant male in no apparent distress.    HEENT: NC/AT. PERRLA. EOMI.  Sclerae white, not injected.    Neck:  No adenopathy, No thyromegaly.    COR: No jugular venous distention when sitting upright.  RRR.  Normal S1 S2 splits physiologically.  No murmur, rub click, or gallop.    Lungs:  CTA. No rhonchi.    Abdomen: soft, nontender, nondistended.  No organomegaly.  Extremities:  No clubbing or cyanosis, trace bilateral LE edema at ankles  Neuro: Alert & Oriented x 3, grossly non focal.  Integument: no open lesions, rashes, or jaundice.    Labs:  CBC RESULTS:   Lab Results   Component Value Date    WBC 5.3 06/23/2021    RBC 4.22 (L) 06/23/2021    HGB 10.1 (L) 06/23/2021    HCT 34.6 (L) 06/23/2021    MCV 82 06/23/2021    MCH 26.3 (L) 06/23/2021    MCHC 32.1 06/23/2021    RDW 12.7 06/23/2021     06/23/2021       BMP RESULTS:  Lab Results   Component Value Date     06/23/2021    POTASSIUM 3.6 06/23/2021    CHLORIDE 105 06/23/2021    CO2 24 06/23/2021    ANIONGAP 7 06/23/2021     (H) 06/23/2021    BUN 35 (H) 06/23/2021    CR 1.93 (H) 06/23/2021    GFRESTIMATED 37 (L) 06/23/2021    GFRESTBLACK 43 (L) 06/23/2021    ARLENE 9.3 06/23/2021      LIPID RESULTS:  Lab Results   Component Value Date    CHOL 168 06/23/2021    HDL 49 06/23/2021    LDL 75 06/23/2021    TRIG 220 (H) 06/23/2021    NHDL 119 06/23/2021       IMMUNOSUPPRESSANT LEVELS  Lab Results   Component Value Date    TACROL 3.5 (L) 06/23/2021    DOSTAC Not Provided 06/23/2021       No components found for: CK  Lab Results   Component Value Date    MAG 1.9 06/23/2021 " Osteogenesis imperfecta "    Lab Results   Component Value Date    A1C 10.3 (H) 2021     Lab Results   Component Value Date    PHOS 3.4 2021     Lab Results   Component Value Date    NTBNPI 1,673 (H) 2020     Lab Results   Component Value Date    SAITESTMET [facility] 10/29/2020    SAICELL Class I 10/29/2020    PE1CYENOQ Cw:15 10/29/2020    OF5VTPABID Cw:18 10/29/2020    SAIREPCOM  10/29/2020      Test performed by modified procedure. Serum heat inactivated and tested   by a modified ([place]) protocol including fetal calf serum addition.   High-risk, mfi >3,000. Mod-risk, mfi 500-3,000.       Lab Results   Component Value Date    SAIITESTME [facility] 10/29/2020    SAIICELL Class II 10/29/2020    CL4OVJQEF None 10/29/2020    [id number] DR:10 10/29/2020    SAIIREPCOM  10/29/2020      Test performed by modified procedure. Serum heat inactivated and tested   by a modified ([place]) protocol including fetal calf serum addition.   High-risk, mfi >3,000. Mod-risk, mfi 500-3,000.       Lab Results   Component Value Date    CSPEC Plasma 2021       Echo 21  Global and regional left ventricular function is normal with an EF of 55-60%.  Right ventricular function, chamber size, wall motion, and thickness are  normal.  The inferior vena cava is normal.  No pericardial effusion is present.  No significant changes noted.    RHC and Angiogram  RA: /  RV: 37/14  PA: 36/25 (29)  PCWP: 22  PA Sat: 73.5%  William C.I./C.O.: 3.33/6.30  TD C.I./C.O.: 3.14/5.93  TP  PVR: 1.11 [place] Units  SVR: 901  Angiographically normal coronary arteries.      Assessment and Plan:  MrMil \"Anju Sharp is a 59yr old male with a history of CAD (STEMI with ALYSSA to LAD 18), ICM (LVEF 20-25%) s/p HM3 LVAD (18), monomorphic VT (s/p ICD with shocks), LV thrombus, CKD, elevated PSA, pAF, HTN, HL, and DMII, now s/p OHT 20, who presents to clinic for routine follow up.    ICM, s/p OHT 20  His post-transplant course was c/b NSVT (with no " hemodynamic compromise), leukocytosis with C Acnes growing from his former LVAD site (thought to be a contaminant, but treated with IV vanco --> po doxy through 6/1/20, 14d course total), and in the setting of donor blood growing S anginosus and Veillonella (also thought to be a contaminant, but treated with Vanco/zosyn --> Vanco/Flagyl for a total of 7 days). He ultimately discharged 5/29. He was readmitted 8/31-9/24 with fever, cough, diarrhea, and syncope.  He was initially treated as a fungal infection as his fungitell was +ve, but he did not clinically improve, nor did the size of the mass change.  Ultimately, tissue culture from the RUL mass showed abiotrophia defectiva (oral microbe), which was thought to be a contaminant, but did respond to antibiotics. He's currently off anti-microbials and recovering from a pneumonia perspective.       Serostatus:  - CMV D+/R-  - EBV D-/R+  - Toxo D-/R-    Immunosuppression:  - tacro, goal level 3-5.    - everolimus, goal level 6-8.    - If creatine is greater than 1.7 in 3 months- will stop Tacro and begin MMF 1000 BID and obtain Immuknow, RHC and Biopsy at 16 months visit.    - If creatine less than 1.7 in 3 months will continue current regimen and obtain allomap and echo at 16 month visit.    PPx:  - CAV: Continue rosuvastatin 10mg daily.  - GI:  Pantoprazole 40mg daily  - Osteoporosis:  Calcium/vitamin d supplements    Graft function:  - BPs:  Mildly elevated today.  Instructed patient to monitor BP and SBP > 140 call clinic.  Continue amlodipine 5mg daily and hydralazine 100mg TID  - Encouraged patient to continue regular aerobic exercise aiming for at least 150 minutes of moderate physical activity or 75 minutes of vigorous physical activity - or an equal combination of both - each week. and follow low-salt, heart healthy diet.    - fluid status:  Mildly hypervolemic.  Begin torsemide 20mg daily    Routine screenings:    Derm: needs to schedule  Dental: needs to  schedule  Colonoscopy: had in 2018  Breast/Prostate:following with Urology  Eye: just had 3 months ago  Flu/Pneumonia: Had flu shot/Covid x2r      DMII  following with endocrinology.       Elevated PSA  following with urology           Jenni Ortiz MD  Section Head - Advanced Heart Failure, Transplantation and Mechanical Circulatory Support  Director - Adult Congenital [facility] Cardiovascular [facility]etics [facility]  Associate Professor of Medicine, [facility]    I spent 40 minutes in care of the patient today including reviewing personally reviewing echo images and cath lab results, today's labs examination and discussion of testing results and care recommendations with patient and documentation.

## 2021-07-18 NOTE — PHYSICAL THERAPY INITIAL EVALUATION ADULT - TRANSFER SKILLS, REHAB EVAL
as evidenced by meeting < 75% est needs >1 mo and physical signs of mod/sev muscle/fat loss
needed assist/SAC occasionally

## 2021-09-10 NOTE — ED PROVIDER NOTE - ST/T WAVE
Call attempted to patient regarding the status of her last eye exam without success. Left a message to return call to rheumatology staff to discuss. [employer] message sent as well.    When patient returns call, please reach rheumatology staff via back line.     Lianna Rayo CMA   9/10/2021 9:00 AM    
Eye exam in 2017?  
HYDROXYCHLOROQUINE 200 MG TAB      Last Written Prescription Date:  3-15-21  Last Fill Quantity: 180,   # refills: 1  Last Office Visit : 3-15-21 ( RTC 3-4 M)  Future Office visit:  12-15-21  Last Eye exam: 9-1-17   Eye Letter sent    Routing refill request to provider for review/approval because:  Past due eye exam   next appt- out of RTC timeframe      
no overt acute ischemic changes

## 2022-03-30 ENCOUNTER — INPATIENT (INPATIENT)
Facility: HOSPITAL | Age: 67
LOS: 4 days | Discharge: HOME HEALTH | DRG: 493 | End: 2022-04-04
Attending: ORTHOPAEDIC SURGERY | Admitting: ORTHOPAEDIC SURGERY
Payer: MEDICARE

## 2022-03-30 ENCOUNTER — TRANSCRIPTION ENCOUNTER (OUTPATIENT)
Age: 67
End: 2022-03-30

## 2022-03-30 VITALS
HEIGHT: 70 IN | WEIGHT: 143.3 LBS | OXYGEN SATURATION: 96 % | RESPIRATION RATE: 18 BRPM | SYSTOLIC BLOOD PRESSURE: 174 MMHG | HEART RATE: 81 BPM | TEMPERATURE: 98 F | DIASTOLIC BLOOD PRESSURE: 96 MMHG

## 2022-03-30 DIAGNOSIS — S42.309A UNSPECIFIED FRACTURE OF SHAFT OF HUMERUS, UNSPECIFIED ARM, INITIAL ENCOUNTER FOR CLOSED FRACTURE: ICD-10-CM

## 2022-03-30 DIAGNOSIS — Z98.890 OTHER SPECIFIED POSTPROCEDURAL STATES: Chronic | ICD-10-CM

## 2022-03-30 LAB
ALBUMIN SERPL ELPH-MCNC: 4.7 G/DL — SIGNIFICANT CHANGE UP (ref 3.3–5)
ALP SERPL-CCNC: 135 U/L — HIGH (ref 40–120)
ALT FLD-CCNC: 22 U/L — SIGNIFICANT CHANGE UP (ref 10–45)
ANION GAP SERPL CALC-SCNC: 16 MMOL/L — SIGNIFICANT CHANGE UP (ref 5–17)
APTT BLD: 31.8 SEC — SIGNIFICANT CHANGE UP (ref 27.5–35.5)
AST SERPL-CCNC: 31 U/L — SIGNIFICANT CHANGE UP (ref 10–40)
BILIRUB SERPL-MCNC: 0.5 MG/DL — SIGNIFICANT CHANGE UP (ref 0.2–1.2)
BLD GP AB SCN SERPL QL: NEGATIVE — SIGNIFICANT CHANGE UP
BUN SERPL-MCNC: 28 MG/DL — HIGH (ref 7–23)
CALCIUM SERPL-MCNC: 9.8 MG/DL — SIGNIFICANT CHANGE UP (ref 8.4–10.5)
CHLORIDE SERPL-SCNC: 99 MMOL/L — SIGNIFICANT CHANGE UP (ref 96–108)
CO2 SERPL-SCNC: 24 MMOL/L — SIGNIFICANT CHANGE UP (ref 22–31)
CREAT SERPL-MCNC: 0.58 MG/DL — SIGNIFICANT CHANGE UP (ref 0.5–1.3)
EGFR: 100 ML/MIN/1.73M2 — SIGNIFICANT CHANGE UP
GLUCOSE SERPL-MCNC: 93 MG/DL — SIGNIFICANT CHANGE UP (ref 70–99)
HCT VFR BLD CALC: 40.7 % — SIGNIFICANT CHANGE UP (ref 34.5–45)
HGB BLD-MCNC: 13.1 G/DL — SIGNIFICANT CHANGE UP (ref 11.5–15.5)
INR BLD: 1.03 RATIO — SIGNIFICANT CHANGE UP (ref 0.88–1.16)
MCHC RBC-ENTMCNC: 29.6 PG — SIGNIFICANT CHANGE UP (ref 27–34)
MCHC RBC-ENTMCNC: 32.2 GM/DL — SIGNIFICANT CHANGE UP (ref 32–36)
MCV RBC AUTO: 91.9 FL — SIGNIFICANT CHANGE UP (ref 80–100)
NRBC # BLD: 0 /100 WBCS — SIGNIFICANT CHANGE UP (ref 0–0)
PLATELET # BLD AUTO: 204 K/UL — SIGNIFICANT CHANGE UP (ref 150–400)
POTASSIUM SERPL-MCNC: 4 MMOL/L — SIGNIFICANT CHANGE UP (ref 3.5–5.3)
POTASSIUM SERPL-SCNC: 4 MMOL/L — SIGNIFICANT CHANGE UP (ref 3.5–5.3)
PROT SERPL-MCNC: 8.3 G/DL — SIGNIFICANT CHANGE UP (ref 6–8.3)
PROTHROM AB SERPL-ACNC: 11.8 SEC — SIGNIFICANT CHANGE UP (ref 10.5–13.4)
RBC # BLD: 4.43 M/UL — SIGNIFICANT CHANGE UP (ref 3.8–5.2)
RBC # FLD: 14.2 % — SIGNIFICANT CHANGE UP (ref 10.3–14.5)
RH IG SCN BLD-IMP: POSITIVE — SIGNIFICANT CHANGE UP
SARS-COV-2 RNA SPEC QL NAA+PROBE: SIGNIFICANT CHANGE UP
SODIUM SERPL-SCNC: 139 MMOL/L — SIGNIFICANT CHANGE UP (ref 135–145)
WBC # BLD: 9.71 K/UL — SIGNIFICANT CHANGE UP (ref 3.8–10.5)
WBC # FLD AUTO: 9.71 K/UL — SIGNIFICANT CHANGE UP (ref 3.8–10.5)

## 2022-03-30 PROCEDURE — G1004: CPT

## 2022-03-30 PROCEDURE — 73090 X-RAY EXAM OF FOREARM: CPT | Mod: 26,LT,76

## 2022-03-30 PROCEDURE — 72170 X-RAY EXAM OF PELVIS: CPT | Mod: 26

## 2022-03-30 PROCEDURE — 73200 CT UPPER EXTREMITY W/O DYE: CPT | Mod: 26,LT,MG

## 2022-03-30 PROCEDURE — 71260 CT THORAX DX C+: CPT | Mod: 26,MG

## 2022-03-30 PROCEDURE — 74177 CT ABD & PELVIS W/CONTRAST: CPT | Mod: 26,MG

## 2022-03-30 PROCEDURE — 72125 CT NECK SPINE W/O DYE: CPT | Mod: 26,MG

## 2022-03-30 PROCEDURE — 70450 CT HEAD/BRAIN W/O DYE: CPT | Mod: 26,MG

## 2022-03-30 PROCEDURE — 76377 3D RENDER W/INTRP POSTPROCES: CPT | Mod: 26

## 2022-03-30 PROCEDURE — 73060 X-RAY EXAM OF HUMERUS: CPT | Mod: 26,LT,76

## 2022-03-30 PROCEDURE — 99285 EMERGENCY DEPT VISIT HI MDM: CPT | Mod: GC

## 2022-03-30 PROCEDURE — 73070 X-RAY EXAM OF ELBOW: CPT | Mod: 26,LT

## 2022-03-30 PROCEDURE — 71045 X-RAY EXAM CHEST 1 VIEW: CPT | Mod: 26

## 2022-03-30 RX ORDER — CEFAZOLIN SODIUM 1 G
2000 VIAL (EA) INJECTION ONCE
Refills: 0 | Status: COMPLETED | OUTPATIENT
Start: 2022-03-30 | End: 2022-03-30

## 2022-03-30 RX ORDER — ZOLPIDEM TARTRATE 10 MG/1
5 TABLET ORAL AT BEDTIME
Refills: 0 | Status: DISCONTINUED | OUTPATIENT
Start: 2022-03-30 | End: 2022-03-31

## 2022-03-30 RX ORDER — OXYCODONE HYDROCHLORIDE 5 MG/1
2.5 TABLET ORAL EVERY 4 HOURS
Refills: 0 | Status: DISCONTINUED | OUTPATIENT
Start: 2022-03-30 | End: 2022-03-31

## 2022-03-30 RX ORDER — FENTANYL CITRATE 50 UG/ML
50 INJECTION INTRAVENOUS ONCE
Refills: 0 | Status: DISCONTINUED | OUTPATIENT
Start: 2022-03-30 | End: 2022-03-30

## 2022-03-30 RX ORDER — LAMOTRIGINE 25 MG/1
100 TABLET, ORALLY DISINTEGRATING ORAL ONCE
Refills: 0 | Status: COMPLETED | OUTPATIENT
Start: 2022-03-30 | End: 2022-03-30

## 2022-03-30 RX ORDER — CEFAZOLIN SODIUM 1 G
2000 VIAL (EA) INJECTION EVERY 8 HOURS
Refills: 0 | Status: DISCONTINUED | OUTPATIENT
Start: 2022-03-31 | End: 2022-03-31

## 2022-03-30 RX ORDER — TETANUS TOXOID, REDUCED DIPHTHERIA TOXOID AND ACELLULAR PERTUSSIS VACCINE, ADSORBED 5; 2.5; 8; 8; 2.5 [IU]/.5ML; [IU]/.5ML; UG/.5ML; UG/.5ML; UG/.5ML
0.5 SUSPENSION INTRAMUSCULAR ONCE
Refills: 0 | Status: COMPLETED | OUTPATIENT
Start: 2022-03-30 | End: 2022-03-30

## 2022-03-30 RX ORDER — CLONAZEPAM 1 MG
0.5 TABLET ORAL ONCE
Refills: 0 | Status: DISCONTINUED | OUTPATIENT
Start: 2022-03-30 | End: 2022-03-30

## 2022-03-30 RX ORDER — HYDROMORPHONE HYDROCHLORIDE 2 MG/ML
0.5 INJECTION INTRAMUSCULAR; INTRAVENOUS; SUBCUTANEOUS EVERY 6 HOURS
Refills: 0 | Status: DISCONTINUED | OUTPATIENT
Start: 2022-03-30 | End: 2022-03-31

## 2022-03-30 RX ORDER — OXYCODONE HYDROCHLORIDE 5 MG/1
5 TABLET ORAL EVERY 4 HOURS
Refills: 0 | Status: DISCONTINUED | OUTPATIENT
Start: 2022-03-30 | End: 2022-03-31

## 2022-03-30 RX ORDER — LEVOTHYROXINE SODIUM 125 MCG
50 TABLET ORAL DAILY
Refills: 0 | Status: DISCONTINUED | OUTPATIENT
Start: 2022-03-30 | End: 2022-03-31

## 2022-03-30 RX ORDER — ACETAMINOPHEN 500 MG
975 TABLET ORAL EVERY 8 HOURS
Refills: 0 | Status: DISCONTINUED | OUTPATIENT
Start: 2022-03-30 | End: 2022-03-31

## 2022-03-30 RX ORDER — SODIUM CHLORIDE 9 MG/ML
1000 INJECTION INTRAMUSCULAR; INTRAVENOUS; SUBCUTANEOUS
Refills: 0 | Status: DISCONTINUED | OUTPATIENT
Start: 2022-03-30 | End: 2022-03-31

## 2022-03-30 RX ORDER — FLUOXETINE HCL 10 MG
20 CAPSULE ORAL
Refills: 0 | Status: DISCONTINUED | OUTPATIENT
Start: 2022-03-30 | End: 2022-03-31

## 2022-03-30 RX ORDER — HYDROMORPHONE HYDROCHLORIDE 2 MG/ML
1 INJECTION INTRAMUSCULAR; INTRAVENOUS; SUBCUTANEOUS ONCE
Refills: 0 | Status: DISCONTINUED | OUTPATIENT
Start: 2022-03-30 | End: 2022-03-30

## 2022-03-30 RX ADMIN — OXYCODONE HYDROCHLORIDE 2.5 MILLIGRAM(S): 5 TABLET ORAL at 23:13

## 2022-03-30 RX ADMIN — FENTANYL CITRATE 50 MICROGRAM(S): 50 INJECTION INTRAVENOUS at 17:24

## 2022-03-30 RX ADMIN — Medication 975 MILLIGRAM(S): at 23:13

## 2022-03-30 RX ADMIN — LAMOTRIGINE 100 MILLIGRAM(S): 25 TABLET, ORALLY DISINTEGRATING ORAL at 22:12

## 2022-03-30 RX ADMIN — TETANUS TOXOID, REDUCED DIPHTHERIA TOXOID AND ACELLULAR PERTUSSIS VACCINE, ADSORBED 0.5 MILLILITER(S): 5; 2.5; 8; 8; 2.5 SUSPENSION INTRAMUSCULAR at 17:24

## 2022-03-30 RX ADMIN — HYDROMORPHONE HYDROCHLORIDE 1 MILLIGRAM(S): 2 INJECTION INTRAMUSCULAR; INTRAVENOUS; SUBCUTANEOUS at 18:31

## 2022-03-30 RX ADMIN — Medication 100 MILLIGRAM(S): at 17:23

## 2022-03-30 RX ADMIN — Medication 0.5 MILLIGRAM(S): at 22:12

## 2022-03-30 RX ADMIN — FENTANYL CITRATE 50 MICROGRAM(S): 50 INJECTION INTRAVENOUS at 18:04

## 2022-03-30 NOTE — ED ADULT NURSE NOTE - NSIMPLEMENTINTERV_GEN_ALL_ED
Implemented All Fall with Harm Risk Interventions:  [place] to call system. Call bell, personal items and telephone within reach. Instruct patient to call for assistance. Room bathroom lighting operational. Non-slip footwear when patient is off stretcher. Physically safe environment: no spills, clutter or unnecessary equipment. Stretcher in lowest position, wheels locked, appropriate side rails in place. Provide visual cue, wrist band, yellow gown, etc. Monitor gait and stability. Monitor for mental status changes and reorient to person, place, and time. Review medications for side effects contributing to fall risk. Reinforce activity limits and safety measures with patient and family. Provide visual clues: red socks.

## 2022-03-30 NOTE — ED PROVIDER NOTE - PHYSICAL EXAMINATION
GEN: Patient awake alert NAD.   HEENT: normocephalic, atraumatic, EOMI, no scleral icterus, moist MM  CARDIAC: RRR, S1, S2, no murmur.   PULM: CTA B/L no wheeze, rhonchi, rales.   ABD: soft NT, ND, no rebound no guarding, no CVA tenderness.   MSK: Moving all extremities, no edema. 5/5 strength and full ROM in RUE, b/l LEs. Obvious L elbow deformity, NV intact with ROM intact with break in the skin and bleeding lateral and superior to the elbow. L axillary chest wall ttp.    NEURO: A&Ox3, no focal neurological deficits, No c spine midline ttp

## 2022-03-30 NOTE — ED PROVIDER NOTE - CLINICAL SUMMARY MEDICAL DECISION MAKING FREE TEXT BOX
Dominick Terry DO PGY-1: 66yF with PMH of osteogenesis imperfecta, depression presents to the ED following mechanical fall from standing height c/o L elbow pain. Pt was placed in a sling a swath. Denies head strike, LOC or any other pain. Dominick Stewart DO PGY-1: 66yF with PMH of osteogenesis imperfecta, depression presents to the ED following mechanical fall from standing height c/o L elbow pain. Pt was placed in a sling a swath. Denies head strike, LOC or any other pain. Obvious L elbow deformity, NV intact with ROM intact with break in the skin and bleeding lateral and superior to the elbow. L axillary chest wall ttp. Concerning for fx and rib fx, will obtain trauma CT scan, xrats, preop labs. Will give tdap, pain control, abx. Reassess. consider ortho consult. Dominick Stewart DO PGY-1: 66yF with PMH of osteogenesis imperfecta, depression presents to the ED following mechanical fall from standing height c/o L elbow pain. Pt was placed in a sling a swath. Denies head strike, LOC or any other pain. Obvious L elbow deformity, NV intact with ROM intact with break in the skin and bleeding lateral and superior to the elbow. L axillary chest wall ttp. Concerning for fx and rib fx, will obtain trauma CT scan, xrats, preop labs. Will give tdap, pain control, abx. Reassess. consider ortho consult.    Attending Statement: Agree with the above.  H/O OI with mechanical fall onto LUE.  Obvious deformity to LUE proximal to elbow with overlying skin break (no obvious open joint).  Patient is neurovascularly intact distally.  Sensation full, motor 5/5 median, ulnar, radial.  Radial pulse 2+ symmetrically and compartments are soft.  Pt is not on AC.  Primary survey WNL.  Secondary survey shows L axillary chest wall tenderness to palpation without crepitaiton or flail segment.  Will need urgent XR to assess for fx dislocation, pan scan, preop labs, pain control.  Of note, patient is on dilaudid PO 16mg q6 which would make procedural sedation potentially high risk.  Will d/w ortho.  --BMM

## 2022-03-30 NOTE — ED PROVIDER NOTE - PROGRESS NOTE DETAILS
Dominick Stewart DO PGY-1: XR shows L displaced humeral shaft fx. Ortho paged awaiting a call back. Dominick Stewart DO PGY-1: XR shows L displaced humeral shaft fx. Ortho paged awaiting a call back. Pt remains NV intact Calcagni: Imaging returned revealing L sided mild displaced 10th rib fracture, aside from that CTs are negative, will admit to ortho.

## 2022-03-30 NOTE — H&P ADULT - HISTORY OF PRESENT ILLNESS
66y Female community ambulator presents c/o presents with L elbow pain s/p fall. Pt denies numbness tingling paresthesias in affected limb. Pt denies headstrike or LOC and denies any other orthopedic injuries at this time    PAST MEDICAL & SURGICAL HISTORY:  Osteogenesis imperfecta    Former smoker    Hard of hearing    History of open reduction and internal fixation (ORIF) procedure      MEDICATIONS  (STANDING):  acetaminophen     Tablet .. 975 milliGRAM(s) Oral every 8 hours  sodium chloride 0.9%. 1000 milliLiter(s) IV Continuous <Continuous>    Allergies    No Known Allergies                       13.1   9.71  )-----------( 204      ( 30 Mar 2022 17:50 )             40.7     30 Mar 2022 17:50    139    |  99     |  28     ----------------------------<  93     4.0     |  24     |  0.58     Ca    9.8        30 Mar 2022 17:50    TPro  8.3    /  Alb  4.7    /  TBili  0.5    /  DBili  x      /  AST  31     /  ALT  22     /  AlkPhos  135    30 Mar 2022 17:50    PT/INR - ( 30 Mar 2022 17:50 )   PT: 11.8 sec;   INR: 1.03 ratio         PTT - ( 30 Mar 2022 17:50 )  PTT:31.8 sec  Vital Signs Last 24 Hrs  T(C): 36.8 (03-30-22 @ 17:20), Max: 36.8 (03-30-22 @ 17:20)  T(F): 98.2 (03-30-22 @ 17:20), Max: 98.2 (03-30-22 @ 17:20)  HR: 85 (03-30-22 @ 18:00) (81 - 89)  BP: 134/87 (03-30-22 @ 18:00) (134/87 - 187/66)  BP(mean): --  RR: 18 (03-30-22 @ 18:00) (18 - 20)  SpO2: 99% (03-30-22 @ 18:00) (96% - 99%)    Imaging: XR demonstrates L G1 distal humerus fracture      Gen: NAD, AAOx3    LUE: Poke hole open, gross deformity at elbow, slight tenting of skin medial elbow, ecchymosis over distal humerus,+ Swelling at distal humerus, no bony TTP at Shoulder/wrist/Hand/Fingers, +AIN/PIN/M/R/U/Msc/Ax, SILT C5-T1, Radial Pulse, compartments soft, hand is pink and warm    Secondary Survey: Full ROM of unaffected extremities, able to SLR B/L, SILT globally, compartments soft, no bony TTP over bony prominences, no calf TTP, no TTP along axial spine      A/P: 66y Female with L distal humerus  Fracture  -pain control  -in posterior long arm splint at 30 deg flexion  -NWB LUE  -keep splint clean dry intact  -rest ice elevate affected elbow  -splint for comfort  -no lifting with affected hand  -NVI post splint  -discussed signs symptoms of compartment syndrome  -discussed need for surgical fixation  -NPO at MN, IVF  -Preop labs  -Please document medical clearance                                   66y Female community ambulator presents c/o presents with L elbow pain s/p fall. Pt denies numbness tingling paresthesias in affected limb. Pt denies headstrike or LOC and denies any other orthopedic injuries at this time    PAST MEDICAL & SURGICAL HISTORY:  Osteogenesis imperfecta    Former smoker    Hard of hearing    History of open reduction and internal fixation (ORIF) procedure      MEDICATIONS  (STANDING):  acetaminophen     Tablet .. 975 milliGRAM(s) Oral every 8 hours  sodium chloride 0.9%. 1000 milliLiter(s) IV Continuous <Continuous>    Allergies    No Known Allergies                       13.1   9.71  )-----------( 204      ( 30 Mar 2022 17:50 )             40.7     30 Mar 2022 17:50    139    |  99     |  28     ----------------------------<  93     4.0     |  24     |  0.58     Ca    9.8        30 Mar 2022 17:50    TPro  8.3    /  Alb  4.7    /  TBili  0.5    /  DBili  x      /  AST  31     /  ALT  22     /  AlkPhos  135    30 Mar 2022 17:50    PT/INR - ( 30 Mar 2022 17:50 )   PT: 11.8 sec;   INR: 1.03 ratio         PTT - ( 30 Mar 2022 17:50 )  PTT:31.8 sec  Vital Signs Last 24 Hrs  T(C): 36.8 (03-30-22 @ 17:20), Max: 36.8 (03-30-22 @ 17:20)  T(F): 98.2 (03-30-22 @ 17:20), Max: 98.2 (03-30-22 @ 17:20)  HR: 85 (03-30-22 @ 18:00) (81 - 89)  BP: 134/87 (03-30-22 @ 18:00) (134/87 - 187/66)  BP(mean): --  RR: 18 (03-30-22 @ 18:00) (18 - 20)  SpO2: 99% (03-30-22 @ 18:00) (96% - 99%)    Imaging: XR demonstrates L G1 distal humerus fracture      Gen: NAD, AAOx3    LUE: Poke hole open, gross deformity at elbow, slight tenting of skin medial elbow, ecchymosis over distal humerus,+ Swelling at distal humerus, no bony TTP at Shoulder/wrist/Hand/Fingers, +AIN/PIN/M/R/U/Msc/Ax, SILT C5-T1, Radial Pulse, compartments soft, hand is pink and warm    Secondary Survey: Full ROM of unaffected extremities, able to SLR B/L, SILT globally, compartments soft, no bony TTP over bony prominences, no calf TTP, no TTP along axial spine      A/P: 66y Female with L distal humerus  Fracture  -pain control  -in posterior long arm splint at 30 deg flexion  -NWB LUE  -keep splint clean dry intact  -rest ice elevate affected elbow  -splint for comfort  -no lifting with affected hand  -NVI post splint  -discussed signs symptoms of compartment syndrome  -discussed need for surgical fixation  -NPO at MN, IVF  -Preop labs  -Please document medical clearance  - L 10th rib fx- pain control PRN

## 2022-03-30 NOTE — ED PROVIDER NOTE - NS ED ROS FT
GENERAL: No fever, chills  CARDIAC: no chest pain/pressure, SOB, lower extremity swelling  PULMONARY: no cough, SOB  GI: no abdominal pain, n/v/d  : no dysuria, no hematuria  SKIN: no rashes, no ecchymosis  NEURO: no headache, lightheadedness  MSK: + joint pain, myalgia, weakness.

## 2022-03-30 NOTE — CONSULT NOTE ADULT - SUBJECTIVE AND OBJECTIVE BOX
Medical Attending Consult note      · Chief Complaint: The patient is a 66y Female complaining of fall. NO history of syncope or seizure  · HPI Objective Statement: 66yF with PMH of osteogenesis imperfecta, depression presents to the ED following mechanical fall from standing height c/o L elbow pain. Pt was placed in a sling a swath. Denies head strike, LOC or any other pain.      PAST MEDICAL HISTORY:  Former smoker     Hard of hearing     Osteogenesis imperfecta.     PAST SURGICAL HISTORY:  History of open reduction and internal fixation (ORIF) procedure.     FAMILY HISTORY:  Family history of osteogenesis imperfecta,  Family history of valvular heart disease, .    ALLERGIES AND HOME MEDICATIONS:   Allergies:        Allergies:  	No Known Allergies:     Home Medications:   * Patient Currently Takes Medications as of 16-Mar-2021 16:11 documented in Structured Notes  · 	senna 8.6 mg oral tablet: 2 tab(s) orally once a day (at bedtime) MDD:2 tabs per day  · 	polyethylene glycol 3350 oral powder for reconstitution: 17 gram(s) orally once a day, As needed, Constipation MDD:1 packet/ day  · 	HYDROmorphone 4 mg oral tablet: 1 tab(s) orally every 6 hours -for severe pain MDD:4 tabs   · 	rosuvastatin 10 mg oral tablet: 1 tab(s) orally once a day (at bedtime)  · 	FLUoxetine 20 mg oral capsule: 1 cap(s) orally 3 times a day  · 	acetaminophen 325 mg oral tablet: 2 tab(s) orally every 6 hours, As needed, Temp greater or equal to 38C (100.4F), mild pain  · 	Multiple Vitamins oral tablet: 1 tab(s) orally once a day  · 	clonazePAM 0.5 mg oral tablet: 1 tab(s) orally 3 times a day  · 	Ambien 5 mg oral tablet: 1 tab(s) orally once a day (at bedtime)  · 	levothyroxine 50 mcg (0.05 mg) oral tablet: 1 tab(s) orally once a day  · 	Wellbutrin 75 mg oral tablet: 1 tab(s) orally 3 times a day  · 	lamoTRIgine 100 mg oral tablet: 1 tab(s) orally 2 times a day  · 	busPIRone 15 mg oral tablet: 1 tab(s) orally 2 times a day    REVIEW OF SYSTEMS:    Review of Systems:  · Review of Systems: GENERAL: No fever, chills  	CARDIAC: no chest pain/pressure, SOB, lower extremity swelling  	PULMONARY: no cough, SOB  	GI: no abdominal pain, n/v/d  	: no dysuria, no hematuria  	SKIN: no rashes, no ecchymosis  	NEURO: no headache, lightheadedness  MSK: + joint pain, myalgia, weakness.          + hip pain     PHYSICAL EXAM:   · Physical Examination: GEN: Patient awake alert NAD.   	HEENT: normocephalic, atraumatic, EOMI, no scleral icterus, moist MM  	CARDIAC: RRR, S1, S2, no murmur.   	PULM: CTA B/L no wheeze, rhonchi, rales.   	ABD: soft NT, ND, no rebound no guarding, no CVA tenderness.   	MSK: Moving all extremities, no edema. 5/5 strength and full ROM in RUE, b/l LEs. Obvious L elbow deformity, NV intact with ROM intact with break in the skin and bleeding lateral and superior to the elbow. L axillary chest wall ttp.    NEURO: A&Ox3, no focal neurological deficits, No c spine midline ttp      RESULTS:    displace left humeral shaft fracture    DISPOSITION:   Admitted to ortho surgery :    A/P   66yF with PMH of osteogenesis imperfecta, depression presents to the ED following mechanical fall from standing height c/o L elbow pain. Pt was placed in a sling a swath. Denies head strike, LOC or any other pain. Obvious L elbow deformity, NV intact with ROM intact with break in the skin and bleeding lateral and superior to the elbow. L axillary chest wall ttp. Concerning for fx and rib fx, will obtain trauma CT scan, xrays, preop labs.    Attending Statement: Agree with the above.  H/O OI with mechanical fall onto LUE.  Obvious deformity to LUE proximal to elbow with overlying skin break (no obvious open joint).  Patient is neurovascularly intact distally.  Sensation full, motor 5/5 median, ulnar, radial.  Radial pulse 2+ symmetrically and compartments are soft.  Pt is not on AC.  Primary survey WNL.  Secondary survey shows L axillary chest wall tenderness to palpation without crepitaiton or flail segment.  Will need urgent XR to assess for fx dislocation, pan scan, preop labs, pain control.  Of note, patient is on dilaudid PO 16mg q6 which would make procedural sedation potentially high risk.        Patient seen and examined Data reviewed The patient is medically stable to proceed with surgery as planned         Efrain Divya MD    Time spent 70 minutes  	     Medical Attending Consult note      · Chief Complaint: The patient is a 66y Female complaining of fall. NO history of syncope or seizure  · HPI Objective Statement: 66yF with PMH of osteogenesis imperfecta, depression presents to the ED following mechanical fall from standing height c/o L elbow pain. Pt was placed in a sling a swath. Denies head strike, LOC or any other pain.      PAST MEDICAL HISTORY:  Former smoker     Hard of hearing     Osteogenesis imperfecta.     PAST SURGICAL HISTORY:  History of open reduction and internal fixation (ORIF) procedure.     FAMILY HISTORY:  Family history of osteogenesis imperfecta,  Family history of valvular heart disease, .    ALLERGIES AND HOME MEDICATIONS:   Allergies:        Allergies:  	No Known Allergies:     Home Medications:   * Patient Currently Takes Medications as of 16-Mar-2021 16:11 documented in Structured Notes  · 	senna 8.6 mg oral tablet: 2 tab(s) orally once a day (at bedtime) MDD:2 tabs per day  · 	polyethylene glycol 3350 oral powder for reconstitution: 17 gram(s) orally once a day, As needed, Constipation MDD:1 packet/ day  · 	HYDROmorphone 4 mg oral tablet: 1 tab(s) orally every 6 hours -for severe pain MDD:4 tabs   · 	rosuvastatin 10 mg oral tablet: 1 tab(s) orally once a day (at bedtime)  · 	FLUoxetine 20 mg oral capsule: 1 cap(s) orally 3 times a day  · 	acetaminophen 325 mg oral tablet: 2 tab(s) orally every 6 hours, As needed, Temp greater or equal to 38C (100.4F), mild pain  · 	Multiple Vitamins oral tablet: 1 tab(s) orally once a day  · 	clonazePAM 0.5 mg oral tablet: 1 tab(s) orally 3 times a day  · 	Ambien 5 mg oral tablet: 1 tab(s) orally once a day (at bedtime)  · 	levothyroxine 50 mcg (0.05 mg) oral tablet: 1 tab(s) orally once a day  · 	Wellbutrin 75 mg oral tablet: 1 tab(s) orally 3 times a day  · 	lamoTRIgine 100 mg oral tablet: 1 tab(s) orally 2 times a day  · 	busPIRone 15 mg oral tablet: 1 tab(s) orally 2 times a day    REVIEW OF SYSTEMS:    Review of Systems:  · Review of Systems: GENERAL: No fever, chills  	CARDIAC: no chest pain/pressure, SOB, lower extremity swelling  	PULMONARY: no cough, SOB  	GI: no abdominal pain, n/v/d  	: no dysuria, no hematuria  	SKIN: no rashes, no ecchymosis  	NEURO: no headache, lightheadedness  MSK: + joint pain, myalgia, weakness.          + hip pain     PHYSICAL EXAM:   · Physical Examination: GEN: Patient awake alert NAD.   	HEENT: normocephalic, atraumatic, EOMI, no scleral icterus, moist MM  	CARDIAC: RRR, S1, S2, no murmur.   	PULM: CTA B/L no wheeze, rhonchi, rales.   	ABD: soft NT, ND, no rebound no guarding, no CVA tenderness.   	MSK: Moving all extremities, no edema. 5/5 strength and full ROM in RUE, b/l LEs. Obvious L elbow deformity, NV intact with ROM intact with break in the skin and bleeding lateral and superior to the elbow. L axillary chest wall ttp.    NEURO: A&Ox3, no focal neurological deficits, No c spine midline ttp      RESULTS:    displace left humeral shaft fracture    DISPOSITION:   Admitted to ortho surgery :    A/P   66yF with PMH of osteogenesis imperfecta, depression presents to the ED following mechanical fall from standing height c/o L elbow pain. Pt was placed in a sling / swath. Denies head strike, LOC or any other pain. Obvious L elbow deformity, NV intact with ROM intact with break in the skin and bleeding lateral and superior to the elbow. L axillary chest wall ttp. Concerning for fx and rib fx, will obtain trauma CT scan, xrays, preop labs.          Patient seen and examined Data reviewed The patient is medically stable to proceed with surgery as planned         Efrain Stokes MD    Time spent 70 minutes

## 2022-03-30 NOTE — ED PROVIDER NOTE - OBJECTIVE STATEMENT
66yF with PMH of osteogenesis imperfecta, depression presents to the ED following mechanical fall from standing height c/o L elbow pain. Pt was placed in a sling a swath. Denies head strike, 66yF with PMH of osteogenesis imperfecta, depression presents to the ED following mechanical fall from standing height c/o L elbow pain. Pt was placed in a sling a swath. Denies head strike, LOC or any other pain.

## 2022-03-30 NOTE — ED PROVIDER NOTE - NSICDXFAMILYHX_GEN_ALL_CORE_FT
FAMILY HISTORY:  Father  Still living? Unknown  Family history of osteogenesis imperfecta, Age at diagnosis: Age Unknown    Sibling  Still living? Unknown  Family history of osteogenesis imperfecta, Age at diagnosis: Age Unknown  Family history of valvular heart disease, Age at diagnosis: Age Unknown

## 2022-03-30 NOTE — ED ADULT NURSE NOTE - OBJECTIVE STATEMENT
Pt is 67 y/o female presenting to the ED via EMS s/p mechanical fall. As per pt, slipped in her kitchen and hit L elbow on counter. Pt denies LOC, head trauma, and AC use. PMH of osteogenesis imperfecta and endorses hx of multiple fractures. Upon assessment, pt AxO x3, and speaking in full sentences. Breathing spontaneously and unlabored. Abdomen is soft and non-tender to palpation. Obvious deformity noted to L elbow. Swelling noted to LUE w/ active bleeding. Pt able to move L fingers, + radial pulses, extremity warm. Pt denies numbness and tingling, = sensation noted to all extremities. Pt denies chest pain, SOB, n/v/d, and dizziness. Safety and comfort measures provided- bed in lowest position, locked, and blanket given.

## 2022-03-31 DIAGNOSIS — Q78.0 OSTEOGENESIS IMPERFECTA: ICD-10-CM

## 2022-03-31 DIAGNOSIS — R52 PAIN, UNSPECIFIED: ICD-10-CM

## 2022-03-31 DIAGNOSIS — S42.402A UNSPECIFIED FRACTURE OF LOWER END OF LEFT HUMERUS, INITIAL ENCOUNTER FOR CLOSED FRACTURE: ICD-10-CM

## 2022-03-31 DIAGNOSIS — F32.89 OTHER SPECIFIED DEPRESSIVE EPISODES: ICD-10-CM

## 2022-03-31 PROBLEM — Z87.891 PERSONAL HISTORY OF NICOTINE DEPENDENCE: Chronic | Status: ACTIVE | Noted: 2021-03-13

## 2022-03-31 PROBLEM — H91.90 UNSPECIFIED HEARING LOSS, UNSPECIFIED EAR: Chronic | Status: ACTIVE | Noted: 2021-03-13

## 2022-03-31 LAB
ANION GAP SERPL CALC-SCNC: 10 MMOL/L — SIGNIFICANT CHANGE UP (ref 5–17)
ANION GAP SERPL CALC-SCNC: 11 MMOL/L — SIGNIFICANT CHANGE UP (ref 5–17)
APTT BLD: 30.3 SEC — SIGNIFICANT CHANGE UP (ref 27.5–35.5)
BLD GP AB SCN SERPL QL: NEGATIVE — SIGNIFICANT CHANGE UP
BUN SERPL-MCNC: 11 MG/DL — SIGNIFICANT CHANGE UP (ref 7–23)
BUN SERPL-MCNC: 22 MG/DL — SIGNIFICANT CHANGE UP (ref 7–23)
CALCIUM SERPL-MCNC: 7.8 MG/DL — LOW (ref 8.4–10.5)
CALCIUM SERPL-MCNC: 8.4 MG/DL — SIGNIFICANT CHANGE UP (ref 8.4–10.5)
CHLORIDE SERPL-SCNC: 105 MMOL/L — SIGNIFICANT CHANGE UP (ref 96–108)
CHLORIDE SERPL-SCNC: 105 MMOL/L — SIGNIFICANT CHANGE UP (ref 96–108)
CO2 SERPL-SCNC: 22 MMOL/L — SIGNIFICANT CHANGE UP (ref 22–31)
CO2 SERPL-SCNC: 25 MMOL/L — SIGNIFICANT CHANGE UP (ref 22–31)
CREAT SERPL-MCNC: 0.41 MG/DL — LOW (ref 0.5–1.3)
CREAT SERPL-MCNC: 0.57 MG/DL — SIGNIFICANT CHANGE UP (ref 0.5–1.3)
EGFR: 101 ML/MIN/1.73M2 — SIGNIFICANT CHANGE UP
EGFR: 109 ML/MIN/1.73M2 — SIGNIFICANT CHANGE UP
GLUCOSE SERPL-MCNC: 122 MG/DL — HIGH (ref 70–99)
GLUCOSE SERPL-MCNC: 92 MG/DL — SIGNIFICANT CHANGE UP (ref 70–99)
HCT VFR BLD CALC: 30.6 % — LOW (ref 34.5–45)
HCT VFR BLD CALC: 30.6 % — LOW (ref 34.5–45)
HGB BLD-MCNC: 10.2 G/DL — LOW (ref 11.5–15.5)
HGB BLD-MCNC: 9.9 G/DL — LOW (ref 11.5–15.5)
INR BLD: 1.12 RATIO — SIGNIFICANT CHANGE UP (ref 0.88–1.16)
MCHC RBC-ENTMCNC: 29.8 PG — SIGNIFICANT CHANGE UP (ref 27–34)
MCHC RBC-ENTMCNC: 29.9 PG — SIGNIFICANT CHANGE UP (ref 27–34)
MCHC RBC-ENTMCNC: 32.4 GM/DL — SIGNIFICANT CHANGE UP (ref 32–36)
MCHC RBC-ENTMCNC: 33.3 GM/DL — SIGNIFICANT CHANGE UP (ref 32–36)
MCV RBC AUTO: 89.7 FL — SIGNIFICANT CHANGE UP (ref 80–100)
MCV RBC AUTO: 92.2 FL — SIGNIFICANT CHANGE UP (ref 80–100)
NRBC # BLD: 0 /100 WBCS — SIGNIFICANT CHANGE UP (ref 0–0)
NRBC # BLD: 0 /100 WBCS — SIGNIFICANT CHANGE UP (ref 0–0)
PLATELET # BLD AUTO: 146 K/UL — LOW (ref 150–400)
PLATELET # BLD AUTO: 176 K/UL — SIGNIFICANT CHANGE UP (ref 150–400)
POTASSIUM SERPL-MCNC: 3.4 MMOL/L — LOW (ref 3.5–5.3)
POTASSIUM SERPL-MCNC: 3.5 MMOL/L — SIGNIFICANT CHANGE UP (ref 3.5–5.3)
POTASSIUM SERPL-SCNC: 3.4 MMOL/L — LOW (ref 3.5–5.3)
POTASSIUM SERPL-SCNC: 3.5 MMOL/L — SIGNIFICANT CHANGE UP (ref 3.5–5.3)
PROTHROM AB SERPL-ACNC: 12.9 SEC — SIGNIFICANT CHANGE UP (ref 10.5–13.4)
RBC # BLD: 3.32 M/UL — LOW (ref 3.8–5.2)
RBC # BLD: 3.41 M/UL — LOW (ref 3.8–5.2)
RBC # FLD: 13.8 % — SIGNIFICANT CHANGE UP (ref 10.3–14.5)
RBC # FLD: 14.2 % — SIGNIFICANT CHANGE UP (ref 10.3–14.5)
RH IG SCN BLD-IMP: POSITIVE — SIGNIFICANT CHANGE UP
SODIUM SERPL-SCNC: 138 MMOL/L — SIGNIFICANT CHANGE UP (ref 135–145)
SODIUM SERPL-SCNC: 140 MMOL/L — SIGNIFICANT CHANGE UP (ref 135–145)
WBC # BLD: 4.06 K/UL — SIGNIFICANT CHANGE UP (ref 3.8–10.5)
WBC # BLD: 4.76 K/UL — SIGNIFICANT CHANGE UP (ref 3.8–10.5)
WBC # FLD AUTO: 4.06 K/UL — SIGNIFICANT CHANGE UP (ref 3.8–10.5)
WBC # FLD AUTO: 4.76 K/UL — SIGNIFICANT CHANGE UP (ref 3.8–10.5)

## 2022-03-31 PROCEDURE — 11012 DEB SKIN BONE AT FX SITE: CPT | Mod: LT

## 2022-03-31 PROCEDURE — 64708 REVISE ARM/LEG NERVE: CPT | Mod: 59,LT

## 2022-03-31 PROCEDURE — 24515 OPTX HUMRL SHFT FX PLATE/SCR: CPT | Mod: LT

## 2022-03-31 DEVICE — IMPLANTABLE DEVICE: Type: IMPLANTABLE DEVICE | Site: LEFT | Status: FUNCTIONAL

## 2022-03-31 DEVICE — SCREW LOKG SLF-TPNG W/ STARDRIVE RECESS 3.5X22MM: Type: IMPLANTABLE DEVICE | Site: LEFT | Status: FUNCTIONAL

## 2022-03-31 DEVICE — K-WIRE SYNTHES (THREADED) TROCAR POINT 1.6MM X 150MM: Type: IMPLANTABLE DEVICE | Site: LEFT | Status: FUNCTIONAL

## 2022-03-31 DEVICE — SCREW LOKG SLF-TPNG W/ STARDRIVE RECESS 3.5X26MM: Type: IMPLANTABLE DEVICE | Site: LEFT | Status: FUNCTIONAL

## 2022-03-31 DEVICE — SCREW LOKG SLF-TPNG W/ STARDRIVE RECESS 3.5X18MM: Type: IMPLANTABLE DEVICE | Site: LEFT | Status: FUNCTIONAL

## 2022-03-31 DEVICE — SCREW LOKG SLF-TPNG W/ STARDRIVE RECESS 3.5X20MM: Type: IMPLANTABLE DEVICE | Site: LEFT | Status: FUNCTIONAL

## 2022-03-31 RX ORDER — FAMOTIDINE 10 MG/ML
20 INJECTION INTRAVENOUS
Refills: 0 | Status: DISCONTINUED | OUTPATIENT
Start: 2022-03-31 | End: 2022-04-04

## 2022-03-31 RX ORDER — INFLUENZA VIRUS VACCINE 15; 15; 15; 15 UG/.5ML; UG/.5ML; UG/.5ML; UG/.5ML
0.7 SUSPENSION INTRAMUSCULAR ONCE
Refills: 0 | Status: DISCONTINUED | OUTPATIENT
Start: 2022-03-31 | End: 2022-04-04

## 2022-03-31 RX ORDER — SODIUM CHLORIDE 9 MG/ML
1000 INJECTION, SOLUTION INTRAVENOUS
Refills: 0 | Status: DISCONTINUED | OUTPATIENT
Start: 2022-03-31 | End: 2022-04-04

## 2022-03-31 RX ORDER — ONDANSETRON 8 MG/1
4 TABLET, FILM COATED ORAL ONCE
Refills: 0 | Status: DISCONTINUED | OUTPATIENT
Start: 2022-03-31 | End: 2022-03-31

## 2022-03-31 RX ORDER — ACETAMINOPHEN 500 MG
1000 TABLET ORAL ONCE
Refills: 0 | Status: COMPLETED | OUTPATIENT
Start: 2022-04-01 | End: 2022-04-01

## 2022-03-31 RX ORDER — HYDROMORPHONE HYDROCHLORIDE 2 MG/ML
1 INJECTION INTRAMUSCULAR; INTRAVENOUS; SUBCUTANEOUS EVERY 4 HOURS
Refills: 0 | Status: DISCONTINUED | OUTPATIENT
Start: 2022-03-31 | End: 2022-04-04

## 2022-03-31 RX ORDER — POTASSIUM CHLORIDE 20 MEQ
20 PACKET (EA) ORAL
Refills: 0 | Status: COMPLETED | OUTPATIENT
Start: 2022-03-31 | End: 2022-03-31

## 2022-03-31 RX ORDER — HYDROMORPHONE HYDROCHLORIDE 2 MG/ML
4 INJECTION INTRAMUSCULAR; INTRAVENOUS; SUBCUTANEOUS
Refills: 0 | Status: DISCONTINUED | OUTPATIENT
Start: 2022-03-31 | End: 2022-04-01

## 2022-03-31 RX ORDER — ASPIRIN/CALCIUM CARB/MAGNESIUM 324 MG
325 TABLET ORAL
Refills: 0 | Status: DISCONTINUED | OUTPATIENT
Start: 2022-04-01 | End: 2022-04-04

## 2022-03-31 RX ORDER — LAMOTRIGINE 25 MG/1
100 TABLET, ORALLY DISINTEGRATING ORAL
Refills: 0 | Status: DISCONTINUED | OUTPATIENT
Start: 2022-03-31 | End: 2022-04-04

## 2022-03-31 RX ORDER — ACETAMINOPHEN 500 MG
1000 TABLET ORAL ONCE
Refills: 0 | Status: DISCONTINUED | OUTPATIENT
Start: 2022-03-31 | End: 2022-03-31

## 2022-03-31 RX ORDER — OXYCODONE HYDROCHLORIDE 5 MG/1
10 TABLET ORAL
Refills: 0 | Status: DISCONTINUED | OUTPATIENT
Start: 2022-03-31 | End: 2022-03-31

## 2022-03-31 RX ORDER — SENNA PLUS 8.6 MG/1
2 TABLET ORAL AT BEDTIME
Refills: 0 | Status: DISCONTINUED | OUTPATIENT
Start: 2022-03-31 | End: 2022-04-04

## 2022-03-31 RX ORDER — ATORVASTATIN CALCIUM 80 MG/1
40 TABLET, FILM COATED ORAL AT BEDTIME
Refills: 0 | Status: DISCONTINUED | OUTPATIENT
Start: 2022-03-31 | End: 2022-04-04

## 2022-03-31 RX ORDER — ONDANSETRON 8 MG/1
4 TABLET, FILM COATED ORAL THREE TIMES A DAY
Refills: 0 | Status: DISCONTINUED | OUTPATIENT
Start: 2022-03-31 | End: 2022-04-04

## 2022-03-31 RX ORDER — POLYETHYLENE GLYCOL 3350 17 G/17G
17 POWDER, FOR SOLUTION ORAL AT BEDTIME
Refills: 0 | Status: DISCONTINUED | OUTPATIENT
Start: 2022-03-31 | End: 2022-04-04

## 2022-03-31 RX ORDER — SODIUM CHLORIDE 9 MG/ML
500 INJECTION INTRAMUSCULAR; INTRAVENOUS; SUBCUTANEOUS ONCE
Refills: 0 | Status: COMPLETED | OUTPATIENT
Start: 2022-04-01 | End: 2022-04-01

## 2022-03-31 RX ORDER — LEVOTHYROXINE SODIUM 125 MCG
50 TABLET ORAL DAILY
Refills: 0 | Status: DISCONTINUED | OUTPATIENT
Start: 2022-03-31 | End: 2022-04-04

## 2022-03-31 RX ORDER — BENZOCAINE AND MENTHOL 5; 1 G/100ML; G/100ML
1 LIQUID ORAL THREE TIMES A DAY
Refills: 0 | Status: DISCONTINUED | OUTPATIENT
Start: 2022-03-31 | End: 2022-04-04

## 2022-03-31 RX ORDER — HYDROMORPHONE HYDROCHLORIDE 2 MG/ML
0.5 INJECTION INTRAMUSCULAR; INTRAVENOUS; SUBCUTANEOUS
Refills: 0 | Status: DISCONTINUED | OUTPATIENT
Start: 2022-03-31 | End: 2022-03-31

## 2022-03-31 RX ORDER — CEFAZOLIN SODIUM 1 G
2000 VIAL (EA) INJECTION EVERY 8 HOURS
Refills: 0 | Status: COMPLETED | OUTPATIENT
Start: 2022-03-31 | End: 2022-04-01

## 2022-03-31 RX ORDER — ACETAMINOPHEN 500 MG
650 TABLET ORAL EVERY 6 HOURS
Refills: 0 | Status: DISCONTINUED | OUTPATIENT
Start: 2022-04-01 | End: 2022-04-04

## 2022-03-31 RX ORDER — OXYCODONE HYDROCHLORIDE 5 MG/1
5 TABLET ORAL
Refills: 0 | Status: DISCONTINUED | OUTPATIENT
Start: 2022-03-31 | End: 2022-03-31

## 2022-03-31 RX ORDER — CLONAZEPAM 1 MG
0.5 TABLET ORAL THREE TIMES A DAY
Refills: 0 | Status: DISCONTINUED | OUTPATIENT
Start: 2022-03-31 | End: 2022-04-04

## 2022-03-31 RX ORDER — TRAMADOL HYDROCHLORIDE 50 MG/1
50 TABLET ORAL EVERY 4 HOURS
Refills: 0 | Status: DISCONTINUED | OUTPATIENT
Start: 2022-03-31 | End: 2022-04-01

## 2022-03-31 RX ORDER — ZOLPIDEM TARTRATE 10 MG/1
5 TABLET ORAL AT BEDTIME
Refills: 0 | Status: DISCONTINUED | OUTPATIENT
Start: 2022-03-31 | End: 2022-04-04

## 2022-03-31 RX ORDER — FLUOXETINE HCL 10 MG
20 CAPSULE ORAL
Refills: 0 | Status: DISCONTINUED | OUTPATIENT
Start: 2022-03-31 | End: 2022-04-04

## 2022-03-31 RX ORDER — ACETAMINOPHEN 500 MG
650 TABLET ORAL EVERY 6 HOURS
Refills: 0 | Status: DISCONTINUED | OUTPATIENT
Start: 2022-03-31 | End: 2022-03-31

## 2022-03-31 RX ORDER — HYDROMORPHONE HYDROCHLORIDE 2 MG/ML
6 INJECTION INTRAMUSCULAR; INTRAVENOUS; SUBCUTANEOUS
Refills: 0 | Status: DISCONTINUED | OUTPATIENT
Start: 2022-03-31 | End: 2022-04-01

## 2022-03-31 RX ADMIN — Medication 0.5 MILLIGRAM(S): at 22:18

## 2022-03-31 RX ADMIN — OXYCODONE HYDROCHLORIDE 5 MILLIGRAM(S): 5 TABLET ORAL at 11:53

## 2022-03-31 RX ADMIN — Medication 20 MILLIGRAM(S): at 06:26

## 2022-03-31 RX ADMIN — Medication 975 MILLIGRAM(S): at 06:27

## 2022-03-31 RX ADMIN — Medication 100 MILLIGRAM(S): at 01:11

## 2022-03-31 RX ADMIN — Medication 100 MILLIGRAM(S): at 08:34

## 2022-03-31 RX ADMIN — Medication 20 MILLIEQUIVALENT(S): at 23:47

## 2022-03-31 RX ADMIN — HYDROMORPHONE HYDROCHLORIDE 0.5 MILLIGRAM(S): 2 INJECTION INTRAMUSCULAR; INTRAVENOUS; SUBCUTANEOUS at 18:50

## 2022-03-31 RX ADMIN — Medication 20 MILLIEQUIVALENT(S): at 21:13

## 2022-03-31 RX ADMIN — ATORVASTATIN CALCIUM 40 MILLIGRAM(S): 80 TABLET, FILM COATED ORAL at 21:14

## 2022-03-31 RX ADMIN — HYDROMORPHONE HYDROCHLORIDE 0.5 MILLIGRAM(S): 2 INJECTION INTRAMUSCULAR; INTRAVENOUS; SUBCUTANEOUS at 19:20

## 2022-03-31 RX ADMIN — ZOLPIDEM TARTRATE 5 MILLIGRAM(S): 10 TABLET ORAL at 00:32

## 2022-03-31 RX ADMIN — Medication 50 MICROGRAM(S): at 06:26

## 2022-03-31 RX ADMIN — Medication 975 MILLIGRAM(S): at 07:27

## 2022-03-31 RX ADMIN — Medication 975 MILLIGRAM(S): at 00:14

## 2022-03-31 RX ADMIN — OXYCODONE HYDROCHLORIDE 2.5 MILLIGRAM(S): 5 TABLET ORAL at 00:13

## 2022-03-31 RX ADMIN — HYDROMORPHONE HYDROCHLORIDE 6 MILLIGRAM(S): 2 INJECTION INTRAMUSCULAR; INTRAVENOUS; SUBCUTANEOUS at 21:14

## 2022-03-31 RX ADMIN — Medication 100 MILLIGRAM(S): at 23:47

## 2022-03-31 RX ADMIN — HYDROMORPHONE HYDROCHLORIDE 6 MILLIGRAM(S): 2 INJECTION INTRAMUSCULAR; INTRAVENOUS; SUBCUTANEOUS at 21:45

## 2022-03-31 RX ADMIN — OXYCODONE HYDROCHLORIDE 5 MILLIGRAM(S): 5 TABLET ORAL at 08:30

## 2022-03-31 RX ADMIN — OXYCODONE HYDROCHLORIDE 5 MILLIGRAM(S): 5 TABLET ORAL at 12:50

## 2022-03-31 RX ADMIN — OXYCODONE HYDROCHLORIDE 5 MILLIGRAM(S): 5 TABLET ORAL at 07:53

## 2022-03-31 NOTE — CHART NOTE - NSCHARTNOTEFT_GEN_A_CORE
POC    No complaints;   Denies SOB/CP/N/V;   Pain controlled @ present      T(C): 36.9 (03-31-22 @ 20:24)  T(F): 98.4 (03-31-22 @ 20:24)  HR: 79 (03-31-22 @ 20:24)  BP: 113/71 (03-31-22 @ 20:24)  RR: 18 (03-31-22 @ 20:24)  SpO2: 95% (03-31-22 @ 20:24)  Wt(kg): --    Physical Exam  Gen: NAD    LUE:   ACE Dressing CDI;   Sling on  Drain yes [ ]  No [ x]  Sensation/R/U/M/AIN/PIN grossly intact  An\ble to extend wrist  2+ Radial pulse   Cap refill < 2 secs                          9.9<L>  4.76  )-----------( 146<L>    ( 31 Mar 2022 18:51 )             30.6<L>    03-31    138  |  105  |  11  ----------------------------<  122<H>  3.4<L>   |  22  |  0.41<L>        A/P: 66y Female s/p  Open Reduction Internal Fixation / Surgical Repair L Proximal Humerus    - K+ given for post-op hypokalemia (k 3.4)  -Pain control; Ice prn; Elevate  -DVT ppx; ASA V\BID  -Am labs  -PT eval: NWB in sling/immobilizer      Gentle ROM  shoulder -> digits as tolerated (per Attending)  - I-Stop # [id number] [ IN CHART}     ***  Patient takes (8) mg of dilaudid @ home          Pt prescribed 4/6 mg Q 3 hrs prn          Will monitor pain needs--may require long-acting opioid           Consider pain c/s  -Dispo planning: TBD      ***See Above  Dewey QUINONES  Orthopedics  B: [id number] POC    No complaints;   Denies SOB/CP/N/V;   Pain controlled @ present      T(C): 36.9 (03-31-22 @ 20:24)  T(F): 98.4 (03-31-22 @ 20:24)  HR: 79 (03-31-22 @ 20:24)  BP: 113/71 (03-31-22 @ 20:24)  RR: 18 (03-31-22 @ 20:24)  SpO2: 95% (03-31-22 @ 20:24)  Wt(kg): --    Physical Exam  Gen: NAD    LUE:   ACE Dressing CDI;   Sling on  Drain yes [ ]  No [ x]  Sensation/R/U/M/AIN/PIN grossly intact  An\ble to extend wrist  2+ Radial pulse   Cap refill < 2 secs    Melton in place                        9.9<L>  4.76  )-----------( 146<L>    ( 31 Mar 2022 18:51 )             30.6<L>    03-31    138  |  105  |  11  ----------------------------<  122<H>  3.4<L>   |  22  |  0.41<L>        A/P: 66y Female s/p  Open Reduction Internal Fixation / Surgical Repair L Proximal Humerus    - K+ given for post-op hypokalemia (k 3.4)  -Pain control; Ice prn; Elevate  -DVT ppx; ASA BID  -Am labs  -PT eval: NWB in sling/immobilizer      Gentle ROM  shoulder -> digits as tolerated (per Attending)  - I-Stop # [id number] [ IN CHART}     ***  Patient takes (8) mg of dilaudid @ home ( up to 4 pills / day)          Pt prescribed 4/6 mg Q 3 hrs prn          Will monitor pain needs--may require long-acting opioid           Chronic pain c/s called  -Dispo planning: TBD      ***See Above  Dewey QUINONES  Orthopedics  B: [id number] POC    No complaints;   Denies SOB/CP/N/V;   Pain controlled @ present      T(C): 36.9 (03-31-22 @ 20:24)  T(F): 98.4 (03-31-22 @ 20:24)  HR: 79 (03-31-22 @ 20:24)  BP: 113/71 (03-31-22 @ 20:24)  RR: 18 (03-31-22 @ 20:24)  SpO2: 95% (03-31-22 @ 20:24)  Wt(kg): --    Physical Exam  Gen: NAD    LUE:   ACE Dressing CDI;   Sling on  Drain yes [ ]  No [ x]  Sensation/R/U/M/AIN/PIN grossly intact  An\ble to extend wrist  2+ Radial pulse   Cap refill < 2 secs    Melton in place                        9.9<L>  4.76  )-----------( 146<L>    ( 31 Mar 2022 18:51 )             30.6<L>    03-31    138  |  105  |  11  ----------------------------<  122<H>  3.4<L>   |  22  |  0.41<L>        A/P: 66y Female s/p  Open Reduction Internal Fixation / Surgical Repair L Proximal Humerus    - K+ given for post-op hypokalemia (k 3.4)  -Pain control; Ice prn; Elevate  -DVT ppx; ASA BID  -Am labs  -PT eval: NWB in sling/immobilizer      Gentle ROM  shoulder -> digits as tolerated (per Attending)  - I-Stop # [id number] [ IN CHART}     ***  Patient takes (8) mg of dilaudid @ home ( up to 4 pills / day)          Pt prescribed 4/6 mg Q 3 hrs prn          Will monitor pain needs--may require long-acting opioid           Chronic pain c/s called    [ states NO longer takes Wellbutrin]  -Dispo planning: TBD      ***See Above  Dewey QUINONES  Orthopedics  B: [id number]

## 2022-03-31 NOTE — PROGRESS NOTE ADULT - TIME BILLING
Discussed treatment plan with patient at bedside.   I am a non participating [employer] physician seeing Pt in coverage for Dr Bradford Discussed treatment plan with patient at bedside.   I am a non participating [employer] physician seeing Pt in coverage for Dr Bradford. The above patient documentation by Dr. Garcia was reviewed and I agree with his evaluation, assessment and treatment plan.  Matt Bradford M.D.

## 2022-03-31 NOTE — PROVIDER CONTACT NOTE (OTHER) - ASSESSMENT
Pt complained of abdominal distention. She stated " I feel like I have to pee but can't". Pt was bladder scan, showed 456ml.

## 2022-03-31 NOTE — PATIENT PROFILE ADULT - FALL HARM RISK - HARM RISK INTERVENTIONS

## 2022-03-31 NOTE — PRE-ANESTHESIA EVALUATION ADULT - NSANTHPMHFT_GEN_ALL_CORE
67 y/o F hx of osteogenesis imperfecta on chronic pain medication presents with left distal humerus fracture s/p fall.

## 2022-04-01 DIAGNOSIS — R33.9 RETENTION OF URINE, UNSPECIFIED: ICD-10-CM

## 2022-04-01 LAB
ANION GAP SERPL CALC-SCNC: 10 MMOL/L — SIGNIFICANT CHANGE UP (ref 5–17)
BUN SERPL-MCNC: 10 MG/DL — SIGNIFICANT CHANGE UP (ref 7–23)
CALCIUM SERPL-MCNC: 7.8 MG/DL — LOW (ref 8.4–10.5)
CHLORIDE SERPL-SCNC: 104 MMOL/L — SIGNIFICANT CHANGE UP (ref 96–108)
CO2 SERPL-SCNC: 22 MMOL/L — SIGNIFICANT CHANGE UP (ref 22–31)
CREAT SERPL-MCNC: 0.44 MG/DL — LOW (ref 0.5–1.3)
EGFR: 107 ML/MIN/1.73M2 — SIGNIFICANT CHANGE UP
GLUCOSE SERPL-MCNC: 100 MG/DL — HIGH (ref 70–99)
HCT VFR BLD CALC: 27.2 % — LOW (ref 34.5–45)
HGB BLD-MCNC: 8.7 G/DL — LOW (ref 11.5–15.5)
MCHC RBC-ENTMCNC: 29.3 PG — SIGNIFICANT CHANGE UP (ref 27–34)
MCHC RBC-ENTMCNC: 32 GM/DL — SIGNIFICANT CHANGE UP (ref 32–36)
MCV RBC AUTO: 91.6 FL — SIGNIFICANT CHANGE UP (ref 80–100)
NRBC # BLD: 0 /100 WBCS — SIGNIFICANT CHANGE UP (ref 0–0)
PLATELET # BLD AUTO: 165 K/UL — SIGNIFICANT CHANGE UP (ref 150–400)
POTASSIUM SERPL-MCNC: 4.1 MMOL/L — SIGNIFICANT CHANGE UP (ref 3.5–5.3)
POTASSIUM SERPL-SCNC: 4.1 MMOL/L — SIGNIFICANT CHANGE UP (ref 3.5–5.3)
RBC # BLD: 2.97 M/UL — LOW (ref 3.8–5.2)
RBC # FLD: 14.1 % — SIGNIFICANT CHANGE UP (ref 10.3–14.5)
SODIUM SERPL-SCNC: 136 MMOL/L — SIGNIFICANT CHANGE UP (ref 135–145)
WBC # BLD: 6.09 K/UL — SIGNIFICANT CHANGE UP (ref 3.8–10.5)
WBC # FLD AUTO: 6.09 K/UL — SIGNIFICANT CHANGE UP (ref 3.8–10.5)

## 2022-04-01 PROCEDURE — 99222 1ST HOSP IP/OBS MODERATE 55: CPT

## 2022-04-01 RX ORDER — HYDROMORPHONE HYDROCHLORIDE 2 MG/ML
8 INJECTION INTRAMUSCULAR; INTRAVENOUS; SUBCUTANEOUS EVERY 4 HOURS
Refills: 0 | Status: DISCONTINUED | OUTPATIENT
Start: 2022-04-01 | End: 2022-04-04

## 2022-04-01 RX ADMIN — HYDROMORPHONE HYDROCHLORIDE 1 MILLIGRAM(S): 2 INJECTION INTRAMUSCULAR; INTRAVENOUS; SUBCUTANEOUS at 01:21

## 2022-04-01 RX ADMIN — HYDROMORPHONE HYDROCHLORIDE 6 MILLIGRAM(S): 2 INJECTION INTRAMUSCULAR; INTRAVENOUS; SUBCUTANEOUS at 06:04

## 2022-04-01 RX ADMIN — HYDROMORPHONE HYDROCHLORIDE 6 MILLIGRAM(S): 2 INJECTION INTRAMUSCULAR; INTRAVENOUS; SUBCUTANEOUS at 09:33

## 2022-04-01 RX ADMIN — HYDROMORPHONE HYDROCHLORIDE 6 MILLIGRAM(S): 2 INJECTION INTRAMUSCULAR; INTRAVENOUS; SUBCUTANEOUS at 10:30

## 2022-04-01 RX ADMIN — Medication 325 MILLIGRAM(S): at 05:06

## 2022-04-01 RX ADMIN — Medication 20 MILLIGRAM(S): at 05:06

## 2022-04-01 RX ADMIN — LAMOTRIGINE 100 MILLIGRAM(S): 25 TABLET, ORALLY DISINTEGRATING ORAL at 05:06

## 2022-04-01 RX ADMIN — Medication 400 MILLIGRAM(S): at 05:06

## 2022-04-01 RX ADMIN — ATORVASTATIN CALCIUM 40 MILLIGRAM(S): 80 TABLET, FILM COATED ORAL at 21:12

## 2022-04-01 RX ADMIN — HYDROMORPHONE HYDROCHLORIDE 8 MILLIGRAM(S): 2 INJECTION INTRAMUSCULAR; INTRAVENOUS; SUBCUTANEOUS at 18:50

## 2022-04-01 RX ADMIN — Medication 0.5 MILLIGRAM(S): at 12:08

## 2022-04-01 RX ADMIN — SODIUM CHLORIDE 1000 MILLILITER(S): 9 INJECTION INTRAMUSCULAR; INTRAVENOUS; SUBCUTANEOUS at 07:49

## 2022-04-01 RX ADMIN — HYDROMORPHONE HYDROCHLORIDE 6 MILLIGRAM(S): 2 INJECTION INTRAMUSCULAR; INTRAVENOUS; SUBCUTANEOUS at 00:56

## 2022-04-01 RX ADMIN — Medication 325 MILLIGRAM(S): at 17:47

## 2022-04-01 RX ADMIN — HYDROMORPHONE HYDROCHLORIDE 6 MILLIGRAM(S): 2 INJECTION INTRAMUSCULAR; INTRAVENOUS; SUBCUTANEOUS at 15:10

## 2022-04-01 RX ADMIN — HYDROMORPHONE HYDROCHLORIDE 6 MILLIGRAM(S): 2 INJECTION INTRAMUSCULAR; INTRAVENOUS; SUBCUTANEOUS at 14:12

## 2022-04-01 RX ADMIN — HYDROMORPHONE HYDROCHLORIDE 1 MILLIGRAM(S): 2 INJECTION INTRAMUSCULAR; INTRAVENOUS; SUBCUTANEOUS at 01:35

## 2022-04-01 RX ADMIN — Medication 0.5 MILLIGRAM(S): at 21:13

## 2022-04-01 RX ADMIN — Medication 20 MILLIGRAM(S): at 17:47

## 2022-04-01 RX ADMIN — Medication 100 MILLIGRAM(S): at 07:53

## 2022-04-01 RX ADMIN — HYDROMORPHONE HYDROCHLORIDE 6 MILLIGRAM(S): 2 INJECTION INTRAMUSCULAR; INTRAVENOUS; SUBCUTANEOUS at 05:34

## 2022-04-01 RX ADMIN — FAMOTIDINE 20 MILLIGRAM(S): 10 INJECTION INTRAVENOUS at 17:46

## 2022-04-01 RX ADMIN — ZOLPIDEM TARTRATE 5 MILLIGRAM(S): 10 TABLET ORAL at 02:31

## 2022-04-01 RX ADMIN — LAMOTRIGINE 100 MILLIGRAM(S): 25 TABLET, ORALLY DISINTEGRATING ORAL at 17:47

## 2022-04-01 RX ADMIN — Medication 50 MICROGRAM(S): at 05:07

## 2022-04-01 RX ADMIN — HYDROMORPHONE HYDROCHLORIDE 8 MILLIGRAM(S): 2 INJECTION INTRAMUSCULAR; INTRAVENOUS; SUBCUTANEOUS at 17:53

## 2022-04-01 RX ADMIN — Medication 1000 MILLIGRAM(S): at 05:36

## 2022-04-01 RX ADMIN — FAMOTIDINE 20 MILLIGRAM(S): 10 INJECTION INTRAVENOUS at 05:07

## 2022-04-01 RX ADMIN — Medication 0.5 MILLIGRAM(S): at 06:45

## 2022-04-01 RX ADMIN — HYDROMORPHONE HYDROCHLORIDE 6 MILLIGRAM(S): 2 INJECTION INTRAMUSCULAR; INTRAVENOUS; SUBCUTANEOUS at 00:26

## 2022-04-01 RX ADMIN — Medication 15 MILLIGRAM(S): at 05:06

## 2022-04-01 NOTE — CONSULT NOTE ADULT - SUBJECTIVE AND OBJECTIVE BOX
Chief Complaint:  Patient is a 66y old  Female who presents with a  L G1 open distal humerus Fx      HPI:  66y Female with osteogenesis imperfecta presents with L elbow pain s/p fall. Pt denies numbness tingling paresthesias in affected limb. Pt denies headstrike or LOC.  Diagnosed with a left G1 open distal humerus fracture and left 10th rib fracture.  Now S/P ORIF left humerus.    Current Pain Score: 7/10    Current out- patient pain regimen: dilaudid 16 mg PO BID    Out Patient Pain Management provider: Lexa Otoole MD    [facility] Prescription Monitoring Program: Reference #[id number]    PAST MEDICAL & SURGICAL HISTORY:  Osteogenesis imperfecta    Former smoker    Hard of hearing    History of open reduction and internal fixation (ORIF) procedure      FAMILY HISTORY:  Family history of osteogenesis imperfecta (Father, Sibling)    Family history of valvular heart disease (Sibling)      SOCIAL HISTORY:  Tobacco Use: quit  Alcohol Use: denies  Recreational Marijuana: denies  Illcicit Drug Use: denies    Opioid Risk Tool (ORT-OUD) Score: low    Allergies    No Known Allergies    Intolerances    None known    REVIEW OF SYSTEMS:  CONSTITUTIONAL: No fever, weight loss, fatigue; + falls at home  NEURO: No headaches, memory loss, tremors, dizziness or blurred vision  RESP: No shortness of breath, cough  CV: No chest pain, palpitations  GI: No abdominal pain, nausea, vomiting, diarrhea; + constipation   : No urinary incontinence/retention, dysuriua  MSK: Multiple joint pains; + back pain; no progressive upper or lower motor strength weakness; no saddle anesthesia   SKIN: No itching, burning, rashes   PSYCHIATRIC: No depression, anxiety, mood swings, or difficulty sleeping      MEDICATIONS  (STANDING):  acetaminophen     Tablet .. 650 milliGRAM(s) Oral every 6 hours  aspirin 325 milliGRAM(s) Oral two times a day  atorvastatin 40 milliGRAM(s) Oral at bedtime  busPIRone 15 milliGRAM(s) Oral two times a day  clonazePAM  Tablet 0.5 milliGRAM(s) Oral three times a day  famotidine    Tablet 20 milliGRAM(s) Oral two times a day  FLUoxetine 20 milliGRAM(s) Oral two times a day  influenza  Vaccine (HIGH DOSE) 0.7 milliLiter(s) IntraMuscular once  lactated ringers. 1000 milliLiter(s) (100 mL/Hr) IV Continuous <Continuous>  lamoTRIgine 100 milliGRAM(s) Oral two times a day  levothyroxine 50 MICROGram(s) Oral daily  polyethylene glycol 3350 17 Gram(s) Oral at bedtime  senna 2 Tablet(s) Oral at bedtime    MEDICATIONS  (PRN):  benzocaine 15 mG/menthol 3.6 mG Lozenge 1 Lozenge Oral three times a day PRN Sore Throat  HYDROmorphone   Tablet 8 milliGRAM(s) Oral every 4 hours PRN Severe Pain (7 - 10)  HYDROmorphone  Injectable 1 milliGRAM(s) IV Push every 4 hours PRN breakthrough  ondansetron    Tablet 4 milliGRAM(s) Oral three times a day PRN Nausea and/or Vomiting  zolpidem 5 milliGRAM(s) Oral at bedtime PRN Insomnia      PHYSICAL EXAM  GENERAL: seen at bedside; NAD; well developed, well groomed; no signs of toxicity  HEENT: head atraumatic, normocephalic; anicteric; speech clear and tangential  NEURO: A + O X 3; good concentration; Cranial Nerves II- XII intact; SILT except decreased sensation left distal lower extremity  RESPIRATORY: lungs clear to auscultation B/L; no rales or rhonchi; chest expansion equal  CARDIAC: regular cardiac rhythm; S1 S2; no JVD  GI: abdomen soft, non distended; + bowel sounds; no flatus: last BM 3 days ago; appetite fair  : pardo catheter in place with clear yellow urine due to urinary retention when on PCA  EXTREMITIES/ PV: SIMMONS; 2+ peripheral pulses; no cyanosis or clubbing; + slight B/L L/E pedal edema  MUSCULOSKELETAL: motor strength 4/5 B/L dorsi and plantarflexion; ambulates with cane or a walker  SKIN: no rashes, lesions    PSYCH: affect broad; good eye contact; appears anxious    Vital Signs:  T(C): 36.7 (04-01-22 @ 14:18)  HR: 83 (04-01-22 @ 14:18)  BP: 119/71 (04-01-22 @ 14:18)  RR: 18 (04-01-22 @ 14:18)  SpO2: 98% (04-01-22 @ 14:18)    Pertinent labs/radiology:  04-01    136  |  104  |  10  ----------------------------<  100<H>  4.1   |  22  |  0.44<L>    Ca    7.8<L>      01 Apr 2022 07:18    TPro  8.3  /  Alb  4.7  /  TBili  0.5  /  DBili  x   /  AST  31  /  ALT  22  /  AlkPhos  135<H>  03-30                          8.7    6.09  )-----------( 165      ( 01 Apr 2022 07:22 )             27.2     from: Xray Humerus, Left (03.30.22 @ 19:24)   IMPRESSION:    Acute left humerus distal diaphyseal fracture with marked lateral   displacement as before, with partially improvedangulation, which may be   positional. Comminution with small fracture fragments are redemonstrated.  There is a partially imaged mid thoracic compression deformity,   age-indeterminate.

## 2022-04-01 NOTE — OCCUPATIONAL THERAPY INITIAL EVALUATION ADULT - FINE MOTOR COORDINATION TRAINING, OT EVAL
GOAL: Pt will manipulate buttons/zippers/fasteners on shirts/pants independently in 2 weeks to increase independence for ADL performance.

## 2022-04-01 NOTE — PROGRESS NOTE ADULT - TIME BILLING
Discussed treatment plan with patient at bedside.   I am a non participating [employer] physician seeing Pt in coverage for Dr Bradford. The above patient documentation by Dr. Garcia was reviewed and I agree with his evaluation, assessment and treatment plan.  Matt Bradford M.D.

## 2022-04-01 NOTE — PROGRESS NOTE ADULT - PROBLEM SELECTOR PLAN 5
Melton placed  continue to monitor urine out put  would repeat TOV after Patient has become more ambulatory

## 2022-04-01 NOTE — CONSULT NOTE ADULT - ASSESSMENT
66y Female with osteogenesis imperfecta presents with L elbow pain s/p fall.  Diagnosed with a left G1 open distal humerus fracture and left 10th rib fracture.  Now S/P ORIF left humerus.  H/O of whole body chronic pain and is on chronic opioid therapy.    Current out- patient pain regimen: dilaudid 16 mg PO BID  Out Patient Pain Management provider: Lexa Otoole MD    Dilaudid 4 mg and 6 mg PO discontinued.  Start dilaudid 8 mg PO Q 4 hours PRN severe pain.  Continue dilaudid 1 mg IV Q 4 hours PRN severe breakthrough pain x 10 doses.  Discontinue tramadol- pt is on prozac- risk of serotonin syndrome and seizures.  Continue tylenol PRN.    Monitor for sedation and respiratory depression.  Bowel regimen.  PT/ OOB per Trauma service.    Follow up with Lexa Otoole MD for continued pain management after discharge.  Please discharge with a narcan rescue kit (naloxone 4 mg/ 0.1 ml nasal spray- 1 spray Q 2-3 minutes alternating between nostrils).      Minutes spent on total encounter: 50 minutes      Chronic Pain Service  [phone]

## 2022-04-01 NOTE — OCCUPATIONAL THERAPY INITIAL EVALUATION ADULT - ANTICIPATED DISCHARGE DISPOSITION, OT EVAL
Recommending subacute rehab to improve functional abilities. Discussed with [facility].If pt returns home, pt would require assist with ALL ADL's and functional mobility and home OT./rehabilitation facility

## 2022-04-01 NOTE — PHYSICAL THERAPY INITIAL EVALUATION ADULT - ADDITIONAL COMMENTS
Pt resides with spouse in an apartment with ramp and elevator access to their apartment unit on the 19th floor. PTA, spouse stated that pt. was ambulatory with a rollatory & dependent with ADLs  which he assisted such as cooking, assisting with bathing. Hero confirmed pt. own a rollator, cane, grab bars, elevated toilet seat, and shower chair. Pt resides with spouse in an apartment with ramp and elevator access to their apartment unit on the 19th floor. PTA, spouse stated that pt. was ambulatory with a rollator & independent with ADLs. Spouse assisted such as cooking, assisting with bathing. Hero confirmed pt. Pt own a rollator, cane, grab bars, elevated toilet seat, commode and tub transfer bench.

## 2022-04-01 NOTE — PHYSICAL THERAPY INITIAL EVALUATION ADULT - PERTINENT HX OF CURRENT PROBLEM, REHAB EVAL
67 y/o F, admitted through ED for S/P fall and sustained a left transverse distal humerus fracture, s/p ORIF to distal shaft on 3/31.

## 2022-04-01 NOTE — PHYSICAL THERAPY INITIAL EVALUATION ADULT - DISCHARGE DISPOSITION, PT EVAL
Initial Anesthesia Post-op Note    Patient: Hiren Dejesus  Procedure(s) Performed: MYRINGOTOMY WITH TUBE INSERTION  Anesthesia type: General  Patient in PACU.  Report given to RN.  Patient in stable condition. Vitals noted.  No apparent anesthetic complications.  
Subacute Rehab; if home, pt would require assist with ALL ADL's and functional mobility and home PT./rehabilitation facility

## 2022-04-01 NOTE — OCCUPATIONAL THERAPY INITIAL EVALUATION ADULT - PERTINENT HX OF CURRENT PROBLEM, REHAB EVAL
66F community ambulator presents c/o presents with L elbow pain s/p fall. Found with proximal humerus fx. Now s/p  Open Reduction Internal Fixation / Surgical Repair L Proximal Humerus POD1.

## 2022-04-01 NOTE — OCCUPATIONAL THERAPY INITIAL EVALUATION ADULT - MD ORDER
Occupational therapy to evaluate and treat.  NWB LUE in sling. Gentle ROM to shoulder-> digits as tolerated.

## 2022-04-02 LAB
HCT VFR BLD CALC: 27.8 % — LOW (ref 34.5–45)
HGB BLD-MCNC: 9.2 G/DL — LOW (ref 11.5–15.5)
MCHC RBC-ENTMCNC: 29.6 PG — SIGNIFICANT CHANGE UP (ref 27–34)
MCHC RBC-ENTMCNC: 33.1 GM/DL — SIGNIFICANT CHANGE UP (ref 32–36)
MCV RBC AUTO: 89.4 FL — SIGNIFICANT CHANGE UP (ref 80–100)
NRBC # BLD: 0 /100 WBCS — SIGNIFICANT CHANGE UP (ref 0–0)
PLATELET # BLD AUTO: 191 K/UL — SIGNIFICANT CHANGE UP (ref 150–400)
RBC # BLD: 3.11 M/UL — LOW (ref 3.8–5.2)
RBC # FLD: 14.1 % — SIGNIFICANT CHANGE UP (ref 10.3–14.5)
WBC # BLD: 4.86 K/UL — SIGNIFICANT CHANGE UP (ref 3.8–10.5)
WBC # FLD AUTO: 4.86 K/UL — SIGNIFICANT CHANGE UP (ref 3.8–10.5)

## 2022-04-02 RX ADMIN — Medication 0.5 MILLIGRAM(S): at 13:16

## 2022-04-02 RX ADMIN — HYDROMORPHONE HYDROCHLORIDE 8 MILLIGRAM(S): 2 INJECTION INTRAMUSCULAR; INTRAVENOUS; SUBCUTANEOUS at 21:53

## 2022-04-02 RX ADMIN — FAMOTIDINE 20 MILLIGRAM(S): 10 INJECTION INTRAVENOUS at 06:14

## 2022-04-02 RX ADMIN — Medication 650 MILLIGRAM(S): at 00:30

## 2022-04-02 RX ADMIN — Medication 325 MILLIGRAM(S): at 17:51

## 2022-04-02 RX ADMIN — LAMOTRIGINE 100 MILLIGRAM(S): 25 TABLET, ORALLY DISINTEGRATING ORAL at 06:14

## 2022-04-02 RX ADMIN — HYDROMORPHONE HYDROCHLORIDE 8 MILLIGRAM(S): 2 INJECTION INTRAMUSCULAR; INTRAVENOUS; SUBCUTANEOUS at 01:07

## 2022-04-02 RX ADMIN — HYDROMORPHONE HYDROCHLORIDE 8 MILLIGRAM(S): 2 INJECTION INTRAMUSCULAR; INTRAVENOUS; SUBCUTANEOUS at 12:00

## 2022-04-02 RX ADMIN — HYDROMORPHONE HYDROCHLORIDE 8 MILLIGRAM(S): 2 INJECTION INTRAMUSCULAR; INTRAVENOUS; SUBCUTANEOUS at 01:37

## 2022-04-02 RX ADMIN — ATORVASTATIN CALCIUM 40 MILLIGRAM(S): 80 TABLET, FILM COATED ORAL at 21:46

## 2022-04-02 RX ADMIN — Medication 650 MILLIGRAM(S): at 06:45

## 2022-04-02 RX ADMIN — Medication 650 MILLIGRAM(S): at 00:01

## 2022-04-02 RX ADMIN — POLYETHYLENE GLYCOL 3350 17 GRAM(S): 17 POWDER, FOR SOLUTION ORAL at 16:56

## 2022-04-02 RX ADMIN — HYDROMORPHONE HYDROCHLORIDE 8 MILLIGRAM(S): 2 INJECTION INTRAMUSCULAR; INTRAVENOUS; SUBCUTANEOUS at 16:56

## 2022-04-02 RX ADMIN — Medication 0.5 MILLIGRAM(S): at 21:46

## 2022-04-02 RX ADMIN — ZOLPIDEM TARTRATE 5 MILLIGRAM(S): 10 TABLET ORAL at 00:02

## 2022-04-02 RX ADMIN — Medication 325 MILLIGRAM(S): at 06:15

## 2022-04-02 RX ADMIN — HYDROMORPHONE HYDROCHLORIDE 8 MILLIGRAM(S): 2 INJECTION INTRAMUSCULAR; INTRAVENOUS; SUBCUTANEOUS at 22:53

## 2022-04-02 RX ADMIN — Medication 20 MILLIGRAM(S): at 06:16

## 2022-04-02 RX ADMIN — LAMOTRIGINE 100 MILLIGRAM(S): 25 TABLET, ORALLY DISINTEGRATING ORAL at 17:51

## 2022-04-02 RX ADMIN — Medication 50 MICROGRAM(S): at 06:14

## 2022-04-02 RX ADMIN — Medication 0.5 MILLIGRAM(S): at 06:15

## 2022-04-02 RX ADMIN — Medication 650 MILLIGRAM(S): at 06:14

## 2022-04-02 RX ADMIN — SENNA PLUS 2 TABLET(S): 8.6 TABLET ORAL at 21:46

## 2022-04-02 RX ADMIN — Medication 20 MILLIGRAM(S): at 17:50

## 2022-04-02 RX ADMIN — HYDROMORPHONE HYDROCHLORIDE 8 MILLIGRAM(S): 2 INJECTION INTRAMUSCULAR; INTRAVENOUS; SUBCUTANEOUS at 11:13

## 2022-04-02 RX ADMIN — FAMOTIDINE 20 MILLIGRAM(S): 10 INJECTION INTRAVENOUS at 17:51

## 2022-04-03 RX ORDER — CLONAZEPAM 1 MG
0.5 TABLET ORAL ONCE
Refills: 0 | Status: DISCONTINUED | OUTPATIENT
Start: 2022-04-03 | End: 2022-04-03

## 2022-04-03 RX ADMIN — Medication 0.5 MILLIGRAM(S): at 10:48

## 2022-04-03 RX ADMIN — Medication 325 MILLIGRAM(S): at 17:19

## 2022-04-03 RX ADMIN — FAMOTIDINE 20 MILLIGRAM(S): 10 INJECTION INTRAVENOUS at 05:56

## 2022-04-03 RX ADMIN — HYDROMORPHONE HYDROCHLORIDE 8 MILLIGRAM(S): 2 INJECTION INTRAMUSCULAR; INTRAVENOUS; SUBCUTANEOUS at 12:36

## 2022-04-03 RX ADMIN — HYDROMORPHONE HYDROCHLORIDE 8 MILLIGRAM(S): 2 INJECTION INTRAMUSCULAR; INTRAVENOUS; SUBCUTANEOUS at 13:15

## 2022-04-03 RX ADMIN — ZOLPIDEM TARTRATE 5 MILLIGRAM(S): 10 TABLET ORAL at 00:14

## 2022-04-03 RX ADMIN — Medication 20 MILLIGRAM(S): at 17:20

## 2022-04-03 RX ADMIN — HYDROMORPHONE HYDROCHLORIDE 8 MILLIGRAM(S): 2 INJECTION INTRAMUSCULAR; INTRAVENOUS; SUBCUTANEOUS at 18:30

## 2022-04-03 RX ADMIN — Medication 0.5 MILLIGRAM(S): at 14:45

## 2022-04-03 RX ADMIN — Medication 50 MICROGRAM(S): at 05:56

## 2022-04-03 RX ADMIN — Medication 325 MILLIGRAM(S): at 05:55

## 2022-04-03 RX ADMIN — Medication 20 MILLIGRAM(S): at 05:56

## 2022-04-03 RX ADMIN — POLYETHYLENE GLYCOL 3350 17 GRAM(S): 17 POWDER, FOR SOLUTION ORAL at 21:10

## 2022-04-03 RX ADMIN — Medication 0.5 MILLIGRAM(S): at 20:46

## 2022-04-03 RX ADMIN — LAMOTRIGINE 100 MILLIGRAM(S): 25 TABLET, ORALLY DISINTEGRATING ORAL at 05:56

## 2022-04-03 RX ADMIN — FAMOTIDINE 20 MILLIGRAM(S): 10 INJECTION INTRAVENOUS at 17:20

## 2022-04-03 RX ADMIN — ATORVASTATIN CALCIUM 40 MILLIGRAM(S): 80 TABLET, FILM COATED ORAL at 21:10

## 2022-04-03 RX ADMIN — LAMOTRIGINE 100 MILLIGRAM(S): 25 TABLET, ORALLY DISINTEGRATING ORAL at 17:19

## 2022-04-04 ENCOUNTER — TRANSCRIPTION ENCOUNTER (OUTPATIENT)
Age: 67
End: 2022-04-04

## 2022-04-04 VITALS
RESPIRATION RATE: 18 BRPM | HEART RATE: 90 BPM | TEMPERATURE: 98 F | DIASTOLIC BLOOD PRESSURE: 72 MMHG | OXYGEN SATURATION: 96 % | SYSTOLIC BLOOD PRESSURE: 135 MMHG

## 2022-04-04 PROCEDURE — G1004: CPT

## 2022-04-04 PROCEDURE — 76000 FLUOROSCOPY <1 HR PHYS/QHP: CPT

## 2022-04-04 PROCEDURE — 86900 BLOOD TYPING SEROLOGIC ABO: CPT

## 2022-04-04 PROCEDURE — 36415 COLL VENOUS BLD VENIPUNCTURE: CPT

## 2022-04-04 PROCEDURE — 99285 EMERGENCY DEPT VISIT HI MDM: CPT | Mod: 25

## 2022-04-04 PROCEDURE — 71045 X-RAY EXAM CHEST 1 VIEW: CPT

## 2022-04-04 PROCEDURE — 73200 CT UPPER EXTREMITY W/O DYE: CPT | Mod: MG

## 2022-04-04 PROCEDURE — 96376 TX/PRO/DX INJ SAME DRUG ADON: CPT

## 2022-04-04 PROCEDURE — 76377 3D RENDER W/INTRP POSTPROCES: CPT

## 2022-04-04 PROCEDURE — 97162 PT EVAL MOD COMPLEX 30 MIN: CPT

## 2022-04-04 PROCEDURE — 85610 PROTHROMBIN TIME: CPT

## 2022-04-04 PROCEDURE — 86901 BLOOD TYPING SEROLOGIC RH(D): CPT

## 2022-04-04 PROCEDURE — 85027 COMPLETE CBC AUTOMATED: CPT

## 2022-04-04 PROCEDURE — C1889: CPT

## 2022-04-04 PROCEDURE — 97116 GAIT TRAINING THERAPY: CPT

## 2022-04-04 PROCEDURE — 86850 RBC ANTIBODY SCREEN: CPT

## 2022-04-04 PROCEDURE — 70450 CT HEAD/BRAIN W/O DYE: CPT | Mod: MG

## 2022-04-04 PROCEDURE — 73090 X-RAY EXAM OF FOREARM: CPT

## 2022-04-04 PROCEDURE — 72170 X-RAY EXAM OF PELVIS: CPT

## 2022-04-04 PROCEDURE — 74177 CT ABD & PELVIS W/CONTRAST: CPT | Mod: MG

## 2022-04-04 PROCEDURE — 73060 X-RAY EXAM OF HUMERUS: CPT

## 2022-04-04 PROCEDURE — 93005 ELECTROCARDIOGRAM TRACING: CPT

## 2022-04-04 PROCEDURE — C1713: CPT

## 2022-04-04 PROCEDURE — 80053 COMPREHEN METABOLIC PANEL: CPT

## 2022-04-04 PROCEDURE — 97530 THERAPEUTIC ACTIVITIES: CPT

## 2022-04-04 PROCEDURE — 71260 CT THORAX DX C+: CPT | Mod: MG

## 2022-04-04 PROCEDURE — C9399: CPT

## 2022-04-04 PROCEDURE — 90715 TDAP VACCINE 7 YRS/> IM: CPT

## 2022-04-04 PROCEDURE — 90471 IMMUNIZATION ADMIN: CPT

## 2022-04-04 PROCEDURE — 80048 BASIC METABOLIC PNL TOTAL CA: CPT

## 2022-04-04 PROCEDURE — 96375 TX/PRO/DX INJ NEW DRUG ADDON: CPT

## 2022-04-04 PROCEDURE — 73070 X-RAY EXAM OF ELBOW: CPT

## 2022-04-04 PROCEDURE — U0003: CPT

## 2022-04-04 PROCEDURE — 97166 OT EVAL MOD COMPLEX 45 MIN: CPT

## 2022-04-04 PROCEDURE — 96374 THER/PROPH/DIAG INJ IV PUSH: CPT | Mod: XU

## 2022-04-04 PROCEDURE — 72125 CT NECK SPINE W/O DYE: CPT | Mod: MG

## 2022-04-04 PROCEDURE — 85730 THROMBOPLASTIN TIME PARTIAL: CPT

## 2022-04-04 RX ORDER — ASPIRIN/CALCIUM CARB/MAGNESIUM 324 MG
1 TABLET ORAL
Qty: 84 | Refills: 0
Start: 2022-04-04 | End: 2022-05-15

## 2022-04-04 RX ORDER — NALOXONE HYDROCHLORIDE 4 MG/.1ML
4 SPRAY NASAL
Qty: 1 | Refills: 0
Start: 2022-04-04

## 2022-04-04 RX ORDER — HYDROMORPHONE HYDROCHLORIDE 2 MG/ML
1 INJECTION INTRAMUSCULAR; INTRAVENOUS; SUBCUTANEOUS
Qty: 0 | Refills: 0 | DISCHARGE
Start: 2022-04-04

## 2022-04-04 RX ORDER — NALOXONE HYDROCHLORIDE 4 MG/.1ML
4 SPRAY NASAL
Qty: 1 | Refills: 0
Start: 2022-04-04 | End: 2022-04-04

## 2022-04-04 RX ADMIN — FAMOTIDINE 20 MILLIGRAM(S): 10 INJECTION INTRAVENOUS at 05:20

## 2022-04-04 RX ADMIN — HYDROMORPHONE HYDROCHLORIDE 8 MILLIGRAM(S): 2 INJECTION INTRAMUSCULAR; INTRAVENOUS; SUBCUTANEOUS at 10:11

## 2022-04-04 RX ADMIN — Medication 50 MICROGRAM(S): at 05:19

## 2022-04-04 RX ADMIN — HYDROMORPHONE HYDROCHLORIDE 8 MILLIGRAM(S): 2 INJECTION INTRAMUSCULAR; INTRAVENOUS; SUBCUTANEOUS at 11:11

## 2022-04-04 RX ADMIN — Medication 0.5 MILLIGRAM(S): at 14:28

## 2022-04-04 RX ADMIN — LAMOTRIGINE 100 MILLIGRAM(S): 25 TABLET, ORALLY DISINTEGRATING ORAL at 05:20

## 2022-04-04 RX ADMIN — Medication 0.5 MILLIGRAM(S): at 07:55

## 2022-04-04 RX ADMIN — ZOLPIDEM TARTRATE 5 MILLIGRAM(S): 10 TABLET ORAL at 00:48

## 2022-04-04 RX ADMIN — Medication 20 MILLIGRAM(S): at 05:21

## 2022-04-04 RX ADMIN — Medication 325 MILLIGRAM(S): at 05:19

## 2022-04-04 NOTE — PROGRESS NOTE ADULT - ASSESSMENT
67 yo woman with a hx of osteogenesis imperfecta presents after a fall with an open left distal humerus fracture. scheduled for Open reduction and internal fixation of the left distal humerus 
A/P: 66y Female s/p L humerus ORIF    PT/OT- NWB LUE in sling  DVT PPx with   Pain Control with chronic pain recs  Dispo planning    Aysha Argueta PA-C  Team Pager: #[phone]
A/P: 66y Female with L distal humerus  Fracture  -pain control  -in posterior long arm splint at 30 deg flexion  -rest ice elevate affected elbow  -NWB LUE in posterior slab  -no lifting with affected hand  -NVI post splint  -discussed signs symptoms of compartment syndrome  -discussed need for surgical fixation  -NPO, IVF  -Hold all chemical dvt ppx  -SCDs  -Preop labs  -Pt medical clearance charted  -L 10th rib fx- pain control PRN  -plan for OR today 3/31  
65 yo woman with a hx of osteogenesis imperfecta presents after a fall with an open left distal humerus fracture. S/P Open reduction and internal fixation of the left distal humerus 
ORIF left humerus  Incentive spirometry  DVT and GI prophylaxis    Pain control adequate  Avoid excesss sedation 
  A/P: 66y Female s/p L humerus ORIF    PT/OT- NWB LUE in sling  DVT PPx with   Followup AM labs  DC pardo  Pain Control with chronic pain recs    Aysha Argueta PA-C  Team Pager: #[phone]
67 yo woman with a hx of osteogenesis imperfecta presents after a fall with an open left distal humerus fracture. S/P Open reduction and internal fixation of the left distal humerus 
  A/p: 66yFemale s/p ORIF L Distal Humerus Open Fx.  VSS. NAD.

## 2022-04-04 NOTE — DISCHARGE NOTE PROVIDER - NSDCCPCAREPLAN_GEN_ALL_CORE_FT
PRINCIPAL DISCHARGE DIAGNOSIS  Diagnosis: Displaced fracture of humerus  Assessment and Plan of Treatment:

## 2022-04-04 NOTE — DISCHARGE NOTE PROVIDER - HOSPITAL COURSE
History of Present Illness:   66y Female community ambulator presents c/o presents with L elbow pain s/p fall. Pt denies numbness tingling paresthesias in affected limb. Pt denies headstrike or LOC and denies any other orthopedic injuries at this time    PAST MEDICAL & SURGICAL HISTORY:  Osteogenesis imperfecta  Former smoker  Hard of hearing  History of open reduction and internal fixation (ORIF) procedure    Allergies  No Known Allergies    This is a 66 year old female admitted to [facility] on 3/30/22 after a mechanical fall.  Patient found to have L distal humerus open fracture.  Patient evaluated and cleared by Medicine for operative procedure.  On 3/31, patient underwent an uncomplicated ORIF.  Evaluated and treated by PT, recommended for Subacute Rehab.  Patient requested home.  Remain of hospital stay unremarkable, and patient discharged home. Siliq Counseling:  I discussed with the patient the risks of Siliq including but not limited to new or worsening depression, suicidal thoughts and behavior, immunosuppression, malignancy, posterior leukoencephalopathy syndrome, and serious infections.  The patient understands that monitoring is required including a PPD at baseline and must alert us or the primary physician if symptoms of infection or other concerning signs are noted. There is also a special program designed to monitor depression which is required with Siliq.

## 2022-04-04 NOTE — PROGRESS NOTE ADULT - PROBLEM SELECTOR PLAN 1
Patient scheduled for surgical repair  No contraindication to scheduled procedure  NPO for procedure  DVT and GI prophylaxis
PT/OT-NWB LILIANA  IS  DVT PPx  Pain Control  Continue Current Tx.  Patient requesting home, awaiting homecare approval    Jamie Riggs PA-C  Team Pager: #[phone]
Patient tolerated the procedure well  PO as tolerated  DVT and GI prophylaxis   wound care as per ortho
Patient tolerated the procedure well  PO as tolerated  DVT and GI prophylaxis   wound care as per ortho

## 2022-04-04 NOTE — PROGRESS NOTE ADULT - PROBLEM SELECTOR PROBLEM 1
Fracture of distal end of left humerus

## 2022-04-04 NOTE — DISCHARGE NOTE PROVIDER - CARE PROVIDER_API CALL
Guero Morrison (MD)  Orthopaedic Surgery  [address]  [place] [address]  Phone: [phone]  Fax: [phone]  Follow Up Time:

## 2022-04-04 NOTE — CHART NOTE - NSCHARTNOTEFT_GEN_A_CORE
66y Female with osteogenesis imperfecta presents with L elbow pain s/p fall.  Diagnosed with a left G1 open distal humerus fracture and left 10th rib fracture.  Now S/P ORIF left humerus.  H/O of whole body chronic pain and is on chronic opioid therapy.    Current out- patient pain regimen: dilaudid 16 mg PO BID  Out Patient Pain Management provider: Lexa Otoole MD    Pain score 8/10, down to 0/10 with medication.  Pt preparing for discharge home with home care.    Continue dilaudid 8 mg PO Q 4 hours PRN severe pain.  Discontinue IV dilaudid.   Continue tylenol PRN.    Monitor for sedation and respiratory depression.  Bowel regimen.  PT/ OOB per Trauma service.    Follow up with Lexa Otoole MD for continued pain management after discharge.  Please discharge with a narcan rescue kit (naloxone 4 mg/ 0.1 ml nasal spray- 1 spray Q 2-3 minutes alternating between nostrils).    Signing off at this time.      Chronic Pain Service  [phone]

## 2022-04-04 NOTE — DISCHARGE NOTE PROVIDER - NSDCCPTREATMENT_GEN_ALL_CORE_FT
PRINCIPAL PROCEDURE  Procedure: ORIF, fracture, humerus, distal shaft  Findings and Treatment: LEFT

## 2022-04-04 NOTE — PROGRESS NOTE ADULT - REASON FOR ADMISSION
L G1 open distal humerus Fx

## 2022-04-04 NOTE — PROGRESS NOTE ADULT - TIME BILLING
Patient DCed home  continue current meds  PO as tolerated   activity as tolerated  follow up with ortho  Patient aware of plan  I am a non participating [employer] physician seeing Pt in coverage for Dr Bradford. The above patient documentation by Dr. Garcia was reviewed and I agree with his evaluation, assessment and treatment plan.  Matt Bradford M.D.

## 2022-04-04 NOTE — DISCHARGE NOTE NURSING/CASE MANAGEMENT/SOCIAL WORK - NSDCVIVACCINE_GEN_ALL_CORE_FT
COVID-19 vaccine, vector-nr, rS-Ad26, PF, 0.5 mL ([facility]); 15-Mar-2021 15:30; Adelia Lora (RN); [employer]; [id number] (Exp. Date: 15-Mar-2021); IntraMuscular; Deltoid Right.; 0.5 milliLiter(s);   Tdap; 30-Mar-2022 17:24; Rabia Cabello ([facility]); [employer]; [id number] (Exp. Date: 18-Jan-2024); IntraMuscular; Deltoid Right.; 0.5 milliLiter(s); VIS (VIS Published: 09-May-2013, VIS Presented: 30-Mar-2022);

## 2022-04-04 NOTE — PROGRESS NOTE ADULT - PROBLEM SELECTOR PLAN 3
Pain meds as needed  follow for oversedation  Patient has been chronically on pain meds. Consider a chronic pain management evaluation  GI regime to prevent constipation

## 2022-04-04 NOTE — PROGRESS NOTE ADULT - PROVIDER SPECIALTY LIST ADULT
Internal Medicine
Orthopedics
Orthopedics
Internal Medicine
Orthopedics
Internal Medicine
Orthopedics
Internal Medicine

## 2022-04-04 NOTE — DISCHARGE NOTE PROVIDER - NSDCMRMEDTOKEN_GEN_ALL_CORE_FT
acetaminophen 325 mg oral tablet: 2 tab(s) orally every 6 hours, As needed, Temp greater or equal to 38C (100.4F), mild pain  Ambien 5 mg oral tablet: 1 tab(s) orally once a day (at bedtime)  busPIRone 15 mg oral tablet: 1 tab(s) orally 2 times a day  clonazePAM 0.5 mg oral tablet: 1 tab(s) orally 3 times a day  FLUoxetine 20 mg oral capsule: 1 cap(s) orally 3 times a day  HYDROmorphone 4 mg oral tablet: 1 tab(s) orally every 6 hours -for severe pain MDD:4 tabs   lamoTRIgine 100 mg oral tablet: 1 tab(s) orally 2 times a day  levothyroxine 50 mcg (0.05 mg) oral tablet: 1 tab(s) orally once a day  Multiple Vitamins oral tablet: 1 tab(s) orally once a day  polyethylene glycol 3350 oral powder for reconstitution: 17 gram(s) orally once a day, As needed, Constipation MDD:1 packet/ day  rosuvastatin 10 mg oral tablet: 1 tab(s) orally once a day (at bedtime)  senna 8.6 mg oral tablet: 2 tab(s) orally once a day (at bedtime) MDD:2 tabs per day  Wellbutrin 75 mg oral tablet: 1 tab(s) orally 3 times a day

## 2022-04-04 NOTE — PROGRESS NOTE ADULT - ATTENDING COMMENTS
PT. DVT ppx. OOB. f/u labs. DC planning
For ORIF L distal humerus.  R/B/A discussed
PT. DVT ppx. f/u labs. DC planning
Pt. DVT ppx. f/u labs. f/u medicine. DC planning
PT. DVT ppx. f/u labs. DC planning

## 2022-04-04 NOTE — DISCHARGE NOTE PROVIDER - NSDCFUADDINST_GEN_ALL_CORE_FT
Please follow up with your doctor 14 days after your discharge from the hospital (call for appointment).  PT-non weight bearing left upper extremity.  May remove sling at rest and range as tolerated.  Sling for support.  Aspirin 325 twice daily x 6 weeks total for dvt prevention.  Keep dressing clean and intact, have doctor remove staples/sutures post op day 14 in office (if applicable) and apply steristrips.  Please follow up with your PMD within 1 month for routine checkup.     Please follow up with your pain specialist Lexa Otoole MD for any pain related questions.

## 2022-04-04 NOTE — PROGRESS NOTE ADULT - PROBLEM SELECTOR PLAN 2
continue to monitor for further fractures

## 2022-04-04 NOTE — DISCHARGE NOTE NURSING/CASE MANAGEMENT/SOCIAL WORK - PATIENT PORTAL LINK FT
You can access the FollowMyHealth Patient Portal offered by [facility] by registering at the following website: [email or web address]. By joining [email or web address]’s FollowMyHealth portal, you will also be able to view your health information using other applications (apps) compatible with our system.

## 2022-04-04 NOTE — PROGRESS NOTE ADULT - PROBLEM SELECTOR PLAN 4
Continue fluoxitine  Psych eval as needed

## 2022-04-04 NOTE — PROGRESS NOTE ADULT - SUBJECTIVE AND OBJECTIVE BOX
Orthopedics    Pt seen and examined at the bedside. Pain is well controlled at this time, no concerns at this time.     Vital Signs Last 24 Hrs  T(C): 36.7 (04-01-22 @ 04:20), Max: 36.9 (03-31-22 @ 20:24)  T(F): 98.1 (04-01-22 @ 04:20), Max: 98.4 (03-31-22 @ 20:24)  HR: 68 (04-01-22 @ 04:20) (68 - 86)  BP: 106/65 (04-01-22 @ 04:20) (91/54 - 129/79)  BP(mean): 86 (03-31-22 @ 19:50) (67 - 86)  RR: 18 (04-01-22 @ 04:20) (16 - 18)  SpO2: 95% (04-01-22 @ 04:20) (92% - 98%)                        9.9    4.76  )-----------( 146      ( 31 Mar 2022 18:51 )             30.6     31 Mar 2022 18:51    138    |  105    |  11     ----------------------------<  122    3.4     |  22     |  0.41     Ca    7.8        31 Mar 2022 18:51    TPro  8.3    /  Alb  4.7    /  TBili  0.5    /  DBili  x      /  AST  31     /  ALT  22     /  AlkPhos  135    30 Mar 2022 17:50    PT/INR - ( 31 Mar 2022 04:49 )   PT: 12.9 sec;   INR: 1.12 ratio         PTT - ( 31 Mar 2022 04:49 )  PTT:30.3 sec    Exam:  GEN: NAD, awake and alert.  RIGHT Upper Extremity:   Dressing c/d/i  TTP over the bony prominences of the distal humerus  Painless passive/active ROM of the wrist/hand/fingers  C5-T1 SILT  Motor grossly intact throughout axillary/musculocutaneous/radial/median/ulnar/AIN/PIN nerves  + radial pulse  Compartments soft and compressible      A/P:  66y F s/p L Distal humerus ORIF POD #1    -Pain control prn  - DVT ppx  - NWB LUE/PT/OOB as tolerated   - FU am labs  - Med mgmt, continue home meds.  -FU PT recs for dispo. 
Patient with fracture of the shaft of the humerus  Incentive spirometry  DVT prophylaxis  Left G1 ope distal humerus fracture   Continue with fractrue fo the shaft of hte humerus    Time spent  70 minutes  Efrain Stokes MD
Post op Day [4 ]    Patient resting without complaints.  No chest pain, SOB, N/V.    T(C): 36.7 (04-04-22 @ 05:27), Max: 37 (04-03-22 @ 12:33)  HR: 69 (04-04-22 @ 05:27) (69 - 90)  BP: 127/85 (04-04-22 @ 05:27) (113/72 - 136/77)  RR: 18 (04-04-22 @ 05:27) (18 - 18)  SpO2: 97% (04-04-22 @ 05:27) (95% - 97%)  Wt(kg): --    Exam:  Alert and Oriented, No Acute Distress  LUE in sling, compartments soft/compressible, dsg c/d/i  moving wrist, digit, +Radial pulse, SILT  Calves Soft, Non-tender bilaterally                            9.2    4.86  )-----------( 191      ( 02 Apr 2022 12:24 )             27.8          
ORTHO ATTENDING POST OP    s/p ORIF L humerus  NWB  L UE  ROM as tolerated  L UE  sling  elevation  ancef x 24h  venodynes   BID for dvt ppx  CBC in AM and RR  keep aquacel on until office visit  shower OK w aquacel in place 
Patient with closed fracture of the shaft of  the humerus  Medically stable post  op.  Incentive spirometry 10x per hour
Post op Day [2]    Patient resting without complaints.  No chest pain, SOB, N/V.    T(C): 36.6 (04-02-22 @ 04:20), Max: 37.1 (04-01-22 @ 17:14)  HR: 74 (04-02-22 @ 04:20) (68 - 90)  BP: 125/72 (04-02-22 @ 04:20) (92/56 - 130/80)  RR: 18 (04-02-22 @ 04:20) (18 - 18)  SpO2: 97% (04-02-22 @ 04:20) (94% - 98%)  Wt(kg): --    Exam:  Somnolent but arousable, No Acute Distress  Upper Ext: LUE wrapped in ace, C/D/I  Moving digits, +  SILT AIN/PIN/M/U  + radial pulse                            8.7    6.09  )-----------( 165      ( 01 Apr 2022 07:22 )             27.2    04-01    136  |  104  |  10  ----------------------------<  100<H>  4.1   |  22  |  0.44<L>    Ca    7.8<L>      01 Apr 2022 07:18      
Post op Day [3]    Patient resting without complaints.  No chest pain, SOB, N/V.    T(C): 36.8 (04-03-22 @ 04:42), Max: 37.4 (04-02-22 @ 21:50)  HR: 72 (04-03-22 @ 04:42) (72 - 84)  BP: 111/70 (04-03-22 @ 04:42) (111/70 - 148/79)  RR: 18 (04-03-22 @ 04:42) (18 - 18)  SpO2: 97% (04-03-22 @ 04:42) (95% - 97%)  Wt(kg): --    Exam:  Somnolent but arousable, No Acute Distress  Upper Ext: LUE wrapped in ace, C/D/I  Moving digits, +  SILT AIN/PIN/M/U  + radial pulse                            9.2    4.86  )-----------( 191      ( 02 Apr 2022 12:24 )             27.8      
Pt seen and examined. Pain controlled. No acute events overnight. Plan for OR today.    Vital Signs Last 24 Hrs  T(C): 36.8 (31 Mar 2022 04:10), Max: 37 (30 Mar 2022 22:15)  T(F): 98.2 (31 Mar 2022 04:10), Max: 98.6 (30 Mar 2022 22:15)  HR: 71 (31 Mar 2022 04:10) (71 - 90)  BP: 123/71 (31 Mar 2022 04:10) (103/67 - 187/66)  BP(mean): --  RR: 18 (31 Mar 2022 04:10) (18 - 20)  SpO2: 95% (31 Mar 2022 04:10) (93% - 99%)    Gen: NAD, AAOx3    LUE: Poke hole open, gross deformity at elbow, slight tenting of skin medial elbow, ecchymosis over distal humerus,+ Swelling at distal humerus, no bony TTP at Shoulder/wrist/Hand/Fingers, +AIN/PIN/M/R/U/Msc/Ax, SILT C5-T1, Radial Pulse, compartments soft, hand is pink and warm    
Medical Attending progress NOte    Patient seen post op  NO chest pain SOB or  Nausea vomiting abdominal pain   She is     ss Note Adult-Orthopedics Physician Assistant/Attendin [Charted Location: [address]] [Authored: 02-Apr-2022 07:42]- for Visit: [phone], Complete, Revised, Signed in Full, Available to Patient    Progress Note:  Medicine      Reason for Admission:    Reason for Admission:  · Reason for Admission	L G1 open distal humerus Fx    Patient  is s/p ORIF left humerus fracture      · Subjective and Objective:   Post op Day [2]    Patient resting without complaints.  No chest pain, SOB, N/V.  Denies abdominal pain   Incisional pain under control.    PAST MEDICAL & SURGICAL HISTORY:  Osteogenesis imperfecta    Former smoker    Hard of hearing    History of open reduction and internal fixation (ORIF) procedure    MEDICATIONS  (STANDING):  acetaminophen     Tablet .. 650 milliGRAM(s) Oral every 6 hours  aspirin 325 milliGRAM(s) Oral two times a day  atorvastatin 40 milliGRAM(s) Oral at bedtime  busPIRone 15 milliGRAM(s) Oral two times a day  clonazePAM  Tablet 0.5 milliGRAM(s) Oral three times a day  famotidine    Tablet 20 milliGRAM(s) Oral two times a day  FLUoxetine 20 milliGRAM(s) Oral two times a day  influenza  Vaccine (HIGH DOSE) 0.7 milliLiter(s) IntraMuscular once  lactated ringers. 1000 milliLiter(s) (100 mL/Hr) IV Continuous <Continuous>  lamoTRIgine 100 milliGRAM(s) Oral two times a day  levothyroxine 50 MICROGram(s) Oral daily  polyethylene glycol 3350 17 Gram(s) Oral at bedtime  senna 2 Tablet(s) Oral at bedtime    MEDICATIONS  (PRN):  benzocaine 15 mG/menthol 3.6 mG Lozenge 1 Lozenge Oral three times a day PRN Sore Throat  HYDROmorphone   Tablet 8 milliGRAM(s) Oral every 4 hours PRN Severe Pain (7 - 10)  HYDROmorphone  Injectable 1 milliGRAM(s) IV Push every 4 hours PRN breakthrough  ondansetron    Tablet 4 milliGRAM(s) Oral three times a day PRN Nausea and/or Vomiting  zolpidem 5 milliGRAM(s) Oral at bedtime PRN Insomnia      T(C): 36.6 (04-02-22 @ 04:20), Max: 37.1 (04-01-22 @ 17:14)  HR: 74 (04-02-22 @ 04:20) (68 - 90)  BP: 125/72 (04-02-22 @ 04:20) (92/56 - 130/80)  RR: 18 (04-02-22 @ 04:20) (18 - 18)  SpO2: 97% (04-02-22 @ 04:20) (94% - 98%)  Wt(kg): --    Exam:  Somnolent but arousable, No Acute Distress  Lungs clear  Heart regular  Abd benign  Ext no c/c/e  Upper Ext: LUE wrapped in ace, C/D/I  Moving digits, +  SILT AIN/PIN/M/U  + radial pulse          CBC Full  -  ( 02 Apr 2022 12:24 )  WBC Count : 4.86 K/uL  RBC Count : 3.11 M/uL  Hemoglobin : 9.2 g/dL  Hematocrit : 27.8 %  Platelet Count - Automated : 191 K/uL  Mean Cell Volume : 89.4 fl  Mean Cell Hemoglobin : 29.6 pg  Mean Cell Hemoglobin Concentration : 33.1 gm/dL  Auto Neutrophil # : x  Auto Lymphocyte # : x  Auto Monocyte # : x  Auto Eosinophil # : x  Auto Basophil # : x  Auto Neutrophil % : x  Auto Lymphocyte % : x  Auto Monocyte % : x  Auto Eosinophil % : x  Auto Basophil % : x    04-01    136  |  104  |  10  ----------------------------<  100<H>  4.1   |  22  |  0.44<L>    Ca    7.8<L>      01 Apr 2022 07:18                                  8.7    6.09  )-----------( 165      ( 01 Apr 2022 07:22 )             27.2    04-01    136  |  104  |  10  ----------------------------<  100<H>  4.1   |  22  |  0.44<L>    Ca    7.8<L>      01 Apr 2022 07:18          Assessment and Plan:   · Assessment	    A/P: 66y Female s/p L humerus ORIF    PT/OT- NWB LUE in sling  DVT PPx with   Followup AM labs  DC pardo  Pain Control with chronic pain meds      
 66yF with PMH of osteogenesis imperfecta, depression presents to the ED following mechanical fall from standing height c/o L elbow pain. Pt was placed in a sling a swath. Denies head strike, LOC or any other pain. Patient was found to have a open distal humerus fracture. She is scheduled for an Open reduction and internal fixation of the left distal humerus. Patient seen post op with complaint of pain at the surgical site. Patient has been participating with physical therapy and is scheduled for DC home today      MEDICATIONS  (STANDING):  acetaminophen     Tablet .. 650 milliGRAM(s) Oral every 6 hours  aspirin 325 milliGRAM(s) Oral two times a day  atorvastatin 40 milliGRAM(s) Oral at bedtime  busPIRone 15 milliGRAM(s) Oral two times a day  clonazePAM  Tablet 0.5 milliGRAM(s) Oral three times a day  famotidine    Tablet 20 milliGRAM(s) Oral two times a day  FLUoxetine 20 milliGRAM(s) Oral two times a day  influenza  Vaccine (HIGH DOSE) 0.7 milliLiter(s) IntraMuscular once  lactated ringers. 1000 milliLiter(s) (100 mL/Hr) IV Continuous <Continuous>  lamoTRIgine 100 milliGRAM(s) Oral two times a day  levothyroxine 50 MICROGram(s) Oral daily  polyethylene glycol 3350 17 Gram(s) Oral at bedtime  senna 2 Tablet(s) Oral at bedtime    MEDICATIONS  (PRN):  benzocaine 15 mG/menthol 3.6 mG Lozenge 1 Lozenge Oral three times a day PRN Sore Throat  HYDROmorphone   Tablet 8 milliGRAM(s) Oral every 4 hours PRN Severe Pain (7 - 10)  HYDROmorphone  Injectable 1 milliGRAM(s) IV Push every 4 hours PRN breakthrough  ondansetron    Tablet 4 milliGRAM(s) Oral three times a day PRN Nausea and/or Vomiting  zolpidem 5 milliGRAM(s) Oral at bedtime PRN Insomnia          VITALS:   T(C): 36.9 (04-04-22 @ 08:50), Max: 36.9 (04-04-22 @ 08:50)  HR: 90 (04-04-22 @ 08:50) (69 - 90)  BP: 135/72 (04-04-22 @ 08:50) (114/61 - 136/77)  RR: 18 (04-04-22 @ 08:50) (18 - 18)  SpO2: 96% (04-04-22 @ 08:50) (95% - 97%)  Wt(kg): --      PHYSICAL EXAM:  GENERAL: NAD, well nourished and conversant  HEAD:  Atraumatic  EYES: EOM, PERRLA, conjunctiva pink and sclera white  ENT: No tonsillar erythema, exudates, or enlargement, moist mucous membranes, good dentition, no lesions  NECK: Supple, No JVD, normal thyroid, carotids with normal upstrokes and no bruits  CHEST/LUNG: Clear to auscultation bilaterally, No rales, rhonchi, wheezing, or rubs  HEART: Regular rate and rhythm, No murmurs, rubs, or gallops  ABDOMEN: Soft, nondistended, no masses, guarding, tenderness or rebound, bowel sounds present  EXTREMITIES:  2+ Peripheral Pulses, No clubbing, cyanosis, or edema.   LYMPH: No lymphadenopathy noted  SKIN: No rashes or lesions  NERVOUS SYSTEM:  Alert & Oriented X3, normal cognitive function. Motor Strength 5/5 right upper and right lower.  5/5 left upper and left lower extremities, DTRs 2+ intact and symmetric    LABS:                      CAPILLARY BLOOD GLUCOSE          RADIOLOGY & ADDITIONAL TESTS:      
 66yF with PMH of osteogenesis imperfecta, depression presents to the ED following mechanical fall from standing height c/o L elbow pain. Pt was placed in a sling a swath. Denies head strike, LOC or any other pain. Patient was found to have a open distal humerus fracture. She is scheduled for an Open reduction and internal fixation of the left distal humerus. Patient seen post op with complaint of pain at the surgical site. Patient was found to be in urinary retention and pardo was placed       MEDICATIONS  (STANDING):  acetaminophen     Tablet .. 650 milliGRAM(s) Oral every 6 hours  aspirin 325 milliGRAM(s) Oral two times a day  atorvastatin 40 milliGRAM(s) Oral at bedtime  busPIRone 15 milliGRAM(s) Oral two times a day  clonazePAM  Tablet 0.5 milliGRAM(s) Oral three times a day  famotidine    Tablet 20 milliGRAM(s) Oral two times a day  FLUoxetine 20 milliGRAM(s) Oral two times a day  influenza  Vaccine (HIGH DOSE) 0.7 milliLiter(s) IntraMuscular once  lactated ringers. 1000 milliLiter(s) (100 mL/Hr) IV Continuous <Continuous>  lamoTRIgine 100 milliGRAM(s) Oral two times a day  levothyroxine 50 MICROGram(s) Oral daily  polyethylene glycol 3350 17 Gram(s) Oral at bedtime  senna 2 Tablet(s) Oral at bedtime    MEDICATIONS  (PRN):  benzocaine 15 mG/menthol 3.6 mG Lozenge 1 Lozenge Oral three times a day PRN Sore Throat  HYDROmorphone   Tablet 8 milliGRAM(s) Oral every 4 hours PRN Severe Pain (7 - 10)  HYDROmorphone  Injectable 1 milliGRAM(s) IV Push every 4 hours PRN breakthrough  ondansetron    Tablet 4 milliGRAM(s) Oral three times a day PRN Nausea and/or Vomiting  zolpidem 5 milliGRAM(s) Oral at bedtime PRN Insomnia          VITALS:   T(C): 37.1 (04-01-22 @ 17:14), Max: 37.1 (04-01-22 @ 17:14)  HR: 90 (04-01-22 @ 17:14) (68 - 90)  BP: 124/68 (04-01-22 @ 17:14) (91/54 - 128/62)  RR: 18 (04-01-22 @ 17:14) (17 - 18)  SpO2: 97% (04-01-22 @ 17:14) (92% - 98%)  Wt(kg): --      PHYSICAL EXAM:  GENERAL: NAD, well nourished and conversant  HEAD:  Atraumatic  EYES: EOM, PERRLA, conjunctiva pink and sclera white  ENT: No tonsillar erythema, exudates, or enlargement, moist mucous membranes, good dentition, no lesions  NECK: Supple, No JVD, normal thyroid, carotids with normal upstrokes and no bruits  CHEST/LUNG: Clear to auscultation bilaterally, No rales, rhonchi, wheezing, or rubs  HEART: Regular rate and rhythm, No murmurs, rubs, or gallops  ABDOMEN: Soft, nondistended, no masses, guarding, tenderness or rebound, bowel sounds present  EXTREMITIES:  2+ Peripheral Pulses, No clubbing, cyanosis, or edema.   LYMPH: No lymphadenopathy noted  SKIN: No rashes or lesions  NERVOUS SYSTEM:  Alert & Oriented X3, normal cognitive function. Motor Strength 5/5 right upper and right lower.  5/5 left upper and left lower extremities, DTRs 2+ intact and symmetric    LABS:        CBC Full  -  ( 01 Apr 2022 07:22 )  WBC Count : 6.09 K/uL  RBC Count : 2.97 M/uL  Hemoglobin : 8.7 g/dL  Hematocrit : 27.2 %  Platelet Count - Automated : 165 K/uL  Mean Cell Volume : 91.6 fl  Mean Cell Hemoglobin : 29.3 pg  Mean Cell Hemoglobin Concentration : 32.0 gm/dL  Auto Neutrophil # : x  Auto Lymphocyte # : x  Auto Monocyte # : x  Auto Eosinophil # : x  Auto Basophil # : x  Auto Neutrophil % : x  Auto Lymphocyte % : x  Auto Monocyte % : x  Auto Eosinophil % : x  Auto Basophil % : x    04-01    136  |  104  |  10  ----------------------------<  100<H>  4.1   |  22  |  0.44<L>    Ca    7.8<L>      01 Apr 2022 07:18    TPro  8.3  /  Alb  4.7  /  TBili  0.5  /  DBili  x   /  AST  31  /  ALT  22  /  AlkPhos  135<H>  03-30    LIVER FUNCTIONS - ( 30 Mar 2022 17:50 )  Alb: 4.7 g/dL / Pro: 8.3 g/dL / ALK PHOS: 135 U/L / ALT: 22 U/L / AST: 31 U/L / GGT: x           PT/INR - ( 31 Mar 2022 04:49 )   PT: 12.9 sec;   INR: 1.12 ratio         PTT - ( 31 Mar 2022 04:49 )  PTT:30.3 sec    CAPILLARY BLOOD GLUCOSE          RADIOLOGY & ADDITIONAL TESTS:      
 66yF with PMH of osteogenesis imperfecta, depression presents to the ED following mechanical fall from standing height c/o L elbow pain. Pt was placed in a sling a swath. Denies head strike, LOC or any other pain. Patient was found to have a open distal humerus fracture. She is scheduled for an Open reduction and internal fixation of the left distal humerus. Patient seen pre op resting comfortably      MEDICATIONS  (STANDING):  acetaminophen     Tablet .. 975 milliGRAM(s) Oral every 8 hours  ceFAZolin   IVPB 2000 milliGRAM(s) IV Intermittent every 8 hours  FLUoxetine 20 milliGRAM(s) Oral two times a day  influenza  Vaccine (HIGH DOSE) 0.7 milliLiter(s) IntraMuscular once  levothyroxine 50 MICROGram(s) Oral daily  sodium chloride 0.9%. 1000 milliLiter(s) (125 mL/Hr) IV Continuous <Continuous>    MEDICATIONS  (PRN):  HYDROmorphone  Injectable 0.5 milliGRAM(s) IV Push every 6 hours PRN Severe Pain (7 - 10)  oxyCODONE    IR 2.5 milliGRAM(s) Oral every 4 hours PRN Moderate Pain (4 - 6)  oxyCODONE    IR 5 milliGRAM(s) Oral every 4 hours PRN Severe Pain (7 - 10)  zolpidem 5 milliGRAM(s) Oral at bedtime PRN Insomnia  zolpidem 5 milliGRAM(s) Oral at bedtime PRN Insomnia          VITALS:   T(C): 36.6 (03-31-22 @ 16:37), Max: 37 (03-30-22 @ 22:15)  HR: 74 (03-31-22 @ 16:37) (70 - 90)  BP: 118/65 (03-31-22 @ 16:37) (103/67 - 187/66)  RR: 16 (03-31-22 @ 16:37) (16 - 20)  SpO2: 96% (03-31-22 @ 16:37) (93% - 99%)  Wt(kg): --    PHYSICAL EXAM:  GENERAL: NAD, well nourished and conversant  HEAD:  Atraumatic  EYES: EOM, PERRLA, conjunctiva pink and sclera white  ENT: No tonsillar erythema, exudates, or enlargement, moist mucous membranes, good dentition, no lesions  NECK: Supple, No JVD, normal thyroid, carotids with normal upstrokes and no bruits  CHEST/LUNG: Clear to auscultation bilaterally, No rales, rhonchi, wheezing, or rubs  HEART: Regular rate and rhythm, No murmurs, rubs, or gallops  ABDOMEN: Soft, nondistended, no masses, guarding, tenderness or rebound, bowel sounds present  EXTREMITIES:  2+ Peripheral Pulses, No clubbing, cyanosis, or edema.   LYMPH: No lymphadenopathy noted  SKIN: No rashes or lesions  NERVOUS SYSTEM:  Alert & Oriented X3, normal cognitive function. Motor Strength 5/5 right upper and right lower.  5/5 left upper and left lower extremities, DTRs 2+ intact and symmetric      LABS:        CBC Full  -  ( 31 Mar 2022 04:49 )  WBC Count : 4.06 K/uL  RBC Count : 3.41 M/uL  Hemoglobin : 10.2 g/dL  Hematocrit : 30.6 %  Platelet Count - Automated : 176 K/uL  Mean Cell Volume : 89.7 fl  Mean Cell Hemoglobin : 29.9 pg  Mean Cell Hemoglobin Concentration : 33.3 gm/dL  Auto Neutrophil # : x  Auto Lymphocyte # : x  Auto Monocyte # : x  Auto Eosinophil # : x  Auto Basophil # : x  Auto Neutrophil % : x  Auto Lymphocyte % : x  Auto Monocyte % : x  Auto Eosinophil % : x  Auto Basophil % : x    03-31    140  |  105  |  22  ----------------------------<  92  3.5   |  25  |  0.57    Ca    8.4      31 Mar 2022 04:49    TPro  8.3  /  Alb  4.7  /  TBili  0.5  /  DBili  x   /  AST  31  /  ALT  22  /  AlkPhos  135<H>  03-30    LIVER FUNCTIONS - ( 30 Mar 2022 17:50 )  Alb: 4.7 g/dL / Pro: 8.3 g/dL / ALK PHOS: 135 U/L / ALT: 22 U/L / AST: 31 U/L / GGT: x           PT/INR - ( 31 Mar 2022 04:49 )   PT: 12.9 sec;   INR: 1.12 ratio         PTT - ( 31 Mar 2022 04:49 )  PTT:30.3 sec    CAPILLARY BLOOD GLUCOSE          RADIOLOGY & ADDITIONAL TESTS:

## 2022-04-18 ENCOUNTER — APPOINTMENT (OUTPATIENT)
Dept: ORTHOPEDIC SURGERY | Facility: CLINIC | Age: 67
End: 2022-04-18
Payer: COMMERCIAL

## 2022-04-18 VITALS
HEART RATE: 89 BPM | BODY MASS INDEX: 25.76 KG/M2 | WEIGHT: 140 LBS | HEIGHT: 62 IN | DIASTOLIC BLOOD PRESSURE: 80 MMHG | SYSTOLIC BLOOD PRESSURE: 159 MMHG

## 2022-04-18 PROCEDURE — 99024 POSTOP FOLLOW-UP VISIT: CPT

## 2022-05-09 ENCOUNTER — APPOINTMENT (OUTPATIENT)
Dept: ORTHOPEDIC SURGERY | Facility: CLINIC | Age: 67
End: 2022-05-09
Payer: COMMERCIAL

## 2022-05-09 PROCEDURE — 73060 X-RAY EXAM OF HUMERUS: CPT | Mod: LT

## 2022-05-09 PROCEDURE — 99024 POSTOP FOLLOW-UP VISIT: CPT

## 2022-05-11 NOTE — HISTORY OF PRESENT ILLNESS
[de-identified] : S/P ORIF G1 open L humerus fracture and neurolysis radial nerve for preop RN palsy 3/31/22 [de-identified] : 67 yo F hx OI and previous R BBFA with PIN palsy- now healed. new fall now \par S/P ORIF G1 open L humerus fracture and neurolysis radial nerve for preop RN palsy 3/31/22\par She has increasing pain of her left arm/elbow. She states she is having a harder time being able to extend her elbow wrist and fingers She continues to have pain with moving her shoulder. She states she has been using her elbow to to push herself out of a wheelchair.  [de-identified] : L humerus \par Incision CDI healed s no erythema drainage or induration \par SILT AX M U R \par AROM L shoulder 0-70 degrees she has full ROM of her wrist and fingers able to abduct and extend her thumb. \par NVi distally  [de-identified] : 2 views L humerus taken today and reviewed by me show show interval displacement of the fracture with failure of hardware distally  [de-identified] : S/P ORIF G1 open L humerus fracture and neurolysis radial nerve for preop RN palsy 3/31/22-  with failure of hardware scondary to weightbearing  though her arm to get out of her wheel chair.  [de-identified] : S/P ORIF G1 open L humerus fracture and neurolysis radial nerve for preop RN palsy 3/31/22\par -  A long discussion was had with the patient about the failure of hardware and interval displacement. NOM vs removal of hardware and reconstruction were discussed in detail. THe RBA's of each were explained. They wish to think about timing of surgery and will call to schedule removal of hardware and reconstruction of this fracture

## 2022-05-13 ENCOUNTER — INPATIENT (INPATIENT)
Facility: HOSPITAL | Age: 67
LOS: 10 days | Discharge: HOME HEALTH | DRG: 857 | End: 2022-05-24
Attending: INTERNAL MEDICINE | Admitting: HOSPITALIST
Payer: COMMERCIAL

## 2022-05-13 VITALS
DIASTOLIC BLOOD PRESSURE: 91 MMHG | WEIGHT: 139.99 LBS | SYSTOLIC BLOOD PRESSURE: 157 MMHG | HEART RATE: 111 BPM | OXYGEN SATURATION: 96 % | TEMPERATURE: 99 F | HEIGHT: 70 IN | RESPIRATION RATE: 20 BRPM

## 2022-05-13 DIAGNOSIS — Z98.890 OTHER SPECIFIED POSTPROCEDURAL STATES: Chronic | ICD-10-CM

## 2022-05-13 LAB
ALBUMIN SERPL ELPH-MCNC: 4.4 G/DL — SIGNIFICANT CHANGE UP (ref 3.3–5)
ALP SERPL-CCNC: 284 U/L — HIGH (ref 40–120)
ALT FLD-CCNC: 18 U/L — SIGNIFICANT CHANGE UP (ref 10–45)
ANION GAP SERPL CALC-SCNC: 16 MMOL/L — SIGNIFICANT CHANGE UP (ref 5–17)
APPEARANCE UR: CLEAR — SIGNIFICANT CHANGE UP
APTT BLD: 33.5 SEC — SIGNIFICANT CHANGE UP (ref 27.5–35.5)
AST SERPL-CCNC: 29 U/L — SIGNIFICANT CHANGE UP (ref 10–40)
BACTERIA # UR AUTO: NEGATIVE — SIGNIFICANT CHANGE UP
BASE EXCESS BLDV CALC-SCNC: 5.7 MMOL/L — HIGH (ref -2–2)
BASOPHILS # BLD AUTO: 0.02 K/UL — SIGNIFICANT CHANGE UP (ref 0–0.2)
BASOPHILS NFR BLD AUTO: 0.3 % — SIGNIFICANT CHANGE UP (ref 0–2)
BILIRUB SERPL-MCNC: 0.7 MG/DL — SIGNIFICANT CHANGE UP (ref 0.2–1.2)
BILIRUB UR-MCNC: NEGATIVE — SIGNIFICANT CHANGE UP
BUN SERPL-MCNC: 19 MG/DL — SIGNIFICANT CHANGE UP (ref 7–23)
CA-I SERPL-SCNC: 1.23 MMOL/L — SIGNIFICANT CHANGE UP (ref 1.15–1.33)
CALCIUM SERPL-MCNC: 9.8 MG/DL — SIGNIFICANT CHANGE UP (ref 8.4–10.5)
CHLORIDE BLDV-SCNC: 100 MMOL/L — SIGNIFICANT CHANGE UP (ref 96–108)
CHLORIDE SERPL-SCNC: 99 MMOL/L — SIGNIFICANT CHANGE UP (ref 96–108)
CO2 BLDV-SCNC: 32 MMOL/L — HIGH (ref 22–26)
CO2 SERPL-SCNC: 24 MMOL/L — SIGNIFICANT CHANGE UP (ref 22–31)
COLOR SPEC: YELLOW — SIGNIFICANT CHANGE UP
CREAT SERPL-MCNC: 0.6 MG/DL — SIGNIFICANT CHANGE UP (ref 0.5–1.3)
DIFF PNL FLD: NEGATIVE — SIGNIFICANT CHANGE UP
EGFR: 100 ML/MIN/1.73M2 — SIGNIFICANT CHANGE UP
EOSINOPHIL # BLD AUTO: 0.01 K/UL — SIGNIFICANT CHANGE UP (ref 0–0.5)
EOSINOPHIL NFR BLD AUTO: 0.1 % — SIGNIFICANT CHANGE UP (ref 0–6)
EPI CELLS # UR: 1 /HPF — SIGNIFICANT CHANGE UP
GAS PNL BLDV: 140 MMOL/L — SIGNIFICANT CHANGE UP (ref 136–145)
GAS PNL BLDV: SIGNIFICANT CHANGE UP
GAS PNL BLDV: SIGNIFICANT CHANGE UP
GLUCOSE BLDV-MCNC: 122 MG/DL — HIGH (ref 70–99)
GLUCOSE SERPL-MCNC: 141 MG/DL — HIGH (ref 70–99)
GLUCOSE UR QL: NEGATIVE — SIGNIFICANT CHANGE UP
HCO3 BLDV-SCNC: 31 MMOL/L — HIGH (ref 22–29)
HCT VFR BLD CALC: 34.5 % — SIGNIFICANT CHANGE UP (ref 34.5–45)
HCT VFR BLDA CALC: 32 % — LOW (ref 34.5–46.5)
HGB BLD CALC-MCNC: 10.6 G/DL — LOW (ref 11.7–16.1)
HGB BLD-MCNC: 11.1 G/DL — LOW (ref 11.5–15.5)
IMM GRANULOCYTES NFR BLD AUTO: 0.6 % — SIGNIFICANT CHANGE UP (ref 0–1.5)
INR BLD: 1.19 RATIO — HIGH (ref 0.88–1.16)
KETONES UR-MCNC: ABNORMAL
LACTATE BLDV-MCNC: 1.4 MMOL/L — SIGNIFICANT CHANGE UP (ref 0.7–2)
LEUKOCYTE ESTERASE UR-ACNC: ABNORMAL
LYMPHOCYTES # BLD AUTO: 0.99 K/UL — LOW (ref 1–3.3)
LYMPHOCYTES # BLD AUTO: 12.7 % — LOW (ref 13–44)
MCHC RBC-ENTMCNC: 28.4 PG — SIGNIFICANT CHANGE UP (ref 27–34)
MCHC RBC-ENTMCNC: 32.2 GM/DL — SIGNIFICANT CHANGE UP (ref 32–36)
MCV RBC AUTO: 88.2 FL — SIGNIFICANT CHANGE UP (ref 80–100)
MONOCYTES # BLD AUTO: 0.56 K/UL — SIGNIFICANT CHANGE UP (ref 0–0.9)
MONOCYTES NFR BLD AUTO: 7.2 % — SIGNIFICANT CHANGE UP (ref 2–14)
NEUTROPHILS # BLD AUTO: 6.19 K/UL — SIGNIFICANT CHANGE UP (ref 1.8–7.4)
NEUTROPHILS NFR BLD AUTO: 79.1 % — HIGH (ref 43–77)
NITRITE UR-MCNC: NEGATIVE — SIGNIFICANT CHANGE UP
NRBC # BLD: 0 /100 WBCS — SIGNIFICANT CHANGE UP (ref 0–0)
PCO2 BLDV: 45 MMHG — HIGH (ref 39–42)
PH BLDV: 7.44 — HIGH (ref 7.32–7.43)
PH UR: 6 — SIGNIFICANT CHANGE UP (ref 5–8)
PLATELET # BLD AUTO: 337 K/UL — SIGNIFICANT CHANGE UP (ref 150–400)
PO2 BLDV: 39 MMHG — SIGNIFICANT CHANGE UP (ref 25–45)
POTASSIUM BLDV-SCNC: 3.1 MMOL/L — LOW (ref 3.5–5.1)
POTASSIUM SERPL-MCNC: 3 MMOL/L — LOW (ref 3.5–5.3)
POTASSIUM SERPL-SCNC: 3 MMOL/L — LOW (ref 3.5–5.3)
PROCALCITONIN SERPL-MCNC: 0.04 NG/ML — SIGNIFICANT CHANGE UP (ref 0.02–0.1)
PROT SERPL-MCNC: 8.3 G/DL — SIGNIFICANT CHANGE UP (ref 6–8.3)
PROT UR-MCNC: ABNORMAL
PROTHROM AB SERPL-ACNC: 13.8 SEC — HIGH (ref 10.5–13.4)
RAPID RVP RESULT: SIGNIFICANT CHANGE UP
RBC # BLD: 3.91 M/UL — SIGNIFICANT CHANGE UP (ref 3.8–5.2)
RBC # FLD: 13.8 % — SIGNIFICANT CHANGE UP (ref 10.3–14.5)
RBC CASTS # UR COMP ASSIST: 2 /HPF — SIGNIFICANT CHANGE UP (ref 0–4)
SAO2 % BLDV: 72.6 % — SIGNIFICANT CHANGE UP (ref 67–88)
SARS-COV-2 RNA SPEC QL NAA+PROBE: SIGNIFICANT CHANGE UP
SODIUM SERPL-SCNC: 139 MMOL/L — SIGNIFICANT CHANGE UP (ref 135–145)
SP GR SPEC: 1.03 — HIGH (ref 1.01–1.02)
TROPONIN T, HIGH SENSITIVITY RESULT: 12 NG/L — SIGNIFICANT CHANGE UP (ref 0–51)
UROBILINOGEN FLD QL: ABNORMAL
WBC # BLD: 7.82 K/UL — SIGNIFICANT CHANGE UP (ref 3.8–10.5)
WBC # FLD AUTO: 7.82 K/UL — SIGNIFICANT CHANGE UP (ref 3.8–10.5)
WBC UR QL: 8 /HPF — HIGH (ref 0–5)

## 2022-05-13 PROCEDURE — 70450 CT HEAD/BRAIN W/O DYE: CPT | Mod: 26,MA

## 2022-05-13 PROCEDURE — 99285 EMERGENCY DEPT VISIT HI MDM: CPT

## 2022-05-13 PROCEDURE — 73060 X-RAY EXAM OF HUMERUS: CPT | Mod: 26,LT

## 2022-05-13 PROCEDURE — 71045 X-RAY EXAM CHEST 1 VIEW: CPT | Mod: 26

## 2022-05-13 RX ORDER — SODIUM CHLORIDE 9 MG/ML
1000 INJECTION INTRAMUSCULAR; INTRAVENOUS; SUBCUTANEOUS ONCE
Refills: 0 | Status: COMPLETED | OUTPATIENT
Start: 2022-05-13 | End: 2022-05-13

## 2022-05-13 RX ORDER — POTASSIUM CHLORIDE 20 MEQ
40 PACKET (EA) ORAL ONCE
Refills: 0 | Status: COMPLETED | OUTPATIENT
Start: 2022-05-13 | End: 2022-05-13

## 2022-05-13 RX ORDER — HALOPERIDOL DECANOATE 100 MG/ML
5 INJECTION INTRAMUSCULAR ONCE
Refills: 0 | Status: COMPLETED | OUTPATIENT
Start: 2022-05-13 | End: 2022-05-13

## 2022-05-13 RX ADMIN — SODIUM CHLORIDE 1000 MILLILITER(S): 9 INJECTION INTRAMUSCULAR; INTRAVENOUS; SUBCUTANEOUS at 17:40

## 2022-05-13 RX ADMIN — Medication 40 MILLIEQUIVALENT(S): at 18:04

## 2022-05-13 NOTE — ED PROVIDER NOTE - ATTENDING CONTRIBUTION TO CARE
Attending MD Crisostomo:  I personally have seen and examined this patient. I have performed a substantive portion of the visit including all aspects of the medical decision making.  Resident note reviewed and agree on plan of care and except where noted.        66F with OI, recent humerus fx sp ORIF, depression/anxiety presenting with multiple days of increased sleepiness, confusion, speech changes since hearing about maybe needing another arm surgery, also with subacute memory issues x months. Patient initially activated as a code stroke, cancelled after neurology and ED assessment given time course of symptoms and low suspicion for CVA. Ddx quite broad but includes metabolic or infectious encephalopathy, somatoform illness. Plan for CT head, labs, basic infectious w/u with UA, CXR, admission for further investigation            *The above represents an initial assessment/impression. Please refer to progress notes for potential changes in patient clinical course*

## 2022-05-13 NOTE — ED ADULT NURSE NOTE - NSIMPLEMENTINTERV_GEN_ALL_ED
Implemented All Fall with Harm Risk Interventions:  [place] to call system. Call bell, personal items and telephone within reach. Instruct patient to call for assistance. Room bathroom lighting operational. Non-slip footwear when patient is off stretcher. Physically safe environment: no spills, clutter or unnecessary equipment. Stretcher in lowest position, wheels locked, appropriate side rails in place. Provide visual cue, wrist band, yellow gown, etc. Monitor gait and stability. Monitor for mental status changes and reorient to person, place, and time. Review medications for side effects contributing to fall risk. Reinforce activity limits and safety measures with patient and family. Provide visual clues: red socks.

## 2022-05-13 NOTE — ED PROVIDER NOTE - OBJECTIVE STATEMENT
66F hx of OI with multiple fractures, recent admission for L humeral fx with hardwarre (needing repair now due to hardware malfunction), depression/anxiety on multipe meds presenting to the Ed for 2 days of waxing and waning confusion (disoriented), more sleepiness. Progressive memory loss. Denies fever, chills, chest pain, shortness of breath, abd pain. Called as a code stroke for confusion but LKN > 24 hrs ago, patient felt warm, thus code stroke canceled.

## 2022-05-13 NOTE — ED PROVIDER NOTE - PHYSICAL EXAMINATION
GENERAL: middle aged female, lying in bed, NAD. tachycardic otherwise Vital signs are within normal limits  EYES: Conjunctiva noninjected or pale, sclera anicteric  HENT: NC/AT, moist mucous membranes  NECK: Supple, trachea midline  LUNG: Nonlabored respirations, no wheezes, rales  CV: tachy rate, no audible murmurs, Pulses- Radial/dorsalis pedis: 2+ bilateral and equal  ABDOMEN: Nondistended, nontender, no guarding  MSK: + LUE swelling, slight erythema, equally warm with other extremities. Able to range bilateral UE, limited, but not painful up to patient's physioogic range of motion barrier. Tenderness to LUE but also with swelling (present since surgery per )  SKIN: No rashes, bruises. + slight erythema of LUE  NEURO: AAOx1 (to person NOT place, time, event), no tremor, sensation intact to touch bilateral UE, no truncal ataxia  -Cranial Nerves:  --CN II: PERRL 3mm  --CN III, IV, VI: EOMI bilateral no nystgmus  --CN V1-3: Facial sensation intact to touch  --CN VII: No facial asymmetry or droop  --CN VIII: Hearing intact to rubbing fingers b/l  --CN IX, X: Palate elevates symmetrically. Normal phonation  --CN XI: Heading turning and shoulder shrug intact b/l  --CN XII: Tongue midline with normal movements   PSYCH: smiling, cooperative, not speaking coherently

## 2022-05-13 NOTE — ED ADULT TRIAGE NOTE - CHIEF COMPLAINT QUOTE
woke up at 1 am this morning confused and not knowing where she is, talking to herself, s/p orthopedic surgery 2 weeks ago. on psychiatric medication, restless for the past 2 days and most of the time talking to herself

## 2022-05-13 NOTE — ED ADULT NURSE NOTE - OBJECTIVE STATEMENT
66 year old female presents to the ED via wheelchair through waiting room for AMS x2 days. Patient has PMH of osteogenesis imperfecta on PO dilauded for pain around the clock, patient and patient's family member both unsure if patient took extra dose. Family member with patient states patient has been talking to herself/people who are not there at night x 2 nights. Code stroke initiated in triage and cancelled at CT by neurology MDs after neuro assessment. Patient is awake, alert, following commands, strong equal strength bilaterally x 4, sensory in tact. Pt. unsure of current year, A&Ox2 which is off from baseline as per family member with patients. 20g peripheral IV placed in L AC and labs drawn and sent to lab. Patient undressed and placed into gown, call bell in hand and side rails up with bed in lowest position for safety. blanket provided. Comfort and safety provided.

## 2022-05-13 NOTE — ED PROVIDER NOTE - PROGRESS NOTE DETAILS
Elisabeth Stevenson, DO PGY-3: pt recevied as signout - pending rectal temp, xr, UA, plan to admit  Pt is refusing to get EKG and other pending testing. Whispering when I ask her anything. Appears angry. Not violent however.  mentions patient was talking about people who passed many years ago yesterday. Was wondering around in the house, talking to herself. States her behavior has been happening in waves since yesterday. Was ok when she first came to the ED and states she has worsened now she she went to get the cxr. Plan to give haldol, RN aware to continue the workup.  in agreement with the plan Elisabeth Stevenson, DO PGY-3: pt is now more pleasant, A&Ox4. Denies any pain anywhere. Pt gave urine - to be sent, RN aware. Plan to admit. Sxs c/w delirium Resident Stuart: case endorsed to Hospitalist (Md José Antonio).

## 2022-05-13 NOTE — ED ADULT NURSE REASSESSMENT NOTE - NS ED NURSE REASSESS COMMENT FT1
Patient is refusing to wear hospital gown, refusing EKG, refusing rectal temp, threatening to accuse ED ancillary (Bj) of pushing her to make her go away. Bj was never alone with patient. MDs aware that patient is threatening to make false claims and is refusing all care.

## 2022-05-13 NOTE — ED PROVIDER NOTE - CLINICAL SUMMARY MEDICAL DECISION MAKING FREE TEXT BOX
66F hx of OI with multiple fractures, recent admission for L humeral fx with hardwarre (needing repair now due to hardware malfunction), depression/anxiety on multipe meds here for encephalopathy x 48 hrs, waxing and waning. Tachycardic in ED, orally afebrile. Confused, aaox1 now, normally aaox4. Neurovasc intact. LUE swelling with limited range of motion, tenderness to touch though tender since surgery per patient and  at bedside. No facial plethora. Benign abdomen, clear lungs. No truncal ataxia. Eval for source of encephalopthy (metabolic vs infectious vs intracranial mass vs polypharmacy?). Check labs, lactate, UA, CXs, CXR, screening EKG, CTH, CXR.

## 2022-05-14 DIAGNOSIS — R41.0 DISORIENTATION, UNSPECIFIED: ICD-10-CM

## 2022-05-14 LAB
ANION GAP SERPL CALC-SCNC: 14 MMOL/L — SIGNIFICANT CHANGE UP (ref 5–17)
ANION GAP SERPL CALC-SCNC: 14 MMOL/L — SIGNIFICANT CHANGE UP (ref 5–17)
BUN SERPL-MCNC: 5 MG/DL — LOW (ref 7–23)
BUN SERPL-MCNC: 7 MG/DL — SIGNIFICANT CHANGE UP (ref 7–23)
CALCIUM SERPL-MCNC: 8.6 MG/DL — SIGNIFICANT CHANGE UP (ref 8.4–10.5)
CALCIUM SERPL-MCNC: 8.8 MG/DL — SIGNIFICANT CHANGE UP (ref 8.4–10.5)
CHLORIDE SERPL-SCNC: 101 MMOL/L — SIGNIFICANT CHANGE UP (ref 96–108)
CHLORIDE SERPL-SCNC: 103 MMOL/L — SIGNIFICANT CHANGE UP (ref 96–108)
CO2 SERPL-SCNC: 22 MMOL/L — SIGNIFICANT CHANGE UP (ref 22–31)
CO2 SERPL-SCNC: 24 MMOL/L — SIGNIFICANT CHANGE UP (ref 22–31)
CREAT SERPL-MCNC: 0.37 MG/DL — LOW (ref 0.5–1.3)
CREAT SERPL-MCNC: 0.39 MG/DL — LOW (ref 0.5–1.3)
EGFR: 110 ML/MIN/1.73M2 — SIGNIFICANT CHANGE UP
EGFR: 112 ML/MIN/1.73M2 — SIGNIFICANT CHANGE UP
GLUCOSE SERPL-MCNC: 100 MG/DL — HIGH (ref 70–99)
GLUCOSE SERPL-MCNC: 93 MG/DL — SIGNIFICANT CHANGE UP (ref 70–99)
GRAM STN FLD: SIGNIFICANT CHANGE UP
HCT VFR BLD CALC: 30 % — LOW (ref 34.5–45)
HGB BLD-MCNC: 10 G/DL — LOW (ref 11.5–15.5)
MAGNESIUM SERPL-MCNC: 2 MG/DL — SIGNIFICANT CHANGE UP (ref 1.6–2.6)
MCHC RBC-ENTMCNC: 28.9 PG — SIGNIFICANT CHANGE UP (ref 27–34)
MCHC RBC-ENTMCNC: 33.3 GM/DL — SIGNIFICANT CHANGE UP (ref 32–36)
MCV RBC AUTO: 86.7 FL — SIGNIFICANT CHANGE UP (ref 80–100)
NRBC # BLD: 0 /100 WBCS — SIGNIFICANT CHANGE UP (ref 0–0)
PHOSPHATE SERPL-MCNC: 2.5 MG/DL — SIGNIFICANT CHANGE UP (ref 2.5–4.5)
PLATELET # BLD AUTO: 271 K/UL — SIGNIFICANT CHANGE UP (ref 150–400)
POTASSIUM SERPL-MCNC: 2.6 MMOL/L — CRITICAL LOW (ref 3.5–5.3)
POTASSIUM SERPL-MCNC: 3.7 MMOL/L — SIGNIFICANT CHANGE UP (ref 3.5–5.3)
POTASSIUM SERPL-SCNC: 2.6 MMOL/L — CRITICAL LOW (ref 3.5–5.3)
POTASSIUM SERPL-SCNC: 3.7 MMOL/L — SIGNIFICANT CHANGE UP (ref 3.5–5.3)
RBC # BLD: 3.46 M/UL — LOW (ref 3.8–5.2)
RBC # FLD: 13.5 % — SIGNIFICANT CHANGE UP (ref 10.3–14.5)
SODIUM SERPL-SCNC: 139 MMOL/L — SIGNIFICANT CHANGE UP (ref 135–145)
SODIUM SERPL-SCNC: 139 MMOL/L — SIGNIFICANT CHANGE UP (ref 135–145)
SPECIMEN SOURCE: SIGNIFICANT CHANGE UP
WBC # BLD: 4.82 K/UL — SIGNIFICANT CHANGE UP (ref 3.8–10.5)
WBC # FLD AUTO: 4.82 K/UL — SIGNIFICANT CHANGE UP (ref 3.8–10.5)

## 2022-05-14 PROCEDURE — 73070 X-RAY EXAM OF ELBOW: CPT | Mod: 26,LT

## 2022-05-14 PROCEDURE — 70450 CT HEAD/BRAIN W/O DYE: CPT | Mod: 26

## 2022-05-14 RX ORDER — CEFTRIAXONE 500 MG/1
1000 INJECTION, POWDER, FOR SOLUTION INTRAMUSCULAR; INTRAVENOUS ONCE
Refills: 0 | Status: COMPLETED | OUTPATIENT
Start: 2022-05-14 | End: 2022-05-14

## 2022-05-14 RX ORDER — CEFAZOLIN SODIUM 1 G
2000 VIAL (EA) INJECTION EVERY 8 HOURS
Refills: 0 | Status: DISCONTINUED | OUTPATIENT
Start: 2022-05-14 | End: 2022-05-16

## 2022-05-14 RX ORDER — VANCOMYCIN HCL 1 G
1000 VIAL (EA) INTRAVENOUS ONCE
Refills: 0 | Status: COMPLETED | OUTPATIENT
Start: 2022-05-14 | End: 2022-05-14

## 2022-05-14 RX ORDER — POTASSIUM CHLORIDE 20 MEQ
10 PACKET (EA) ORAL
Refills: 0 | Status: COMPLETED | OUTPATIENT
Start: 2022-05-14 | End: 2022-05-14

## 2022-05-14 RX ORDER — POTASSIUM CHLORIDE 20 MEQ
40 PACKET (EA) ORAL ONCE
Refills: 0 | Status: COMPLETED | OUTPATIENT
Start: 2022-05-14 | End: 2022-05-14

## 2022-05-14 RX ADMIN — Medication 100 MILLIEQUIVALENT(S): at 09:26

## 2022-05-14 RX ADMIN — Medication 100 MILLIGRAM(S): at 13:06

## 2022-05-14 RX ADMIN — Medication 100 MILLIEQUIVALENT(S): at 11:51

## 2022-05-14 RX ADMIN — Medication 100 MILLIEQUIVALENT(S): at 10:37

## 2022-05-14 RX ADMIN — Medication 40 MILLIEQUIVALENT(S): at 09:26

## 2022-05-14 RX ADMIN — Medication 100 MILLIGRAM(S): at 21:31

## 2022-05-14 RX ADMIN — Medication 250 MILLIGRAM(S): at 03:06

## 2022-05-14 NOTE — H&P ADULT - ASSESSMENT
Patient with confusion and altered sensorium  will need neuro eval and ortho to see for left arm difficulty moving    Her mental status progressed back to baseline by this    Ortho to see patient for possible       placement of left arm brace and Neuro to  evaluate altered mental status    See orders

## 2022-05-14 NOTE — PHYSICAL THERAPY INITIAL EVALUATION ADULT - PRECAUTIONS/LIMITATIONS, REHAB EVAL
5/14	Ortho consulted, X-ray elbow demonstrating L distal humerus s/p ORIF w/ fx distal to plate, No acute orthopedic intervention at this time, possible future operative management, Patient underwent splinting L eblow in posterior slab/fall precautions

## 2022-05-14 NOTE — PATIENT PROFILE ADULT - FALL HARM RISK - HARM RISK INTERVENTIONS

## 2022-05-14 NOTE — CHART NOTE - NSCHARTNOTEFT_GEN_A_CORE
Notified of Blood culture results below.     Culture - Blood (05.13.22 @ 17:30)   Gram Stain:   Growth in aerobic and anaerobic bottles: Gram Positive Cocci in Clusters   Specimen Source: .Blood Blood-Peripheral   Culture Results:   Growth in aerobic and anaerobic bottles: Gram Positive Cocci in Clusters   ***Blood Panel PCR results on this specimen are available   approximately 3 hours after the Gram stain result.***   Gram stain, PCR, and/or culture results may not always   correspond due to difference in methodologies.   ************************************************************   This PCR assay was performed by multiplex PCR. This   Assay tests for 66 bacterial and resistance gene targets.   Please refer to the [facility] test directory   at [email or web address] for details.     Only one blood culture result available   Pt currently on [facility]ef  Will wait for sensitives to determine if different antibiotics are needed.   Will repeat blood cultures  Continue to monitor    Cinthia Yanes PA-C  Department of medicine Notified of Blood culture results below.     Culture - Blood (05.13.22 @ 17:30)   Gram Stain:   Growth in aerobic and anaerobic bottles: Gram Positive Cocci in Clusters   Specimen Source: .Blood Blood-Peripheral   Culture Results:   Growth in aerobic and anaerobic bottles: Gram Positive Cocci in Clusters   ***Blood Panel PCR results on this specimen are available   approximately 3 hours after the Gram stain result.***   Gram stain, PCR, and/or culture results may not always   correspond due to difference in methodologies.   ************************************************************   This PCR assay was performed by multiplex PCR. This   Assay tests for 66 bacterial and resistance gene targets.   Please refer to the [facility] test directory   at [email or web address] for details.     Only one blood culture result available   Pt currently on Ancef  Will wait for sensitives to determine if different antibiotics are needed.   Will repeat blood cultures  Continue to monitor      Addendum:  Culture - Blood (05.13.22 @ 17:30)   - Staphylococcus epidermidis, Methicillin resistant: Detec   Gram Stain:   Growth in aerobic and anaerobic bottles: Gram Positive Cocci in Clusters   Specimen Source: .Blood Blood-Peripheral   Organism: Blood Culture PCR   Culture Results:   Growth in aerobic and anaerobic bottles: Gram Positive Cocci in Clusters   ***Blood Panel PCR results on this specimen are available   approximately 3 hours after the Gram stain result.***   Gram stain, PCR, and/or culture results may not always   correspond due to difference in methodologies.       Sensitives back with MRSA positive blood culture.   1x vanco dose given  VSS  Other blood culture still negative  Will continue to monitor  Repeat cultures in TIA Yanes PA-C  Department of medicine

## 2022-05-14 NOTE — H&P ADULT - NSHPPHYSICALEXAM_GEN_ALL_CORE
Physical exam      HEENT normal  lungs clear  Heart regular  abd benign  ext no c/c/e  Neuro left arm weakness needs brace to help with movement Ortho at bedside   to evaluate and treat arm pain and weakness

## 2022-05-14 NOTE — CONSULT NOTE ADULT - SUBJECTIVE AND OBJECTIVE BOX
HPI: 66y year old Female RHD PMH OI, depression, anxiety, PSH R both bone fx s/p ORIF (9/2019) c/b perimplant fx s/p ORIF (10/201) and L distal humerus s/p ORIF (3/2022) w/ Morrison admitted to medicine for confusion. Ortho consulted for x-rays demonstrating L distal humerus fx distal to hardware from prior ORIF. Pt last saw Dr. Morrison in office on Monday and current x-rays are unchanged from those taken in office. Pt currently A+Ox2, not aware of location. Unable to describe any recent falls or trauma. Endorses moderate pain L elbow.  Patient denies pain in any other joint, numbness, tingling, paresthesias. No pain in any other extremities.        PMH:  Osteogenesis imperfecta    Former smoker    Hard of hearing      [ ] No past medical history    PSH:  No significant past surgical history    History of open reduction and internal fixation (ORIF) procedure      [ ] No past surgical history    Allergies:  No Known Allergies      O:  T(C): 36.9 (05-14-22 @ 06:22), Max: 37.4 (05-13-22 @ 17:48)  HR: 82 (05-14-22 @ 06:22) (82 - 111)  BP: 140/81 (05-14-22 @ 06:22) (140/81 - 157/91)  RR: 18 (05-14-22 @ 06:22) (16 - 20)  SpO2: 97% (05-14-22 @ 06:22) (96% - 100%)    Gen: NAD, A+Ox2  LUE:  Skin intact  Swollen elbow  AIN/PIN/U Intact  SILT M/U/R  Painless active ROM shoulder, wrist and hand  Radial pulse palpable, WWP distally      RUE / B/L LE:  No bony TTP; Good ROM w/o pain. Able to SLR B/L. Exam Unremarkable.     Imaging:  X-ray elbow demonstrating L distal humerus s/p ORIF w/ fx distal to plate    Patient underwent splinting L eblow in posterior slab      A/P 66y year old Female PMH OI, R both bone fx s/p ORIF (9/2019) c/b perimplant fx s/p ORIF (10/201) and L distal humerus s/p ORIF (3/2022) w/ Prince admitted to medicine for confusion. Ortho consulted for fracture distal to L humerus plate.    Plan:  No acute orthopedic intervention at this time, will continue to f/u w/ attending and pt family regarding possible future operative management  Pain Control  NWB LUE  Sling for comfort  Posterior slab   Splint precautions:  Keep Splint dry  Elevate extremity, can try and ice through the Splint   Do not stick anything into the Splint    INCOMPLETE NOTE WILL F/U W/ ATTENDING FOR COMPLETE PLAN  INCOMPLETE NOTE WILL F/U W/ ATTENDING FOR COMPLETE PLAN  INCOMPLETE NOTE WILL F/U W/ ATTENDING FOR COMPLETE PLAN    HPI: 66y year old Female RHD PMH OI, depression, anxiety, PSH R both bone fx s/p ORIF (9/2019) c/b perimplant fx s/p ORIF (10/201) and L distal humerus s/p ORIF (3/2022) w/ Morrison admitted to medicine for confusion. Ortho consulted for x-rays demonstrating L distal humerus fx distal to hardware from prior ORIF. Pt last saw Dr. Morrison in office on Monday and current x-rays are unchanged from those taken in office. Pt currently A+Ox2, not aware of location. Unable to describe any recent falls or trauma. Endorses moderate pain L elbow.  Patient denies pain in any other joint, numbness, tingling, paresthesias. No pain in any other extremities.        PMH:  Osteogenesis imperfecta    Former smoker    Hard of hearing      [ ] No past medical history    PSH:  No significant past surgical history    History of open reduction and internal fixation (ORIF) procedure      [ ] No past surgical history    Allergies:  No Known Allergies      O:  T(C): 36.9 (05-14-22 @ 06:22), Max: 37.4 (05-13-22 @ 17:48)  HR: 82 (05-14-22 @ 06:22) (82 - 111)  BP: 140/81 (05-14-22 @ 06:22) (140/81 - 157/91)  RR: 18 (05-14-22 @ 06:22) (16 - 20)  SpO2: 97% (05-14-22 @ 06:22) (96% - 100%)    Gen: NAD, A+Ox2  LUE:  Skin intact  Swollen elbow  AIN/PIN/U Intact  SILT M/U/R  Painless active ROM shoulder, wrist and hand  Radial pulse palpable, WWP distally      RUE / B/L LE:  No bony TTP; Good ROM w/o pain. Able to SLR B/L. Exam Unremarkable.     Imaging:  X-ray elbow demonstrating L distal humerus s/p ORIF w/ fx distal to plate    Patient underwent splinting L eblow in posterior slab      A/P 66y year old Female PMH OI, R both bone fx s/p ORIF (9/2019) c/b perimplant fx s/p ORIF (10/201) and L distal humerus s/p ORIF (3/2022) w/ Prince admitted to medicine for confusion. Ortho consulted for fracture distal to L humerus plate.    Plan:  No acute orthopedic intervention at this time, will continue to f/u w/ attending and pt's family regarding possible future operative management  IV ancef  Pain Control  NWB LUE  Sling for comfort  Posterior slab   Splint precautions:  Keep Splint dry  Elevate extremity, can try and ice through the Splint   Do not stick anything into the Splint

## 2022-05-14 NOTE — H&P ADULT - NSHPLABSRESULTS_GEN_ALL_CORE
CBC Full  -  ( 14 May 2022 07:20 )  WBC Count : 4.82 K/uL  RBC Count : 3.46 M/uL  Hemoglobin : 10.0 g/dL  Hematocrit : 30.0 %  Platelet Count - Automated : 271 K/uL  Mean Cell Volume : 86.7 fl  Mean Cell Hemoglobin : 28.9 pg  Mean Cell Hemoglobin Concentration : 33.3 gm/dL  Auto Neutrophil # : x  Auto Lymphocyte # : x  Auto Monocyte # : x  Auto Eosinophil # : x  Auto Basophil # : x  Auto Neutrophil % : x  Auto Lymphocyte % : x  Auto Monocyte % : x  Auto Eosinophil % : x  Auto Basophil % : x    05-  bmp pending    139  |  101  |  7   ----------------------------<  93  2.6<LL>   |  24  |  0.39<L>    Ca    8.8      14 May 2022 07:20  Phos  2.5     -  Mg     2.0         TPro  8.3  /  Alb  4.4  /  TBili  0.7  /  DBili  x   /  AST  29  /  ALT  18  /  AlkPhos  284<H>  05-13    LIVER FUNCTIONS - ( 13 May 2022 13:58 )  Alb: 4.4 g/dL / Pro: 8.3 g/dL / ALK PHOS: 284 U/L / ALT: 18 U/L / AST: 29 U/L / GGT: x           PT/INR - ( 13 May 2022 13:58 )   PT: 13.8 sec;   INR: 1.19 ratio         PTT - ( 13 May 2022 13:58 )  PTT:33.5 sec  Urinalysis Basic - ( 13 May 2022 22:01 )    Color: Yellow / Appearance: Clear / S.032 / pH: x  Gluc: x / Ketone: Large  / Bili: Negative / Urobili: 2 mg/dL   Blood: x / Protein: 30 mg/dL / Nitrite: Negative   Leuk Esterase: Small / RBC: 2 /hpf / WBC 8 /HPF   Sq Epi: x / Non Sq Epi: 1 /hpf / Bacteria: Negative

## 2022-05-14 NOTE — PHYSICAL THERAPY INITIAL EVALUATION ADULT - ADDITIONAL COMMENTS
Pt resides with spouse in an apartment with ramp and elevator access to their apartment unit on the 19th floor. PTA, spouse stated that pt. was ambulatory with a rollator & independent with ADLs. Spouse assisted such as cooking, assisting with bathing. spouse confirmed pt. Pt own a rollator, cane, grab bars, elevated toilet seat, commode and tub transfer bench.

## 2022-05-14 NOTE — H&P ADULT - HISTORY OF PRESENT ILLNESS
· Chief Complaint: The patient is a 66y Female complaining of confusion.  · HPI Objective Statement: 66F hx of OI with multiple fractures, recent admission for L humeral fx with hardwarre (needing repair now due to hardware malfunction), depression/anxiety on multipe meds presenting to the Ed for 2 days of waxing and waning confusion (disoriented), more sleepiness. Progressive memory loss. Denies fever, chills, chest pain, shortness of breath, abd pain. Called as a code stroke for confusion but LKN > 24 hrs ago, patient felt warm, thus code stroke canceled.      PAST MEDICAL & SURGICAL HISTORY:  Osteogenesis imperfecta      Former smoker      Hard of hearing      MEDICATIONS  (STANDING):    MEDICATIONS  (PRN):  History of open reduction and internal fixation (ORIF) procedure    Social History:  intermittenti episodes of confusion        PAST MEDICAL/SURGICAL/FAMILY/SOCIAL HISTORY:    Past Medical, Past Surgical, and Family History:  PAST MEDICAL HISTORY:  Former smoker     Hard of hearing     Osteogenesis imperfecta.     PAST SURGICAL HISTORY:  History of open reduction and internal fixation (ORIF) procedure.     FAMILY HISTORY:  Father  Still living? Unknown  Family history of osteogenesis imperfecta, Age at diagnosis: Age Unknown    Sibling  Still living? Unknown  Family history of osteogenesis imperfecta, Age at diagnosis: Age Unknown  Family history of valvular heart disease, Age at diagnosis: Age Unknown.     Tobacco Usage:  · Tobacco Usage	Former smoker    ALLERGIES AND HOME MEDICATIONS:   Allergies:        Allergies:  	No Known Allergies:     Home Medications:   * Patient Currently Takes Medications as of 04-Apr-2022 09:57 documented in Structured Notes  · 	Narcan 4 mg/0.1 mL nasal spray: 4 milligram(s) intranasally- 1 spray Q 2-3 minutes alternating between nostrils).  · 	HYDROmorphone 8 mg oral tablet: 1 tab(s) orally every 4-6 hours, As needed, moderate to Severe Pain  · 	Ecotrin 325 mg oral delayed release tablet: 1 tab(s) orally 2 times a day x 6 weeks total for dvt prevention MDD:2  · 	senna 8.6 mg oral tablet: 2 tab(s) orally once a day (at bedtime) MDD:2 tabs per day  · 	polyethylene glycol 3350 oral powder for reconstitution: 17 gram(s) orally once a day, As needed, Constipation MDD:1 packet/ day  · 	rosuvastatin 10 mg oral tablet: 1 tab(s) orally once a day (at bedtime)  · 	FLUoxetine 20 mg oral capsule: 1 cap(s) orally 3 times a day  · 	acetaminophen 325 mg oral tablet: 2 tab(s) orally every 6 hours, As needed, Temp greater or equal to 38C (100.4F), mild pain  · 	Multiple Vitamins oral tablet: 1 tab(s) orally once a day  · 	clonazePAM 0.5 mg oral tablet: 1 tab(s) orally 3 times a day  · 	Ambien 5 mg oral tablet: 1 tab(s) orally once a day (at bedtime)  · 	levothyroxine 50 mcg (0.05 mg) oral tablet: 1 tab(s) orally once a day  · 	Wellbutrin 75 mg oral tablet: 1 tab(s) orally 3 times a day  · 	lamoTRIgine 100 mg oral tablet: 1 tab(s) orally 2 times a day  · 	busPIRone 15 mg oral tablet: 1 tab(s) orally 2 times a day    REVIEW OF SYSTEMS:    Review of Systems:  · CONSTITUTIONAL: no fever and no chills.  · CARDIOVASCULAR: no chest pain and no edema.  · RESPIRATORY: no chest pain, no cough, and no shortness of breath.  · GASTROINTESTINAL: no abdominal pain, no bloating, no constipation, no diarrhea, no nausea and no vomiting.  · GENITOURINARY: no dysuria, no frequency, and no hematuria.  · MUSCULOSKELETAL: - - -  · Musculoskeletal [+]: + LUE pain (chronic  since LUE surgery)  · SKIN: - - -  · Skin [+]: + left UE swelling (subacute to chronic)  · Skin [-]: no laceration  · ALLERGIC/IMMUNOLOGIC: immunizations up to date  · ROS STATEMENT: all other ROS negative except as per HPI    PHYSICAL EXAM:   · Physical Examination: GENERAL: middle aged female, lying in bed, NAD. tachycardic otherwise Vital signs are within normal limits  	EYES: Conjunctiva noninjected or pale, sclera anicteric  	HENT: NC/AT, moist mucous membranes  	NECK: Supple, trachea midline  	LUNG: Nonlabored respirations, no wheezes, rales  	CV: tachy rate, no audible murmurs, Pulses- Radial/dorsalis pedis: 2+ bilateral and equal  	ABDOMEN: Nondistended, nontender, no guarding  	MSK: + LUE swelling, slight erythema, equally warm with other extremities. Able to range bilateral UE, limited, but not painful up to patient's physioogic range of motion barrier. Tenderness to LUE but also with swelling (present since surgery per )  	SKIN: No rashes, bruises. + slight erythema of LUE  	NEURO: AAOx1 (to person NOT place, time, event), no tremor, sensation intact to touch bilateral UE, no truncal ataxia  	-Cranial Nerves:  	--CN II: PERRL 3mm  	--CN III, IV, VI: EOMI bilateral no nystgmus  	--CN V1-3: Facial sensation intact to touch  	--CN VII: No facial asymmetry or droop  	--CN VIII: Hearing intact to rubbing fingers b/l  	--CN IX, X: Palate elevates symmetrically. Normal phonation  	--CN XI: Heading turning and shoulder shrug intact b/l  	--CN XII: Tongue midline with normal movements   PSYCH: smiling, cooperative, not speaking coherently

## 2022-05-14 NOTE — PHYSICAL THERAPY INITIAL EVALUATION ADULT - GENERAL OBSERVATIONS, REHAB EVAL
Pt encountered supine in bed, + LUE in sling and splinted. + IV locked. + continuous pulse ox. AO x1-2

## 2022-05-14 NOTE — PHYSICAL THERAPY INITIAL EVALUATION ADULT - PERTINENT HX OF CURRENT PROBLEM, REHAB EVAL
66F hx of OI w/ multiple fractures, recent admission for L humeral fx with hardwarre (needing repair now d/t hardware malfunction), depression/anxiety on multipe meds presenting to the Ed for 2 days of waxing and waning confusion (disoriented), more sleepiness. Progressive memory loss.

## 2022-05-14 NOTE — CONSULT NOTE ADULT - ATTENDING COMMENTS
Pt seen in office earlier this week..  Pts cognition seems to wax and wane.  Dx w Failure of hardware s/p ORIF L distal humerus.  Pt non compliant w NWB restrictions.  Now w erythema surrounding distal humerus.  Pt non compliant w brace immobilization of fracture.  Likely leading to erythema.  However, there is a new escar on posterior aspect of elbow w palpable hardware deep to it.      Will place pt in posterior elbow splint.  Hematoma expressed.  no purulence.  Would tx w keflex for now due to darsware being just below skin.  Pts cognition seems to be improved today compared to thursday, the last time her and I spoke.  Pt understands that she requires reconstructive surgery of L distal humerus fracture.  However, she does not wish to undergo surgery at this time.  Will try to hold off on surgery until end of May/June, when pt would like to have the surgery performed.  No indication for emergency surgery at this time.

## 2022-05-14 NOTE — PROVIDER CONTACT NOTE (OTHER) - ASSESSMENT
Patient disoriented to situation, place and time. RN went to hang ordered antibiotic and patient refused to have it hung even after RN tried to explain importance of medication.

## 2022-05-15 LAB
-  STAPHYLOCOCCUS EPIDERMIDIS, METHICILLIN RESISTANT: SIGNIFICANT CHANGE UP
ALBUMIN SERPL ELPH-MCNC: 3.8 G/DL — SIGNIFICANT CHANGE UP (ref 3.3–5)
ALP SERPL-CCNC: 255 U/L — HIGH (ref 40–120)
ALT FLD-CCNC: 14 U/L — SIGNIFICANT CHANGE UP (ref 10–45)
ANION GAP SERPL CALC-SCNC: 15 MMOL/L — SIGNIFICANT CHANGE UP (ref 5–17)
AST SERPL-CCNC: 25 U/L — SIGNIFICANT CHANGE UP (ref 10–40)
BILIRUB SERPL-MCNC: 0.5 MG/DL — SIGNIFICANT CHANGE UP (ref 0.2–1.2)
BUN SERPL-MCNC: 8 MG/DL — SIGNIFICANT CHANGE UP (ref 7–23)
CALCIUM SERPL-MCNC: 9.1 MG/DL — SIGNIFICANT CHANGE UP (ref 8.4–10.5)
CHLORIDE SERPL-SCNC: 103 MMOL/L — SIGNIFICANT CHANGE UP (ref 96–108)
CO2 SERPL-SCNC: 22 MMOL/L — SIGNIFICANT CHANGE UP (ref 22–31)
CREAT SERPL-MCNC: 0.41 MG/DL — LOW (ref 0.5–1.3)
CULTURE RESULTS: SIGNIFICANT CHANGE UP
CULTURE RESULTS: SIGNIFICANT CHANGE UP
EGFR: 109 ML/MIN/1.73M2 — SIGNIFICANT CHANGE UP
GLUCOSE SERPL-MCNC: 87 MG/DL — SIGNIFICANT CHANGE UP (ref 70–99)
GRAM STN FLD: SIGNIFICANT CHANGE UP
HCT VFR BLD CALC: 34.8 % — SIGNIFICANT CHANGE UP (ref 34.5–45)
HGB BLD-MCNC: 11.3 G/DL — LOW (ref 11.5–15.5)
MCHC RBC-ENTMCNC: 28.3 PG — SIGNIFICANT CHANGE UP (ref 27–34)
MCHC RBC-ENTMCNC: 32.5 GM/DL — SIGNIFICANT CHANGE UP (ref 32–36)
MCV RBC AUTO: 87.2 FL — SIGNIFICANT CHANGE UP (ref 80–100)
METHOD TYPE: SIGNIFICANT CHANGE UP
NRBC # BLD: 0 /100 WBCS — SIGNIFICANT CHANGE UP (ref 0–0)
ORGANISM # SPEC MICROSCOPIC CNT: SIGNIFICANT CHANGE UP
ORGANISM # SPEC MICROSCOPIC CNT: SIGNIFICANT CHANGE UP
PLATELET # BLD AUTO: 297 K/UL — SIGNIFICANT CHANGE UP (ref 150–400)
POTASSIUM SERPL-MCNC: 3.2 MMOL/L — LOW (ref 3.5–5.3)
POTASSIUM SERPL-SCNC: 3.2 MMOL/L — LOW (ref 3.5–5.3)
PROT SERPL-MCNC: 7.2 G/DL — SIGNIFICANT CHANGE UP (ref 6–8.3)
RBC # BLD: 3.99 M/UL — SIGNIFICANT CHANGE UP (ref 3.8–5.2)
RBC # FLD: 13.5 % — SIGNIFICANT CHANGE UP (ref 10.3–14.5)
SODIUM SERPL-SCNC: 140 MMOL/L — SIGNIFICANT CHANGE UP (ref 135–145)
SPECIMEN SOURCE: SIGNIFICANT CHANGE UP
SPECIMEN SOURCE: SIGNIFICANT CHANGE UP
WBC # BLD: 4.41 K/UL — SIGNIFICANT CHANGE UP (ref 3.8–10.5)
WBC # FLD AUTO: 4.41 K/UL — SIGNIFICANT CHANGE UP (ref 3.8–10.5)

## 2022-05-15 PROCEDURE — 99222 1ST HOSP IP/OBS MODERATE 55: CPT

## 2022-05-15 RX ORDER — HYDROMORPHONE HYDROCHLORIDE 2 MG/ML
4 INJECTION INTRAMUSCULAR; INTRAVENOUS; SUBCUTANEOUS EVERY 6 HOURS
Refills: 0 | Status: DISCONTINUED | OUTPATIENT
Start: 2022-05-15 | End: 2022-05-20

## 2022-05-15 RX ORDER — VANCOMYCIN HCL 1 G
1000 VIAL (EA) INTRAVENOUS ONCE
Refills: 0 | Status: COMPLETED | OUTPATIENT
Start: 2022-05-15 | End: 2022-05-15

## 2022-05-15 RX ORDER — LAMOTRIGINE 25 MG/1
100 TABLET, ORALLY DISINTEGRATING ORAL
Refills: 0 | Status: DISCONTINUED | OUTPATIENT
Start: 2022-05-15 | End: 2022-05-24

## 2022-05-15 RX ORDER — FLUOXETINE HCL 10 MG
60 CAPSULE ORAL DAILY
Refills: 0 | Status: DISCONTINUED | OUTPATIENT
Start: 2022-05-16 | End: 2022-05-24

## 2022-05-15 RX ORDER — FLUOXETINE HCL 10 MG
20 CAPSULE ORAL
Refills: 0 | Status: DISCONTINUED | OUTPATIENT
Start: 2022-05-15 | End: 2022-05-15

## 2022-05-15 RX ORDER — LANOLIN ALCOHOL/MO/W.PET/CERES
5 CREAM (GRAM) TOPICAL ONCE
Refills: 0 | Status: COMPLETED | OUTPATIENT
Start: 2022-05-15 | End: 2022-05-17

## 2022-05-15 RX ORDER — CLONAZEPAM 1 MG
0.5 TABLET ORAL THREE TIMES A DAY
Refills: 0 | Status: COMPLETED | OUTPATIENT
Start: 2022-05-15 | End: 2022-05-22

## 2022-05-15 RX ORDER — HYDROMORPHONE HYDROCHLORIDE 2 MG/ML
4 INJECTION INTRAMUSCULAR; INTRAVENOUS; SUBCUTANEOUS EVERY 6 HOURS
Refills: 0 | Status: DISCONTINUED | OUTPATIENT
Start: 2022-05-15 | End: 2022-05-15

## 2022-05-15 RX ORDER — POTASSIUM CHLORIDE 20 MEQ
20 PACKET (EA) ORAL
Refills: 0 | Status: COMPLETED | OUTPATIENT
Start: 2022-05-15 | End: 2022-05-15

## 2022-05-15 RX ADMIN — Medication 100 MILLIGRAM(S): at 13:01

## 2022-05-15 RX ADMIN — Medication 100 MILLIGRAM(S): at 21:01

## 2022-05-15 RX ADMIN — HYDROMORPHONE HYDROCHLORIDE 4 MILLIGRAM(S): 2 INJECTION INTRAMUSCULAR; INTRAVENOUS; SUBCUTANEOUS at 16:15

## 2022-05-15 RX ADMIN — HYDROMORPHONE HYDROCHLORIDE 4 MILLIGRAM(S): 2 INJECTION INTRAMUSCULAR; INTRAVENOUS; SUBCUTANEOUS at 15:18

## 2022-05-15 RX ADMIN — Medication 0.5 MILLIGRAM(S): at 12:43

## 2022-05-15 RX ADMIN — Medication 250 MILLIGRAM(S): at 06:45

## 2022-05-15 RX ADMIN — Medication 100 MILLIGRAM(S): at 06:44

## 2022-05-15 RX ADMIN — Medication 20 MILLIGRAM(S): at 16:45

## 2022-05-15 RX ADMIN — LAMOTRIGINE 100 MILLIGRAM(S): 25 TABLET, ORALLY DISINTEGRATING ORAL at 19:04

## 2022-05-15 RX ADMIN — Medication 20 MILLIEQUIVALENT(S): at 13:01

## 2022-05-15 RX ADMIN — Medication 0.5 MILLIGRAM(S): at 21:01

## 2022-05-15 NOTE — PROGRESS NOTE ADULT - ASSESSMENT
66F baseline cognitively with wfd in 2022 but otherwise her usual self, with AMS in setting of bacteremia as well as while hospitalized not being given her usual psych regimen or adequate pain control  mental status has recovered  exam w/o sx or signs concerning for having had a stroke. serial hcts neg for stroke, discussed with  some atrophy worse than for age in regions that could contribute to her word finding difficulty that began this year.

## 2022-05-15 NOTE — CONSULT NOTE ADULT - ASSESSMENT
66F baseline cognitively with wfd in 2022 but otherwise her usual self, with AMS in setting of bacteremia as well as while hospitalized not being given her usual psych regimen or adequate pain control  mental status has recovered  exam w/o sx or signs concerning for having had a stroke. serial hcts neg for stroke, discussed with  some atrophy worse than for age in regions that could contribute to her word finding difficulty that began this year.    no further inpt recs at this time  outpatient rec she could have her psychiatrist refer for neuropsychological testing  pls reconsult with new questions

## 2022-05-15 NOTE — PROGRESS NOTE ADULT - SUBJECTIVE AND OBJECTIVE BOX
Patient seen and examined this morning, remains acutely confused but no acute complaints at this time. pain controlled    Vital Signs Last 24 Hrs  T(C): 36.9 (15 May 2022 00:10), Max: 37.3 (14 May 2022 10:02)  T(F): 98.4 (15 May 2022 00:10), Max: 99.2 (14 May 2022 10:02)  HR: 96 (15 May 2022 00:10) (76 - 96)  BP: 150/82 (15 May 2022 00:10) (120/76 - 152/90)  BP(mean): --  RR: 18 (15 May 2022 00:10) (18 - 18)  SpO2: 97% (15 May 2022 00:10) (95% - 98%)    LABS:    14 May 2022 15:15    139    |  103    |  5      ----------------------------<  100    3.7     |  22     |  0.37     Ca    8.6        14 May 2022 15:15      PT/INR - ( 13 May 2022 13:58 )   PT: 13.8 sec;   INR: 1.19 ratio         PTT - ( 13 May 2022 13:58 )  PTT:33.5 sec    Gen: NAD, A+Ox2  LUE:  No in posterior slab splint and sling  AIN/PIN/U Intact  SILT throughout exposed hand  Painless active ROM shoulder, wrist and hand  Radial pulse palpable, WWP distally        A/P 66y year old Female PMH OI, R both bone fx s/p ORIF (9/2019) c/b perimplant fx s/p ORIF (10/201) and L distal humerus s/p ORIF (3/2022) w/ Prince admitted to medicine for confusion. Ortho consulted for fracture distal to L humerus plate.    Plan:  No acute orthopedic intervention at this time, will continue to f/u w/ attending and pt's family regarding possible future operative management  IV ancef  now found to have +BCx x2 (Staph epi in 1 and GPCiC in the other), started in vanco  Pain Control  NWB LUE  Sling for comfort  Posterior slab   Splint precautions:  Keep Splint dry  Elevate extremity, can try and ice through the Splint   Do not stick anything into the Splint

## 2022-05-15 NOTE — CONSULT NOTE ADULT - SUBJECTIVE AND OBJECTIVE BOX
HPI:    · Chief Complaint: The patient is a 66y Female a/w confusion    pt interviewed with  at bedside  baseline shes aid to have had wfd last few months but otherwise cognitively well. sees psych for many yrs. ws told she had bipolar but its borderline they say. hx learning disability and dyslexia as well.  in setting of her many meds and intermittent trouble sleeping she may sometimes have memory issues but he didnt think it was disproportionate to those factors   she had L fracture repair 3'22. says that wasnt acognitive turning point. he notes shes hasd 38 surgeries, always takes some days for the "cobwebs to clear" afterwards, for a few days, and this time was no different.  he says when she learned 3'22 she needed another repair of the recently operated on arm she got very anxious     then on Thurs she didnt sleep well, not at all really. he says that night she may have either missed her psych meds or taken more than supposed to . in Fri 5/13 AM he noted she was spacey as if in deep thought, not unresponsive but just not herself. he took her in cab to bring to hospital. code stroke activated then canceled but no neuro consult actually done.  hes said since in hospital on 5/13 she wasnt being given her pain meds, nor her klonopin, and he noted she had a lot of anxiety sometiems episodic.  however they gave her back these meds and today he thinks shes doing much better, seems more like her baseline.    she had hct 5/13 and 5/14, no strokes noted. vol loss noted. on my review i see vol loss more biP and L perisylvian regions.    she is bacteremic, on abx  ua neg, covid neg      on exam she was alert, fluent, some nasal tone to speech they say is baseline  she was active in the discusion, chiming in. social but remained appriopriate, extraneous details, also said to be baseline  she made a semantic paraphasic error camera/calendar but otherwise didnt hear any others. no wfd apparent in conversation  could name spoon, yogurt  gave her hx well  oriented to may 15 2022 sun, knew in hospital though thought it was lij.  face symm, limbs symm except LUE in cast. ffm good though b/l  didnt test gait.    med list from last discharge listed the following psych related meds:  klonopin 0.5 tid  prozac 60  lamictal 100 bid  wellbutrin 75tid    diluadid also being given      PAST MEDICAL & SURGICAL HISTORY:  Osteogenesis imperfecta      Former smoker      Hard of hearing      MEDICATIONS  (STANDING):    MEDICATIONS  (PRN):  History of open reduction and internal fixation (ORIF) procedure    Social History:  denies etoh        PAST MEDICAL/SURGICAL/FAMILY/SOCIAL HISTORY:    Past Medical, Past Surgical, and Family History:  PAST MEDICAL HISTORY:  Former smoker     Hard of hearing     Osteogenesis imperfecta.     PAST SURGICAL HISTORY:  History of open reduction and internal fixation (ORIF) procedure.     FAMILY HISTORY:  Father  Still living? Unknown  Family history of osteogenesis imperfecta, Age at diagnosis: Age Unknown  says granmother had dementia. brother had cog changes     Sibling  Still living? Unknown  Family history of osteogenesis imperfecta, Age at diagnosis: Age Unknown  Family history of valvular heart disease, Age at diagnosis: Age Unknown.     Tobacco Usage:  · Tobacco Usage	Former smoker    ALLERGIES AND HOME MEDICATIONS:   Allergies:        Allergies:  	No Known Allergies:     Home Medications:   * Patient Currently Takes Medications as of 04-Apr-2022 09:57 documented in Structured Notes  · 	Narcan 4 mg/0.1 mL nasal spray: 4 milligram(s) intranasally- 1 spray Q 2-3 minutes alternating between nostrils).  · 	HYDROmorphone 8 mg oral tablet: 1 tab(s) orally every 4-6 hours, As needed, moderate to Severe Pain  · 	Ecotrin 325 mg oral delayed release tablet: 1 tab(s) orally 2 times a day x 6 weeks total for dvt prevention MDD:2  · 	senna 8.6 mg oral tablet: 2 tab(s) orally once a day (at bedtime) MDD:2 tabs per day  · 	polyethylene glycol 3350 oral powder for reconstitution: 17 gram(s) orally once a day, As needed, Constipation MDD:1 packet/ day  · 	rosuvastatin 10 mg oral tablet: 1 tab(s) orally once a day (at bedtime)  · 	FLUoxetine 20 mg oral capsule: 1 cap(s) orally 3 times a day  · 	acetaminophen 325 mg oral tablet: 2 tab(s) orally every 6 hours, As needed, Temp greater or equal to 38C (100.4F), mild pain  · 	Multiple Vitamins oral tablet: 1 tab(s) orally once a day  · 	clonazePAM 0.5 mg oral tablet: 1 tab(s) orally 3 times a day  · 	Ambien 5 mg oral tablet: 1 tab(s) orally once a day (at bedtime)  · 	levothyroxine 50 mcg (0.05 mg) oral tablet: 1 tab(s) orally once a day  · 	Wellbutrin 75 mg oral tablet: 1 tab(s) orally 3 times a day  · 	lamoTRIgine 100 mg oral tablet: 1 tab(s) orally 2 times a day  · 	busPIRone 15 mg oral tablet: 1 tab(s) orally 2 times a day    PI        MEDICATIONS  (STANDING):  busPIRone 15 milliGRAM(s) Oral two times a day  ceFAZolin   IVPB 2000 milliGRAM(s) IV Intermittent every 8 hours  clonazePAM  Tablet 0.5 milliGRAM(s) Oral three times a day  FLUoxetine 20 milliGRAM(s) Oral two times a day  lamoTRIgine 100 milliGRAM(s) Oral two times a day    MEDICATIONS  (PRN):  HYDROmorphone   Tablet 4 milliGRAM(s) Oral every 6 hours PRN Severe Pain (7 - 10)      PAST MEDICAL & SURGICAL HISTORY:  Osteogenesis imperfecta      Former smoker      Hard of hearing      History of open reduction and internal fixation (ORIF) procedure          FAMILY HISTORY:  Family history of osteogenesis imperfecta (Father, Sibling)    Family history of valvular heart disease (Sibling)        Allergies    No Known Allergies    Intolerances          SHx - No smoking, No ETOH, No drug abuse      Review of Systems: Pertinent positives and negatives as per HPI, all other systems were reviewed and are negative.      Vital Signs Last 24 Hrs  T(C): 36.9 (15 May 2022 13:28), Max: 37.1 (14 May 2022 16:37)  T(F): 98.4 (15 May 2022 13:28), Max: 98.8 (14 May 2022 20:57)  HR: 89 (15 May 2022 13:28) (83 - 96)  BP: 153/84 (15 May 2022 13:28) (120/76 - 153/84)  BP(mean): --  RR: 18 (15 May 2022 13:28) (18 - 18)  SpO2: 96% (15 May 2022 13:28) (95% - 97%)    Other:    05-15    140  |  103  |  8   ----------------------------<  87  3.2<L>   |  22  |  0.41<L>    Ca    9.1      15 May 2022 07:14  Phos  2.5     05-14  Mg     2.0     05-14    TPro  7.2  /  Alb  3.8  /  TBili  0.5  /  DBili  x   /  AST  25  /  ALT  14  /  AlkPhos  255<H>  05-15    05-15    140  |  103  |  8   ----------------------------<  87  3.2<L>   |  22  |  0.41<L>    Ca    9.1      15 May 2022 07:14  Phos  2.5     05-14  Mg     2.0     05-14    TPro  7.2  /  Alb  3.8  /  TBili  0.5  /  DBili  x   /  AST  25  /  ALT  14  /  AlkPhos  255<H>  05-15                          11.3   4.41  )-----------( 297      ( 15 May 2022 07:14 )             34.8

## 2022-05-15 NOTE — PROGRESS NOTE ADULT - ATTENDING COMMENTS
continue medical management.  Possible OR later this week for reconstructionnonunion pending medical vazquez

## 2022-05-15 NOTE — PROGRESS NOTE ADULT - SUBJECTIVE AND OBJECTIVE BOX
Patient with altered mentation  and right arm swelling and erythema    66F baseline cognitively with wfd in  but otherwise her usual self, with AMS in setting of bacteremia as well as while hospitalized not being given her usual psych regimen or adequate pain control  mental status has recovered  exam w/o sx or signs concerning for having had a stroke. serial hcts neg for stroke, discussed with  some atrophy worse than for age in regions that could contribute to her word finding difficulty that began this year.    MEDICATIONS  (STANDING):  busPIRone 15 milliGRAM(s) Oral two times a day  ceFAZolin   IVPB 2000 milliGRAM(s) IV Intermittent every 8 hours  clonazePAM  Tablet 0.5 milliGRAM(s) Oral three times a day  lamoTRIgine 100 milliGRAM(s) Oral two times a day    MEDICATIONS  (PRN):  HYDROmorphone   Tablet 4 milliGRAM(s) Oral every 6 hours PRN Severe Pain (7 - 10)    Vital Signs Last 24 Hrs  T(C): 36.9 (15 May 2022 17:24), Max: 37.1 (14 May 2022 20:57)  T(F): 98.5 (15 May 2022 17:24), Max: 98.8 (14 May 2022 20:57)  HR: 81 (15 May 2022 17:46) (81 - 96)  BP: 153/84 (15 May 2022 13:28) (120/76 - 153/84)  BP(mean): --  RR: 18 (15 May 2022 17:24) (18 - 18)  SpO2: 94% (15 May 2022 17:24) (94% - 97%)    Lungs clear  Heart Regular  Abd benign  Ext no c/c/e  Fluctuating Mental status has stabilized          CBC Full  -  ( 15 May 2022 07:14 )  WBC Count : 4.41 K/uL  RBC Count : 3.99 M/uL  Hemoglobin : 11.3 g/dL  Hematocrit : 34.8 %  Platelet Count - Automated : 297 K/uL  Mean Cell Volume : 87.2 fl  Mean Cell Hemoglobin : 28.3 pg  Mean Cell Hemoglobin Concentration : 32.5 gm/dL  Auto Neutrophil # : x  Auto Lymphocyte # : x  Auto Monocyte # : x  Auto Eosinophil # : x  Auto Basophil # : x  Auto Neutrophil % : x  Auto Lymphocyte % : x  Auto Monocyte % : x  Auto Eosinophil % : x  Auto Basophil % : x    05-15    140  |  103  |  8   ----------------------------<  87  3.2<L>   |  22  |  0.41<L>    Ca    9.1      15 May 2022 07:14  Phos  2.5     -  Mg     2.0     -    TPro  7.2  /  Alb  3.8  /  TBili  0.5  /  DBili  x   /  AST  25  /  ALT  14  /  AlkPhos  255<H>  05-15    LIVER FUNCTIONS - ( 15 May 2022 07:14 )  Alb: 3.8 g/dL / Pro: 7.2 g/dL / ALK PHOS: 255 U/L / ALT: 14 U/L / AST: 25 U/L / GGT: x             Urinalysis Basic - ( 13 May 2022 22:01 )    Color: Yellow / Appearance: Clear / S.032 / pH: x  Gluc: x / Ketone: Large  / Bili: Negative / Urobili: 2 mg/dL   Blood: x / Protein: 30 mg/dL / Nitrite: Negative   Leuk Esterase: Small / RBC: 2 /hpf / WBC 8 /HPF   Sq Epi: x / Non Sq Epi: 1 /hpf / Bacteria: Negative

## 2022-05-16 DIAGNOSIS — T14.8XXA OTHER INJURY OF UNSPECIFIED BODY REGION, INITIAL ENCOUNTER: ICD-10-CM

## 2022-05-16 DIAGNOSIS — F41.9 ANXIETY DISORDER, UNSPECIFIED: ICD-10-CM

## 2022-05-16 DIAGNOSIS — Q78.0 OSTEOGENESIS IMPERFECTA: ICD-10-CM

## 2022-05-16 DIAGNOSIS — R41.82 ALTERED MENTAL STATUS, UNSPECIFIED: ICD-10-CM

## 2022-05-16 LAB
-  AMPICILLIN/SULBACTAM: SIGNIFICANT CHANGE UP
-  CEFAZOLIN: SIGNIFICANT CHANGE UP
-  CLINDAMYCIN: SIGNIFICANT CHANGE UP
-  ERYTHROMYCIN: SIGNIFICANT CHANGE UP
-  GENTAMICIN: SIGNIFICANT CHANGE UP
-  OXACILLIN: SIGNIFICANT CHANGE UP
-  PENICILLIN: SIGNIFICANT CHANGE UP
-  RIFAMPIN: SIGNIFICANT CHANGE UP
-  TETRACYCLINE: SIGNIFICANT CHANGE UP
-  TRIMETHOPRIM/SULFAMETHOXAZOLE: SIGNIFICANT CHANGE UP
-  VANCOMYCIN: SIGNIFICANT CHANGE UP
ANION GAP SERPL CALC-SCNC: 17 MMOL/L — SIGNIFICANT CHANGE UP (ref 5–17)
BUN SERPL-MCNC: 7 MG/DL — SIGNIFICANT CHANGE UP (ref 7–23)
CALCIUM SERPL-MCNC: 9.3 MG/DL — SIGNIFICANT CHANGE UP (ref 8.4–10.5)
CHLORIDE SERPL-SCNC: 100 MMOL/L — SIGNIFICANT CHANGE UP (ref 96–108)
CO2 SERPL-SCNC: 21 MMOL/L — LOW (ref 22–31)
CREAT SERPL-MCNC: 0.42 MG/DL — LOW (ref 0.5–1.3)
CULTURE RESULTS: SIGNIFICANT CHANGE UP
EGFR: 108 ML/MIN/1.73M2 — SIGNIFICANT CHANGE UP
GLUCOSE SERPL-MCNC: 94 MG/DL — SIGNIFICANT CHANGE UP (ref 70–99)
HCT VFR BLD CALC: 35.9 % — SIGNIFICANT CHANGE UP (ref 34.5–45)
HGB BLD-MCNC: 11.9 G/DL — SIGNIFICANT CHANGE UP (ref 11.5–15.5)
MCHC RBC-ENTMCNC: 28.2 PG — SIGNIFICANT CHANGE UP (ref 27–34)
MCHC RBC-ENTMCNC: 33.1 GM/DL — SIGNIFICANT CHANGE UP (ref 32–36)
MCV RBC AUTO: 85.1 FL — SIGNIFICANT CHANGE UP (ref 80–100)
METHOD TYPE: SIGNIFICANT CHANGE UP
NRBC # BLD: 0 /100 WBCS — SIGNIFICANT CHANGE UP (ref 0–0)
ORGANISM # SPEC MICROSCOPIC CNT: SIGNIFICANT CHANGE UP
ORGANISM # SPEC MICROSCOPIC CNT: SIGNIFICANT CHANGE UP
PLATELET # BLD AUTO: 259 K/UL — SIGNIFICANT CHANGE UP (ref 150–400)
POTASSIUM SERPL-MCNC: 3 MMOL/L — LOW (ref 3.5–5.3)
POTASSIUM SERPL-SCNC: 3 MMOL/L — LOW (ref 3.5–5.3)
RBC # BLD: 4.22 M/UL — SIGNIFICANT CHANGE UP (ref 3.8–5.2)
RBC # FLD: 13.6 % — SIGNIFICANT CHANGE UP (ref 10.3–14.5)
SODIUM SERPL-SCNC: 138 MMOL/L — SIGNIFICANT CHANGE UP (ref 135–145)
SPECIMEN SOURCE: SIGNIFICANT CHANGE UP
WBC # BLD: 5.48 K/UL — SIGNIFICANT CHANGE UP (ref 3.8–10.5)
WBC # FLD AUTO: 5.48 K/UL — SIGNIFICANT CHANGE UP (ref 3.8–10.5)

## 2022-05-16 PROCEDURE — 99223 1ST HOSP IP/OBS HIGH 75: CPT

## 2022-05-16 RX ORDER — LANOLIN ALCOHOL/MO/W.PET/CERES
3 CREAM (GRAM) TOPICAL AT BEDTIME
Refills: 0 | Status: DISCONTINUED | OUTPATIENT
Start: 2022-05-16 | End: 2022-05-24

## 2022-05-16 RX ORDER — VANCOMYCIN HCL 1 G
1000 VIAL (EA) INTRAVENOUS EVERY 12 HOURS
Refills: 0 | Status: DISCONTINUED | OUTPATIENT
Start: 2022-05-16 | End: 2022-05-24

## 2022-05-16 RX ORDER — ENOXAPARIN SODIUM 100 MG/ML
40 INJECTION SUBCUTANEOUS EVERY 24 HOURS
Refills: 0 | Status: COMPLETED | OUTPATIENT
Start: 2022-05-16 | End: 2022-05-18

## 2022-05-16 RX ORDER — POTASSIUM CHLORIDE 20 MEQ
40 PACKET (EA) ORAL ONCE
Refills: 0 | Status: COMPLETED | OUTPATIENT
Start: 2022-05-16 | End: 2022-05-16

## 2022-05-16 RX ORDER — ACETAMINOPHEN 500 MG
1000 TABLET ORAL ONCE
Refills: 0 | Status: COMPLETED | OUTPATIENT
Start: 2022-05-16 | End: 2022-05-17

## 2022-05-16 RX ADMIN — HYDROMORPHONE HYDROCHLORIDE 4 MILLIGRAM(S): 2 INJECTION INTRAMUSCULAR; INTRAVENOUS; SUBCUTANEOUS at 12:54

## 2022-05-16 RX ADMIN — Medication 250 MILLIGRAM(S): at 17:58

## 2022-05-16 RX ADMIN — LAMOTRIGINE 100 MILLIGRAM(S): 25 TABLET, ORALLY DISINTEGRATING ORAL at 17:57

## 2022-05-16 RX ADMIN — HYDROMORPHONE HYDROCHLORIDE 4 MILLIGRAM(S): 2 INJECTION INTRAMUSCULAR; INTRAVENOUS; SUBCUTANEOUS at 13:24

## 2022-05-16 RX ADMIN — Medication 100 MILLIGRAM(S): at 06:09

## 2022-05-16 RX ADMIN — Medication 0.5 MILLIGRAM(S): at 21:01

## 2022-05-16 RX ADMIN — HYDROMORPHONE HYDROCHLORIDE 4 MILLIGRAM(S): 2 INJECTION INTRAMUSCULAR; INTRAVENOUS; SUBCUTANEOUS at 07:02

## 2022-05-16 RX ADMIN — Medication 40 MILLIEQUIVALENT(S): at 12:02

## 2022-05-16 RX ADMIN — Medication 100 MILLIGRAM(S): at 13:15

## 2022-05-16 RX ADMIN — Medication 0.5 MILLIGRAM(S): at 13:15

## 2022-05-16 RX ADMIN — LAMOTRIGINE 100 MILLIGRAM(S): 25 TABLET, ORALLY DISINTEGRATING ORAL at 06:10

## 2022-05-16 RX ADMIN — ENOXAPARIN SODIUM 40 MILLIGRAM(S): 100 INJECTION SUBCUTANEOUS at 17:58

## 2022-05-16 RX ADMIN — Medication 60 MILLIGRAM(S): at 12:05

## 2022-05-16 RX ADMIN — HYDROMORPHONE HYDROCHLORIDE 4 MILLIGRAM(S): 2 INJECTION INTRAMUSCULAR; INTRAVENOUS; SUBCUTANEOUS at 06:47

## 2022-05-16 RX ADMIN — HYDROMORPHONE HYDROCHLORIDE 4 MILLIGRAM(S): 2 INJECTION INTRAMUSCULAR; INTRAVENOUS; SUBCUTANEOUS at 18:23

## 2022-05-16 RX ADMIN — HYDROMORPHONE HYDROCHLORIDE 4 MILLIGRAM(S): 2 INJECTION INTRAMUSCULAR; INTRAVENOUS; SUBCUTANEOUS at 18:53

## 2022-05-16 RX ADMIN — Medication 0.5 MILLIGRAM(S): at 06:10

## 2022-05-16 NOTE — CONSULT NOTE ADULT - SUBJECTIVE AND OBJECTIVE BOX
66 f with depression/anxiety osteogenesis imperfecta with multiple fractures and osteotomies, prosthetic joints, recent L humeral fx s/p ORIF and hardware 3/31/22,  brought in 5/14 for AMS and confusion, afebrile, normal WBC   erythema and edema of elbow found to have a hematoma which was drained by ortho, no culture done  blood cx 5/14: 3/4 staph epi  xray: S/P plate and screw fixation of distal humeral fracture. The distal plate and screw construct now has minimal purchase within the distal fracture fragment with interval proximal migration and apex lateral angulation of the distal fracture fragment when compared to radiographs dated 04/18/2022. There is extensive periosteal reaction with areas of lucency/osteolysis within the distal fracture fragment, concerning for underlying osteomyelitis or may be related to chronic motion at the fracture site.  head CT was negative and mental status improved      PAST MEDICAL & SURGICAL HISTORY:  Osteogenesis imperfecta          SOCIAL HISTORY:  mmarried, lives with , former smoker  no smoking, alcohol or drug abuse  no recent travel    FAMILY HISTORY:  Family history of osteogenesis imperfecta (Father, Sibling)    Family history of valvular heart disease (Sibling)        ROS:    All other systems negative     Constitutional: no fever, no chills, no weight loss, no night sweats  Eye: no eye pain, no redness, no vision changes  ENT:  no sore throat, no rhinorrhea  Cardiovascular:  no chest pain, no palpitation  Respiratory:  no SOB, no cough  GI:  no abd pain, no vomiting, no diarrhea  urinary: no dysuria, no hematuria, no flank pain  : no vaginal discharge or bleeding  musculoskeletal: L elbow edema erythema, pain  skin:  no rash  neurology:  no headache, no seizure, was altered and confused on arrival which resolved  psych: no anxiety, no depression     Physical Exam:    General:    NAD, non toxic, sitting on a chair  Head: atraumatic, normocephalic  Eyes: normal sclera and conjunctiva  ENT:   no oropharyngeal lesions, no LAD, neck supple  Cardio:    regular S1,S2, no murmur  Respiratory:   clear b/l, no wheezing  abd:   soft, BS +, not tender  :     no CVAT, no suprapubic tenderness, no pardo  Musculoskeletal : L elbow edema, erythema in dressing  Skin:    no rash  vascular: no phlebitis  Neurologic:     no focal deficits  psych: normal affect      Drug Dosing Weight  Height (cm): 177.8 (13 May 2022 13:32)  Weight (kg): 63.5 (13 May 2022 13:32)  BMI (kg/m2): 20.1 (13 May 2022 13:32)  BSA (m2): 1.79 (13 May 2022 13:32)    Vital Signs Last 24 Hrs  T(F): 98.7 (05-16-22 @ 09:54), Max: 99.4 (05-13-22 @ 17:48)    Vital Signs Last 24 Hrs  HR: 106 (05-16-22 @ 12:29) (81 - 106)  BP: 154/89 (05-16-22 @ 12:29) (145/84 - 161/92)  RR: 18 (05-16-22 @ 12:29)  SpO2: 99% (05-16-22 @ 12:29) (94% - 99%)  Wt(kg): --                          11.9   5.48  )-----------( 259      ( 16 May 2022 07:15 )             35.9       05-16    138  |  100  |  7   ----------------------------<  94  3.0<L>   |  21<L>  |  0.42<L>    Ca    9.3      16 May 2022 07:13    TPro  7.2  /  Alb  3.8  /  TBili  0.5  /  DBili  x   /  AST  25  /  ALT  14  /  AlkPhos  255<H>  05-15          MICROBIOLOGY:  v  .Blood Blood  05-15-22   No growth to date.  --  --      Clean Catch Clean Catch (Midstream)  05-13-22   <10,000 CFU/mL Normal Urogenital Katia  --  --      .Blood Blood-Peripheral  05-13-22   Growth in aerobic bottle: Staphylococcus epidermidis  Susceptibility to follow.  --    Growth in aerobic bottle: Gram Positive Cocci in Clusters      .Blood Blood-Peripheral  05-13-22   Growth in aerobic and anaerobic bottles: Staphylococcus epidermidis  ***Blood Panel PCR results on this specimen are available  approximately 3 hours after the Gram stain result.***  Gram stain, PCR, and/or culture results may not always  correspond due to difference in methodologies.  ************************************************************  This PCR assay was performed by multiplex PCR. This  Assay tests for 66 bacterial and resistance gene targets.  Please refer to the [facility] test directory  at [email or web address] for details.  --  Blood Culture PCR          Rapid RVP Result: NotDetec (05-13 @ 18:21)          RADIOLOGY:    Images independently visualized and reviewed personally,  findings as below      RADIOLOGY:  Images independently visualized and reviewed personally, findings as below  < from: Xray Elbow 2 Views Post-Reduction, Left (05.14.22 @ 11:47) >  IMPRESSION:  Redemonstrated intact distal humeral contoured condylar fracture fixation   plate.    Refractured previously healing distal humeral supracondylar fracture with   displacement and angulation.    No gross intra-articular involvement. Elbow articular relationships and   spacing maintained.    < end of copied text >  < from: CT Head No Cont (05.14.22 @ 11:30) >    FINDINGS:    No acute intracranial hemorrhage, mass effect, or midline shift. The   brain demonstrates periventricular hypoattenuation, nonspecific in   etiology but likely representing chronic microvascular ischemic changes.    Mild atrophy is present. No abnormal extra-axial fluid collections are   present.    The ventricles and sulci demonstrate age appropriate involutional   changes.  The basal cisterns are patent.    The orbits are unremarkable. The paranasal sinuses are clear. The mastoid   air cells are clear. The calvarium is intact.      < end of copied text >

## 2022-05-16 NOTE — PROGRESS NOTE ADULT - SUBJECTIVE AND OBJECTIVE BOX
66F hx of OI with multiple fractures, recent admission for L humeral fx with hardware (needing repair now due to hardware malfunction), depression/anxiety on multipe meds presenting to the Ed for 2 days of waxing and waning confusion (disoriented), more sleepiness. Progressive memory loss. Denies fever, chills, chest pain, shortness of breath, abd pain. Called as a code stroke for confusion but LKN > 24 hrs ago, patient felt warm, thus code stroke canceled. Patient was found to have a hematoma at her previous surgical site. Post op Patient was found to have Bacteremia. Seen now resting comfortably. MS has returned to baseline      MEDICATIONS  (STANDING):  busPIRone 15 milliGRAM(s) Oral two times a day  clonazePAM  Tablet 0.5 milliGRAM(s) Oral three times a day  enoxaparin Injectable 40 milliGRAM(s) SubCutaneous every 24 hours  FLUoxetine 60 milliGRAM(s) Oral daily  lamoTRIgine 100 milliGRAM(s) Oral two times a day  melatonin 5 milliGRAM(s) Oral once  vancomycin  IVPB 1000 milliGRAM(s) IV Intermittent every 12 hours    MEDICATIONS  (PRN):  HYDROmorphone   Tablet 4 milliGRAM(s) Oral every 6 hours PRN Severe Pain (7 - 10)      PHYSICAL EXAM:  GENERAL: NAD, well nourished and conversant  HEAD:  Atraumatic  EYES: EOM, PERRLA, conjunctiva pink and sclera white  ENT: No tonsillar erythema, exudates, or enlargement, moist mucous membranes, good dentition, no lesions  NECK: Supple, No JVD, normal thyroid, carotids with normal upstrokes and no bruits  CHEST/LUNG: Clear to auscultation bilaterally, No rales, rhonchi, wheezing, or rubs  HEART: Regular rate and rhythm, No murmurs, rubs, or gallops  ABDOMEN: Soft, nondistended, no masses, guarding, tenderness or rebound, bowel sounds present  EXTREMITIES:  left arm pain  LYMPH: No lymphadenopathy noted  SKIN: No rashes or lesions  NERVOUS SYSTEM:  Alert & Oriented X3, normal cognitive function.     VITALS:   T(C): 37.1 (05-16-22 @ 09:54), Max: 37.1 (05-15-22 @ 21:25)  HR: 106 (05-16-22 @ 12:29) (81 - 106)  BP: 154/89 (05-16-22 @ 12:29) (145/84 - 161/92)  RR: 18 (05-16-22 @ 12:29) (18 - 18)  SpO2: 99% (05-16-22 @ 12:29) (94% - 99%)  Wt(kg): --        LABS:        CBC Full  -  ( 16 May 2022 07:15 )  WBC Count : 5.48 K/uL  RBC Count : 4.22 M/uL  Hemoglobin : 11.9 g/dL  Hematocrit : 35.9 %  Platelet Count - Automated : 259 K/uL  Mean Cell Volume : 85.1 fl  Mean Cell Hemoglobin : 28.2 pg  Mean Cell Hemoglobin Concentration : 33.1 gm/dL  Auto Neutrophil # : x  Auto Lymphocyte # : x  Auto Monocyte # : x  Auto Eosinophil # : x  Auto Basophil # : x  Auto Neutrophil % : x  Auto Lymphocyte % : x  Auto Monocyte % : x  Auto Eosinophil % : x  Auto Basophil % : x    05-16    138  |  100  |  7   ----------------------------<  94  3.0<L>   |  21<L>  |  0.42<L>    Ca    9.3      16 May 2022 07:13    TPro  7.2  /  Alb  3.8  /  TBili  0.5  /  DBili  x   /  AST  25  /  ALT  14  /  AlkPhos  255<H>  05-15    LIVER FUNCTIONS - ( 15 May 2022 07:14 )  Alb: 3.8 g/dL / Pro: 7.2 g/dL / ALK PHOS: 255 U/L / ALT: 14 U/L / AST: 25 U/L / GGT: x

## 2022-05-16 NOTE — PROGRESS NOTE ADULT - ASSESSMENT
65 yo woman with a hx osteogenesis imperfecta and multiple fractures presents with a change in mental status. Patient with a hx of a recent Open reduction and internal fixation of the left humerus. Patient found to have positive blood cultures.

## 2022-05-16 NOTE — PROGRESS NOTE ADULT - SUBJECTIVE AND OBJECTIVE BOX
Patient seen and examined this morning, remains mildly confused but no acute complaints at this time. Pain-controlled.     Vital Signs Last 24 Hrs  T(C): 36.8 (16 May 2022 01:05), Max: 37.1 (15 May 2022 21:25)  T(F): 98.3 (16 May 2022 01:05), Max: 98.8 (15 May 2022 21:25)  HR: 89 (16 May 2022 01:05) (81 - 93)  BP: 145/84 (16 May 2022 01:05) (145/84 - 156/88)  RR: 18 (16 May 2022 01:05) (18 - 18)  SpO2: 95% (16 May 2022 01:05) (94% - 96%)        LABS:                        11.3   4.41  )-----------( 297      ( 15 May 2022 07:14 )             34.8     05-15    140  |  103  |  8   ----------------------------<  87  3.2<L>   |  22  |  0.41<L>    Ca    9.1      15 May 2022 07:14  Phos  2.5     05-14  Mg     2.0     05-14    TPro  7.2  /  Alb  3.8  /  TBili  0.5  /  DBili  x   /  AST  25  /  ALT  14  /  AlkPhos  255<H>  05-15        PHYSICAL EXAM:  Gen: NAD, A+Ox2  LUE:  Posterior slab splint and sling.   Ax/Rad/Uln/Med/AIN/PIN Intact; Unable to test Musc  SILT throughout exposed hand  Capillary refill < 2 seconds in all digits of the hand  Painless active ROM shoulder, wrist and hand.           A/P 66y year old Female PMH OI, R both bone fx s/p ORIF (9/2019) c/b perimplant fx s/p ORIF (10/201) and L distal humerus s/p ORIF (3/2022) w/ Prince admitted to medicine for confusion. Ortho consulted for fracture distal to L humerus plate.    Plan:  No acute orthopedic intervention at this time, will continue to f/u w/ attending and pt's family regarding possible future operative management  IV ancef  now found to have +BCx x2 (Staph epi in 1 and GPCiC in the other), started in [facility]  Pain Control  NWB LUE  Sling for comfort  Posterior slab   Splint precautions:  Keep Splint dry  Elevate extremity, can try and ice through the Splint   Do not stick anything into the Splint  Will discuss with Dr. Morrison and advise of any changes to the above plan

## 2022-05-16 NOTE — CONSULT NOTE ADULT - REASON FOR ADMISSION
altered mental status and left arm swelling and difficulty moving
altered mental status
altered mental status and left arm swelling and difficulty moving

## 2022-05-16 NOTE — CONSULT NOTE ADULT - ASSESSMENT
66 f with depression/anxiety osteogenesis imperfecta with multiple fractures and osteotomies, prosthetic joints, recent L humeral fx s/p ORIF and hardware 3/31/22,  brought in 5/14 for AMS and confusion, afebrile, normal WBC   erythema and edema of elbow found to have a hematoma which was drained by ortho, no culture done  blood cx 5/14: 3/4 staph epi  xray: S/P plate and screw fixation of distal humeral fracture. The distal plate and screw construct now has minimal purchase within the distal fracture fragment with interval proximal migration and apex lateral angulation of the distal fracture fragment when compared to radiographs dated 04/18/2022. There is extensive periosteal reaction with areas of lucency/osteolysis within the distal fracture fragment, concerning for underlying osteomyelitis or may be related to chronic motion at the fracture site.  head CT was negative and mental status improved    AMS, staph epi bacteremia in the setting of recent humerus ORIF, hardware placement now with unstable hardware, hematoma, likely hardware infection and osteo  h/o OI and multiple osteotomies and prosthetic joints    * discontinue cefazolin and start vanco 1 q 12  * check the trough before the 4th dose  * monitor vanco trough and renal function to prevent nephrotoxicity  * would get a LUE MRI if hardwares are MRI compatible  * hardware removal  * echo    The above assessment and plan was discussed with ortho    Linda Goode MD  contact on teams  After 5pm and on weekends call [phone] 66 f with depression/anxiety osteogenesis imperfecta with multiple fractures and osteotomies, prosthetic joints, recent L humeral fx s/p ORIF and hardware 3/31/22,  brought in 5/14 for AMS and confusion, afebrile, normal WBC   erythema and edema of elbow found to have a hematoma which was drained by ortho, no culture done  blood cx 5/14: 3/4 staph epi  xray: S/P plate and screw fixation of distal humeral fracture. The distal plate and screw construct now has minimal purchase within the distal fracture fragment with interval proximal migration and apex lateral angulation of the distal fracture fragment when compared to radiographs dated 04/18/2022. There is extensive periosteal reaction with areas of lucency/osteolysis within the distal fracture fragment, concerning for underlying osteomyelitis or may be related to chronic motion at the fracture site.  head CT was negative and mental status improved    AMS, staph epi bacteremia in the setting of recent humerus ORIF, hardware placement now with unstable hardware, hematoma, likely hardware infection and osteo  h/o OI and multiple osteotomies and prosthetic joints    * discontinue cefazolin and start vanco 1 q 12  * check the trough before the 4th dose  * monitor vanco trough and renal function to prevent nephrotoxicity  * would get a LUE MRI if hardwares are MRI compatible  * hardware removal and cultures  * echo    The above assessment and plan was discussed with ortho    Linda Goode MD  contact on teams  After 5pm and on weekends call [phone]

## 2022-05-16 NOTE — PROGRESS NOTE ADULT - PROBLEM SELECTOR PLAN 2
Patient seen by ID and started on vanco  follow cultures  follow vanco trough  Scheduled for a washout 5/18  No contraindication to scheduled procedure

## 2022-05-17 LAB
ANION GAP SERPL CALC-SCNC: 12 MMOL/L — SIGNIFICANT CHANGE UP (ref 5–17)
BUN SERPL-MCNC: 12 MG/DL — SIGNIFICANT CHANGE UP (ref 7–23)
CALCIUM SERPL-MCNC: 8.9 MG/DL — SIGNIFICANT CHANGE UP (ref 8.4–10.5)
CHLORIDE SERPL-SCNC: 104 MMOL/L — SIGNIFICANT CHANGE UP (ref 96–108)
CO2 SERPL-SCNC: 23 MMOL/L — SIGNIFICANT CHANGE UP (ref 22–31)
CREAT SERPL-MCNC: 0.52 MG/DL — SIGNIFICANT CHANGE UP (ref 0.5–1.3)
CRP SERPL-MCNC: 15 MG/L — HIGH (ref 0–4)
EGFR: 103 ML/MIN/1.73M2 — SIGNIFICANT CHANGE UP
ERYTHROCYTE [SEDIMENTATION RATE] IN BLOOD: 98 MM/HR — HIGH (ref 0–20)
GLUCOSE SERPL-MCNC: 108 MG/DL — HIGH (ref 70–99)
HCT VFR BLD CALC: 34.5 % — SIGNIFICANT CHANGE UP (ref 34.5–45)
HGB BLD-MCNC: 11.3 G/DL — LOW (ref 11.5–15.5)
MCHC RBC-ENTMCNC: 28 PG — SIGNIFICANT CHANGE UP (ref 27–34)
MCHC RBC-ENTMCNC: 32.8 GM/DL — SIGNIFICANT CHANGE UP (ref 32–36)
MCV RBC AUTO: 85.6 FL — SIGNIFICANT CHANGE UP (ref 80–100)
NRBC # BLD: 0 /100 WBCS — SIGNIFICANT CHANGE UP (ref 0–0)
PLATELET # BLD AUTO: 306 K/UL — SIGNIFICANT CHANGE UP (ref 150–400)
POTASSIUM SERPL-MCNC: 3.3 MMOL/L — LOW (ref 3.5–5.3)
POTASSIUM SERPL-SCNC: 3.3 MMOL/L — LOW (ref 3.5–5.3)
RBC # BLD: 4.03 M/UL — SIGNIFICANT CHANGE UP (ref 3.8–5.2)
RBC # FLD: 13.8 % — SIGNIFICANT CHANGE UP (ref 10.3–14.5)
SODIUM SERPL-SCNC: 139 MMOL/L — SIGNIFICANT CHANGE UP (ref 135–145)
WBC # BLD: 4.38 K/UL — SIGNIFICANT CHANGE UP (ref 3.8–10.5)
WBC # FLD AUTO: 4.38 K/UL — SIGNIFICANT CHANGE UP (ref 3.8–10.5)

## 2022-05-17 PROCEDURE — 99232 SBSQ HOSP IP/OBS MODERATE 35: CPT

## 2022-05-17 RX ORDER — POTASSIUM CHLORIDE 20 MEQ
40 PACKET (EA) ORAL ONCE
Refills: 0 | Status: COMPLETED | OUTPATIENT
Start: 2022-05-17 | End: 2022-05-17

## 2022-05-17 RX ORDER — ATORVASTATIN CALCIUM 80 MG/1
40 TABLET, FILM COATED ORAL AT BEDTIME
Refills: 0 | Status: DISCONTINUED | OUTPATIENT
Start: 2022-05-17 | End: 2022-05-24

## 2022-05-17 RX ORDER — LEVOTHYROXINE SODIUM 125 MCG
50 TABLET ORAL DAILY
Refills: 0 | Status: DISCONTINUED | OUTPATIENT
Start: 2022-05-17 | End: 2022-05-24

## 2022-05-17 RX ADMIN — Medication 40 MILLIEQUIVALENT(S): at 18:04

## 2022-05-17 RX ADMIN — ATORVASTATIN CALCIUM 40 MILLIGRAM(S): 80 TABLET, FILM COATED ORAL at 22:59

## 2022-05-17 RX ADMIN — LAMOTRIGINE 100 MILLIGRAM(S): 25 TABLET, ORALLY DISINTEGRATING ORAL at 17:03

## 2022-05-17 RX ADMIN — Medication 1000 MILLIGRAM(S): at 17:33

## 2022-05-17 RX ADMIN — HYDROMORPHONE HYDROCHLORIDE 4 MILLIGRAM(S): 2 INJECTION INTRAMUSCULAR; INTRAVENOUS; SUBCUTANEOUS at 18:05

## 2022-05-17 RX ADMIN — HYDROMORPHONE HYDROCHLORIDE 4 MILLIGRAM(S): 2 INJECTION INTRAMUSCULAR; INTRAVENOUS; SUBCUTANEOUS at 12:02

## 2022-05-17 RX ADMIN — Medication 5 MILLIGRAM(S): at 22:59

## 2022-05-17 RX ADMIN — Medication 60 MILLIGRAM(S): at 11:26

## 2022-05-17 RX ADMIN — Medication 0.5 MILLIGRAM(S): at 22:59

## 2022-05-17 RX ADMIN — Medication 400 MILLIGRAM(S): at 17:03

## 2022-05-17 RX ADMIN — HYDROMORPHONE HYDROCHLORIDE 4 MILLIGRAM(S): 2 INJECTION INTRAMUSCULAR; INTRAVENOUS; SUBCUTANEOUS at 18:35

## 2022-05-17 RX ADMIN — HYDROMORPHONE HYDROCHLORIDE 4 MILLIGRAM(S): 2 INJECTION INTRAMUSCULAR; INTRAVENOUS; SUBCUTANEOUS at 05:41

## 2022-05-17 RX ADMIN — HYDROMORPHONE HYDROCHLORIDE 4 MILLIGRAM(S): 2 INJECTION INTRAMUSCULAR; INTRAVENOUS; SUBCUTANEOUS at 11:32

## 2022-05-17 RX ADMIN — LAMOTRIGINE 100 MILLIGRAM(S): 25 TABLET, ORALLY DISINTEGRATING ORAL at 05:11

## 2022-05-17 RX ADMIN — Medication 250 MILLIGRAM(S): at 05:11

## 2022-05-17 RX ADMIN — ENOXAPARIN SODIUM 40 MILLIGRAM(S): 100 INJECTION SUBCUTANEOUS at 17:03

## 2022-05-17 RX ADMIN — HYDROMORPHONE HYDROCHLORIDE 4 MILLIGRAM(S): 2 INJECTION INTRAMUSCULAR; INTRAVENOUS; SUBCUTANEOUS at 05:11

## 2022-05-17 RX ADMIN — Medication 0.5 MILLIGRAM(S): at 05:11

## 2022-05-17 RX ADMIN — Medication 250 MILLIGRAM(S): at 17:02

## 2022-05-17 RX ADMIN — Medication 0.5 MILLIGRAM(S): at 13:28

## 2022-05-17 NOTE — PROGRESS NOTE ADULT - SUBJECTIVE AND OBJECTIVE BOX
Follow Up:  bacteremia, hardware infection osteo    Interval History: pt is doing much better, no fever    ROS:      All other systems negative    Constitutional: no fever, no chills  Cardiovascular:  no chest pain, no palpitationRespiratory:  no SOB, no cough  GI:  no abd pain, no vomiting, no diarrhea  urinary: no dysuria, no hematuria, no flank pain  musculoskeletal: L arm edema, erythema   skin:  no rash  neurology:  no headache, no seizure      Allergies  No Known Allergies        ANTIMICROBIALS:  vancomycin  IVPB 1000 every 12 hours      OTHER MEDS:  acetaminophen   IVPB .. 1000 milliGRAM(s) IV Intermittent once  atorvastatin 40 milliGRAM(s) Oral at bedtime  busPIRone 15 milliGRAM(s) Oral two times a day  clonazePAM  Tablet 0.5 milliGRAM(s) Oral three times a day  enoxaparin Injectable 40 milliGRAM(s) SubCutaneous every 24 hours  FLUoxetine 60 milliGRAM(s) Oral daily  HYDROmorphone   Tablet 4 milliGRAM(s) Oral every 6 hours PRN  lamoTRIgine 100 milliGRAM(s) Oral two times a day  levothyroxine 50 MICROGram(s) Oral daily  melatonin 3 milliGRAM(s) Oral at bedtime PRN  melatonin 5 milliGRAM(s) Oral once      Vital Signs Last 24 Hrs  T(C): 37.1 (17 May 2022 13:32), Max: 37.1 (17 May 2022 13:32)  T(F): 98.7 (17 May 2022 13:32), Max: 98.7 (17 May 2022 13:32)  HR: 96 (17 May 2022 13:32) (80 - 96)  BP: 154/81 (17 May 2022 13:32) (122/75 - 162/87)  BP(mean): --  RR: 18 (17 May 2022 13:32) (18 - 18)  SpO2: 99% (17 May 2022 13:32) (97% - 99%)    Physical Exam:  General:    NAD, non toxic, sitting on a chair  Cardio:    regular S1,S2, no murmur  Respiratory:   clear b/l, no wheezing  abd:   soft, BS +, not tender  :     no CVAT, no suprapubic tenderness, no pardo  Musculoskeletal : L elbow edema, erythema in dressing  Skin:    no rash  vascular: no phlebitis                          11.3   4.38  )-----------( 306      ( 17 May 2022 07:25 )             34.5       05-17    139  |  104  |  12  ----------------------------<  108<H>  3.3<L>   |  23  |  0.52    Ca    8.9      17 May 2022 07:28            MICROBIOLOGY:  v  .Blood Blood  05-15-22   No growth to date.  --  --      Clean Catch Clean Catch (Midstream)  05-13-22   <10,000 CFU/mL Normal Urogenital Katia  --  --      .Blood Blood-Peripheral  05-13-22   Growth in aerobic bottle: Staphylococcus epidermidis  --  Staphylococcus epidermidis      .Blood Blood-Peripheral  05-13-22   Growth in aerobic and anaerobic bottles: Staphylococcus epidermidis  ***Blood Panel PCR results on this specimen are available  approximately 3 hours after the Gram stain result.***  Gram stain, PCR, and/or culture results may not always  correspond due to difference in methodologies.  ************************************************************  This PCR assay was performed by multiplex PCR. This  Assay tests for 66 bacterial and resistance gene targets.  Please refer to the [facility] test directory  at [email or web address] for details.  --  Blood Culture PCR          Rapid RVP Result: NotDetec (05-13 @ 18:21)        RADIOLOGY:  Images independently visualized and reviewed personally, findings as below  < from: Xray Elbow 2 Views Post-Reduction, Left (05.14.22 @ 11:47) >    IMPRESSION:  Redemonstrated intact distal humeral contoured condylar fracture fixation   plate.    Refractured previously healing distal humeral supracondylar fracture with   displacement and angulation.    No gross intra-articular involvement. Elbow articular relationships and   spacing maintained.    < end of copied text >  < from: CT Head No Cont (05.14.22 @ 11:30) >    IMPRESSION: No change since 5/13/2022. Mild atrophy.    < end of copied text >

## 2022-05-17 NOTE — PROGRESS NOTE ADULT - SUBJECTIVE AND OBJECTIVE BOX
Patient seen and examined this morning, remains mildly confused and distracted but no acute complaints at this time. Pain-controlled.     Vital Signs Last 24 Hrs  T(C): 36.6 (17 May 2022 05:00), Max: 37.1 (16 May 2022 09:54)  T(F): 97.8 (17 May 2022 05:00), Max: 98.7 (16 May 2022 09:54)  HR: 80 (17 May 2022 05:00) (80 - 106)  BP: 156/93 (17 May 2022 05:00) (122/75 - 162/87)  RR: 18 (17 May 2022 05:00) (18 - 18)  SpO2: 98% (17 May 2022 05:00) (95% - 99%)          LABS:                          11.9   5.48  )-----------( 259      ( 16 May 2022 07:15 )             35.9     05-16    138  |  100  |  7   ----------------------------<  94  3.0<L>   |  21<L>  |  0.42<L>    Ca    9.3      16 May 2022 07:13    TPro  7.2  /  Alb  3.8  /  TBili  0.5  /  DBili  x   /  AST  25  /  ALT  14  /  AlkPhos  255<H>  05-15      PHYSICAL EXAM:  Gen: NAD, A+Ox2  LUE:  Posterior slab splint and sling.   Ax/Rad/Uln/Med/AIN/PIN Intact; Unable to test Musc  SILT throughout exposed hand  Capillary refill < 2 seconds in all digits of the hand  Painless active ROM wrist and hand.           A/P 66y year old Female PMH OI, R both bone fx s/p ORIF (9/2019) c/b evy-implant fx s/p ORIF (10/201) and L distal humerus s/p ORIF (3/2022) pooja/ Prince admitted to medicine for confusion. Ortho consulted for fracture distal to L humerus plate.    Plan:  Planned for OR for surgical fixation on 5/19.   Please document Medical/Other clearances as needed.   NPO at midnight except medications on 5/18.   +BCx x2 (Staph epi in 1 and GPCiC in the other), s/p vanco  IV ancef  Pain Control  NWB LUE  Sling for comfort  Posterior slab   Splint precautions:  Keep Splint dry  Elevate extremity, can try and ice through the Splint   Do not stick anything into the Splint  Will discuss with Dr. Morrison and advise of any changes to the above plan   Patient seen and examined this morning, remains mildly confused and distracted but no acute complaints at this time. Pain-controlled.     Vital Signs Last 24 Hrs  T(C): 36.6 (17 May 2022 05:00), Max: 37.1 (16 May 2022 09:54)  T(F): 97.8 (17 May 2022 05:00), Max: 98.7 (16 May 2022 09:54)  HR: 80 (17 May 2022 05:00) (80 - 106)  BP: 156/93 (17 May 2022 05:00) (122/75 - 162/87)  RR: 18 (17 May 2022 05:00) (18 - 18)  SpO2: 98% (17 May 2022 05:00) (95% - 99%)          LABS:                          11.9   5.48  )-----------( 259      ( 16 May 2022 07:15 )             35.9     05-16    138  |  100  |  7   ----------------------------<  94  3.0<L>   |  21<L>  |  0.42<L>    Ca    9.3      16 May 2022 07:13    TPro  7.2  /  Alb  3.8  /  TBili  0.5  /  DBili  x   /  AST  25  /  ALT  14  /  AlkPhos  255<H>  05-15      PHYSICAL EXAM:  Gen: NAD, A+Ox2  LUE:  Posterior slab splint and sling.   Ax/Rad/Uln/Med/AIN/PIN Intact; Unable to test Musc  SILT throughout exposed hand  Capillary refill < 2 seconds in all digits of the hand  Painless active ROM wrist and hand.           A/P 66y year old Female PMH OI, R both bone fx s/p ORIF (9/2019) c/b evy-implant fx s/p ORIF (10/201) and L distal humerus s/p ORIF (3/2022) pooja/ Prince admitted to medicine for confusion. Ortho consulted for fracture distal to L humerus plate.    Plan:  Planned for OR for surgical fixation on 5/19.   Please document Medical/Other clearances as needed.   NPO at midnight except medications on 5/18.   +BCx x2 (Staph epi in 1 and GPCiC in the other)  IV Vanco per ID  Pain Control  NWB LUE  Sling for comfort  Posterior slab   Splint precautions:  Keep Splint dry  Elevate extremity, can try and ice through the Splint   Do not stick anything into the Splint  Will discuss with Dr. Morrison and advise of any changes to the above plan

## 2022-05-17 NOTE — PROGRESS NOTE ADULT - ASSESSMENT
66 f with depression/anxiety osteogenesis imperfecta with multiple fractures and osteotomies, prosthetic joints, recent L humeral fx s/p ORIF and hardware 3/31/22,  brought in 5/14 for AMS and confusion, afebrile, normal WBC   erythema and edema of elbow found to have a hematoma which was drained by ortho, no culture done  blood cx 5/14: 3/4 staph epi  xray: S/P plate and screw fixation of distal humeral fracture. The distal plate and screw construct now has minimal purchase within the distal fracture fragment with interval proximal migration and apex lateral angulation of the distal fracture fragment when compared to radiographs dated 04/18/2022. There is extensive periosteal reaction with areas of lucency/osteolysis within the distal fracture fragment, concerning for underlying osteomyelitis or may be related to chronic motion at the fracture site.  head CT was negative and mental status improved    AMS, staph epi bacteremia in the setting of recent humerus ORIF, hardware placement now with unstable hardware, hematoma, likely hardware infection and osteo  h/o OI and multiple osteotomies and prosthetic joints    * c/w  vanco 1 q 12  * check the trough tomorrow   * monitor vanco trough and renal function to prevent nephrotoxicity  * hardware removal and cultures, would not place a new hardware if there is infected bone (if possbile)  * echo    The above assessment and plan was discussed with ortho    Linda Goode MD  contact on teams  After 5pm and on weekends call [phone]

## 2022-05-17 NOTE — PROGRESS NOTE ADULT - SUBJECTIVE AND OBJECTIVE BOX
66F hx of osteogenesis imperfecta  with multiple fractures, recent admission for L humeral fx with hardware (needing repair now due to hardware malfunction), depression/anxiety on multipe meds presenting to the Ed for 2 days of waxing and waning confusion (disoriented), more sleepiness. Progressive memory loss. Denies fever, chills, chest pain, shortness of breath, abd pain. Called as a code stroke for confusion but LKN > 24 hrs ago, patient felt warm, thus code stroke canceled. Patient was found to have a hematoma at her previous surgical site. Post op Patient was found to have Bacteremia. Seen now resting comfortably. MS has returned to baseline. Patient scheduled for washout with [facility] 5/18      MEDICATIONS  (STANDING):  acetaminophen   IVPB .. 1000 milliGRAM(s) IV Intermittent once  busPIRone 15 milliGRAM(s) Oral two times a day  clonazePAM  Tablet 0.5 milliGRAM(s) Oral three times a day  enoxaparin Injectable 40 milliGRAM(s) SubCutaneous every 24 hours  FLUoxetine 60 milliGRAM(s) Oral daily  lamoTRIgine 100 milliGRAM(s) Oral two times a day  melatonin 5 milliGRAM(s) Oral once  vancomycin  IVPB 1000 milliGRAM(s) IV Intermittent every 12 hours    MEDICATIONS  (PRN):  HYDROmorphone   Tablet 4 milliGRAM(s) Oral every 6 hours PRN Severe Pain (7 - 10)  melatonin 3 milliGRAM(s) Oral at bedtime PRN Insomnia          VITALS:   T(C): 37.1 (05-17-22 @ 13:32), Max: 37.1 (05-17-22 @ 13:32)  HR: 96 (05-17-22 @ 13:32) (80 - 96)  BP: 154/81 (05-17-22 @ 13:32) (122/75 - 162/87)  RR: 18 (05-17-22 @ 13:32) (18 - 18)  SpO2: 99% (05-17-22 @ 13:32) (97% - 99%)  Wt(kg): --    PHYSICAL EXAM:  GENERAL: NAD, well nourished and conversant  HEAD:  Atraumatic  EYES: EOM, PERRLA, conjunctiva pink and sclera white  ENT: No tonsillar erythema, exudates, or enlargement, moist mucous membranes, good dentition, no lesions  NECK: Supple, No JVD, normal thyroid, carotids with normal upstrokes and no bruits  CHEST/LUNG: Clear to auscultation bilaterally, No rales, rhonchi, wheezing, or rubs  HEART: Regular rate and rhythm, No murmurs, rubs, or gallops  ABDOMEN: Soft, nondistended, no masses, guarding, tenderness or rebound, bowel sounds present  EXTREMITIES:  left arm pain  LYMPH: No lymphadenopathy noted  SKIN: No rashes or lesions  NERVOUS SYSTEM:  Alert & Oriented X3, normal cognitive function.     LABS:        CBC Full  -  ( 17 May 2022 07:25 )  WBC Count : 4.38 K/uL  RBC Count : 4.03 M/uL  Hemoglobin : 11.3 g/dL  Hematocrit : 34.5 %  Platelet Count - Automated : 306 K/uL  Mean Cell Volume : 85.6 fl  Mean Cell Hemoglobin : 28.0 pg  Mean Cell Hemoglobin Concentration : 32.8 gm/dL  Auto Neutrophil # : x  Auto Lymphocyte # : x  Auto Monocyte # : x  Auto Eosinophil # : x  Auto Basophil # : x  Auto Neutrophil % : x  Auto Lymphocyte % : x  Auto Monocyte % : x  Auto Eosinophil % : x  Auto Basophil % : x    05-17    139  |  104  |  12  ----------------------------<  108<H>  3.3<L>   |  23  |  0.52    Ca    8.9      17 May 2022 07:28            CAPILLARY BLOOD GLUCOSE          RADIOLOGY & ADDITIONAL TESTS:

## 2022-05-17 NOTE — PROGRESS NOTE ADULT - ASSESSMENT
67 yo woman with a hx osteogenesis imperfecta and multiple fractures presents with a change in mental status. Patient with a hx of a recent Open reduction and internal fixation of the left humerus. Patient found to have positive blood cultures.

## 2022-05-18 ENCOUNTER — TRANSCRIPTION ENCOUNTER (OUTPATIENT)
Age: 67
End: 2022-05-18

## 2022-05-18 LAB
ANION GAP SERPL CALC-SCNC: 16 MMOL/L — SIGNIFICANT CHANGE UP (ref 5–17)
BLD GP AB SCN SERPL QL: NEGATIVE — SIGNIFICANT CHANGE UP
BUN SERPL-MCNC: 14 MG/DL — SIGNIFICANT CHANGE UP (ref 7–23)
CALCIUM SERPL-MCNC: 9.1 MG/DL — SIGNIFICANT CHANGE UP (ref 8.4–10.5)
CHLORIDE SERPL-SCNC: 105 MMOL/L — SIGNIFICANT CHANGE UP (ref 96–108)
CO2 SERPL-SCNC: 22 MMOL/L — SIGNIFICANT CHANGE UP (ref 22–31)
CREAT SERPL-MCNC: 0.58 MG/DL — SIGNIFICANT CHANGE UP (ref 0.5–1.3)
EGFR: 100 ML/MIN/1.73M2 — SIGNIFICANT CHANGE UP
GLUCOSE SERPL-MCNC: 96 MG/DL — SIGNIFICANT CHANGE UP (ref 70–99)
MAGNESIUM SERPL-MCNC: 2.3 MG/DL — SIGNIFICANT CHANGE UP (ref 1.6–2.6)
POTASSIUM SERPL-MCNC: 4.2 MMOL/L — SIGNIFICANT CHANGE UP (ref 3.5–5.3)
POTASSIUM SERPL-SCNC: 4.2 MMOL/L — SIGNIFICANT CHANGE UP (ref 3.5–5.3)
RH IG SCN BLD-IMP: POSITIVE — SIGNIFICANT CHANGE UP
SARS-COV-2 RNA SPEC QL NAA+PROBE: SIGNIFICANT CHANGE UP
SODIUM SERPL-SCNC: 143 MMOL/L — SIGNIFICANT CHANGE UP (ref 135–145)
VANCOMYCIN TROUGH SERPL-MCNC: 11.9 UG/ML — SIGNIFICANT CHANGE UP (ref 10–20)

## 2022-05-18 PROCEDURE — 99232 SBSQ HOSP IP/OBS MODERATE 35: CPT

## 2022-05-18 RX ORDER — ACETAMINOPHEN 500 MG
1000 TABLET ORAL ONCE
Refills: 0 | Status: COMPLETED | OUTPATIENT
Start: 2022-05-18 | End: 2022-05-18

## 2022-05-18 RX ORDER — SODIUM CHLORIDE 9 MG/ML
1000 INJECTION INTRAMUSCULAR; INTRAVENOUS; SUBCUTANEOUS
Refills: 0 | Status: DISCONTINUED | OUTPATIENT
Start: 2022-05-18 | End: 2022-05-24

## 2022-05-18 RX ADMIN — Medication 15 MILLIGRAM(S): at 17:36

## 2022-05-18 RX ADMIN — Medication 250 MILLIGRAM(S): at 17:35

## 2022-05-18 RX ADMIN — Medication 250 MILLIGRAM(S): at 12:14

## 2022-05-18 RX ADMIN — SODIUM CHLORIDE 65 MILLILITER(S): 9 INJECTION INTRAMUSCULAR; INTRAVENOUS; SUBCUTANEOUS at 22:06

## 2022-05-18 RX ADMIN — Medication 0.5 MILLIGRAM(S): at 21:16

## 2022-05-18 RX ADMIN — Medication 15 MILLIGRAM(S): at 05:57

## 2022-05-18 RX ADMIN — Medication 50 MICROGRAM(S): at 05:56

## 2022-05-18 RX ADMIN — Medication 3 MILLIGRAM(S): at 21:18

## 2022-05-18 RX ADMIN — HYDROMORPHONE HYDROCHLORIDE 4 MILLIGRAM(S): 2 INJECTION INTRAMUSCULAR; INTRAVENOUS; SUBCUTANEOUS at 12:23

## 2022-05-18 RX ADMIN — Medication 400 MILLIGRAM(S): at 22:06

## 2022-05-18 RX ADMIN — ATORVASTATIN CALCIUM 40 MILLIGRAM(S): 80 TABLET, FILM COATED ORAL at 21:17

## 2022-05-18 RX ADMIN — ENOXAPARIN SODIUM 40 MILLIGRAM(S): 100 INJECTION SUBCUTANEOUS at 17:35

## 2022-05-18 RX ADMIN — Medication 1000 MILLIGRAM(S): at 22:36

## 2022-05-18 RX ADMIN — HYDROMORPHONE HYDROCHLORIDE 4 MILLIGRAM(S): 2 INJECTION INTRAMUSCULAR; INTRAVENOUS; SUBCUTANEOUS at 18:30

## 2022-05-18 RX ADMIN — LAMOTRIGINE 100 MILLIGRAM(S): 25 TABLET, ORALLY DISINTEGRATING ORAL at 17:36

## 2022-05-18 RX ADMIN — Medication 0.5 MILLIGRAM(S): at 13:12

## 2022-05-18 RX ADMIN — Medication 60 MILLIGRAM(S): at 12:19

## 2022-05-18 RX ADMIN — HYDROMORPHONE HYDROCHLORIDE 4 MILLIGRAM(S): 2 INJECTION INTRAMUSCULAR; INTRAVENOUS; SUBCUTANEOUS at 00:24

## 2022-05-18 RX ADMIN — Medication 0.5 MILLIGRAM(S): at 05:56

## 2022-05-18 RX ADMIN — HYDROMORPHONE HYDROCHLORIDE 4 MILLIGRAM(S): 2 INJECTION INTRAMUSCULAR; INTRAVENOUS; SUBCUTANEOUS at 00:54

## 2022-05-18 RX ADMIN — LAMOTRIGINE 100 MILLIGRAM(S): 25 TABLET, ORALLY DISINTEGRATING ORAL at 05:57

## 2022-05-18 RX ADMIN — HYDROMORPHONE HYDROCHLORIDE 4 MILLIGRAM(S): 2 INJECTION INTRAMUSCULAR; INTRAVENOUS; SUBCUTANEOUS at 18:27

## 2022-05-18 RX ADMIN — HYDROMORPHONE HYDROCHLORIDE 4 MILLIGRAM(S): 2 INJECTION INTRAMUSCULAR; INTRAVENOUS; SUBCUTANEOUS at 13:06

## 2022-05-18 NOTE — PROGRESS NOTE ADULT - ASSESSMENT
65 yo woman with a hx osteogenesis imperfecta and multiple fractures presents with a change in mental status. Patient with a hx of a recent Open reduction and internal fixation of the left humerus. Patient found to have positive blood cultures. Scheduled for a [facility] 5/18

## 2022-05-18 NOTE — PROGRESS NOTE ADULT - SUBJECTIVE AND OBJECTIVE BOX
Patient seen and examined this morning. Pain well-controlled. No acute complaints at this time.     Vital Signs Last 24 Hrs  T(C): 36.7 (18 May 2022 04:37), Max: 37.2 (17 May 2022 17:27)  T(F): 98 (18 May 2022 04:37), Max: 98.9 (17 May 2022 17:27)  HR: 70 (18 May 2022 04:37) (68 - 96)  BP: 124/76 (18 May 2022 04:37) (121/76 - 154/81)  RR: 18 (18 May 2022 04:37) (18 - 18)  SpO2: 99% (18 May 2022 04:37) (98% - 99%)      LABS:                        11.3   4.38  )-----------( 306      ( 17 May 2022 07:25 )             34.5     05-17    139  |  104  |  12  ----------------------------<  108<H>  3.3<L>   |  23  |  0.52    Ca    8.9      17 May 2022 07:28        PHYSICAL EXAM:  Gen: NAD, A+Ox2  LUE:  Posterior slab splint and sling.   Ax/Rad/Uln/Med/AIN/PIN Intact; Unable to test Musc  SILT throughout exposed hand  Capillary refill < 2 seconds in all digits of the hand  Painless active ROM wrist and hand.           A/P 66y year old Female PMH OI, R both bone fx s/p ORIF (9/2019) c/b evy-implant fx s/p ORIF (10/201) and L distal humerus s/p ORIF (3/2022) w/ Prince admitted to medicine for confusion. Ortho consulted for fracture distal to L humerus plate.    Plan:  Planned for OR for surgical fixation tomorrow (5/19).   Please document Medical/Other clearances as needed.   NPO at midnight except medications on 5/18.   +BCx x2 (Staph epi in 1 and [facility] in the other)  IV Vanco per ID  Pain Control  NWB LUE  Sling for comfort  Posterior slab   Splint precautions:  Keep Splint dry  Elevate extremity, can try and ice through the Splint   Do not stick anything into the Splint  Will discuss with Dr. Morrison and advise of any changes to the above plan

## 2022-05-18 NOTE — PROGRESS NOTE ADULT - SUBJECTIVE AND OBJECTIVE BOX
66F hx of osteogenesis imperfecta  with multiple fractures, recent admission for L humeral fx with hardware (needing repair now due to hardware malfunction), depression/anxiety on multipe meds presenting to the Ed for 2 days of waxing and waning confusion (disoriented), more sleepiness. Progressive memory loss. Denies fever, chills, chest pain, shortness of breath, abd pain. Called as a code stroke for confusion but LKN > 24 hrs ago, patient felt warm, thus code stroke canceled. Patient was found to have a hematoma at her previous surgical site. Post op Patient was found to have Bacteremia. Seen now resting comfortably. MS has returned to baseline. Patient scheduled for washout with [facility] 5/18. Patient has been resting comfortably. No new issues overnight      MEDICATIONS  (STANDING):  atorvastatin 40 milliGRAM(s) Oral at bedtime  busPIRone 15 milliGRAM(s) Oral two times a day  clonazePAM  Tablet 0.5 milliGRAM(s) Oral three times a day  enoxaparin Injectable 40 milliGRAM(s) SubCutaneous every 24 hours  FLUoxetine 60 milliGRAM(s) Oral daily  lamoTRIgine 100 milliGRAM(s) Oral two times a day  levothyroxine 50 MICROGram(s) Oral daily  vancomycin  IVPB 1000 milliGRAM(s) IV Intermittent every 12 hours    MEDICATIONS  (PRN):  HYDROmorphone   Tablet 4 milliGRAM(s) Oral every 6 hours PRN Severe Pain (7 - 10)  melatonin 3 milliGRAM(s) Oral at bedtime PRN Insomnia          VITALS:   T(C): 37.6 (05-18-22 @ 12:15), Max: 37.6 (05-18-22 @ 12:15)  HR: 82 (05-18-22 @ 12:15) (68 - 96)  BP: 104/84 (05-18-22 @ 12:15) (104/84 - 154/81)  RR: 18 (05-18-22 @ 12:15) (18 - 18)  SpO2: 98% (05-18-22 @ 12:15) (98% - 100%)  Wt(kg): --    PHYSICAL EXAM:  GENERAL: NAD, well nourished and conversant  HEAD:  Atraumatic  EYES: EOM, PERRLA, conjunctiva pink and sclera white  ENT: No tonsillar erythema, exudates, or enlargement, moist mucous membranes, good dentition, no lesions  NECK: Supple, No JVD, normal thyroid, carotids with normal upstrokes and no bruits  CHEST/LUNG: Clear to auscultation bilaterally, No rales, rhonchi, wheezing, or rubs  HEART: Regular rate and rhythm, No murmurs, rubs, or gallops  ABDOMEN: Soft, nondistended, no masses, guarding, tenderness or rebound, bowel sounds present  EXTREMITIES:  left arm pain  LYMPH: No lymphadenopathy noted  SKIN: No rashes or lesions  NERVOUS SYSTEM:  Alert & Oriented X3, normal cognitive function.     LABS:        CBC Full  -  ( 17 May 2022 07:25 )  WBC Count : 4.38 K/uL  RBC Count : 4.03 M/uL  Hemoglobin : 11.3 g/dL  Hematocrit : 34.5 %  Platelet Count - Automated : 306 K/uL  Mean Cell Volume : 85.6 fl  Mean Cell Hemoglobin : 28.0 pg  Mean Cell Hemoglobin Concentration : 32.8 gm/dL  Auto Neutrophil # : x  Auto Lymphocyte # : x  Auto Monocyte # : x  Auto Eosinophil # : x  Auto Basophil # : x  Auto Neutrophil % : x  Auto Lymphocyte % : x  Auto Monocyte % : x  Auto Eosinophil % : x  Auto Basophil % : x    05-18    143  |  105  |  14  ----------------------------<  96  4.2   |  22  |  0.58    Ca    9.1      18 May 2022 07:13  Mg     2.3     05-18            CAPILLARY BLOOD GLUCOSE          RADIOLOGY & ADDITIONAL TESTS:

## 2022-05-18 NOTE — PROGRESS NOTE ADULT - ASSESSMENT
66 f with depression/anxiety osteogenesis imperfecta with multiple fractures and osteotomies, prosthetic joints, recent L humeral fx s/p ORIF and hardware 3/31/22,  brought in 5/14 for AMS and confusion, afebrile, normal WBC   erythema and edema of elbow found to have a hematoma which was drained by ortho, no culture done  blood cx 5/14: 3/4 staph epi  xray: S/P plate and screw fixation of distal humeral fracture. The distal plate and screw construct now has minimal purchase within the distal fracture fragment with interval proximal migration and apex lateral angulation of the distal fracture fragment when compared to radiographs dated 04/18/2022. There is extensive periosteal reaction with areas of lucency/osteolysis within the distal fracture fragment, concerning for underlying osteomyelitis or may be related to chronic motion at the fracture site.  head CT was negative and mental status improved    AMS, staph epi bacteremia in the setting of recent humerus ORIF, hardware placement now with unstable hardware, hematoma, likely hardware infection and osteo  h/o OI and multiple osteotomies and prosthetic joints  * repeat blood cx 5/15 negative  * c/w  vanco 1 q 12  * monitor vanco trough and renal function to prevent nephrotoxicity  * hardware removal and cultures, would not place a new hardware if there is infected bone (if possible)      The above assessment and plan was discussed with ortho    Linda Goode MD  contact on teams  After 5pm and on weekends call [phone]

## 2022-05-18 NOTE — PROGRESS NOTE ADULT - SUBJECTIVE AND OBJECTIVE BOX
Follow Up:  bacteremia, hardware infection osteo    Interval History: no fever and repeat blood cx negative, plan for OR tomorrow    ROS:      All other systems negative    Constitutional: no fever, no chills  Cardiovascular:  no chest pain, no palpitation  Respiratory:  no SOB, no cough  GI:  no abd pain, no vomiting, no diarrhea  urinary: no dysuria, no hematuria, no flank pain  musculoskeletal: L arm edema, erythema   skin:  no rash  neurology:  no headache, no seizure          Allergies  No Known Allergies        ANTIMICROBIALS:  vancomycin  IVPB 1000 every 12 hours      OTHER MEDS:  atorvastatin 40 milliGRAM(s) Oral at bedtime  busPIRone 15 milliGRAM(s) Oral two times a day  clonazePAM  Tablet 0.5 milliGRAM(s) Oral three times a day  enoxaparin Injectable 40 milliGRAM(s) SubCutaneous every 24 hours  FLUoxetine 60 milliGRAM(s) Oral daily  HYDROmorphone   Tablet 4 milliGRAM(s) Oral every 6 hours PRN  lamoTRIgine 100 milliGRAM(s) Oral two times a day  levothyroxine 50 MICROGram(s) Oral daily  melatonin 3 milliGRAM(s) Oral at bedtime PRN      Vital Signs Last 24 Hrs  T(C): 37.6 (18 May 2022 12:15), Max: 37.6 (18 May 2022 12:15)  T(F): 99.6 (18 May 2022 12:15), Max: 99.6 (18 May 2022 12:15)  HR: 82 (18 May 2022 12:15) (68 - 94)  BP: 104/84 (18 May 2022 12:15) (104/84 - 146/79)  BP(mean): --  RR: 18 (18 May 2022 12:15) (18 - 18)  SpO2: 98% (18 May 2022 12:15) (98% - 100%)    Physical Exam:  General:    NAD, non toxic  Cardio:    regular S1,S2, no murmur  Respiratory:   clear b/l, no wheezing  abd:   soft, BS +, not tender  :     no CVAT, no suprapubic tenderness, no pardo  Musculoskeletal : L elbow edema, erythema in dressing  Skin:    no rash  vascular: no phlebitis                        11.3   4.38  )-----------( 306      ( 17 May 2022 07:25 )             34.5       05-18    143  |  105  |  14  ----------------------------<  96  4.2   |  22  |  0.58    Ca    9.1      18 May 2022 07:13  Mg     2.3     05-18            MICROBIOLOGY:  Vancomycin Level, Trough: 11.9 ug/mL (05-18-22 @ 07:18)  v  .Blood Blood  05-15-22   No growth to date.  --  --      Clean Catch Clean Catch (Midstream)  05-13-22   <10,000 CFU/mL Normal Urogenital Katia  --  --      .Blood Blood-Peripheral  05-13-22   Growth in aerobic bottle: Staphylococcus epidermidis  --  Staphylococcus epidermidis      .Blood Blood-Peripheral  05-13-22   Growth in aerobic and anaerobic bottles: Staphylococcus epidermidis  ***Blood Panel PCR results on this specimen are available  approximately 3 hours after the Gram stain result.***  Gram stain, PCR, and/or culture results may not always  correspond due to difference in methodologies.  ************************************************************  This PCR assay was performed by multiplex PCR. This  Assay tests for 66 bacterial and resistance gene targets.  Please refer to the [facility] test directory  at [email or web address] for details.  --  Blood Culture PCR          Rapid RVP Result: NotDetec (05-13 @ 18:21)        RADIOLOGY:  Images independently visualized and reviewed personally, findings as below  < from: Xray Elbow 2 Views Post-Reduction, Left (05.14.22 @ 11:47) >  IMPRESSION:  Redemonstrated intact distal humeral contoured condylar fracture fixation   plate.    Refractured previously healing distal humeral supracondylar fracture with   displacement and angulation.    No gross intra-articular involvement. Elbow articular relationships and   spacing maintained.    < end of copied text >

## 2022-05-19 ENCOUNTER — APPOINTMENT (OUTPATIENT)
Dept: ORTHOPEDIC SURGERY | Facility: HOSPITAL | Age: 67
End: 2022-05-19

## 2022-05-19 LAB
ANION GAP SERPL CALC-SCNC: 11 MMOL/L — SIGNIFICANT CHANGE UP (ref 5–17)
ANION GAP SERPL CALC-SCNC: 12 MMOL/L — SIGNIFICANT CHANGE UP (ref 5–17)
BUN SERPL-MCNC: 11 MG/DL — SIGNIFICANT CHANGE UP (ref 7–23)
BUN SERPL-MCNC: 19 MG/DL — SIGNIFICANT CHANGE UP (ref 7–23)
CALCIUM SERPL-MCNC: 7.9 MG/DL — LOW (ref 8.4–10.5)
CALCIUM SERPL-MCNC: 8.4 MG/DL — SIGNIFICANT CHANGE UP (ref 8.4–10.5)
CHLORIDE SERPL-SCNC: 105 MMOL/L — SIGNIFICANT CHANGE UP (ref 96–108)
CHLORIDE SERPL-SCNC: 106 MMOL/L — SIGNIFICANT CHANGE UP (ref 96–108)
CO2 SERPL-SCNC: 22 MMOL/L — SIGNIFICANT CHANGE UP (ref 22–31)
CO2 SERPL-SCNC: 23 MMOL/L — SIGNIFICANT CHANGE UP (ref 22–31)
CREAT SERPL-MCNC: 0.46 MG/DL — LOW (ref 0.5–1.3)
CREAT SERPL-MCNC: 0.64 MG/DL — SIGNIFICANT CHANGE UP (ref 0.5–1.3)
EGFR: 106 ML/MIN/1.73M2 — SIGNIFICANT CHANGE UP
EGFR: 98 ML/MIN/1.73M2 — SIGNIFICANT CHANGE UP
GLUCOSE SERPL-MCNC: 104 MG/DL — HIGH (ref 70–99)
GLUCOSE SERPL-MCNC: 131 MG/DL — HIGH (ref 70–99)
HCT VFR BLD CALC: 30.6 % — LOW (ref 34.5–45)
HCT VFR BLD CALC: 32.1 % — LOW (ref 34.5–45)
HGB BLD-MCNC: 10 G/DL — LOW (ref 11.5–15.5)
HGB BLD-MCNC: 10.3 G/DL — LOW (ref 11.5–15.5)
INR BLD: 1.15 RATIO — SIGNIFICANT CHANGE UP (ref 0.88–1.16)
MCHC RBC-ENTMCNC: 28.8 PG — SIGNIFICANT CHANGE UP (ref 27–34)
MCHC RBC-ENTMCNC: 29.5 PG — SIGNIFICANT CHANGE UP (ref 27–34)
MCHC RBC-ENTMCNC: 32.1 GM/DL — SIGNIFICANT CHANGE UP (ref 32–36)
MCHC RBC-ENTMCNC: 32.7 GM/DL — SIGNIFICANT CHANGE UP (ref 32–36)
MCV RBC AUTO: 89.7 FL — SIGNIFICANT CHANGE UP (ref 80–100)
MCV RBC AUTO: 90.3 FL — SIGNIFICANT CHANGE UP (ref 80–100)
NRBC # BLD: 0 /100 WBCS — SIGNIFICANT CHANGE UP (ref 0–0)
NRBC # BLD: 0 /100 WBCS — SIGNIFICANT CHANGE UP (ref 0–0)
PLATELET # BLD AUTO: 220 K/UL — SIGNIFICANT CHANGE UP (ref 150–400)
PLATELET # BLD AUTO: 240 K/UL — SIGNIFICANT CHANGE UP (ref 150–400)
POTASSIUM SERPL-MCNC: 3.8 MMOL/L — SIGNIFICANT CHANGE UP (ref 3.5–5.3)
POTASSIUM SERPL-MCNC: 3.8 MMOL/L — SIGNIFICANT CHANGE UP (ref 3.5–5.3)
POTASSIUM SERPL-SCNC: 3.8 MMOL/L — SIGNIFICANT CHANGE UP (ref 3.5–5.3)
POTASSIUM SERPL-SCNC: 3.8 MMOL/L — SIGNIFICANT CHANGE UP (ref 3.5–5.3)
PROTHROM AB SERPL-ACNC: 13.3 SEC — SIGNIFICANT CHANGE UP (ref 10.5–13.4)
RBC # BLD: 3.39 M/UL — LOW (ref 3.8–5.2)
RBC # BLD: 3.58 M/UL — LOW (ref 3.8–5.2)
RBC # FLD: 14 % — SIGNIFICANT CHANGE UP (ref 10.3–14.5)
RBC # FLD: 14.3 % — SIGNIFICANT CHANGE UP (ref 10.3–14.5)
SODIUM SERPL-SCNC: 139 MMOL/L — SIGNIFICANT CHANGE UP (ref 135–145)
SODIUM SERPL-SCNC: 140 MMOL/L — SIGNIFICANT CHANGE UP (ref 135–145)
VANCOMYCIN TROUGH SERPL-MCNC: 13.2 UG/ML — SIGNIFICANT CHANGE UP (ref 10–20)
WBC # BLD: 3.75 K/UL — LOW (ref 3.8–10.5)
WBC # BLD: 9.9 K/UL — SIGNIFICANT CHANGE UP (ref 3.8–10.5)
WBC # FLD AUTO: 3.75 K/UL — LOW (ref 3.8–10.5)
WBC # FLD AUTO: 9.9 K/UL — SIGNIFICANT CHANGE UP (ref 3.8–10.5)

## 2022-05-19 PROCEDURE — 24435 RPR NON/MAL HUM WITH AGRFT: CPT | Mod: 78,LT

## 2022-05-19 PROCEDURE — 24134 SEQUESTRECTOMY SHFT/DSTL HUM: CPT | Mod: 78,LT

## 2022-05-19 PROCEDURE — 64718 REVISE ULNAR NERVE AT ELBOW: CPT | Mod: 59,78,LT

## 2022-05-19 PROCEDURE — 24342 REPAIR OF RUPTURED TENDON: CPT | Mod: 78,LT

## 2022-05-19 PROCEDURE — 64708 REVISE ARM/LEG NERVE: CPT | Mod: 59,78,LT

## 2022-05-19 PROCEDURE — 99232 SBSQ HOSP IP/OBS MODERATE 35: CPT | Mod: GC

## 2022-05-19 RX ORDER — CEFAZOLIN SODIUM 1 G
2000 VIAL (EA) INJECTION EVERY 8 HOURS
Refills: 0 | Status: COMPLETED | OUTPATIENT
Start: 2022-05-19 | End: 2022-05-20

## 2022-05-19 RX ORDER — ENOXAPARIN SODIUM 100 MG/ML
40 INJECTION SUBCUTANEOUS EVERY 24 HOURS
Refills: 0 | Status: DISCONTINUED | OUTPATIENT
Start: 2022-05-20 | End: 2022-05-24

## 2022-05-19 RX ORDER — HYDROMORPHONE HYDROCHLORIDE 2 MG/ML
0.25 INJECTION INTRAMUSCULAR; INTRAVENOUS; SUBCUTANEOUS
Refills: 0 | Status: DISCONTINUED | OUTPATIENT
Start: 2022-05-19 | End: 2022-05-19

## 2022-05-19 RX ORDER — TOBRAMYCIN 0.3 %
1 DROPS OPHTHALMIC (EYE) EVERY 4 HOURS
Refills: 0 | Status: COMPLETED | OUTPATIENT
Start: 2022-05-19 | End: 2022-05-20

## 2022-05-19 RX ADMIN — HYDROMORPHONE HYDROCHLORIDE 4 MILLIGRAM(S): 2 INJECTION INTRAMUSCULAR; INTRAVENOUS; SUBCUTANEOUS at 05:45

## 2022-05-19 RX ADMIN — Medication 1 DROP(S): at 23:52

## 2022-05-19 RX ADMIN — Medication 250 MILLIGRAM(S): at 05:15

## 2022-05-19 RX ADMIN — Medication 0.5 MILLIGRAM(S): at 23:45

## 2022-05-19 RX ADMIN — LAMOTRIGINE 100 MILLIGRAM(S): 25 TABLET, ORALLY DISINTEGRATING ORAL at 05:15

## 2022-05-19 RX ADMIN — Medication 1 DROP(S): at 17:19

## 2022-05-19 RX ADMIN — Medication 100 MILLIGRAM(S): at 23:45

## 2022-05-19 RX ADMIN — Medication 60 MILLIGRAM(S): at 21:50

## 2022-05-19 RX ADMIN — Medication 50 MICROGRAM(S): at 05:15

## 2022-05-19 RX ADMIN — ENOXAPARIN SODIUM 40 MILLIGRAM(S): 100 INJECTION SUBCUTANEOUS at 23:56

## 2022-05-19 RX ADMIN — HYDROMORPHONE HYDROCHLORIDE 4 MILLIGRAM(S): 2 INJECTION INTRAMUSCULAR; INTRAVENOUS; SUBCUTANEOUS at 05:15

## 2022-05-19 RX ADMIN — LAMOTRIGINE 100 MILLIGRAM(S): 25 TABLET, ORALLY DISINTEGRATING ORAL at 21:49

## 2022-05-19 RX ADMIN — ATORVASTATIN CALCIUM 40 MILLIGRAM(S): 80 TABLET, FILM COATED ORAL at 21:53

## 2022-05-19 RX ADMIN — Medication 1 DROP(S): at 17:18

## 2022-05-19 RX ADMIN — SODIUM CHLORIDE 65 MILLILITER(S): 9 INJECTION INTRAMUSCULAR; INTRAVENOUS; SUBCUTANEOUS at 21:53

## 2022-05-19 RX ADMIN — Medication 0.5 MILLIGRAM(S): at 05:15

## 2022-05-19 RX ADMIN — Medication 250 MILLIGRAM(S): at 21:52

## 2022-05-19 NOTE — PROGRESS NOTE ADULT - SUBJECTIVE AND OBJECTIVE BOX
66F hx of osteogenesis imperfecta  with multiple fractures, recent admission for L humeral fx with hardware (needing repair now due to hardware malfunction), depression/anxiety on multipe meds presenting to the Ed for 2 days of waxing and waning confusion (disoriented), more sleepiness. Progressive memory loss. Denies fever, chills, chest pain, shortness of breath, abd pain. Called as a code stroke for confusion but LKN > 24 hrs ago, patient felt warm, thus code stroke canceled. Patient was found to have a hematoma at her previous surgical site. Post op Patient was found to have Bacteremia. Seen now resting comfortably. MS has returned to baseline. Patient scheduled for washout with [facility] 5/18. Patient has been resting comfortably. No new issues overnight    MEDICATIONS  (STANDING):  atorvastatin 40 milliGRAM(s) Oral at bedtime  busPIRone 15 milliGRAM(s) Oral two times a day  ceFAZolin   IVPB 2000 milliGRAM(s) IV Intermittent every 8 hours  clonazePAM  Tablet 0.5 milliGRAM(s) Oral three times a day  enoxaparin Injectable 40 milliGRAM(s) SubCutaneous every 24 hours  FLUoxetine 60 milliGRAM(s) Oral daily  lamoTRIgine 100 milliGRAM(s) Oral two times a day  levothyroxine 50 MICROGram(s) Oral daily  sodium chloride 0.9%. 1000 milliLiter(s) (65 mL/Hr) IV Continuous <Continuous>  tobramycin 0.3% Ophthalmic Solution 1 Drop(s) Left EYE every 4 hours  vancomycin  IVPB 1000 milliGRAM(s) IV Intermittent every 12 hours    MEDICATIONS  (PRN):  HYDROmorphone   Tablet 4 milliGRAM(s) Oral every 6 hours PRN Severe Pain (7 - 10)  melatonin 3 milliGRAM(s) Oral at bedtime PRN Insomnia          VITALS:   T(C): 37.2 (05-20-22 @ 00:15), Max: 37.3 (05-19-22 @ 20:30)  HR: 98 (05-20-22 @ 00:15) (74 - 104)  BP: 118/62 (05-20-22 @ 00:15) (110/78 - 146/82)  RR: 18 (05-20-22 @ 00:15) (18 - 20)  SpO2: 97% (05-20-22 @ 00:15) (95% - 100%)  Wt(kg): --    PHYSICAL EXAM:  GENERAL: NAD, well nourished and conversant  HEAD:  Atraumatic  EYES: EOM, PERRLA, conjunctiva pink and sclera white  ENT: No tonsillar erythema, exudates, or enlargement, moist mucous membranes, good dentition, no lesions  NECK: Supple, No JVD, normal thyroid, carotids with normal upstrokes and no bruits  CHEST/LUNG: Clear to auscultation bilaterally, No rales, rhonchi, wheezing, or rubs  HEART: Regular rate and rhythm, No murmurs, rubs, or gallops  ABDOMEN: Soft, nondistended, no masses, guarding, tenderness or rebound, bowel sounds present  EXTREMITIES:  left arm pain  LYMPH: No lymphadenopathy noted  SKIN: No rashes or lesions  NERVOUS SYSTEM:  Alert & Oriented X3, normal cognitive function.     LABS:        CBC Full  -  ( 19 May 2022 16:46 )  WBC Count : 9.90 K/uL  RBC Count : 3.39 M/uL  Hemoglobin : 10.0 g/dL  Hematocrit : 30.6 %  Platelet Count - Automated : 220 K/uL  Mean Cell Volume : 90.3 fl  Mean Cell Hemoglobin : 29.5 pg  Mean Cell Hemoglobin Concentration : 32.7 gm/dL  Auto Neutrophil # : x  Auto Lymphocyte # : x  Auto Monocyte # : x  Auto Eosinophil # : x  Auto Basophil # : x  Auto Neutrophil % : x  Auto Lymphocyte % : x  Auto Monocyte % : x  Auto Eosinophil % : x  Auto Basophil % : x    05-19    140  |  106  |  11  ----------------------------<  131<H>  3.8   |  23  |  0.46<L>    Ca    7.9<L>      19 May 2022 16:46  Mg     2.3     05-18        PT/INR - ( 19 May 2022 05:38 )   PT: 13.3 sec;   INR: 1.15 ratio             CAPILLARY BLOOD GLUCOSE          RADIOLOGY & ADDITIONAL TESTS:        66F hx of osteogenesis imperfecta  with multiple fractures, recent admission for L humeral fx with hardware (needing repair now due to hardware malfunction), depression/anxiety on multipe meds presenting to the Ed for 2 days of waxing and waning confusion (disoriented), more sleepiness. Progressive memory loss. Denies fever, chills, chest pain, shortness of breath, abd pain. Called as a code stroke for confusion but LKN > 24 hrs ago, patient felt warm, thus code stroke canceled. Patient was found to have a hematoma at her previous surgical site. Post op Patient was found to have Bacteremia. Seen now resting comfortably. MS has returned to baseline. Patient s/p washout with [facility] 5/18. Patient tolerated the procedure well.     MEDICATIONS  (STANDING):  atorvastatin 40 milliGRAM(s) Oral at bedtime  busPIRone 15 milliGRAM(s) Oral two times a day  ceFAZolin   IVPB 2000 milliGRAM(s) IV Intermittent every 8 hours  clonazePAM  Tablet 0.5 milliGRAM(s) Oral three times a day  enoxaparin Injectable 40 milliGRAM(s) SubCutaneous every 24 hours  FLUoxetine 60 milliGRAM(s) Oral daily  lamoTRIgine 100 milliGRAM(s) Oral two times a day  levothyroxine 50 MICROGram(s) Oral daily  sodium chloride 0.9%. 1000 milliLiter(s) (65 mL/Hr) IV Continuous <Continuous>  tobramycin 0.3% Ophthalmic Solution 1 Drop(s) Left EYE every 4 hours  vancomycin  IVPB 1000 milliGRAM(s) IV Intermittent every 12 hours    MEDICATIONS  (PRN):  HYDROmorphone   Tablet 4 milliGRAM(s) Oral every 6 hours PRN Severe Pain (7 - 10)  melatonin 3 milliGRAM(s) Oral at bedtime PRN Insomnia          VITALS:   T(C): 37.2 (05-20-22 @ 00:15), Max: 37.3 (05-19-22 @ 20:30)  HR: 98 (05-20-22 @ 00:15) (74 - 104)  BP: 118/62 (05-20-22 @ 00:15) (110/78 - 146/82)  RR: 18 (05-20-22 @ 00:15) (18 - 20)  SpO2: 97% (05-20-22 @ 00:15) (95% - 100%)  Wt(kg): --    PHYSICAL EXAM:  GENERAL: NAD, well nourished and conversant  HEAD:  Atraumatic  EYES: EOM, PERRLA, conjunctiva pink and sclera white  ENT: No tonsillar erythema, exudates, or enlargement, moist mucous membranes, good dentition, no lesions  NECK: Supple, No JVD, normal thyroid, carotids with normal upstrokes and no bruits  CHEST/LUNG: Clear to auscultation bilaterally, No rales, rhonchi, wheezing, or rubs  HEART: Regular rate and rhythm, No murmurs, rubs, or gallops  ABDOMEN: Soft, nondistended, no masses, guarding, tenderness or rebound, bowel sounds present  EXTREMITIES:  left arm pain  LYMPH: No lymphadenopathy noted  SKIN: No rashes or lesions  NERVOUS SYSTEM:  Alert & Oriented X3, normal cognitive function.     LABS:        CBC Full  -  ( 19 May 2022 16:46 )  WBC Count : 9.90 K/uL  RBC Count : 3.39 M/uL  Hemoglobin : 10.0 g/dL  Hematocrit : 30.6 %  Platelet Count - Automated : 220 K/uL  Mean Cell Volume : 90.3 fl  Mean Cell Hemoglobin : 29.5 pg  Mean Cell Hemoglobin Concentration : 32.7 gm/dL  Auto Neutrophil # : x  Auto Lymphocyte # : x  Auto Monocyte # : x  Auto Eosinophil # : x  Auto Basophil # : x  Auto Neutrophil % : x  Auto Lymphocyte % : x  Auto Monocyte % : x  Auto Eosinophil % : x  Auto Basophil % : x    05-19    140  |  106  |  11  ----------------------------<  131<H>  3.8   |  23  |  0.46<L>    Ca    7.9<L>      19 May 2022 16:46  Mg     2.3     05-18        PT/INR - ( 19 May 2022 05:38 )   PT: 13.3 sec;   INR: 1.15 ratio             CAPILLARY BLOOD GLUCOSE          RADIOLOGY & ADDITIONAL TESTS:

## 2022-05-19 NOTE — PROGRESS NOTE ADULT - PROBLEM SELECTOR PLAN 2
Patient seen by ID and started on vanco  follow cultures  follow vanco trough  Scheduled for a washout 5/18  No contraindication to scheduled procedure Patient seen by ID and started on vanco  follow cultures  follow vanco trough  s/p washout and [facility]  tolerated the procedure well

## 2022-05-19 NOTE — PROVIDER CONTACT NOTE (MEDICATION) - ASSESSMENT
pt claims she doesn't normally take medication at home. RN explained importance of medication, pt still refused.

## 2022-05-19 NOTE — CHART NOTE - NSCHARTNOTEFT_GEN_A_CORE
Patient seen in PACU resting without complaints.  No Chest Pain, SOB, N/V.    T(C): 36 (05-19-22 @ 16:00), Max: 37.1 (05-19-22 @ 08:34)  HR: 89 (05-19-22 @ 17:30) (73 - 89)  BP: 121/65 (05-19-22 @ 17:30) (110/70 - 146/82)  RR: 18 (05-19-22 @ 17:30) (18 - 20)  SpO2: 96% (05-19-22 @ 17:30) (96% - 100%)  Wt(kg): --    Exam:  Alert and Oriented, No Acute Distress  Laterality: LUE wrapped in ace, C/D/I in sling  SILT deltoid, nerve block still in effect  +radial pulse    Xray:----                          10.0   9.90  )-----------( 220      ( 19 May 2022 16:46 )             30.6    05-19    140  |  106  |  11  ----------------------------<  131<H>  3.8   |  23  |  0.46<L>    Ca    7.9<L>      19 May 2022 16:46  Mg     2.3     05-18        A/P: 66yFemale S/p L humerus nonunion, sequestrectomy, neurolysis radial and ulna nerves, triceps repair    -PT/OT-NWB LUE in sling, ROM hand/shoulder as tolerated  -IS encouraged  -DVT PPx with A325 mg BID  -Followup AM labs  -Pain Control  -Care as per primary team    Aysha Argueta PA-C  Team Pager #[phone]

## 2022-05-19 NOTE — PROGRESS NOTE ADULT - SUBJECTIVE AND OBJECTIVE BOX
Patient seen and examined this morning. Pain well-controlled. No acute complaints at this time.     Vital Signs Last 24 Hrs  T(C): 36.9 (19 May 2022 04:34), Max: 37.6 (18 May 2022 12:15)  T(F): 98.4 (19 May 2022 04:34), Max: 99.6 (18 May 2022 12:15)  HR: 74 (19 May 2022 04:34) (73 - 96)  BP: 136/81 (19 May 2022 04:34) (104/62 - 136/81)  RR: 18 (19 May 2022 04:34) (18 - 18)  SpO2: 98% (19 May 2022 04:34) (98% - 100%)        LABS:                        10.3   3.75  )-----------( 240      ( 19 May 2022 04:32 )             32.1     05-19    139  |  105  |  19  ----------------------------<  104<H>  3.8   |  22  |  0.64    Ca    8.4      19 May 2022 04:32  Mg     2.3     05-18          PHYSICAL EXAM:  Gen: NAD, A+Ox2  LUE:  Posterior slab splint and sling.   Ax/Rad/Uln/Med/AIN/PIN Intact; Unable to test Musc  SILT throughout exposed hand  Capillary refill < 2 seconds in all digits of the hand  Painless active ROM wrist and hand.           A/P 66y year old Female PMH OI, R both bone fx s/p ORIF (9/2019) c/b evy-implant fx s/p ORIF (10/201) and L distal humerus s/p ORIF (3/2022) w/ Prince admitted to medicine for confusion. Ortho consulted for fracture distal to L humerus plate.    Plan:  Planned for OR for surgical fixation today (5/19).   Please document Medical/Other clearances as needed.   NPO; IVF while NPO  +BCx x2 (Staph epi in 1 and GPCC in the other)  IV Vanco per ID  Pain Control  NWB LUE  Sling for comfort  Posterior slab   Splint precautions:  Keep Splint dry  Elevate extremity, can try and ice through the Splint   Do not stick anything into the Splint  Will discuss with Dr. Morrison and advise of any changes to the above plan   Patient seen and examined this morning. Refusing Buspar. Pain well-controlled. No acute complaints at this time.     Vital Signs Last 24 Hrs  T(C): 36.9 (19 May 2022 04:34), Max: 37.6 (18 May 2022 12:15)  T(F): 98.4 (19 May 2022 04:34), Max: 99.6 (18 May 2022 12:15)  HR: 74 (19 May 2022 04:34) (73 - 96)  BP: 136/81 (19 May 2022 04:34) (104/62 - 136/81)  RR: 18 (19 May 2022 04:34) (18 - 18)  SpO2: 98% (19 May 2022 04:34) (98% - 100%)        LABS:                        10.3   3.75  )-----------( 240      ( 19 May 2022 04:32 )             32.1     05-19    139  |  105  |  19  ----------------------------<  104<H>  3.8   |  22  |  0.64    Ca    8.4      19 May 2022 04:32  Mg     2.3     05-18          PHYSICAL EXAM:  Gen: NAD, A+Ox2  LUE:  Posterior slab splint and sling.   Ax/Rad/Uln/Med/AIN/PIN Intact; Unable to test Musc  SILT throughout exposed hand  Capillary refill < 2 seconds in all digits of the hand  Painless active ROM wrist and hand.           A/P 66y year old Female PMH OI, R both bone fx s/p ORIF (9/2019) c/b evy-implant fx s/p ORIF (10/201) and L distal humerus s/p ORIF (3/2022) w/ Prince admitted to medicine for confusion. Ortho consulted for fracture distal to L humerus plate.    Plan:  Planned for OR for surgical fixation today (5/19).   Please document Medical/Other clearances as needed.   NPO; IVF while NPO  Hold AC  +BCx x2 (Staph epi in 1 and GPCC in the other)  IV Vanco per ID  Pain Control  NWB LUE  Sling for comfort  Posterior slab   Splint precautions:  Keep Splint dry  Elevate extremity, can try and ice through the Splint   Do not stick anything into the Splint  Will discuss with Dr. Morrison and advise of any changes to the above plan

## 2022-05-19 NOTE — PROGRESS NOTE ADULT - SUBJECTIVE AND OBJECTIVE BOX
Follow Up:  bacteremia, hardware infection osteo    Interval History: s/p OR today, L humerus non-union ORIF with autograft (BMAC) and partial triceps repair with Mitek suture anchor    ROS:      All other systems negative    Constitutional: no fever, no chills  Cardiovascular:  no chest pain, no palpitation  Respiratory:  no SOB, no cough  GI:  no abd pain, no vomiting, no diarrhea  urinary: no dysuria, no hematuria, no flank pain  musculoskeletal: controlled pain  skin:  no rash  neurology:  no headache, no seizure        Allergies  No Known Allergies        ANTIMICROBIALS:  ceFAZolin   IVPB 2000 every 8 hours  vancomycin  IVPB 1000 every 12 hours      OTHER MEDS:  atorvastatin 40 milliGRAM(s) Oral at bedtime  busPIRone 15 milliGRAM(s) Oral two times a day  clonazePAM  Tablet 0.5 milliGRAM(s) Oral three times a day  FLUoxetine 60 milliGRAM(s) Oral daily  HYDROmorphone   Tablet 4 milliGRAM(s) Oral every 6 hours PRN  HYDROmorphone  Injectable 0.25 milliGRAM(s) IV Push every 10 minutes PRN  lamoTRIgine 100 milliGRAM(s) Oral two times a day  levothyroxine 50 MICROGram(s) Oral daily  melatonin 3 milliGRAM(s) Oral at bedtime PRN  sodium chloride 0.9%. 1000 milliLiter(s) IV Continuous <Continuous>  tobramycin 0.3% Ophthalmic Solution 1 Drop(s) Left EYE every 4 hours      Vital Signs Last 24 Hrs  T(C): 36 (19 May 2022 16:00), Max: 37.1 (19 May 2022 08:34)  T(F): 96.8 (19 May 2022 16:00), Max: 98.8 (19 May 2022 08:34)  HR: 87 (19 May 2022 18:00) (73 - 89)  BP: 112/68 (19 May 2022 18:00) (110/70 - 146/82)  BP(mean): 86 (19 May 2022 18:00) (82 - 100)  RR: 20 (19 May 2022 18:00) (18 - 20)  SpO2: 95% (19 May 2022 18:00) (95% - 100%)    Physical Exam:  General:    NAD, non toxic  Cardio:    regular S1,S2, no murmur  Respiratory:   clear b/l, no wheezing  abd:   soft, BS +, not tender  :     no CVAT, no suprapubic tenderness, no pardo  Musculoskeletal : L arm with dressing  Skin:    no rash  vascular: no phlebitis                          10.0   9.90  )-----------( 220      ( 19 May 2022 16:46 )             30.6       05-19    140  |  106  |  11  ----------------------------<  131<H>  3.8   |  23  |  0.46<L>    Ca    7.9<L>      19 May 2022 16:46  Mg     2.3     05-18            MICROBIOLOGY:  v  .Blood Blood  05-15-22   No growth to date.  --  --      Clean Catch Clean Catch (Midstream)  05-13-22   <10,000 CFU/mL Normal Urogenital Katia  --  --      .Blood Blood-Peripheral  05-13-22   Growth in aerobic bottle: Staphylococcus epidermidis  --  Staphylococcus epidermidis      .Blood Blood-Peripheral  05-13-22   Growth in aerobic and anaerobic bottles: Staphylococcus epidermidis  ***Blood Panel PCR results on this specimen are available  approximately 3 hours after the Gram stain result.***  Gram stain, PCR, and/or culture results may not always  correspond due to difference in methodologies.  ************************************************************  This PCR assay was performed by multiplex PCR. This  Assay tests for 66 bacterial and resistance gene targets.  Please refer to the [facility] test directory  at [email or web address] for details.  --  Blood Culture PCR          Rapid RVP Result: NotDetec (05-13 @ 18:21)        RADIOLOGY:  Images independently visualized and reviewed personally, findings as below  < from: Xray Elbow 2 Views Post-Reduction, Left (05.14.22 @ 11:47) >  IMPRESSION:  Redemonstrated intact distal humeral contoured condylar fracture fixation   plate.    Refractured previously healing distal humeral supracondylar fracture with   displacement and angulation.    No gross intra-articular involvement. Elbow articular relationships and   spacing maintained.    < end of copied text >

## 2022-05-19 NOTE — PRE-ANESTHESIA EVALUATION ADULT - NSANTHPMHFT_GEN_ALL_CORE
65 yo woman with a hx osteogenesis imperfecta and multiple fractures in past   -Open reduction and internal fixation of the left humerus 3/2022  -tough admitted for AMS mental status recovered to baseline, neurology consult noted   -Staph epi bacteremia cleared, on vanco, ID input noted

## 2022-05-19 NOTE — PROGRESS NOTE ADULT - ASSESSMENT
67 yo woman with a hx osteogenesis imperfecta and multiple fractures presents with a change in mental status. Patient with a hx of a recent Open reduction and internal fixation of the left humerus. Patient found to have positive blood cultures. Scheduled for a [facility] 5/18 65 yo woman with a hx osteogenesis imperfecta and multiple fractures presents with a change in mental status. Patient with a hx of a recent Open reduction and internal fixation of the left humerus. Patient found to have positive blood cultures. S/P  [facility] 5/18

## 2022-05-19 NOTE — PROGRESS NOTE ADULT - SUBJECTIVE AND OBJECTIVE BOX
ORTHO ATTENDING POST OP    s/p repair L humerus nonunion, sequestrectomy, neurolysis radial and ulna nerves, triceps repair  posterior splint  NWB L  UE  sling  ROM hand/shoulder as tolerated  abx as per id  venodynes   BID for DVT ppx  f/u cx x 2  elevation  ice  nerve block placed

## 2022-05-19 NOTE — PROGRESS NOTE ADULT - ASSESSMENT
66 f with depression/anxiety osteogenesis imperfecta with multiple fractures and osteotomies, prosthetic joints, recent L humeral fx s/p ORIF and hardware 3/31/22,  brought in 5/14 for AMS and confusion, afebrile, normal WBC   erythema and edema of elbow found to have a hematoma which was drained by ortho, no culture done  blood cx 5/14: 3/4 staph epi  xray: S/P plate and screw fixation of distal humeral fracture. The distal plate and screw construct now has minimal purchase within the distal fracture fragment with interval proximal migration and apex lateral angulation of the distal fracture fragment when compared to radiographs dated 04/18/2022. There is extensive periosteal reaction with areas of lucency/osteolysis within the distal fracture fragment, concerning for underlying osteomyelitis or may be related to chronic motion at the fracture site.  head CT was negative and mental status improved    AMS, staph epi bacteremia in the setting of recent humerus ORIF, hardware placement now with unstable hardware, hematoma, likely hardware infection and osteo  h/o OI and multiple osteotomies and prosthetic joints  * repeat blood cx 5/15 negative, s/p repair L humerus nonunion, hardware removed, sequestrectomy, neurolysis radial and ulna nerves, triceps repair 5/19, new hardware placed, no tatiana pus noted  * f/u the OR cultures  * c/w  vanco 1 q 12  * monitor vanco trough and renal function to prevent nephrotoxicity        The above assessment and plan was discussed with ortho    Linda Goode MD  contact on teams  After 5pm and on weekends call [phone]

## 2022-05-19 NOTE — CHART NOTE - NSCHARTNOTEFT_GEN_A_CORE
Pateint c/o something in her left eye. Corneal abrasion intitated. patient wilth instant relief from tetracaine. Instructed patient that pay will recur but unless she has difficulty seeing or severe pain in eye, at which point an opthalmology consult will be called, that it will improve over the next 24-48 hours

## 2022-05-20 LAB
ANION GAP SERPL CALC-SCNC: 8 MMOL/L — SIGNIFICANT CHANGE UP (ref 5–17)
BUN SERPL-MCNC: 9 MG/DL — SIGNIFICANT CHANGE UP (ref 7–23)
CALCIUM SERPL-MCNC: 7.9 MG/DL — LOW (ref 8.4–10.5)
CHLORIDE SERPL-SCNC: 104 MMOL/L — SIGNIFICANT CHANGE UP (ref 96–108)
CO2 SERPL-SCNC: 24 MMOL/L — SIGNIFICANT CHANGE UP (ref 22–31)
CREAT SERPL-MCNC: 0.46 MG/DL — LOW (ref 0.5–1.3)
CULTURE RESULTS: SIGNIFICANT CHANGE UP
EGFR: 106 ML/MIN/1.73M2 — SIGNIFICANT CHANGE UP
GLUCOSE SERPL-MCNC: 109 MG/DL — HIGH (ref 70–99)
GRAM STN FLD: SIGNIFICANT CHANGE UP
HCT VFR BLD CALC: 25.1 % — LOW (ref 34.5–45)
HCT VFR BLD CALC: 25.3 % — LOW (ref 34.5–45)
HGB BLD-MCNC: 8.2 G/DL — LOW (ref 11.5–15.5)
HGB BLD-MCNC: 8.2 G/DL — LOW (ref 11.5–15.5)
MCHC RBC-ENTMCNC: 29.1 PG — SIGNIFICANT CHANGE UP (ref 27–34)
MCHC RBC-ENTMCNC: 29.2 PG — SIGNIFICANT CHANGE UP (ref 27–34)
MCHC RBC-ENTMCNC: 32.4 GM/DL — SIGNIFICANT CHANGE UP (ref 32–36)
MCHC RBC-ENTMCNC: 32.7 GM/DL — SIGNIFICANT CHANGE UP (ref 32–36)
MCV RBC AUTO: 89.3 FL — SIGNIFICANT CHANGE UP (ref 80–100)
MCV RBC AUTO: 89.7 FL — SIGNIFICANT CHANGE UP (ref 80–100)
NRBC # BLD: 0 /100 WBCS — SIGNIFICANT CHANGE UP (ref 0–0)
NRBC # BLD: 0 /100 WBCS — SIGNIFICANT CHANGE UP (ref 0–0)
PLATELET # BLD AUTO: 222 K/UL — SIGNIFICANT CHANGE UP (ref 150–400)
PLATELET # BLD AUTO: 223 K/UL — SIGNIFICANT CHANGE UP (ref 150–400)
POTASSIUM SERPL-MCNC: 3.7 MMOL/L — SIGNIFICANT CHANGE UP (ref 3.5–5.3)
POTASSIUM SERPL-SCNC: 3.7 MMOL/L — SIGNIFICANT CHANGE UP (ref 3.5–5.3)
RBC # BLD: 2.81 M/UL — LOW (ref 3.8–5.2)
RBC # BLD: 2.82 M/UL — LOW (ref 3.8–5.2)
RBC # FLD: 14.1 % — SIGNIFICANT CHANGE UP (ref 10.3–14.5)
RBC # FLD: 14.2 % — SIGNIFICANT CHANGE UP (ref 10.3–14.5)
SODIUM SERPL-SCNC: 136 MMOL/L — SIGNIFICANT CHANGE UP (ref 135–145)
SPECIMEN SOURCE: SIGNIFICANT CHANGE UP
SPECIMEN SOURCE: SIGNIFICANT CHANGE UP
WBC # BLD: 6.38 K/UL — SIGNIFICANT CHANGE UP (ref 3.8–10.5)
WBC # BLD: 6.61 K/UL — SIGNIFICANT CHANGE UP (ref 3.8–10.5)
WBC # FLD AUTO: 6.38 K/UL — SIGNIFICANT CHANGE UP (ref 3.8–10.5)
WBC # FLD AUTO: 6.61 K/UL — SIGNIFICANT CHANGE UP (ref 3.8–10.5)

## 2022-05-20 PROCEDURE — 71045 X-RAY EXAM CHEST 1 VIEW: CPT | Mod: 26

## 2022-05-20 PROCEDURE — 99232 SBSQ HOSP IP/OBS MODERATE 35: CPT

## 2022-05-20 RX ORDER — VANCOMYCIN HCL 1 G
1 VIAL (EA) INTRAVENOUS
Qty: 82 | Refills: 0
Start: 2022-05-20 | End: 2022-06-29

## 2022-05-20 RX ORDER — HYDROMORPHONE HYDROCHLORIDE 2 MG/ML
0.5 INJECTION INTRAMUSCULAR; INTRAVENOUS; SUBCUTANEOUS ONCE
Refills: 0 | Status: DISCONTINUED | OUTPATIENT
Start: 2022-05-20 | End: 2022-05-20

## 2022-05-20 RX ORDER — ACETAMINOPHEN 500 MG
1000 TABLET ORAL ONCE
Refills: 0 | Status: COMPLETED | OUTPATIENT
Start: 2022-05-20 | End: 2022-05-20

## 2022-05-20 RX ORDER — HYDROMORPHONE HYDROCHLORIDE 2 MG/ML
4 INJECTION INTRAMUSCULAR; INTRAVENOUS; SUBCUTANEOUS EVERY 4 HOURS
Refills: 0 | Status: DISCONTINUED | OUTPATIENT
Start: 2022-05-20 | End: 2022-05-24

## 2022-05-20 RX ADMIN — Medication 400 MILLIGRAM(S): at 11:00

## 2022-05-20 RX ADMIN — HYDROMORPHONE HYDROCHLORIDE 4 MILLIGRAM(S): 2 INJECTION INTRAMUSCULAR; INTRAVENOUS; SUBCUTANEOUS at 13:47

## 2022-05-20 RX ADMIN — Medication 1 DROP(S): at 03:00

## 2022-05-20 RX ADMIN — ATORVASTATIN CALCIUM 40 MILLIGRAM(S): 80 TABLET, FILM COATED ORAL at 22:07

## 2022-05-20 RX ADMIN — Medication 60 MILLIGRAM(S): at 11:45

## 2022-05-20 RX ADMIN — Medication 100 MILLIGRAM(S): at 08:05

## 2022-05-20 RX ADMIN — HYDROMORPHONE HYDROCHLORIDE 4 MILLIGRAM(S): 2 INJECTION INTRAMUSCULAR; INTRAVENOUS; SUBCUTANEOUS at 07:56

## 2022-05-20 RX ADMIN — HYDROMORPHONE HYDROCHLORIDE 4 MILLIGRAM(S): 2 INJECTION INTRAMUSCULAR; INTRAVENOUS; SUBCUTANEOUS at 17:18

## 2022-05-20 RX ADMIN — HYDROMORPHONE HYDROCHLORIDE 4 MILLIGRAM(S): 2 INJECTION INTRAMUSCULAR; INTRAVENOUS; SUBCUTANEOUS at 14:23

## 2022-05-20 RX ADMIN — Medication 1000 MILLIGRAM(S): at 11:30

## 2022-05-20 RX ADMIN — HYDROMORPHONE HYDROCHLORIDE 4 MILLIGRAM(S): 2 INJECTION INTRAMUSCULAR; INTRAVENOUS; SUBCUTANEOUS at 02:22

## 2022-05-20 RX ADMIN — HYDROMORPHONE HYDROCHLORIDE 0.5 MILLIGRAM(S): 2 INJECTION INTRAMUSCULAR; INTRAVENOUS; SUBCUTANEOUS at 05:09

## 2022-05-20 RX ADMIN — LAMOTRIGINE 100 MILLIGRAM(S): 25 TABLET, ORALLY DISINTEGRATING ORAL at 07:56

## 2022-05-20 RX ADMIN — HYDROMORPHONE HYDROCHLORIDE 0.5 MILLIGRAM(S): 2 INJECTION INTRAMUSCULAR; INTRAVENOUS; SUBCUTANEOUS at 05:39

## 2022-05-20 RX ADMIN — Medication 400 MILLIGRAM(S): at 04:07

## 2022-05-20 RX ADMIN — Medication 0.5 MILLIGRAM(S): at 22:07

## 2022-05-20 RX ADMIN — Medication 250 MILLIGRAM(S): at 11:48

## 2022-05-20 RX ADMIN — Medication 0.5 MILLIGRAM(S): at 06:10

## 2022-05-20 RX ADMIN — HYDROMORPHONE HYDROCHLORIDE 4 MILLIGRAM(S): 2 INJECTION INTRAMUSCULAR; INTRAVENOUS; SUBCUTANEOUS at 17:24

## 2022-05-20 RX ADMIN — HYDROMORPHONE HYDROCHLORIDE 4 MILLIGRAM(S): 2 INJECTION INTRAMUSCULAR; INTRAVENOUS; SUBCUTANEOUS at 08:30

## 2022-05-20 RX ADMIN — Medication 1 DROP(S): at 13:48

## 2022-05-20 RX ADMIN — HYDROMORPHONE HYDROCHLORIDE 4 MILLIGRAM(S): 2 INJECTION INTRAMUSCULAR; INTRAVENOUS; SUBCUTANEOUS at 03:00

## 2022-05-20 RX ADMIN — Medication 0.5 MILLIGRAM(S): at 13:47

## 2022-05-20 RX ADMIN — Medication 1 DROP(S): at 11:00

## 2022-05-20 RX ADMIN — Medication 1000 MILLIGRAM(S): at 04:37

## 2022-05-20 RX ADMIN — LAMOTRIGINE 100 MILLIGRAM(S): 25 TABLET, ORALLY DISINTEGRATING ORAL at 21:55

## 2022-05-20 RX ADMIN — HYDROMORPHONE HYDROCHLORIDE 4 MILLIGRAM(S): 2 INJECTION INTRAMUSCULAR; INTRAVENOUS; SUBCUTANEOUS at 22:25

## 2022-05-20 RX ADMIN — Medication 1 DROP(S): at 06:11

## 2022-05-20 RX ADMIN — HYDROMORPHONE HYDROCHLORIDE 4 MILLIGRAM(S): 2 INJECTION INTRAMUSCULAR; INTRAVENOUS; SUBCUTANEOUS at 03:30

## 2022-05-20 RX ADMIN — HYDROMORPHONE HYDROCHLORIDE 4 MILLIGRAM(S): 2 INJECTION INTRAMUSCULAR; INTRAVENOUS; SUBCUTANEOUS at 21:55

## 2022-05-20 RX ADMIN — Medication 50 MICROGRAM(S): at 06:10

## 2022-05-20 NOTE — PROGRESS NOTE ADULT - SUBJECTIVE AND OBJECTIVE BOX
Follow Up:  bacteremia, hardware infection osteo    Interval History: pt c/o severe pain, OR cultures negative for now    ROS:      All other systems negative    Constitutional: no fever, no chills  Cardiovascular:  no chest pain, no palpitation  Respiratory:  no SOB, no cough  GI:  no abd pain, no vomiting, no diarrhea  urinary: no dysuria, no hematuria, no flank pain  musculoskeletal: severe pain  skin:  no rash  neurology:  no headache, no seizure        Allergies  No Known Allergies        ANTIMICROBIALS:  vancomycin  IVPB 1000 every 12 hours      OTHER MEDS:  atorvastatin 40 milliGRAM(s) Oral at bedtime  busPIRone 15 milliGRAM(s) Oral two times a day  clonazePAM  Tablet 0.5 milliGRAM(s) Oral three times a day  enoxaparin Injectable 40 milliGRAM(s) SubCutaneous every 24 hours  FLUoxetine 60 milliGRAM(s) Oral daily  HYDROmorphone   Tablet 4 milliGRAM(s) Oral every 6 hours PRN  lamoTRIgine 100 milliGRAM(s) Oral two times a day  levothyroxine 50 MICROGram(s) Oral daily  melatonin 3 milliGRAM(s) Oral at bedtime PRN  sodium chloride 0.9%. 1000 milliLiter(s) IV Continuous <Continuous>  tobramycin 0.3% Ophthalmic Solution 1 Drop(s) Left EYE every 4 hours      Vital Signs Last 24 Hrs  T(C): 37.2 (20 May 2022 13:10), Max: 37.5 (20 May 2022 05:00)  T(F): 99 (20 May 2022 13:10), Max: 99.5 (20 May 2022 05:00)  HR: 95 (20 May 2022 13:10) (75 - 104)  BP: 116/56 (20 May 2022 13:10) (110/78 - 147/79)  BP(mean): 90 (19 May 2022 18:30) (82 - 100)  RR: 20 (20 May 2022 12:30) (18 - 20)  SpO2: 94% (20 May 2022 13:10) (94% - 100%)    Physical Exam:  General:    NAD, non toxic  Cardio:    regular S1,S2, no murmur  Respiratory:   clear b/l, no wheezing  abd:   soft, BS +, not tender  :     no CVAT, no suprapubic tenderness, no pardo  Musculoskeletal : L arm with dressing  Skin:    no rash  vascular: no phlebitis                          8.2    6.61  )-----------( 222      ( 20 May 2022 07:19 )             25.3       05-20    136  |  104  |  9   ----------------------------<  109<H>  3.7   |  24  |  0.46<L>    Ca    7.9<L>      20 May 2022 07:19            MICROBIOLOGY:  Vancomycin Level, Trough: 13.2 ug/mL (05-19-22 @ 18:34)  v  .Tissue Other  05-19-22 --  --    Rare polymorphonuclear leukocytes seen per low power field  No organisms seen per oil power field      .Blood Blood  05-15-22   No Growth Final  --  --      Clean Catch Clean Catch (Midstream)  05-13-22   <10,000 CFU/mL Normal Urogenital Katia  --  --      .Blood Blood-Peripheral  05-13-22   Growth in aerobic bottle: Staphylococcus epidermidis  --  Staphylococcus epidermidis      .Blood Blood-Peripheral  05-13-22   Growth in aerobic and anaerobic bottles: Staphylococcus epidermidis  ***Blood Panel PCR results on this specimen are available  approximately 3 hours after the Gram stain result.***  Gram stain, PCR, and/or culture results may not always  correspond due to difference in methodologies.  ************************************************************  This PCR assay was performed by multiplex PCR. This  Assay tests for 66 bacterial and resistance gene targets.  Please refer to the [facility] test directory  at [email or web address] for details.  --  Blood Culture PCR          Rapid RVP Result: NotDetec (05-13 @ 18:21)        RADIOLOGY:  Images independently visualized and reviewed personally, findings as below  < from: Xray Elbow 2 Views Post-Reduction, Left (05.14.22 @ 11:47) >  IMPRESSION:  Redemonstrated intact distal humeral contoured condylar fracture fixation   plate.    Refractured previously healing distal humeral supracondylar fracture with   displacement and angulation.    No gross intra-articular involvement. Elbow articular relationships and   spacing maintained.    < end of copied text >

## 2022-05-20 NOTE — PROGRESS NOTE ADULT - ASSESSMENT
66 f with depression/anxiety osteogenesis imperfecta with multiple fractures and osteotomies, prosthetic joints, recent L humeral fx s/p ORIF and hardware 3/31/22,  brought in 5/14 for AMS and confusion, afebrile, normal WBC   erythema and edema of elbow found to have a hematoma which was drained by ortho, no culture done  blood cx 5/14: 3/4 staph epi  xray: S/P plate and screw fixation of distal humeral fracture. The distal plate and screw construct now has minimal purchase within the distal fracture fragment with interval proximal migration and apex lateral angulation of the distal fracture fragment when compared to radiographs dated 04/18/2022. There is extensive periosteal reaction with areas of lucency/osteolysis within the distal fracture fragment, concerning for underlying osteomyelitis or may be related to chronic motion at the fracture site.  head CT was negative and mental status improved    AMS, staph epi bacteremia in the setting of recent humerus ORIF, hardware placement now with unstable hardware, hematoma, likely hardware infection and osteo  h/o OI and multiple osteotomies and prosthetic joints  * repeat blood cx 5/15 negative, s/p repair L humerus nonunion, hardware removed, sequestrectomy, neurolysis radial and ulna nerves, triceps repair 5/19, new hardware placed, no tatiana pus noted  * f/u the OR cultures  * c/w  vanco 1 q 12  * monitor vanco trough and renal function to prevent nephrotoxicity  * add doxy 100 bid as well as there is new hardware as well  * will need a 6 week course post OR until 6/30  * place a picc line  * weekly CBC, CMP, vanco trough while on antibiotics  * f/u with ortho and ID        The above assessment and plan was discussed with ortho    Linda Goode MD  contact on teams  After 5pm and on weekends call [phone]

## 2022-05-20 NOTE — PROGRESS NOTE ADULT - ASSESSMENT
67 yo woman with a hx osteogenesis imperfecta and multiple fractures presents with a change in mental status. Patient with a hx of a recent Open reduction and internal fixation of the left humerus. Patient found to have positive blood cultures. Patient is s/p JESS 5/18

## 2022-05-20 NOTE — PROGRESS NOTE ADULT - SUBJECTIVE AND OBJECTIVE BOX
66F hx of osteogenesis imperfecta  with multiple fractures, recent admission for L humeral fx with hardware (needing repair now due to hardware malfunction), depression/anxiety on multipe meds presenting to the Ed for 2 days of waxing and waning confusion (disoriented), more sleepiness. Progressive memory loss. Denies fever, chills, chest pain, shortness of breath, abd pain. Called as a code stroke for confusion but LKN > 24 hrs ago, patient felt warm, thus code stroke canceled. Patient was found to have a hematoma at her previous surgical site. Post op Patient was found to have Bacteremia. Seen now resting comfortably. MS has returned to baseline. Patient is s/p  washout with JESS 5/18. Patient with increased pain at the surgical site.       MEDICATIONS  (STANDING):  atorvastatin 40 milliGRAM(s) Oral at bedtime  busPIRone 15 milliGRAM(s) Oral two times a day  clonazePAM  Tablet 0.5 milliGRAM(s) Oral three times a day  enoxaparin Injectable 40 milliGRAM(s) SubCutaneous every 24 hours  FLUoxetine 60 milliGRAM(s) Oral daily  HYDROmorphone   Tablet 4 milliGRAM(s) Oral every 4 hours  lamoTRIgine 100 milliGRAM(s) Oral two times a day  levothyroxine 50 MICROGram(s) Oral daily  sodium chloride 0.9%. 1000 milliLiter(s) (65 mL/Hr) IV Continuous <Continuous>  tobramycin 0.3% Ophthalmic Solution 1 Drop(s) Left EYE every 4 hours  vancomycin  IVPB 1000 milliGRAM(s) IV Intermittent every 12 hours    MEDICATIONS  (PRN):  melatonin 3 milliGRAM(s) Oral at bedtime PRN Insomnia          VITALS:   T(C): 37.2 (05-20-22 @ 13:10), Max: 37.5 (05-20-22 @ 05:00)  HR: 95 (05-20-22 @ 13:10) (75 - 104)  BP: 116/56 (05-20-22 @ 13:10) (110/78 - 147/79)  RR: 20 (05-20-22 @ 12:30) (18 - 20)  SpO2: 94% (05-20-22 @ 13:10) (94% - 100%)  Wt(kg): --    PHYSICAL EXAM:  GENERAL: NAD, well nourished and conversant  HEAD:  Atraumatic  EYES: EOM, PERRLA, conjunctiva pink and sclera white  ENT: No tonsillar erythema, exudates, or enlargement, moist mucous membranes, good dentition, no lesions  NECK: Supple, No JVD, normal thyroid, carotids with normal upstrokes and no bruits  CHEST/LUNG: Clear to auscultation bilaterally, No rales, rhonchi, wheezing, or rubs  HEART: Regular rate and rhythm, No murmurs, rubs, or gallops  ABDOMEN: Soft, nondistended, no masses, guarding, tenderness or rebound, bowel sounds present  EXTREMITIES:  left arm pain  LYMPH: No lymphadenopathy noted  SKIN: No rashes or lesions  NERVOUS SYSTEM:  Alert & Oriented X3, normal cognitive function.     LABS:        CBC Full  -  ( 20 May 2022 07:19 )  WBC Count : 6.61 K/uL  RBC Count : 2.82 M/uL  Hemoglobin : 8.2 g/dL  Hematocrit : 25.3 %  Platelet Count - Automated : 222 K/uL  Mean Cell Volume : 89.7 fl  Mean Cell Hemoglobin : 29.1 pg  Mean Cell Hemoglobin Concentration : 32.4 gm/dL  Auto Neutrophil # : x  Auto Lymphocyte # : x  Auto Monocyte # : x  Auto Eosinophil # : x  Auto Basophil # : x  Auto Neutrophil % : x  Auto Lymphocyte % : x  Auto Monocyte % : x  Auto Eosinophil % : x  Auto Basophil % : x    05-20    136  |  104  |  9   ----------------------------<  109<H>  3.7   |  24  |  0.46<L>    Ca    7.9<L>      20 May 2022 07:19        PT/INR - ( 19 May 2022 05:38 )   PT: 13.3 sec;   INR: 1.15 ratio             CAPILLARY BLOOD GLUCOSE          RADIOLOGY & ADDITIONAL TESTS:

## 2022-05-20 NOTE — CHART NOTE - NSCHARTNOTEFT_GEN_A_CORE
Due to osteo imperfecta Q 78.0, patient has a severe mobility limitation and requires a transport wheelchair to assist in daily ADL's. Patient cannot ambulate safely with a cane or a walker. Patient does not have sufficient upper body strength to self propel a standard wheelchair. Patient has a caregiver to assist with maneuvering the wheelchair. Pt has not expressed an unwillingness to use the wheelchair. Patient has ample room in the home for said wheelchair. Use of a transport wheelchair will significantly improve the patients ability to participate in daily ADL's and the patient will use it on a regular basis in the home.   HODA Guthrie NP

## 2022-05-20 NOTE — CHART NOTE - NSCHARTNOTEFT_GEN_A_CORE
Orthopaedic Surgery: Chart Note    Re: Evaluation of Left Up Extremity Posterior Slab Splint    Orthopaedic Team called to bedside by nursing staff after patient had expressed "excruciating pain" on the proximal Left upper extremity that had progressively worsened over the course of the day. Patient requesting loosening of the splint. Patient seen and examined at bedside, in moderate-severe distressmfortably. Pain well controlled. Denies any numbness or tingling. Patient tolerated procedure well  VITAL SIGNS  T(C): 37.8 (05-20-22 @ 21:11), Max: 37.8 (05-20-22 @ 21:11)  HR: 98 (05-20-22 @ 21:11) (78 - 98)  BP: 150/84 (05-20-22 @ 21:11) (116/56 - 150/84)  RR: 17 (05-20-22 @ 21:11) (17 - 20)  SpO2: 96% (05-20-22 @ 21:11) (94% - 100%)        PHYSICAL EXAM  GEN: NAD    RLE / LLE:  Skin: Dressing clean/dry/intact  Compartments soft and compressible  Calves nontender  SCDs in place  +TA/EHL/FHL/GSC  L2-S1 SILT  + DP pulses      Assessment:  66y Female s/p TKA POD #0    -Pain control  -DVT ppx  -WBAT/OOB with assistance as needed  -PT/OT  -Ice as needed  -Encourage incentive spirometry  -DC planning  -Will discuss with attending and will advise if plan changes.

## 2022-05-20 NOTE — OCCUPATIONAL THERAPY INITIAL EVALUATION ADULT - PRECAUTIONS/LIMITATIONS, REHAB EVAL
Progressive memory loss. Denies fever, chills, chest pain, shortness of breath, abd pain. Called as a code stroke for confusion but LKN > 24 hrs ago, patient felt warm, thus code stroke canceled.There is extensive periosteal reaction with areas of lucency/osteolysis within the distal fracture fragment, concerning for underlying osteomyelitis or may be related to chronic motion at the fracture site./fall precautions

## 2022-05-20 NOTE — OCCUPATIONAL THERAPY INITIAL EVALUATION ADULT - RANGE OF MOTION EXAMINATION, UPPER EXTREMITY
L wrist AROM WFL, L shoulder AROM WFL, unable to asses L elbow ROM 2* fx/Right UE Active ROM was WFL (within functional limits)

## 2022-05-20 NOTE — OCCUPATIONAL THERAPY INITIAL EVALUATION ADULT - PERTINENT HX OF CURRENT PROBLEM, REHAB EVAL
HPI Objective Statement: 66F hx of OI with multiple fractures, recent admission for L humeral fx with hardwarre (needing repair now due to hardware malfunction), depression/anxiety on multipe meds presenting to the Ed for 2 days of waxing and waning confusion (disoriented), more sleepiness.

## 2022-05-20 NOTE — PROGRESS NOTE ADULT - SUBJECTIVE AND OBJECTIVE BOX
Orthopedic Surgery Progress Note  S: Pain is well controlled.  No acute events overnight. Discussed dispo with patient and she has a strong preference for going home over rehab, discussed compliance with non-weight bearing status being imperative if she goes home.    O:  Vital Signs Last 24 Hrs  T(C): 37.5 (20 May 2022 05:00), Max: 37.5 (20 May 2022 05:00)  T(F): 99.5 (20 May 2022 05:00), Max: 99.5 (20 May 2022 05:00)  HR: 93 (20 May 2022 05:00) (74 - 104)  BP: 123/58 (20 May 2022 05:00) (110/78 - 146/82)  BP(mean): 90 (19 May 2022 18:30) (82 - 100)  RR: 20 (20 May 2022 05:00) (18 - 20)  SpO2: 94% (20 May 2022 05:00) (94% - 100%)    Gen: NAD  LUE  Splint c/d/i  fires EPL/FDP/IO  at first said she has some subjective decreased sensation in all of her fingers, however on exam sensation was intact R/M/U  WWP distally with brisk cap refill                        10.0   9.90  )-----------( 220      ( 19 May 2022 16:46 )             30.6                         10.3   3.75  )-----------( 240      ( 19 May 2022 04:32 )             32.1     05-19    140  |  106  |  11  ----------------------------<  131<H>  3.8   |  23  |  0.46<L>    PT/INR - ( 19 May 2022 05:38 )   PT: 13.3 sec;   INR: 1.15 ratio      A/P 66y year old Female POD1 s/p L distal humerus nonunion ORIF with autograft and partial triceps repair.    Pain Control  NWB LUE  PT/OT/OOB  DVT PPx: Lovenox  FU OR cultures  Dispo Planning: pending PT matt Mazariegos MD

## 2022-05-20 NOTE — OCCUPATIONAL THERAPY INITIAL EVALUATION ADULT - DIAGNOSIS, OT EVAL
Pt demonstrated decreased LUE strength and ROM, and deficits in balance impairing functional mobility and ADLs.

## 2022-05-20 NOTE — OCCUPATIONAL THERAPY INITIAL EVALUATION ADULT - LIVES WITH, PROFILE
[place] with , no steps to enter, pt lives in [place] rise with , elevator access,no steps to enter, bathtub with shower bench

## 2022-05-20 NOTE — PROGRESS NOTE ADULT - PROBLEM SELECTOR PLAN 2
Patient seen by ID and started on vanco  follow cultures  follow vanco trough  s/p washout and JESS   tolerated the procedure well  physical therapy as tolerated

## 2022-05-21 LAB
HCT VFR BLD CALC: 26 % — LOW (ref 34.5–45)
HGB BLD-MCNC: 8.7 G/DL — LOW (ref 11.5–15.5)
MCHC RBC-ENTMCNC: 29.4 PG — SIGNIFICANT CHANGE UP (ref 27–34)
MCHC RBC-ENTMCNC: 33.5 GM/DL — SIGNIFICANT CHANGE UP (ref 32–36)
MCV RBC AUTO: 87.8 FL — SIGNIFICANT CHANGE UP (ref 80–100)
NRBC # BLD: 0 /100 WBCS — SIGNIFICANT CHANGE UP (ref 0–0)
PLATELET # BLD AUTO: 201 K/UL — SIGNIFICANT CHANGE UP (ref 150–400)
RBC # BLD: 2.96 M/UL — LOW (ref 3.8–5.2)
RBC # FLD: 14.2 % — SIGNIFICANT CHANGE UP (ref 10.3–14.5)
WBC # BLD: 6.39 K/UL — SIGNIFICANT CHANGE UP (ref 3.8–10.5)
WBC # FLD AUTO: 6.39 K/UL — SIGNIFICANT CHANGE UP (ref 3.8–10.5)

## 2022-05-21 RX ORDER — CHLORHEXIDINE GLUCONATE 213 G/1000ML
1 SOLUTION TOPICAL DAILY
Refills: 0 | Status: DISCONTINUED | OUTPATIENT
Start: 2022-05-21 | End: 2022-05-24

## 2022-05-21 RX ORDER — CHLORHEXIDINE GLUCONATE 213 G/1000ML
1 SOLUTION TOPICAL DAILY
Refills: 0 | Status: DISCONTINUED | OUTPATIENT
Start: 2022-05-21 | End: 2022-05-21

## 2022-05-21 RX ADMIN — HYDROMORPHONE HYDROCHLORIDE 4 MILLIGRAM(S): 2 INJECTION INTRAMUSCULAR; INTRAVENOUS; SUBCUTANEOUS at 21:39

## 2022-05-21 RX ADMIN — HYDROMORPHONE HYDROCHLORIDE 4 MILLIGRAM(S): 2 INJECTION INTRAMUSCULAR; INTRAVENOUS; SUBCUTANEOUS at 05:50

## 2022-05-21 RX ADMIN — HYDROMORPHONE HYDROCHLORIDE 4 MILLIGRAM(S): 2 INJECTION INTRAMUSCULAR; INTRAVENOUS; SUBCUTANEOUS at 05:20

## 2022-05-21 RX ADMIN — HYDROMORPHONE HYDROCHLORIDE 4 MILLIGRAM(S): 2 INJECTION INTRAMUSCULAR; INTRAVENOUS; SUBCUTANEOUS at 10:10

## 2022-05-21 RX ADMIN — Medication 50 MICROGRAM(S): at 05:20

## 2022-05-21 RX ADMIN — HYDROMORPHONE HYDROCHLORIDE 4 MILLIGRAM(S): 2 INJECTION INTRAMUSCULAR; INTRAVENOUS; SUBCUTANEOUS at 15:19

## 2022-05-21 RX ADMIN — Medication 0.5 MILLIGRAM(S): at 15:18

## 2022-05-21 RX ADMIN — ENOXAPARIN SODIUM 40 MILLIGRAM(S): 100 INJECTION SUBCUTANEOUS at 01:38

## 2022-05-21 RX ADMIN — Medication 250 MILLIGRAM(S): at 15:07

## 2022-05-21 RX ADMIN — ATORVASTATIN CALCIUM 40 MILLIGRAM(S): 80 TABLET, FILM COATED ORAL at 21:38

## 2022-05-21 RX ADMIN — Medication 0.5 MILLIGRAM(S): at 21:38

## 2022-05-21 RX ADMIN — HYDROMORPHONE HYDROCHLORIDE 4 MILLIGRAM(S): 2 INJECTION INTRAMUSCULAR; INTRAVENOUS; SUBCUTANEOUS at 01:52

## 2022-05-21 RX ADMIN — HYDROMORPHONE HYDROCHLORIDE 4 MILLIGRAM(S): 2 INJECTION INTRAMUSCULAR; INTRAVENOUS; SUBCUTANEOUS at 22:09

## 2022-05-21 RX ADMIN — Medication 0.5 MILLIGRAM(S): at 05:20

## 2022-05-21 RX ADMIN — Medication 250 MILLIGRAM(S): at 01:37

## 2022-05-21 RX ADMIN — LAMOTRIGINE 100 MILLIGRAM(S): 25 TABLET, ORALLY DISINTEGRATING ORAL at 21:38

## 2022-05-21 RX ADMIN — LAMOTRIGINE 100 MILLIGRAM(S): 25 TABLET, ORALLY DISINTEGRATING ORAL at 10:11

## 2022-05-21 RX ADMIN — Medication 60 MILLIGRAM(S): at 15:07

## 2022-05-21 NOTE — PROGRESS NOTE ADULT - SUBJECTIVE AND OBJECTIVE BOX
Patient seen and examined at bedside, resting comfortably. Pain well controlled. Denies dizziness, chest pain, dyspnea. Claims orthopedic team has not seen her in 5 days and that she has not sharifa receiving pain medication. Pain compared to last night significantly improved. No complaints at this time.     LABS:                        8.2    6.38  )-----------( 223      ( 20 May 2022 14:14 )             25.1     05-20    136  |  104  |  9   ----------------------------<  109<H>  3.7   |  24  |  0.46<L>    Ca    7.9<L>      20 May 2022 07:19            VITAL SIGNS:  T(C): 37.4 (05-21-22 @ 04:40), Max: 37.8 (05-20-22 @ 21:11)  HR: 97 (05-21-22 @ 04:40) (78 - 98)  BP: 139/82 (05-21-22 @ 04:40) (116/56 - 150/84)  RR: 18 (05-21-22 @ 04:40) (17 - 20)  SpO2: 98% (05-21-22 @ 04:40) (94% - 100%)    PHYSICAL EXAM  General: NAD, Awake and Alert    LEFT Upper Extremity:  Dressing/Splint c/d/i  + sensation in all fingers and accessible areas to forearm and upper arm  + Radial/Median/Ulnar/AIN/PIN nerves  + radial pulses, WWP  Compartments soft and compressible  Calves nontender

## 2022-05-21 NOTE — PROGRESS NOTE ADULT - TIME BILLING
Discussed treatment plan with patient at bedside.   DC planning for 5/23  I am a non participating [facility] physician seeing Pt in coverage for Dr Bradford. The above patient documentation by Dr. Garcia was reviewed and I agree with his evaluation, assessment and treatment plan.  Matt Bradford M.D.

## 2022-05-21 NOTE — PROGRESS NOTE ADULT - ATTENDING COMMENTS
Doing well s/p non union reconstruction.  monitor NV exam.  ABX. f/u cx Doing well s/p non union reconstruction.  monitor NV exam.  ABX. Cx NGTD.  Would tx for 6 weeks w abx.  long term supression likely not needed. DC planning. PICC placed

## 2022-05-21 NOTE — PROGRESS NOTE ADULT - SUBJECTIVE AND OBJECTIVE BOX
66F hx of osteogenesis imperfecta  with multiple fractures, recent admission for L humeral fx with hardware (needing repair now due to hardware malfunction), depression/anxiety on multipe meds presenting to the Ed for 2 days of waxing and waning confusion (disoriented), more sleepiness. Progressive memory loss. Denies fever, chills, chest pain, shortness of breath, abd pain. Called as a code stroke for confusion but LKN > 24 hrs ago, patient felt warm, thus code stroke canceled. Patient was found to have a hematoma at her previous surgical site. Post op Patient was found to have Bacteremia. Seen now resting comfortably. MS has returned to baseline. Patient is s/p  washout with JESS 5/18. Patient with increased pain at the surgical site. Patient states pain has been better controlled.     MEDICATIONS  (STANDING):  atorvastatin 40 milliGRAM(s) Oral at bedtime  busPIRone 15 milliGRAM(s) Oral two times a day  chlorhexidine 4% Liquid 1 Application(s) Topical daily  clonazePAM  Tablet 0.5 milliGRAM(s) Oral three times a day  enoxaparin Injectable 40 milliGRAM(s) SubCutaneous every 24 hours  FLUoxetine 60 milliGRAM(s) Oral daily  HYDROmorphone   Tablet 4 milliGRAM(s) Oral every 4 hours  lamoTRIgine 100 milliGRAM(s) Oral two times a day  levothyroxine 50 MICROGram(s) Oral daily  sodium chloride 0.9%. 1000 milliLiter(s) (65 mL/Hr) IV Continuous <Continuous>  vancomycin  IVPB 1000 milliGRAM(s) IV Intermittent every 12 hours    MEDICATIONS  (PRN):  melatonin 3 milliGRAM(s) Oral at bedtime PRN Insomnia          VITALS:   T(C): 37.2 (05-21-22 @ 12:48), Max: 37.8 (05-20-22 @ 21:11)  HR: 93 (05-21-22 @ 12:48) (87 - 99)  BP: 121/75 (05-21-22 @ 12:48) (117/60 - 150/84)  RR: 18 (05-21-22 @ 12:48) (17 - 18)  SpO2: 93% (05-21-22 @ 12:48) (93% - 98%)  Wt(kg): --    PHYSICAL EXAM:  GENERAL: NAD, well nourished and conversant  HEAD:  Atraumatic  EYES: EOM, PERRLA, conjunctiva pink and sclera white  ENT: No tonsillar erythema, exudates, or enlargement, moist mucous membranes, good dentition, no lesions  NECK: Supple, No JVD, normal thyroid, carotids with normal upstrokes and no bruits  CHEST/LUNG: Clear to auscultation bilaterally, No rales, rhonchi, wheezing, or rubs  HEART: Regular rate and rhythm, No murmurs, rubs, or gallops  ABDOMEN: Soft, nondistended, no masses, guarding, tenderness or rebound, bowel sounds present  EXTREMITIES:  left arm pain  LYMPH: No lymphadenopathy noted  SKIN: No rashes or lesions  NERVOUS SYSTEM:  Alert & Oriented X3, normal cognitive function.     LABS:        CBC Full  -  ( 21 May 2022 07:00 )  WBC Count : 6.39 K/uL  RBC Count : 2.96 M/uL  Hemoglobin : 8.7 g/dL  Hematocrit : 26.0 %  Platelet Count - Automated : 201 K/uL  Mean Cell Volume : 87.8 fl  Mean Cell Hemoglobin : 29.4 pg  Mean Cell Hemoglobin Concentration : 33.5 gm/dL  Auto Neutrophil # : x  Auto Lymphocyte # : x  Auto Monocyte # : x  Auto Eosinophil # : x  Auto Basophil # : x  Auto Neutrophil % : x  Auto Lymphocyte % : x  Auto Monocyte % : x  Auto Eosinophil % : x  Auto Basophil % : x    05-20    136  |  104  |  9   ----------------------------<  109<H>  3.7   |  24  |  0.46<L>    Ca    7.9<L>      20 May 2022 07:19            CAPILLARY BLOOD GLUCOSE          RADIOLOGY & ADDITIONAL TESTS:

## 2022-05-21 NOTE — PROGRESS NOTE ADULT - PROBLEM SELECTOR PLAN 2
Patient seen by ID and started on vanco  follow cultures  follow vanco trough  s/p washout and JESS   Awaiting PICC line  will need a 6 week course of abx  physical therapy as tolerated

## 2022-05-21 NOTE — PROGRESS NOTE ADULT - ASSESSMENT
A/P 66y year old Female s/p L distal humerus nonunion ORIF with autograft and partial triceps repair POD #2    -Pain control  -VTE ppx: Lovenox  -NWB LEFT upper extremity in splint/sling  -FU OR Cx  -ROM of wrist/hand/fingers encouraged  -OOB with assistance as needed  -PT  -Ice as needed  -Encourage incentive spirometry  -Medical management per primary team  -DC planning  -Will discuss with attending and will advise if plan changes.

## 2022-05-22 LAB
HCT VFR BLD CALC: 22.9 % — LOW (ref 34.5–45)
HCT VFR BLD CALC: 24.5 % — LOW (ref 34.5–45)
HGB BLD-MCNC: 7.5 G/DL — LOW (ref 11.5–15.5)
HGB BLD-MCNC: 8.1 G/DL — LOW (ref 11.5–15.5)
MCHC RBC-ENTMCNC: 29 PG — SIGNIFICANT CHANGE UP (ref 27–34)
MCHC RBC-ENTMCNC: 29.4 PG — SIGNIFICANT CHANGE UP (ref 27–34)
MCHC RBC-ENTMCNC: 32.8 GM/DL — SIGNIFICANT CHANGE UP (ref 32–36)
MCHC RBC-ENTMCNC: 33.1 GM/DL — SIGNIFICANT CHANGE UP (ref 32–36)
MCV RBC AUTO: 87.8 FL — SIGNIFICANT CHANGE UP (ref 80–100)
MCV RBC AUTO: 89.8 FL — SIGNIFICANT CHANGE UP (ref 80–100)
NRBC # BLD: 0 /100 WBCS — SIGNIFICANT CHANGE UP (ref 0–0)
NRBC # BLD: 0 /100 WBCS — SIGNIFICANT CHANGE UP (ref 0–0)
PLATELET # BLD AUTO: 174 K/UL — SIGNIFICANT CHANGE UP (ref 150–400)
PLATELET # BLD AUTO: 189 K/UL — SIGNIFICANT CHANGE UP (ref 150–400)
RBC # BLD: 2.55 M/UL — LOW (ref 3.8–5.2)
RBC # BLD: 2.79 M/UL — LOW (ref 3.8–5.2)
RBC # FLD: 14.4 % — SIGNIFICANT CHANGE UP (ref 10.3–14.5)
RBC # FLD: 14.5 % — SIGNIFICANT CHANGE UP (ref 10.3–14.5)
VANCOMYCIN TROUGH SERPL-MCNC: 10.2 UG/ML — SIGNIFICANT CHANGE UP (ref 10–20)
WBC # BLD: 4.59 K/UL — SIGNIFICANT CHANGE UP (ref 3.8–10.5)
WBC # BLD: 5.15 K/UL — SIGNIFICANT CHANGE UP (ref 3.8–10.5)
WBC # FLD AUTO: 4.59 K/UL — SIGNIFICANT CHANGE UP (ref 3.8–10.5)
WBC # FLD AUTO: 5.15 K/UL — SIGNIFICANT CHANGE UP (ref 3.8–10.5)

## 2022-05-22 RX ORDER — CLONAZEPAM 1 MG
0.5 TABLET ORAL THREE TIMES A DAY
Refills: 0 | Status: DISCONTINUED | OUTPATIENT
Start: 2022-05-23 | End: 2022-05-24

## 2022-05-22 RX ADMIN — HYDROMORPHONE HYDROCHLORIDE 4 MILLIGRAM(S): 2 INJECTION INTRAMUSCULAR; INTRAVENOUS; SUBCUTANEOUS at 22:11

## 2022-05-22 RX ADMIN — ATORVASTATIN CALCIUM 40 MILLIGRAM(S): 80 TABLET, FILM COATED ORAL at 21:42

## 2022-05-22 RX ADMIN — Medication 250 MILLIGRAM(S): at 08:26

## 2022-05-22 RX ADMIN — HYDROMORPHONE HYDROCHLORIDE 4 MILLIGRAM(S): 2 INJECTION INTRAMUSCULAR; INTRAVENOUS; SUBCUTANEOUS at 09:56

## 2022-05-22 RX ADMIN — ENOXAPARIN SODIUM 40 MILLIGRAM(S): 100 INJECTION SUBCUTANEOUS at 02:39

## 2022-05-22 RX ADMIN — HYDROMORPHONE HYDROCHLORIDE 4 MILLIGRAM(S): 2 INJECTION INTRAMUSCULAR; INTRAVENOUS; SUBCUTANEOUS at 21:41

## 2022-05-22 RX ADMIN — Medication 250 MILLIGRAM(S): at 20:25

## 2022-05-22 RX ADMIN — HYDROMORPHONE HYDROCHLORIDE 4 MILLIGRAM(S): 2 INJECTION INTRAMUSCULAR; INTRAVENOUS; SUBCUTANEOUS at 06:14

## 2022-05-22 RX ADMIN — Medication 50 MICROGRAM(S): at 06:11

## 2022-05-22 RX ADMIN — LAMOTRIGINE 100 MILLIGRAM(S): 25 TABLET, ORALLY DISINTEGRATING ORAL at 20:25

## 2022-05-22 RX ADMIN — HYDROMORPHONE HYDROCHLORIDE 4 MILLIGRAM(S): 2 INJECTION INTRAMUSCULAR; INTRAVENOUS; SUBCUTANEOUS at 17:19

## 2022-05-22 RX ADMIN — HYDROMORPHONE HYDROCHLORIDE 4 MILLIGRAM(S): 2 INJECTION INTRAMUSCULAR; INTRAVENOUS; SUBCUTANEOUS at 02:45

## 2022-05-22 RX ADMIN — ENOXAPARIN SODIUM 40 MILLIGRAM(S): 100 INJECTION SUBCUTANEOUS at 23:04

## 2022-05-22 RX ADMIN — HYDROMORPHONE HYDROCHLORIDE 4 MILLIGRAM(S): 2 INJECTION INTRAMUSCULAR; INTRAVENOUS; SUBCUTANEOUS at 06:44

## 2022-05-22 RX ADMIN — Medication 0.5 MILLIGRAM(S): at 13:35

## 2022-05-22 RX ADMIN — HYDROMORPHONE HYDROCHLORIDE 4 MILLIGRAM(S): 2 INJECTION INTRAMUSCULAR; INTRAVENOUS; SUBCUTANEOUS at 02:15

## 2022-05-22 RX ADMIN — Medication 60 MILLIGRAM(S): at 12:07

## 2022-05-22 RX ADMIN — Medication 0.5 MILLIGRAM(S): at 06:10

## 2022-05-22 RX ADMIN — CHLORHEXIDINE GLUCONATE 1 APPLICATION(S): 213 SOLUTION TOPICAL at 12:08

## 2022-05-22 RX ADMIN — LAMOTRIGINE 100 MILLIGRAM(S): 25 TABLET, ORALLY DISINTEGRATING ORAL at 09:56

## 2022-05-22 RX ADMIN — HYDROMORPHONE HYDROCHLORIDE 4 MILLIGRAM(S): 2 INJECTION INTRAMUSCULAR; INTRAVENOUS; SUBCUTANEOUS at 13:35

## 2022-05-22 RX ADMIN — Medication 0.5 MILLIGRAM(S): at 21:41

## 2022-05-22 NOTE — PROGRESS NOTE ADULT - TIME BILLING
Discussed treatment plan with patient at bedside.   DC planning for 5/23  discussed DC planning with care coordination   I am a non participating [facility] physician seeing Pt in coverage for Dr Bradford. The above patient documentation by Dr. Garcia was reviewed and I agree with his evaluation, assessment and treatment plan.  Matt Bradford M.D.

## 2022-05-22 NOTE — PROGRESS NOTE ADULT - SUBJECTIVE AND OBJECTIVE BOX
Orthopedics      Patient seen and examined at bedside. Feeling well. Pain better controlled. No n/v. No acute events overnight.    Vital Signs Last 24 Hrs  T(C): 37.1 (05-22-22 @ 09:05), Max: 37.4 (05-21-22 @ 16:53)  T(F): 98.8 (05-22-22 @ 09:05), Max: 99.3 (05-21-22 @ 16:53)  HR: 90 (05-22-22 @ 09:05) (87 - 93)  BP: 134/64 (05-22-22 @ 09:05) (112/69 - 134/64)  BP(mean): --  RR: 18 (05-22-22 @ 09:05) (18 - 18)  SpO2: 95% (05-22-22 @ 09:05) (93% - 98%)                        8.1    5.15  )-----------( 189      ( 22 May 2022 10:10 )             24.5               Exam:  Gen: NAD, resting comfortably  UE:  Dressing and splint c/d/i  +AIN/PIN/M/R/U/Ax/Musc  SILT C4-T1  Radial pulses 2+  Compartments soft and compressible  Calves NTTP b/l

## 2022-05-22 NOTE — PROGRESS NOTE ADULT - SUBJECTIVE AND OBJECTIVE BOX
66F hx of osteogenesis imperfecta  with multiple fractures, recent admission for L humeral fx with hardware (needing repair now due to hardware malfunction), depression/anxiety on multipe meds presenting to the Ed for 2 days of waxing and waning confusion (disoriented), more sleepiness. Progressive memory loss. Denies fever, chills, chest pain, shortness of breath, abd pain. Called as a code stroke for confusion but LKN > 24 hrs ago, patient felt warm, thus code stroke canceled. Patient was found to have a hematoma at her previous surgical site. Post op Patient was found to have Bacteremia. Seen now resting comfortably. MS has returned to baseline. Patient is s/p  washout with JESS 5/18. Patient with increased pain at the surgical site. Patient states pain has been better controlled. Patient has been working with physical therapy.      MEDICATIONS  (STANDING):  atorvastatin 40 milliGRAM(s) Oral at bedtime  busPIRone 15 milliGRAM(s) Oral two times a day  chlorhexidine 4% Liquid 1 Application(s) Topical daily  clonazePAM  Tablet 0.5 milliGRAM(s) Oral three times a day  enoxaparin Injectable 40 milliGRAM(s) SubCutaneous every 24 hours  FLUoxetine 60 milliGRAM(s) Oral daily  HYDROmorphone   Tablet 4 milliGRAM(s) Oral every 4 hours  lamoTRIgine 100 milliGRAM(s) Oral two times a day  levothyroxine 50 MICROGram(s) Oral daily  sodium chloride 0.9%. 1000 milliLiter(s) (65 mL/Hr) IV Continuous <Continuous>  vancomycin  IVPB 1000 milliGRAM(s) IV Intermittent every 12 hours    MEDICATIONS  (PRN):  melatonin 3 milliGRAM(s) Oral at bedtime PRN Insomnia          VITALS:   T(C): 37.3 (05-22-22 @ 12:58), Max: 37.4 (05-21-22 @ 16:53)  HR: 88 (05-22-22 @ 12:58) (87 - 92)  BP: 138/77 (05-22-22 @ 12:58) (112/69 - 138/77)  RR: 18 (05-22-22 @ 12:58) (18 - 18)  SpO2: 95% (05-22-22 @ 12:58) (95% - 98%)  Wt(kg): --      PHYSICAL EXAM:  GENERAL: NAD, well nourished and conversant  HEAD:  Atraumatic  EYES: EOM, PERRLA, conjunctiva pink and sclera white  ENT: No tonsillar erythema, exudates, or enlargement, moist mucous membranes, good dentition, no lesions  NECK: Supple, No JVD, normal thyroid, carotids with normal upstrokes and no bruits  CHEST/LUNG: Clear to auscultation bilaterally, No rales, rhonchi, wheezing, or rubs  HEART: Regular rate and rhythm, No murmurs, rubs, or gallops  ABDOMEN: Soft, nondistended, no masses, guarding, tenderness or rebound, bowel sounds present  EXTREMITIES:  left arm pain  LYMPH: No lymphadenopathy noted  SKIN: No rashes or lesions  NERVOUS SYSTEM:  Alert & Oriented X3, normal cognitive function.     LABS:        CBC Full  -  ( 22 May 2022 10:10 )  WBC Count : 5.15 K/uL  RBC Count : 2.79 M/uL  Hemoglobin : 8.1 g/dL  Hematocrit : 24.5 %  Platelet Count - Automated : 189 K/uL  Mean Cell Volume : 87.8 fl  Mean Cell Hemoglobin : 29.0 pg  Mean Cell Hemoglobin Concentration : 33.1 gm/dL  Auto Neutrophil # : x  Auto Lymphocyte # : x  Auto Monocyte # : x  Auto Eosinophil # : x  Auto Basophil # : x  Auto Neutrophil % : x  Auto Lymphocyte % : x  Auto Monocyte % : x  Auto Eosinophil % : x  Auto Basophil % : x                CAPILLARY BLOOD GLUCOSE          RADIOLOGY & ADDITIONAL TESTS:

## 2022-05-22 NOTE — PROGRESS NOTE ADULT - ASSESSMENT
A/P 66y year old Female s/p L distal humerus nonunion ORIF with autograft and partial triceps repair POD #3    -Pain control  -VTE ppx: Lovenox  -NWB LEFT upper extremity in splint/sling  -FU OR Cx  -ROM of wrist/hand/fingers encouraged  -OOB with assistance as needed  -PT  -Encourage incentive spirometry  -Appreciate care per primary team

## 2022-05-23 ENCOUNTER — TRANSCRIPTION ENCOUNTER (OUTPATIENT)
Age: 67
End: 2022-05-23

## 2022-05-23 DIAGNOSIS — D62 ACUTE POSTHEMORRHAGIC ANEMIA: ICD-10-CM

## 2022-05-23 LAB
BLD GP AB SCN SERPL QL: NEGATIVE — SIGNIFICANT CHANGE UP
HCT VFR BLD CALC: 23.2 % — LOW (ref 34.5–45)
HGB BLD-MCNC: 7.4 G/DL — LOW (ref 11.5–15.5)
MCHC RBC-ENTMCNC: 29 PG — SIGNIFICANT CHANGE UP (ref 27–34)
MCHC RBC-ENTMCNC: 31.9 GM/DL — LOW (ref 32–36)
MCV RBC AUTO: 91 FL — SIGNIFICANT CHANGE UP (ref 80–100)
NRBC # BLD: 0 /100 WBCS — SIGNIFICANT CHANGE UP (ref 0–0)
PLATELET # BLD AUTO: 198 K/UL — SIGNIFICANT CHANGE UP (ref 150–400)
RBC # BLD: 2.55 M/UL — LOW (ref 3.8–5.2)
RBC # FLD: 14.4 % — SIGNIFICANT CHANGE UP (ref 10.3–14.5)
RH IG SCN BLD-IMP: POSITIVE — SIGNIFICANT CHANGE UP
WBC # BLD: 3.8 K/UL — SIGNIFICANT CHANGE UP (ref 3.8–10.5)
WBC # FLD AUTO: 3.8 K/UL — SIGNIFICANT CHANGE UP (ref 3.8–10.5)

## 2022-05-23 PROCEDURE — 99232 SBSQ HOSP IP/OBS MODERATE 35: CPT

## 2022-05-23 RX ADMIN — HYDROMORPHONE HYDROCHLORIDE 4 MILLIGRAM(S): 2 INJECTION INTRAMUSCULAR; INTRAVENOUS; SUBCUTANEOUS at 03:21

## 2022-05-23 RX ADMIN — Medication 0.5 MILLIGRAM(S): at 06:21

## 2022-05-23 RX ADMIN — Medication 100 MILLIGRAM(S): at 18:52

## 2022-05-23 RX ADMIN — HYDROMORPHONE HYDROCHLORIDE 4 MILLIGRAM(S): 2 INJECTION INTRAMUSCULAR; INTRAVENOUS; SUBCUTANEOUS at 21:28

## 2022-05-23 RX ADMIN — Medication 0.5 MILLIGRAM(S): at 21:28

## 2022-05-23 RX ADMIN — HYDROMORPHONE HYDROCHLORIDE 4 MILLIGRAM(S): 2 INJECTION INTRAMUSCULAR; INTRAVENOUS; SUBCUTANEOUS at 10:57

## 2022-05-23 RX ADMIN — HYDROMORPHONE HYDROCHLORIDE 4 MILLIGRAM(S): 2 INJECTION INTRAMUSCULAR; INTRAVENOUS; SUBCUTANEOUS at 22:00

## 2022-05-23 RX ADMIN — LAMOTRIGINE 100 MILLIGRAM(S): 25 TABLET, ORALLY DISINTEGRATING ORAL at 10:52

## 2022-05-23 RX ADMIN — Medication 0.5 MILLIGRAM(S): at 16:43

## 2022-05-23 RX ADMIN — Medication 50 MICROGRAM(S): at 06:19

## 2022-05-23 RX ADMIN — HYDROMORPHONE HYDROCHLORIDE 4 MILLIGRAM(S): 2 INJECTION INTRAMUSCULAR; INTRAVENOUS; SUBCUTANEOUS at 16:43

## 2022-05-23 RX ADMIN — ATORVASTATIN CALCIUM 40 MILLIGRAM(S): 80 TABLET, FILM COATED ORAL at 21:28

## 2022-05-23 RX ADMIN — Medication 60 MILLIGRAM(S): at 11:01

## 2022-05-23 RX ADMIN — HYDROMORPHONE HYDROCHLORIDE 4 MILLIGRAM(S): 2 INJECTION INTRAMUSCULAR; INTRAVENOUS; SUBCUTANEOUS at 06:50

## 2022-05-23 RX ADMIN — ENOXAPARIN SODIUM 40 MILLIGRAM(S): 100 INJECTION SUBCUTANEOUS at 23:35

## 2022-05-23 RX ADMIN — HYDROMORPHONE HYDROCHLORIDE 4 MILLIGRAM(S): 2 INJECTION INTRAMUSCULAR; INTRAVENOUS; SUBCUTANEOUS at 06:20

## 2022-05-23 RX ADMIN — HYDROMORPHONE HYDROCHLORIDE 4 MILLIGRAM(S): 2 INJECTION INTRAMUSCULAR; INTRAVENOUS; SUBCUTANEOUS at 11:30

## 2022-05-23 RX ADMIN — Medication 250 MILLIGRAM(S): at 21:28

## 2022-05-23 RX ADMIN — CHLORHEXIDINE GLUCONATE 1 APPLICATION(S): 213 SOLUTION TOPICAL at 10:58

## 2022-05-23 RX ADMIN — LAMOTRIGINE 100 MILLIGRAM(S): 25 TABLET, ORALLY DISINTEGRATING ORAL at 21:28

## 2022-05-23 RX ADMIN — HYDROMORPHONE HYDROCHLORIDE 4 MILLIGRAM(S): 2 INJECTION INTRAMUSCULAR; INTRAVENOUS; SUBCUTANEOUS at 02:59

## 2022-05-23 RX ADMIN — Medication 250 MILLIGRAM(S): at 10:51

## 2022-05-23 NOTE — PROGRESS NOTE ADULT - SUBJECTIVE AND OBJECTIVE BOX
66F hx of osteogenesis imperfecta  with multiple fractures, recent admission for L humeral fx with hardware (needing repair now due to hardware malfunction), depression/anxiety on multipe meds presenting to the Ed for 2 days of waxing and waning confusion (disoriented), more sleepiness. Progressive memory loss. Denies fever, chills, chest pain, shortness of breath, abd pain. Called as a code stroke for confusion but LKN > 24 hrs ago, patient felt warm, thus code stroke canceled. Patient was found to have a hematoma at her previous surgical site. Post op Patient was found to have Bacteremia. Seen now resting comfortably. MS has returned to baseline. Patient is s/p  washout with JESS 5/18. Patient with increased pain at the surgical site. Patient states pain has been better controlled. Patient has been working with physical therapy. Patient found to be anemic and 1 unit of PRBC was ordered by ortho      MEDICATIONS  (STANDING):  atorvastatin 40 milliGRAM(s) Oral at bedtime  busPIRone 15 milliGRAM(s) Oral two times a day  chlorhexidine 4% Liquid 1 Application(s) Topical daily  clonazePAM  Tablet 0.5 milliGRAM(s) Oral three times a day  enoxaparin Injectable 40 milliGRAM(s) SubCutaneous every 24 hours  FLUoxetine 60 milliGRAM(s) Oral daily  HYDROmorphone   Tablet 4 milliGRAM(s) Oral every 4 hours  lamoTRIgine 100 milliGRAM(s) Oral two times a day  levothyroxine 50 MICROGram(s) Oral daily  sodium chloride 0.9%. 1000 milliLiter(s) (65 mL/Hr) IV Continuous <Continuous>  vancomycin  IVPB 1000 milliGRAM(s) IV Intermittent every 12 hours    MEDICATIONS  (PRN):  melatonin 3 milliGRAM(s) Oral at bedtime PRN Insomnia          VITALS:   T(C): 37.3 (05-23-22 @ 13:10), Max: 37.3 (05-23-22 @ 13:10)  HR: 88 (05-23-22 @ 13:10) (83 - 94)  BP: 124/57 (05-23-22 @ 13:10) (103/64 - 158/74)  RR: 18 (05-23-22 @ 13:10) (18 - 18)  SpO2: 96% (05-23-22 @ 13:10) (95% - 99%)  Wt(kg): --    PHYSICAL EXAM:  GENERAL: NAD, well nourished and conversant  HEAD:  Atraumatic  EYES: EOM, PERRLA, conjunctiva pink and sclera white  ENT: No tonsillar erythema, exudates, or enlargement, moist mucous membranes, good dentition, no lesions  NECK: Supple, No JVD, normal thyroid, carotids with normal upstrokes and no bruits  CHEST/LUNG: Clear to auscultation bilaterally, No rales, rhonchi, wheezing, or rubs  HEART: Regular rate and rhythm, No murmurs, rubs, or gallops  ABDOMEN: Soft, nondistended, no masses, guarding, tenderness or rebound, bowel sounds present  EXTREMITIES:  left arm pain  LYMPH: No lymphadenopathy noted  SKIN: No rashes or lesions  NERVOUS SYSTEM:  Alert & Oriented X3, normal cognitive function.       LABS:        CBC Full  -  ( 23 May 2022 07:14 )  WBC Count : 3.80 K/uL  RBC Count : 2.55 M/uL  Hemoglobin : 7.4 g/dL  Hematocrit : 23.2 %  Platelet Count - Automated : 198 K/uL  Mean Cell Volume : 91.0 fl  Mean Cell Hemoglobin : 29.0 pg  Mean Cell Hemoglobin Concentration : 31.9 gm/dL  Auto Neutrophil # : x  Auto Lymphocyte # : x  Auto Monocyte # : x  Auto Eosinophil # : x  Auto Basophil # : x  Auto Neutrophil % : x  Auto Lymphocyte % : x  Auto Monocyte % : x  Auto Eosinophil % : x  Auto Basophil % : x                CAPILLARY BLOOD GLUCOSE          RADIOLOGY & ADDITIONAL TESTS:

## 2022-05-23 NOTE — DISCHARGE NOTE PROVIDER - NSDCCPCAREPLAN_GEN_ALL_CORE_FT
PRINCIPAL DISCHARGE DIAGNOSIS  Diagnosis: Bacteremia  Assessment and Plan of Treatment: .bacteremia

## 2022-05-23 NOTE — DISCHARGE NOTE PROVIDER - HOSPITAL COURSE
65 yo woman with a hx osteogenesis imperfecta and multiple fractures presents with a change in mental status. Patient with a hx of a recent Open reduction and internal fixation of the left humerus. Patient found to have positive blood cultures (staph epi bacteremia in the setting of recent humerus ORIF on 5/18). ID consulted, recommended vanco 1 g q12 until 6/30 with doxy 100 bid. PICC placed. The patient was seen and evaluated and deemed medically stable for discharge.

## 2022-05-23 NOTE — PROGRESS NOTE ADULT - SUBJECTIVE AND OBJECTIVE BOX
Orthopedics      Patient seen and examined at bedside. Feeling well. Pain better controlled. No n/v. No acute events overnight.     Vital Signs Last 24 Hrs  T(C): 37 (23 May 2022 05:20), Max: 37.3 (22 May 2022 12:58)  T(F): 98.6 (23 May 2022 05:20), Max: 99.2 (22 May 2022 12:58)  HR: 83 (23 May 2022 05:20) (83 - 94)  BP: 120/60 (23 May 2022 05:20) (103/64 - 138/77)  RR: 18 (23 May 2022 05:20) (18 - 18)  SpO2: 98% (23 May 2022 05:20) (95% - 98%)    LABS:                        8.1    5.15  )-----------( 189      ( 22 May 2022 10:10 )             24.5           Exam:  Gen: NAD, resting comfortably  UE:  Dressing and splint c/d/i  +AIN/PIN/M/R/U/Ax/Musc  SILT C4-T1  Radial pulses 2+  Compartments soft and compressible  Calves NTTP b/l

## 2022-05-23 NOTE — PROGRESS NOTE ADULT - ASSESSMENT
65 yo woman with a hx osteogenesis imperfecta and multiple fractures presents with a change in mental status. Patient with a hx of a recent Open reduction and internal fixation of the left humerus. Patient found to have positive blood cultures. Patient is s/p JESS 5/18

## 2022-05-23 NOTE — PROGRESS NOTE ADULT - TIME BILLING
Discussed treatment plan with patient at bedside.   DC planning for 5/23  discussed DC planning with care coordination   I am a non participating [facility] physician seeing Pt in coverage for Dr Bradford. The above patient documentation by Dr. Garcia was reviewed and I agree with his evaluation, assessment and treatment plan.  Matt Bradford M.D.

## 2022-05-23 NOTE — PROGRESS NOTE ADULT - SUBJECTIVE AND OBJECTIVE BOX
Follow Up:  bacteremia, hardware infection osteo    Interval History: pt feels better, OR culture negative    ROS:      All other systems negative    Constitutional: no fever, no chills  Cardiovascular:  no chest pain, no palpitation  Respiratory:  no SOB, no cough  GI:  no abd pain, no vomiting, no diarrhea  urinary: no dysuria, no hematuria, no flank pain  musculoskeletal: severe pain  skin:  no rash  neurology:  no headache, no seizure      Allergies  No Known Allergies        ANTIMICROBIALS:  vancomycin  IVPB 1000 every 12 hours      OTHER MEDS:  atorvastatin 40 milliGRAM(s) Oral at bedtime  busPIRone 15 milliGRAM(s) Oral two times a day  chlorhexidine 4% Liquid 1 Application(s) Topical daily  clonazePAM  Tablet 0.5 milliGRAM(s) Oral three times a day  enoxaparin Injectable 40 milliGRAM(s) SubCutaneous every 24 hours  FLUoxetine 60 milliGRAM(s) Oral daily  HYDROmorphone   Tablet 4 milliGRAM(s) Oral every 4 hours  lamoTRIgine 100 milliGRAM(s) Oral two times a day  levothyroxine 50 MICROGram(s) Oral daily  melatonin 3 milliGRAM(s) Oral at bedtime PRN  sodium chloride 0.9%. 1000 milliLiter(s) IV Continuous <Continuous>      Vital Signs Last 24 Hrs  T(C): 37.3 (23 May 2022 13:10), Max: 37.3 (23 May 2022 13:10)  T(F): 99.1 (23 May 2022 13:10), Max: 99.1 (23 May 2022 13:10)  HR: 88 (23 May 2022 13:10) (83 - 94)  BP: 124/57 (23 May 2022 13:10) (103/64 - 158/74)  BP(mean): --  RR: 18 (23 May 2022 13:10) (18 - 18)  SpO2: 96% (23 May 2022 13:10) (95% - 99%)    Physical Exam:  General:    NAD, non toxic  Cardio:    regular S1,S2, no murmur  Respiratory:   clear b/l, no wheezing  abd:   soft, BS +, not tender  :     no CVAT, no suprapubic tenderness, no pardo  Musculoskeletal : L arm with dressing  Skin:    no rash  vascular: no phlebitis                                       7.4    3.80  )-----------( 198      ( 23 May 2022 07:14 )             23.2                   MICROBIOLOGY:  v  .Tissue Other  05-19-22   No growth  --    Rare polymorphonuclear leukocytes seen per low power field  No organisms seen per oil power field      .Blood Blood  05-15-22   No Growth Final  --  --      Clean Catch Clean Catch (Midstream)  05-13-22   <10,000 CFU/mL Normal Urogenital Katia  --  --      .Blood Blood-Peripheral  05-13-22   Growth in aerobic bottle: Staphylococcus epidermidis  --  Staphylococcus epidermidis      .Blood Blood-Peripheral  05-13-22   Growth in aerobic and anaerobic bottles: Staphylococcus epidermidis  ***Blood Panel PCR results on this specimen are available  approximately 3 hours after the Gram stain result.***  Gram stain, PCR, and/or culture results may not always  correspond due to difference in methodologies.  ************************************************************  This PCR assay was performed by multiplex PCR. This  Assay tests for 66 bacterial and resistance gene targets.  Please refer to the [facility] test directory  at [email or web address] for details.  --  Blood Culture PCR                RADIOLOGY:  Images independently visualized and reviewed personally, findings as below  < from: Xray Chest 1 View- PORTABLE-Urgent (Xray Chest 1 View- PORTABLE-Urgent .) (05.20.22 @ 19:08) >  IMPRESSION: Right upper extremity PICC line in good position.      < end of copied text >  < from: CT Humerus No Cont, Left (03.30.22 @ 19:39) >  IMPRESSION: Comminuted, markedly displaced shaft fracture of the distal   humerus with 3.1 cm fracture gap. Soft tissue edema about the site of   fracture.    9 mm indeterminate pulmonary nodule in the partially imaged left lower   lobe. Further evaluation with noncontrast low-dose chest CT is advised.    < end of copied text >

## 2022-05-23 NOTE — PROGRESS NOTE ADULT - ASSESSMENT
66 f with depression/anxiety osteogenesis imperfecta with multiple fractures and osteotomies, prosthetic joints, recent L humeral fx s/p ORIF and hardware 3/31/22,  brought in 5/14 for AMS and confusion, afebrile, normal WBC   erythema and edema of elbow found to have a hematoma which was drained by ortho, no culture done  blood cx 5/14: 3/4 staph epi  xray: S/P plate and screw fixation of distal humeral fracture. The distal plate and screw construct now has minimal purchase within the distal fracture fragment with interval proximal migration and apex lateral angulation of the distal fracture fragment when compared to radiographs dated 04/18/2022. There is extensive periosteal reaction with areas of lucency/osteolysis within the distal fracture fragment, concerning for underlying osteomyelitis or may be related to chronic motion at the fracture site.  head CT was negative and mental status improved    AMS, staph epi bacteremia in the setting of recent humerus ORIF, hardware placement now with unstable hardware, hematoma, likely hardware infection and osteo  h/o OI and multiple osteotomies and prosthetic joints  * repeat blood cx 5/15 negative, s/p repair L humerus nonunion, hardware removed, sequestrectomy, neurolysis radial and ulna nerves, triceps repair 5/19, new hardware placed, no tatiana pus noted  *  the OR cultures negative  * c/w  vanco 1 q 12  * monitor vanco trough and renal function to prevent nephrotoxicity  * add doxy 100 bid as well as there is new hardware as well  * will need a 6 week course post OR until 6/30  * s/p  picc line  * weekly CBC, CMP, vanco trough while on antibiotics  * f/u with ortho and ID        The above assessment and plan was discussed with ortho    Linda Goode MD  contact on teams  After 5pm and on weekends call [phone]

## 2022-05-23 NOTE — PROGRESS NOTE ADULT - ASSESSMENT
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 Morrison and advise of any changes to the above plan

## 2022-05-23 NOTE — DISCHARGE NOTE PROVIDER - NSDCMRMEDTOKEN_GEN_ALL_CORE_FT
acetaminophen 325 mg oral tablet: 2 tab(s) orally every 6 hours, As needed, Temp greater or equal to 38C (100.4F), mild pain  Ambien 5 mg oral tablet: 1 tab(s) orally once a day (at bedtime)  busPIRone 15 mg oral tablet: 1 tab(s) orally 2 times a day  cbc, cmp vancomycin trough: once a week   clonazePAM 0.5 mg oral tablet: 1 tab(s) orally 3 times a day  Ecotrin 325 mg oral delayed release tablet: 1 tab(s) orally 2 times a day x 6 weeks total for dvt prevention MDD:2  FLUoxetine 20 mg oral capsule: 1 cap(s) orally 3 times a day  HYDROmorphone 8 mg oral tablet: 1 tab(s) orally every 4-6 hours, As needed, moderate to Severe Pain  lamoTRIgine 100 mg oral tablet: 1 tab(s) orally 2 times a day  levothyroxine 50 mcg (0.05 mg) oral tablet: 1 tab(s) orally once a day  Multiple Vitamins oral tablet: 1 tab(s) orally once a day  Narcan 4 mg/0.1 mL nasal spray: 4 milligram(s) intranasally- 1 spray Q 2-3 minutes alternating between nostrils).  polyethylene glycol 3350 oral powder for reconstitution: 17 gram(s) orally once a day, As needed, Constipation MDD:1 packet/ day  rosuvastatin 10 mg oral tablet: 1 tab(s) orally once a day (at bedtime)  senna 8.6 mg oral tablet: 2 tab(s) orally once a day (at bedtime) MDD:2 tabs per day  transport chair: once a day   vancomycin 1 g intravenous injection: 1 gram(s) intravenously every 12 hours   Wellbutrin 75 mg oral tablet: 1 tab(s) orally 3 times a day   acetaminophen 325 mg oral tablet: 2 tab(s) orally every 6 hours, As needed, Temp greater or equal to 38C (100.4F), mild pain  Ambien 5 mg oral tablet: 1 tab(s) orally once a day (at bedtime)  busPIRone 15 mg oral tablet: 1 tab(s) orally 2 times a day  cbc, cmp vancomycin trough: once a week   clonazePAM 0.5 mg oral tablet: 1 tab(s) orally 3 times a day  doxycycline monohydrate 100 mg oral capsule: 1 cap(s) orally every 12 hours  Ecotrin 325 mg oral delayed release tablet: 1 tab(s) orally 2 times a day x 6 weeks total for dvt prevention MDD:2  FLUoxetine 20 mg oral capsule: 1 cap(s) orally 3 times a day  HYDROmorphone 8 mg oral tablet: 1 tab(s) orally every 4-6 hours, As needed, moderate to Severe Pain  lamoTRIgine 100 mg oral tablet: 1 tab(s) orally 2 times a day  levothyroxine 50 mcg (0.05 mg) oral tablet: 1 tab(s) orally once a day  Multiple Vitamins oral tablet: 1 tab(s) orally once a day  Narcan 4 mg/0.1 mL nasal spray: 4 milligram(s) intranasally- 1 spray Q 2-3 minutes alternating between nostrils).  polyethylene glycol 3350 oral powder for reconstitution: 17 gram(s) orally once a day, As needed, Constipation MDD:1 packet/ day  rosuvastatin 10 mg oral tablet: 1 tab(s) orally once a day (at bedtime)  senna 8.6 mg oral tablet: 2 tab(s) orally once a day (at bedtime) MDD:2 tabs per day  transport chair: once a day   vancomycin 1 g intravenous injection: 1 gram(s) intravenously every 12 hours   Wellbutrin 75 mg oral tablet: 1 tab(s) orally 3 times a day

## 2022-05-23 NOTE — DISCHARGE NOTE PROVIDER - CARE PROVIDER_API CALL
Guero Morrison (MD)  Orthopaedic Surgery  [address]  [place] [address]  Phone: [phone]  Fax: [phone]  Follow Up Time:

## 2022-05-23 NOTE — DISCHARGE NOTE PROVIDER - NSDCFUSCHEDAPPT_GEN_ALL_CORE_FT
[facility]  Scheduled Appointment: 05/28/2022    Guero Morrison  [facility] PreAdmits  Scheduled Appointment: 05/31/2022

## 2022-05-24 ENCOUNTER — TRANSCRIPTION ENCOUNTER (OUTPATIENT)
Age: 67
End: 2022-05-24

## 2022-05-24 VITALS
SYSTOLIC BLOOD PRESSURE: 127 MMHG | DIASTOLIC BLOOD PRESSURE: 70 MMHG | OXYGEN SATURATION: 98 % | TEMPERATURE: 98 F | HEART RATE: 92 BPM

## 2022-05-24 LAB
ANION GAP SERPL CALC-SCNC: 14 MMOL/L — SIGNIFICANT CHANGE UP (ref 5–17)
BUN SERPL-MCNC: 7 MG/DL — SIGNIFICANT CHANGE UP (ref 7–23)
CALCIUM SERPL-MCNC: 8.2 MG/DL — LOW (ref 8.4–10.5)
CHLORIDE SERPL-SCNC: 101 MMOL/L — SIGNIFICANT CHANGE UP (ref 96–108)
CO2 SERPL-SCNC: 25 MMOL/L — SIGNIFICANT CHANGE UP (ref 22–31)
CREAT SERPL-MCNC: 0.58 MG/DL — SIGNIFICANT CHANGE UP (ref 0.5–1.3)
CULTURE RESULTS: SIGNIFICANT CHANGE UP
EGFR: 100 ML/MIN/1.73M2 — SIGNIFICANT CHANGE UP
GLUCOSE SERPL-MCNC: 91 MG/DL — SIGNIFICANT CHANGE UP (ref 70–99)
HCT VFR BLD CALC: 25 % — LOW (ref 34.5–45)
HCT VFR BLD CALC: 25.6 % — LOW (ref 34.5–45)
HGB BLD-MCNC: 8.3 G/DL — LOW (ref 11.5–15.5)
HGB BLD-MCNC: 8.3 G/DL — LOW (ref 11.5–15.5)
MAGNESIUM SERPL-MCNC: 2 MG/DL — SIGNIFICANT CHANGE UP (ref 1.6–2.6)
MCHC RBC-ENTMCNC: 29.1 PG — SIGNIFICANT CHANGE UP (ref 27–34)
MCHC RBC-ENTMCNC: 29.6 PG — SIGNIFICANT CHANGE UP (ref 27–34)
MCHC RBC-ENTMCNC: 32.4 GM/DL — SIGNIFICANT CHANGE UP (ref 32–36)
MCHC RBC-ENTMCNC: 33.2 GM/DL — SIGNIFICANT CHANGE UP (ref 32–36)
MCV RBC AUTO: 89.3 FL — SIGNIFICANT CHANGE UP (ref 80–100)
MCV RBC AUTO: 89.8 FL — SIGNIFICANT CHANGE UP (ref 80–100)
NRBC # BLD: 0 /100 WBCS — SIGNIFICANT CHANGE UP (ref 0–0)
NRBC # BLD: 0 /100 WBCS — SIGNIFICANT CHANGE UP (ref 0–0)
PHOSPHATE SERPL-MCNC: 3.6 MG/DL — SIGNIFICANT CHANGE UP (ref 2.5–4.5)
PLATELET # BLD AUTO: 218 K/UL — SIGNIFICANT CHANGE UP (ref 150–400)
PLATELET # BLD AUTO: 227 K/UL — SIGNIFICANT CHANGE UP (ref 150–400)
POTASSIUM SERPL-MCNC: 3.1 MMOL/L — LOW (ref 3.5–5.3)
POTASSIUM SERPL-SCNC: 3.1 MMOL/L — LOW (ref 3.5–5.3)
RBC # BLD: 2.8 M/UL — LOW (ref 3.8–5.2)
RBC # BLD: 2.85 M/UL — LOW (ref 3.8–5.2)
RBC # FLD: 14 % — SIGNIFICANT CHANGE UP (ref 10.3–14.5)
RBC # FLD: 14.3 % — SIGNIFICANT CHANGE UP (ref 10.3–14.5)
SODIUM SERPL-SCNC: 140 MMOL/L — SIGNIFICANT CHANGE UP (ref 135–145)
SPECIMEN SOURCE: SIGNIFICANT CHANGE UP
WBC # BLD: 4.08 K/UL — SIGNIFICANT CHANGE UP (ref 3.8–10.5)
WBC # BLD: 4.76 K/UL — SIGNIFICANT CHANGE UP (ref 3.8–10.5)
WBC # FLD AUTO: 4.08 K/UL — SIGNIFICANT CHANGE UP (ref 3.8–10.5)
WBC # FLD AUTO: 4.76 K/UL — SIGNIFICANT CHANGE UP (ref 3.8–10.5)

## 2022-05-24 PROCEDURE — 87040 BLOOD CULTURE FOR BACTERIA: CPT

## 2022-05-24 PROCEDURE — 71045 X-RAY EXAM CHEST 1 VIEW: CPT

## 2022-05-24 PROCEDURE — 97164 PT RE-EVAL EST PLAN CARE: CPT

## 2022-05-24 PROCEDURE — 97110 THERAPEUTIC EXERCISES: CPT

## 2022-05-24 PROCEDURE — 82330 ASSAY OF CALCIUM: CPT

## 2022-05-24 PROCEDURE — 85018 HEMOGLOBIN: CPT

## 2022-05-24 PROCEDURE — 82565 ASSAY OF CREATININE: CPT

## 2022-05-24 PROCEDURE — 84145 PROCALCITONIN (PCT): CPT

## 2022-05-24 PROCEDURE — P9059: CPT

## 2022-05-24 PROCEDURE — 0225U NFCT DS DNA&RNA 21 SARSCOV2: CPT

## 2022-05-24 PROCEDURE — 80202 ASSAY OF VANCOMYCIN: CPT

## 2022-05-24 PROCEDURE — 85610 PROTHROMBIN TIME: CPT

## 2022-05-24 PROCEDURE — 73070 X-RAY EXAM OF ELBOW: CPT

## 2022-05-24 PROCEDURE — C1713: CPT

## 2022-05-24 PROCEDURE — 36430 TRANSFUSION BLD/BLD COMPNT: CPT

## 2022-05-24 PROCEDURE — 93005 ELECTROCARDIOGRAM TRACING: CPT

## 2022-05-24 PROCEDURE — 87077 CULTURE AEROBIC IDENTIFY: CPT

## 2022-05-24 PROCEDURE — 36569 INSJ PICC 5 YR+ W/O IMAGING: CPT

## 2022-05-24 PROCEDURE — 85025 COMPLETE CBC W/AUTO DIFF WBC: CPT

## 2022-05-24 PROCEDURE — 82435 ASSAY OF BLOOD CHLORIDE: CPT

## 2022-05-24 PROCEDURE — 85014 HEMATOCRIT: CPT

## 2022-05-24 PROCEDURE — U0003: CPT

## 2022-05-24 PROCEDURE — 87086 URINE CULTURE/COLONY COUNT: CPT

## 2022-05-24 PROCEDURE — 87075 CULTR BACTERIA EXCEPT BLOOD: CPT

## 2022-05-24 PROCEDURE — U0005: CPT

## 2022-05-24 PROCEDURE — 86901 BLOOD TYPING SEROLOGIC RH(D): CPT

## 2022-05-24 PROCEDURE — 99285 EMERGENCY DEPT VISIT HI MDM: CPT

## 2022-05-24 PROCEDURE — C1889: CPT

## 2022-05-24 PROCEDURE — 86923 COMPATIBILITY TEST ELECTRIC: CPT

## 2022-05-24 PROCEDURE — 81001 URINALYSIS AUTO W/SCOPE: CPT

## 2022-05-24 PROCEDURE — 73060 X-RAY EXAM OF HUMERUS: CPT

## 2022-05-24 PROCEDURE — 86850 RBC ANTIBODY SCREEN: CPT

## 2022-05-24 PROCEDURE — 84132 ASSAY OF SERUM POTASSIUM: CPT

## 2022-05-24 PROCEDURE — C9399: CPT

## 2022-05-24 PROCEDURE — 87186 SC STD MICRODIL/AGAR DIL: CPT

## 2022-05-24 PROCEDURE — 80053 COMPREHEN METABOLIC PANEL: CPT

## 2022-05-24 PROCEDURE — 82947 ASSAY GLUCOSE BLOOD QUANT: CPT

## 2022-05-24 PROCEDURE — 87150 DNA/RNA AMPLIFIED PROBE: CPT

## 2022-05-24 PROCEDURE — 97166 OT EVAL MOD COMPLEX 45 MIN: CPT

## 2022-05-24 PROCEDURE — 84295 ASSAY OF SERUM SODIUM: CPT

## 2022-05-24 PROCEDURE — 97116 GAIT TRAINING THERAPY: CPT

## 2022-05-24 PROCEDURE — P9016: CPT

## 2022-05-24 PROCEDURE — 85652 RBC SED RATE AUTOMATED: CPT

## 2022-05-24 PROCEDURE — 87070 CULTURE OTHR SPECIMN AEROBIC: CPT

## 2022-05-24 PROCEDURE — 84100 ASSAY OF PHOSPHORUS: CPT

## 2022-05-24 PROCEDURE — 86140 C-REACTIVE PROTEIN: CPT

## 2022-05-24 PROCEDURE — 76000 FLUOROSCOPY <1 HR PHYS/QHP: CPT

## 2022-05-24 PROCEDURE — 85027 COMPLETE CBC AUTOMATED: CPT

## 2022-05-24 PROCEDURE — 70450 CT HEAD/BRAIN W/O DYE: CPT | Mod: MA

## 2022-05-24 PROCEDURE — 80048 BASIC METABOLIC PNL TOTAL CA: CPT

## 2022-05-24 PROCEDURE — C1751: CPT

## 2022-05-24 PROCEDURE — 97162 PT EVAL MOD COMPLEX 30 MIN: CPT

## 2022-05-24 PROCEDURE — 83605 ASSAY OF LACTIC ACID: CPT

## 2022-05-24 PROCEDURE — 82803 BLOOD GASES ANY COMBINATION: CPT

## 2022-05-24 PROCEDURE — 82962 GLUCOSE BLOOD TEST: CPT

## 2022-05-24 PROCEDURE — 36415 COLL VENOUS BLD VENIPUNCTURE: CPT

## 2022-05-24 PROCEDURE — 84484 ASSAY OF TROPONIN QUANT: CPT

## 2022-05-24 PROCEDURE — 85730 THROMBOPLASTIN TIME PARTIAL: CPT

## 2022-05-24 PROCEDURE — 86900 BLOOD TYPING SEROLOGIC ABO: CPT

## 2022-05-24 PROCEDURE — 83735 ASSAY OF MAGNESIUM: CPT

## 2022-05-24 RX ORDER — POTASSIUM CHLORIDE 20 MEQ
40 PACKET (EA) ORAL EVERY 4 HOURS
Refills: 0 | Status: DISCONTINUED | OUTPATIENT
Start: 2022-05-24 | End: 2022-05-24

## 2022-05-24 RX ADMIN — HYDROMORPHONE HYDROCHLORIDE 4 MILLIGRAM(S): 2 INJECTION INTRAMUSCULAR; INTRAVENOUS; SUBCUTANEOUS at 02:30

## 2022-05-24 RX ADMIN — HYDROMORPHONE HYDROCHLORIDE 4 MILLIGRAM(S): 2 INJECTION INTRAMUSCULAR; INTRAVENOUS; SUBCUTANEOUS at 10:17

## 2022-05-24 RX ADMIN — Medication 250 MILLIGRAM(S): at 10:14

## 2022-05-24 RX ADMIN — Medication 15 MILLIGRAM(S): at 06:16

## 2022-05-24 RX ADMIN — HYDROMORPHONE HYDROCHLORIDE 4 MILLIGRAM(S): 2 INJECTION INTRAMUSCULAR; INTRAVENOUS; SUBCUTANEOUS at 02:12

## 2022-05-24 RX ADMIN — HYDROMORPHONE HYDROCHLORIDE 4 MILLIGRAM(S): 2 INJECTION INTRAMUSCULAR; INTRAVENOUS; SUBCUTANEOUS at 06:16

## 2022-05-24 RX ADMIN — HYDROMORPHONE HYDROCHLORIDE 4 MILLIGRAM(S): 2 INJECTION INTRAMUSCULAR; INTRAVENOUS; SUBCUTANEOUS at 06:30

## 2022-05-24 RX ADMIN — LAMOTRIGINE 100 MILLIGRAM(S): 25 TABLET, ORALLY DISINTEGRATING ORAL at 10:15

## 2022-05-24 RX ADMIN — Medication 50 MICROGRAM(S): at 06:15

## 2022-05-24 RX ADMIN — Medication 0.5 MILLIGRAM(S): at 06:16

## 2022-05-24 RX ADMIN — Medication 100 MILLIGRAM(S): at 06:16

## 2022-05-24 RX ADMIN — Medication 40 MILLIEQUIVALENT(S): at 10:17

## 2022-05-24 NOTE — PROGRESS NOTE ADULT - PROBLEM SELECTOR PROBLEM 2
Wound infection

## 2022-05-24 NOTE — PROGRESS NOTE ADULT - PROBLEM SELECTOR PLAN 1
Patients mental status seem to be at her baseline  appreciate neurology input  continue to follow mental status
Patients mental status seem to be at her baseline  appreciate neurology input  Patient has been AAOx3
Patients mental status seem to be at her baseline  appreciate neurology input  Patient has been AAOx3
Patients mental status seem to be at her baseline  appreciate neurology input  continue to follow mental status
Patients mental status seem to be at her baseline  appreciate neurology input  Patient has been AAOx3

## 2022-05-24 NOTE — PROGRESS NOTE ADULT - SUBJECTIVE AND OBJECTIVE BOX
66F hx of osteogenesis imperfecta  with multiple fractures, recent admission for L humeral fx with hardware (needing repair now due to hardware malfunction), depression/anxiety on multipe meds presenting to the Ed for 2 days of waxing and waning confusion (disoriented), more sleepiness. Progressive memory loss. Denies fever, chills, chest pain, shortness of breath, abd pain. Called as a code stroke for confusion but LKN > 24 hrs ago, patient felt warm, thus code stroke canceled. Patient was found to have a hematoma at her previous surgical site. Post op Patient was found to have Bacteremia. Seen now resting comfortably. MS has returned to baseline. Patient is s/p  washout with JESS 5/18. Patient with increased pain at the surgical site. Patient states pain has been better controlled. Patient has been working with physical therapy. Patient found to be anemic and 1 unit of PRBC was ordered by ortho. Patient tolerated the transfusion and is scheduled for DC home      MEDICATIONS  (STANDING):  atorvastatin 40 milliGRAM(s) Oral at bedtime  busPIRone 15 milliGRAM(s) Oral two times a day  chlorhexidine 4% Liquid 1 Application(s) Topical daily  clonazePAM  Tablet 0.5 milliGRAM(s) Oral three times a day  doxycycline hyclate Capsule 100 milliGRAM(s) Oral every 12 hours  enoxaparin Injectable 40 milliGRAM(s) SubCutaneous every 24 hours  FLUoxetine 60 milliGRAM(s) Oral daily  HYDROmorphone   Tablet 4 milliGRAM(s) Oral every 4 hours  lamoTRIgine 100 milliGRAM(s) Oral two times a day  levothyroxine 50 MICROGram(s) Oral daily  potassium chloride    Tablet ER 40 milliEquivalent(s) Oral every 4 hours  sodium chloride 0.9%. 1000 milliLiter(s) (65 mL/Hr) IV Continuous <Continuous>  vancomycin  IVPB 1000 milliGRAM(s) IV Intermittent every 12 hours    MEDICATIONS  (PRN):  melatonin 3 milliGRAM(s) Oral at bedtime PRN Insomnia          VITALS:   T(C): 36.9 (05-24-22 @ 09:06), Max: 37.7 (05-23-22 @ 17:50)  HR: 92 (05-24-22 @ 09:06) (84 - 93)  BP: 127/70 (05-24-22 @ 09:06) (123/67 - 148/80)  RR: 18 (05-24-22 @ 06:10) (18 - 18)  SpO2: 98% (05-24-22 @ 09:06) (96% - 99%)  Wt(kg): --    PHYSICAL EXAM:  GENERAL: NAD, well nourished and conversant  HEAD:  Atraumatic  EYES: EOM, PERRLA, conjunctiva pink and sclera white  ENT: No tonsillar erythema, exudates, or enlargement, moist mucous membranes, good dentition, no lesions  NECK: Supple, No JVD, normal thyroid, carotids with normal upstrokes and no bruits  CHEST/LUNG: Clear to auscultation bilaterally, No rales, rhonchi, wheezing, or rubs  HEART: Regular rate and rhythm, No murmurs, rubs, or gallops  ABDOMEN: Soft, nondistended, no masses, guarding, tenderness or rebound, bowel sounds present  EXTREMITIES:  left arm pain  LYMPH: No lymphadenopathy noted  SKIN: No rashes or lesions  NERVOUS SYSTEM:  Alert & Oriented X3, normal cognitive function.     LABS:        CBC Full  -  ( 24 May 2022 07:09 )  WBC Count : 4.08 K/uL  RBC Count : 2.85 M/uL  Hemoglobin : 8.3 g/dL  Hematocrit : 25.6 %  Platelet Count - Automated : 218 K/uL  Mean Cell Volume : 89.8 fl  Mean Cell Hemoglobin : 29.1 pg  Mean Cell Hemoglobin Concentration : 32.4 gm/dL  Auto Neutrophil # : x  Auto Lymphocyte # : x  Auto Monocyte # : x  Auto Eosinophil # : x  Auto Basophil # : x  Auto Neutrophil % : x  Auto Lymphocyte % : x  Auto Monocyte % : x  Auto Eosinophil % : x  Auto Basophil % : x    05-24    140  |  101  |  7   ----------------------------<  91  3.1<L>   |  25  |  0.58    Ca    8.2<L>      24 May 2022 07:07  Phos  3.6     05-24  Mg     2.0     05-24            CAPILLARY BLOOD GLUCOSE          RADIOLOGY & ADDITIONAL TESTS:

## 2022-05-24 NOTE — DISCHARGE NOTE NURSING/CASE MANAGEMENT/SOCIAL WORK - NSDCPEFALRISK_GEN_ALL_CORE
For information on Fall & Injury Prevention, visit: [email or web address] OR  [email or web address] OR  https://www.cdc.gov/steadi/patient.html

## 2022-05-24 NOTE — PROGRESS NOTE ADULT - PROBLEM SELECTOR PLAN 5
Patient with worsening anemia  will transfuse 1 unit of PRBC  DC planning for 5/24
Patient with worsening anemia  will transfuse 1 unit of PRBC  DC planning for 5/24

## 2022-05-24 NOTE — PROGRESS NOTE ADULT - PROBLEM SELECTOR PLAN 4
will clarify Patients meds with her   will continue to monitor  Psychiatry eval as needed
continue current meds  will continue to monitor  symptoms have been will controlled
will clarify Patients meds with her   will continue to monitor  Psychiatry eval as needed
continue current meds  will continue to monitor  Psychiatry eval as needed
continue current meds  will continue to monitor  symptoms have been will controlled
continue current meds  will continue to monitor  symptoms have been will controlled
continue current meds  will continue to monitor  Psychiatry eval as needed

## 2022-05-24 NOTE — PROGRESS NOTE ADULT - REASON FOR ADMISSION
altered mental status and left arm swelling and difficulty moving

## 2022-05-24 NOTE — DISCHARGE NOTE NURSING/CASE MANAGEMENT/SOCIAL WORK - PATIENT PORTAL LINK FT
You can access the FollowMyHealth Patient Portal offered by [facility] by registering at the following website: [email or web address]. By joining [email or web address]’s FollowMyHealth portal, you will also be able to view your health information using other applications (apps) compatible with our system.

## 2022-05-24 NOTE — PROGRESS NOTE ADULT - SUBJECTIVE AND OBJECTIVE BOX
Patient seen and examined at bedside. Feeling well. Pain well-controlled. No ha, cp, sob, or n/v. S/p 1U PRBC yesterday. No acute events overnight.     Vital Signs Last 24 Hrs  T(C): 36.8 (24 May 2022 06:10), Max: 37.7 (23 May 2022 17:50)  T(F): 98.3 (24 May 2022 06:10), Max: 99.8 (23 May 2022 17:50)  HR: 84 (24 May 2022 06:10) (84 - 93)  BP: 148/80 (24 May 2022 06:10) (123/67 - 158/74)  RR: 18 (24 May 2022 06:10) (18 - 18)  SpO2: 99% (24 May 2022 06:10) (96% - 99%)      LABS:                        8.3    4.76  )-----------( 227      ( 23 May 2022 23:35 )             25.0         Exam:  Gen: NAD, resting comfortably  UE:  Dressing and splint c/d/i  +AIN/PIN/M/R/U/Ax/Musc  SILT C4-T1  Radial pulses 2+  Compartments soft and compressible  Calves NTTP b/l

## 2022-05-24 NOTE — PROGRESS NOTE ADULT - ASSESSMENT
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   - Will discuss with Dr. 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   - Will discuss with Dr. Morrison and advise of any changes to the above plan   details… Regular rate & rhythm, normal S1, S2; no murmurs, gallops or rubs; no S3, S4

## 2022-05-24 NOTE — PROGRESS NOTE ADULT - PROBLEM SELECTOR PROBLEM 4
Anxiety and depression

## 2022-05-24 NOTE — PROGRESS NOTE ADULT - PROBLEM SELECTOR PLAN 2
Patient seen by ID and started on vanco  follow cultures  PICC line placed  will need a 6 week course of abx  physical therapy as tolerated

## 2022-05-24 NOTE — PROGRESS NOTE ADULT - PROVIDER SPECIALTY LIST ADULT
Infectious Disease
Internal Medicine
Orthopedics
Internal Medicine
Internal Medicine
Orthopedics
Infectious Disease
Infectious Disease
Orthopedics
Orthopedics
Infectious Disease
Infectious Disease
Orthopedics
Internal Medicine

## 2022-05-24 NOTE — DISCHARGE NOTE NURSING/CASE MANAGEMENT/SOCIAL WORK - NSDCVIVACCINE_GEN_ALL_CORE_FT
COVID-19 vaccine, vector-nr, rS-Ad26, PF, 0.5 mL ([facility]); 15-Mar-2021 15:30; Adelia Lora (RN); [employer]; [id number] (Exp. Date: 15-Mar-2021); IntraMuscular; Deltoid Right.; 0.5 milliLiter(s);   Tdap; 30-Mar-2022 17:24; Rabia Cabello ([facility]); [employer]; [id number] (Exp. Date: 18-Jan-2024); IntraMuscular; Deltoid Right.; 0.5 milliLiter(s); VIS (VIS Published: 09-May-2013, VIS Presented: 30-Mar-2022);

## 2022-05-24 NOTE — PROGRESS NOTE ADULT - PROBLEM SELECTOR PROBLEM 3
Osteogenesis imperfecta

## 2022-05-24 NOTE — PROGRESS NOTE ADULT - PROBLEM SELECTOR PROBLEM 1
Change in mental status

## 2022-05-24 NOTE — DISCHARGE NOTE NURSING/CASE MANAGEMENT/SOCIAL WORK - NSSCNAMETXT_GEN_ALL_CORE
[facility]. [facility], located within [facility] [phone]

## 2022-05-31 ENCOUNTER — APPOINTMENT (OUTPATIENT)
Dept: ORTHOPEDIC SURGERY | Facility: HOSPITAL | Age: 67
End: 2022-05-31

## 2022-06-07 ENCOUNTER — APPOINTMENT (OUTPATIENT)
Dept: ORTHOPEDIC SURGERY | Facility: CLINIC | Age: 67
End: 2022-06-07
Payer: COMMERCIAL

## 2022-06-07 DIAGNOSIS — G56.32 LESION OF RADIAL NERVE, LEFT UPPER LIMB: ICD-10-CM

## 2022-06-07 PROCEDURE — 99024 POSTOP FOLLOW-UP VISIT: CPT

## 2022-06-10 PROBLEM — G56.32 LEFT RADIAL NERVE PALSY: Status: ACTIVE | Noted: 2022-04-18

## 2022-06-19 RX ORDER — POTASSIUM CHLORIDE 1500 MG/1
20 TABLET, EXTENDED RELEASE ORAL
Qty: 2 | Refills: 0 | Status: ACTIVE | COMMUNITY
Start: 2022-06-17

## 2022-06-20 ENCOUNTER — APPOINTMENT (OUTPATIENT)
Dept: ORTHOPEDIC SURGERY | Facility: CLINIC | Age: 67
End: 2022-06-20
Payer: COMMERCIAL

## 2022-06-20 VITALS — HEIGHT: 62 IN

## 2022-06-20 PROCEDURE — 73080 X-RAY EXAM OF ELBOW: CPT | Mod: LT

## 2022-06-20 PROCEDURE — 99024 POSTOP FOLLOW-UP VISIT: CPT

## 2022-07-02 ENCOUNTER — INPATIENT (INPATIENT)
Facility: HOSPITAL | Age: 67
LOS: 3 days | Discharge: HOME | End: 2022-07-06
Attending: STUDENT IN AN ORGANIZED HEALTH CARE EDUCATION/TRAINING PROGRAM | Admitting: STUDENT IN AN ORGANIZED HEALTH CARE EDUCATION/TRAINING PROGRAM

## 2022-07-02 VITALS
RESPIRATION RATE: 16 BRPM | HEIGHT: 70 IN | TEMPERATURE: 98 F | SYSTOLIC BLOOD PRESSURE: 152 MMHG | DIASTOLIC BLOOD PRESSURE: 74 MMHG | OXYGEN SATURATION: 97 % | HEART RATE: 87 BPM

## 2022-07-02 DIAGNOSIS — F05 DELIRIUM DUE TO KNOWN PHYSIOLOGICAL CONDITION: ICD-10-CM

## 2022-07-02 DIAGNOSIS — D64.9 ANEMIA, UNSPECIFIED: ICD-10-CM

## 2022-07-02 DIAGNOSIS — R41.82 ALTERED MENTAL STATUS, UNSPECIFIED: ICD-10-CM

## 2022-07-02 DIAGNOSIS — Z29.9 ENCOUNTER FOR PROPHYLACTIC MEASURES, UNSPECIFIED: ICD-10-CM

## 2022-07-02 DIAGNOSIS — F31.9 BIPOLAR DISORDER, UNSPECIFIED: ICD-10-CM

## 2022-07-02 DIAGNOSIS — F19.921 OTHER PSYCHOACTIVE SUBSTANCE USE, UNSPECIFIED WITH INTOXICATION WITH DELIRIUM: ICD-10-CM

## 2022-07-02 DIAGNOSIS — I10 ESSENTIAL (PRIMARY) HYPERTENSION: ICD-10-CM

## 2022-07-02 DIAGNOSIS — Z98.890 OTHER SPECIFIED POSTPROCEDURAL STATES: Chronic | ICD-10-CM

## 2022-07-02 DIAGNOSIS — Z98.890 OTHER SPECIFIED POSTPROCEDURAL STATES: ICD-10-CM

## 2022-07-02 DIAGNOSIS — R03.0 ELEVATED BLOOD-PRESSURE READING, WITHOUT DIAGNOSIS OF HYPERTENSION: ICD-10-CM

## 2022-07-02 LAB
ALBUMIN SERPL ELPH-MCNC: 4 G/DL — SIGNIFICANT CHANGE UP (ref 3.3–5)
ALP SERPL-CCNC: 227 U/L — HIGH (ref 40–120)
ALT FLD-CCNC: 18 U/L — SIGNIFICANT CHANGE UP (ref 4–33)
AMPHET UR-MCNC: NEGATIVE — SIGNIFICANT CHANGE UP
ANION GAP SERPL CALC-SCNC: 18 MMOL/L — HIGH (ref 7–14)
APAP SERPL-MCNC: <10 UG/ML — LOW (ref 15–25)
APPEARANCE UR: CLEAR — SIGNIFICANT CHANGE UP
APTT BLD: 30.1 SEC — SIGNIFICANT CHANGE UP (ref 27–36.3)
AST SERPL-CCNC: 32 U/L — SIGNIFICANT CHANGE UP (ref 4–32)
BACTERIA # UR AUTO: NEGATIVE — SIGNIFICANT CHANGE UP
BARBITURATES UR SCN-MCNC: NEGATIVE — SIGNIFICANT CHANGE UP
BASE EXCESS BLDV CALC-SCNC: 2 MMOL/L — SIGNIFICANT CHANGE UP (ref -2–3)
BASOPHILS # BLD AUTO: 0.01 K/UL — SIGNIFICANT CHANGE UP (ref 0–0.2)
BASOPHILS NFR BLD AUTO: 0.3 % — SIGNIFICANT CHANGE UP (ref 0–2)
BENZODIAZ UR-MCNC: NEGATIVE — SIGNIFICANT CHANGE UP
BILIRUB SERPL-MCNC: 0.5 MG/DL — SIGNIFICANT CHANGE UP (ref 0.2–1.2)
BILIRUB UR-MCNC: NEGATIVE — SIGNIFICANT CHANGE UP
BLOOD GAS VENOUS COMPREHENSIVE RESULT: SIGNIFICANT CHANGE UP
BUN SERPL-MCNC: 13 MG/DL — SIGNIFICANT CHANGE UP (ref 7–23)
CALCIUM SERPL-MCNC: 9.2 MG/DL — SIGNIFICANT CHANGE UP (ref 8.4–10.5)
CHLORIDE BLDV-SCNC: 103 MMOL/L — SIGNIFICANT CHANGE UP (ref 96–108)
CHLORIDE SERPL-SCNC: 97 MMOL/L — LOW (ref 98–107)
CO2 BLDV-SCNC: 27.7 MMOL/L — HIGH (ref 22–26)
CO2 SERPL-SCNC: 24 MMOL/L — SIGNIFICANT CHANGE UP (ref 22–31)
COCAINE METAB.OTHER UR-MCNC: NEGATIVE — SIGNIFICANT CHANGE UP
COLOR SPEC: YELLOW — SIGNIFICANT CHANGE UP
COMMENT - URINE: SIGNIFICANT CHANGE UP
CREAT SERPL-MCNC: 0.61 MG/DL — SIGNIFICANT CHANGE UP (ref 0.5–1.3)
CREATININE URINE RESULT, DAU: 108 MG/DL — SIGNIFICANT CHANGE UP
DIFF PNL FLD: NEGATIVE — SIGNIFICANT CHANGE UP
EGFR: 99 ML/MIN/1.73M2 — SIGNIFICANT CHANGE UP
EOSINOPHIL # BLD AUTO: 0.08 K/UL — SIGNIFICANT CHANGE UP (ref 0–0.5)
EOSINOPHIL NFR BLD AUTO: 2.2 % — SIGNIFICANT CHANGE UP (ref 0–6)
EPI CELLS # UR: 6 /HPF — HIGH (ref 0–5)
ETHANOL SERPL-MCNC: <10 MG/DL — SIGNIFICANT CHANGE UP
FLUAV AG NPH QL: SIGNIFICANT CHANGE UP
FLUBV AG NPH QL: SIGNIFICANT CHANGE UP
GAS PNL BLDV: 136 MMOL/L — SIGNIFICANT CHANGE UP (ref 136–145)
GAS PNL BLDV: SIGNIFICANT CHANGE UP
GLUCOSE BLDV-MCNC: 87 MG/DL — SIGNIFICANT CHANGE UP (ref 70–99)
GLUCOSE SERPL-MCNC: 100 MG/DL — HIGH (ref 70–99)
GLUCOSE UR QL: NEGATIVE — SIGNIFICANT CHANGE UP
HCO3 BLDV-SCNC: 26 MMOL/L — SIGNIFICANT CHANGE UP (ref 22–29)
HCT VFR BLD CALC: 32.3 % — LOW (ref 34.5–45)
HCT VFR BLDA CALC: 37 % — SIGNIFICANT CHANGE UP (ref 34.5–46.5)
HGB BLD CALC-MCNC: 12.4 G/DL — SIGNIFICANT CHANGE UP (ref 11.5–15.5)
HGB BLD-MCNC: 10.7 G/DL — LOW (ref 11.5–15.5)
HYALINE CASTS # UR AUTO: 10 /LPF — HIGH (ref 0–7)
IANC: 2.59 K/UL — SIGNIFICANT CHANGE UP (ref 1.8–7.4)
IMM GRANULOCYTES NFR BLD AUTO: 0.3 % — SIGNIFICANT CHANGE UP (ref 0–1.5)
INR BLD: 1.37 RATIO — HIGH (ref 0.88–1.16)
KETONES UR-MCNC: ABNORMAL
LACTATE BLDV-MCNC: 1 MMOL/L — SIGNIFICANT CHANGE UP (ref 0.5–2)
LACTATE BLDV-MCNC: 1.1 MMOL/L — SIGNIFICANT CHANGE UP (ref 0.5–2)
LEUKOCYTE ESTERASE UR-ACNC: NEGATIVE — SIGNIFICANT CHANGE UP
LYMPHOCYTES # BLD AUTO: 0.57 K/UL — LOW (ref 1–3.3)
LYMPHOCYTES # BLD AUTO: 15.6 % — SIGNIFICANT CHANGE UP (ref 13–44)
MCHC RBC-ENTMCNC: 27.4 PG — SIGNIFICANT CHANGE UP (ref 27–34)
MCHC RBC-ENTMCNC: 33.1 GM/DL — SIGNIFICANT CHANGE UP (ref 32–36)
MCV RBC AUTO: 82.8 FL — SIGNIFICANT CHANGE UP (ref 80–100)
METHADONE UR-MCNC: NEGATIVE — SIGNIFICANT CHANGE UP
MONOCYTES # BLD AUTO: 0.39 K/UL — SIGNIFICANT CHANGE UP (ref 0–0.9)
MONOCYTES NFR BLD AUTO: 10.7 % — SIGNIFICANT CHANGE UP (ref 2–14)
NEUTROPHILS # BLD AUTO: 2.59 K/UL — SIGNIFICANT CHANGE UP (ref 1.8–7.4)
NEUTROPHILS NFR BLD AUTO: 70.9 % — SIGNIFICANT CHANGE UP (ref 43–77)
NITRITE UR-MCNC: NEGATIVE — SIGNIFICANT CHANGE UP
NRBC # BLD: 0 /100 WBCS — SIGNIFICANT CHANGE UP
NRBC # FLD: 0 K/UL — SIGNIFICANT CHANGE UP
OPIATES UR-MCNC: POSITIVE
OXYCODONE UR-MCNC: NEGATIVE — SIGNIFICANT CHANGE UP
PCO2 BLDV: 40 MMHG — SIGNIFICANT CHANGE UP (ref 39–42)
PCP SPEC-MCNC: SIGNIFICANT CHANGE UP
PCP UR-MCNC: POSITIVE
PH BLDV: 7.43 — SIGNIFICANT CHANGE UP (ref 7.32–7.43)
PH UR: 7 — SIGNIFICANT CHANGE UP (ref 5–8)
PLATELET # BLD AUTO: 290 K/UL — SIGNIFICANT CHANGE UP (ref 150–400)
PO2 BLDV: 45 MMHG — SIGNIFICANT CHANGE UP
POTASSIUM BLDV-SCNC: 3.9 MMOL/L — SIGNIFICANT CHANGE UP (ref 3.5–5.1)
POTASSIUM SERPL-MCNC: 2.6 MMOL/L — CRITICAL LOW (ref 3.5–5.3)
POTASSIUM SERPL-SCNC: 2.6 MMOL/L — CRITICAL LOW (ref 3.5–5.3)
PROT SERPL-MCNC: 8.1 G/DL — SIGNIFICANT CHANGE UP (ref 6–8.3)
PROT UR-MCNC: ABNORMAL
PROTHROM AB SERPL-ACNC: 15.9 SEC — HIGH (ref 10.5–13.4)
RBC # BLD: 3.9 M/UL — SIGNIFICANT CHANGE UP (ref 3.8–5.2)
RBC # FLD: 15.6 % — HIGH (ref 10.3–14.5)
RBC CASTS # UR COMP ASSIST: 7 /HPF — HIGH (ref 0–4)
RSV RNA NPH QL NAA+NON-PROBE: SIGNIFICANT CHANGE UP
SALICYLATES SERPL-MCNC: <0.3 MG/DL — LOW (ref 15–30)
SAO2 % BLDV: 77.1 % — SIGNIFICANT CHANGE UP
SARS-COV-2 RNA SPEC QL NAA+PROBE: SIGNIFICANT CHANGE UP
SODIUM SERPL-SCNC: 139 MMOL/L — SIGNIFICANT CHANGE UP (ref 135–145)
SP GR SPEC: 1.03 — SIGNIFICANT CHANGE UP (ref 1–1.05)
THC UR QL: NEGATIVE — SIGNIFICANT CHANGE UP
TOXICOLOGY SCREEN, DRUGS OF ABUSE, SERUM RESULT: SIGNIFICANT CHANGE UP
UROBILINOGEN FLD QL: ABNORMAL
WBC # BLD: 3.65 K/UL — LOW (ref 3.8–10.5)
WBC # FLD AUTO: 3.65 K/UL — LOW (ref 3.8–10.5)
WBC UR QL: 10 /HPF — HIGH (ref 0–5)

## 2022-07-02 PROCEDURE — 71045 X-RAY EXAM CHEST 1 VIEW: CPT | Mod: 26

## 2022-07-02 PROCEDURE — 70450 CT HEAD/BRAIN W/O DYE: CPT | Mod: 26,MA

## 2022-07-02 PROCEDURE — 90792 PSYCH DIAG EVAL W/MED SRVCS: CPT

## 2022-07-02 PROCEDURE — 99223 1ST HOSP IP/OBS HIGH 75: CPT

## 2022-07-02 PROCEDURE — 99285 EMERGENCY DEPT VISIT HI MDM: CPT | Mod: GC

## 2022-07-02 RX ORDER — CLONAZEPAM 1 MG
0.25 TABLET ORAL THREE TIMES A DAY
Refills: 0 | Status: DISCONTINUED | OUTPATIENT
Start: 2022-07-02 | End: 2022-07-06

## 2022-07-02 RX ORDER — POTASSIUM CHLORIDE 20 MEQ
40 PACKET (EA) ORAL ONCE
Refills: 0 | Status: COMPLETED | OUTPATIENT
Start: 2022-07-02 | End: 2022-07-02

## 2022-07-02 RX ORDER — LEVOTHYROXINE SODIUM 125 MCG
50 TABLET ORAL DAILY
Refills: 0 | Status: DISCONTINUED | OUTPATIENT
Start: 2022-07-02 | End: 2022-07-06

## 2022-07-02 RX ORDER — FLUOXETINE HCL 10 MG
20 CAPSULE ORAL DAILY
Refills: 0 | Status: DISCONTINUED | OUTPATIENT
Start: 2022-07-02 | End: 2022-07-06

## 2022-07-02 RX ORDER — ATORVASTATIN CALCIUM 80 MG/1
40 TABLET, FILM COATED ORAL AT BEDTIME
Refills: 0 | Status: DISCONTINUED | OUTPATIENT
Start: 2022-07-02 | End: 2022-07-06

## 2022-07-02 RX ORDER — ACETAMINOPHEN 500 MG
650 TABLET ORAL EVERY 6 HOURS
Refills: 0 | Status: DISCONTINUED | OUTPATIENT
Start: 2022-07-02 | End: 2022-07-06

## 2022-07-02 RX ORDER — HYDROMORPHONE HYDROCHLORIDE 2 MG/ML
8 INJECTION INTRAMUSCULAR; INTRAVENOUS; SUBCUTANEOUS EVERY 8 HOURS
Refills: 0 | Status: DISCONTINUED | OUTPATIENT
Start: 2022-07-02 | End: 2022-07-05

## 2022-07-02 RX ORDER — BUPROPION HYDROCHLORIDE 150 MG/1
1 TABLET, EXTENDED RELEASE ORAL
Qty: 0 | Refills: 0 | DISCHARGE

## 2022-07-02 RX ORDER — SODIUM CHLORIDE 9 MG/ML
1000 INJECTION INTRAMUSCULAR; INTRAVENOUS; SUBCUTANEOUS ONCE
Refills: 0 | Status: COMPLETED | OUTPATIENT
Start: 2022-07-02 | End: 2022-07-02

## 2022-07-02 RX ORDER — POLYETHYLENE GLYCOL 3350 17 G/17G
17 POWDER, FOR SOLUTION ORAL DAILY
Refills: 0 | Status: DISCONTINUED | OUTPATIENT
Start: 2022-07-02 | End: 2022-07-06

## 2022-07-02 RX ORDER — POTASSIUM CHLORIDE 20 MEQ
10 PACKET (EA) ORAL
Refills: 0 | Status: COMPLETED | OUTPATIENT
Start: 2022-07-02 | End: 2022-07-02

## 2022-07-02 RX ORDER — LEVOTHYROXINE SODIUM 125 MCG
1 TABLET ORAL
Qty: 0 | Refills: 0 | DISCHARGE

## 2022-07-02 RX ORDER — SENNA PLUS 8.6 MG/1
2 TABLET ORAL AT BEDTIME
Refills: 0 | Status: DISCONTINUED | OUTPATIENT
Start: 2022-07-02 | End: 2022-07-06

## 2022-07-02 RX ORDER — ENOXAPARIN SODIUM 100 MG/ML
40 INJECTION SUBCUTANEOUS EVERY 24 HOURS
Refills: 0 | Status: DISCONTINUED | OUTPATIENT
Start: 2022-07-02 | End: 2022-07-06

## 2022-07-02 RX ORDER — HYDROMORPHONE HYDROCHLORIDE 2 MG/ML
8 INJECTION INTRAMUSCULAR; INTRAVENOUS; SUBCUTANEOUS EVERY 6 HOURS
Refills: 0 | Status: DISCONTINUED | OUTPATIENT
Start: 2022-07-02 | End: 2022-07-02

## 2022-07-02 RX ADMIN — Medication 100 MILLIEQUIVALENT(S): at 13:01

## 2022-07-02 RX ADMIN — HYDROMORPHONE HYDROCHLORIDE 8 MILLIGRAM(S): 2 INJECTION INTRAMUSCULAR; INTRAVENOUS; SUBCUTANEOUS at 22:38

## 2022-07-02 RX ADMIN — Medication 100 MILLIEQUIVALENT(S): at 14:07

## 2022-07-02 RX ADMIN — ATORVASTATIN CALCIUM 40 MILLIGRAM(S): 80 TABLET, FILM COATED ORAL at 22:40

## 2022-07-02 RX ADMIN — Medication 0.25 MILLIGRAM(S): at 22:38

## 2022-07-02 RX ADMIN — SODIUM CHLORIDE 1000 MILLILITER(S): 9 INJECTION INTRAMUSCULAR; INTRAVENOUS; SUBCUTANEOUS at 14:46

## 2022-07-02 RX ADMIN — Medication 40 MILLIEQUIVALENT(S): at 14:20

## 2022-07-02 RX ADMIN — Medication 40 MILLIEQUIVALENT(S): at 13:00

## 2022-07-02 RX ADMIN — HYDROMORPHONE HYDROCHLORIDE 8 MILLIGRAM(S): 2 INJECTION INTRAMUSCULAR; INTRAVENOUS; SUBCUTANEOUS at 23:08

## 2022-07-02 RX ADMIN — Medication 100 MILLIEQUIVALENT(S): at 15:16

## 2022-07-02 NOTE — ED ADULT NURSE REASSESSMENT NOTE - NS ED NURSE REASSESS COMMENT FT1
A&Ox3. ambulatory. pt denies any discomfort at this time. NAD.  at bedside. respirations are even and un labored. safety precautions maintained.
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
## 2022-07-02 NOTE — H&P ADULT - PROBLEM SELECTOR PLAN 5
Diet: DASH   DVT prophylaxis: lovenox   Dispo: pending hospital course Patient presenting with hemoglobin 10, appears to be baseline per chart review   - will order iron studies in AM

## 2022-07-02 NOTE — H&P ADULT - NSHPLABSRESULTS_GEN_ALL_CORE
CBC Full  -  ( 02 Jul 2022 11:45 )  WBC Count : 3.65 K/uL  RBC Count : 3.90 M/uL  Hemoglobin : 10.7 g/dL  Hematocrit : 32.3 %  Platelet Count - Automated : 290 K/uL  Mean Cell Volume : 82.8 fL  Mean Cell Hemoglobin : 27.4 pg  Mean Cell Hemoglobin Concentration : 33.1 gm/dL  Auto Neutrophil # : 2.59 K/uL  Auto Lymphocyte # : 0.57 K/uL  Auto Monocyte # : 0.39 K/uL  Auto Eosinophil # : 0.08 K/uL  Auto Basophil # : 0.01 K/uL  Auto Neutrophil % : 70.9 %  Auto Lymphocyte % : 15.6 %  Auto Monocyte % : 10.7 %  Auto Eosinophil % : 2.2 %  Auto Basophil % : 0.3 %    Comprehensive Metabolic Panel (07.02.22 @ 11:45)    Sodium, Serum: 139 mmol/L    Potassium, Serum: 2.6: TYPE:(C=Critical, N=Notification, A=Abnormal) C  TESTS: POTASSIUM = 2.6  DATE/TIME CALLED: 07/02/2022 12:37:02 EDT  CALLED TO: NATALIO MARCOS MD  READ BACK (2 Patient Identifiers)(Y/N): Y  READ BACK VALUES (Y/N): Y  CALLED BY: EMEKA HORNER mmol/L    Chloride, Serum: 97 mmol/L    Carbon Dioxide, Serum: 24 mmol/L    Anion Gap, Serum: 18 mmol/L    Blood Urea Nitrogen, Serum: 13 mg/dL    Creatinine, Serum: 0.61 mg/dL    Glucose, Serum: 100 mg/dL    Calcium, Total Serum: 9.2 mg/dL    Protein Total, Serum: 8.1 g/dL    Albumin, Serum: 4.0 g/dL    Bilirubin Total, Serum: 0.5 mg/dL    Alkaline Phosphatase, Serum: 227 U/L    Aspartate Aminotransferase (AST/SGOT): 32 U/L    Alanine Aminotransferase (ALT/SGPT): 18 U/L    eGFR: 99: The estimated glomerular filtration rate (eGFR) is calculated using the  2021 CKD-EPI creatinine equation, which does not have a coefficient for  race and is validated in individuals 18 years of age and older (N Engl J  Med 2021; 385:3899-9167). Creatinine-based eGFR may be inaccurate in  various situations including but not limited to extremes of muscle mass,  altered dietary protein intake, or medications that affect renal tubular  creatinine secretion. mL/min/1.73m2

## 2022-07-02 NOTE — H&P ADULT - PROBLEM SELECTOR PLAN 2
Patient s/p ORIF   - continue with home pain medications for now to avoid withdrawal, consider tapering during admission as tolerated Patient with a history of bipolar disorder, JO-ANN, panic disorder   - patient seen and evaluated by    - no need for 1:1 at this time, patient calm  - Discontinue Ambien  - Decrease Klonopin 0.5mg TID to 0.25 TID  - Discontinue home Lamictal due to recent inconsistency  - Continue home Prozac 20mg daily

## 2022-07-02 NOTE — H&P ADULT - PROBLEM SELECTOR PLAN 4
Patient presenting with hemoglobin 10, appears to be baseline per chart review   - will order iron studies in AM Patient with systolic BPs 140s-150s since admission, no history of hypertension   - patient mildly agitated, continue to monitor BP and assess need for anti-hypertensives

## 2022-07-02 NOTE — ED PROVIDER NOTE - PROGRESS NOTE DETAILS
Fidelia Ayala, DO PGY-2  Psych consulted, state that symptoms are more consistent with delirium given recent infection, medication use (hydromorphone after recent surgery) Fidelia Ayala DO PGY-2  Spoke with Dr. Garcia who recommended admitting to hospitalist. Spoke with hospitalist who will accept the patient.
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
## 2022-07-02 NOTE — ED BEHAVIORAL HEALTH ASSESSMENT NOTE - NSBHDSMAXES_PSY_ALL_CORE
See above Area L Indication Text: Tumors in this location are included in Area L (trunk and extremities).  Mohs surgery is indicated for larger tumors, or tumors with aggressive histologic features, in these anatomic locations.

## 2022-07-02 NOTE — H&P ADULT - NSHPREVIEWOFSYSTEMS_GEN_ALL_CORE
CONSTITUTIONAL: No weakness, fevers or chills  EYES/ENT: No visual changes;  No vertigo or throat pain   NECK: No pain or stiffness  RESPIRATORY: No cough, wheezing, hemoptysis; No shortness of breath  CARDIOVASCULAR: No chest pain or palpitations  GASTROINTESTINAL: No abdominal or epigastric pain. No nausea, vomiting, or hematemesis; No diarrhea or constipation. No melena or hematochezia.  GENITOURINARY: No dysuria, frequency or hematuria  NEUROLOGICAL: No numbness or weakness  MUSCULOSKELETAL: No myalgias, arthralgias, or joint swelling  SKIN: No itching, rashes

## 2022-07-02 NOTE — ED PROVIDER NOTE - CLINICAL SUMMARY MEDICAL DECISION MAKING FREE TEXT BOX
Fidelia Ayala DO PGY-2  66 year old female with PMH osteogenesis imperfecta, bipolar disorder, presents with 3-4 weeks of worsening altered mental status, increasing paranoia, refusing to take meds. Was recently admitted for AMS after recent ORIF, found to be bacteremic. Concern for infection, altered mental status. Will check labs, cxr, ua, CT head, and patient will likely require admission.
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
## 2022-07-02 NOTE — H&P ADULT - PROBLEM SELECTOR PLAN 1
- No need for CO 1:1  - Not a psych admission candidate  - Consider neurology consult  - Consider work up for delirium  - Discontinue Ambien  - Decrease Klonopin 0.5mg TID to 0.25 TID  - Discontinue home Lamictal due to recent inconsistency, may restart titration if pt opts but may consider switching to another agent with outpt psychiatrist  - Continue home Prozac 20mg daily Patient with a history of bipolar disorder, JO-ANN, panic disorder presenting with AMS and paranoia in the setting of recent ORIF complicated by staph epi bacteremia and hydromorphone use   - patient evaluated by    - CT head negative, CXR clear and UA without signs of infection   - will order TSH, folate, B12 for AM   - No need for CO 1:1 per    - Discontinue Ambien  - Decrease Klonopin 0.5mg TID to 0.25 TID  - Discontinue home Lamictal due to recent inconsistency  - Continue home Prozac 20mg daily Patient with a history of bipolar disorder, JO-ANN, panic disorder presenting with AMS and paranoia in the setting of recent ORIF complicated by staph epi bacteremia and hydromorphone use   - appreciate  recommendations   - CT head negative, CXR clear and UA without signs of infection   - will order TSH, folate, B12 for AM Patient with a history of bipolar disorder, JO-ANN, panic disorder presenting with AMS and paranoia in the setting of recent ORIF complicated by staph epi bacteremia and hydromorphone use   - appreciate  recommendations   - neuro consult in AM, patient with memory difficulties and word finding difficulty   - CT head negative  - CXR clear and UA without signs of infection, blood and urine cultures pending   - will order TSH, folate, B12 for AM

## 2022-07-02 NOTE — ED PROVIDER NOTE - ATTENDING CONTRIBUTION TO CARE
Dr. Red:  I have personally performed a face to face bedside history and physical examination of this patient. I have discussed the history, examination, review of systems, assessment and plan of management with the resident. I have reviewed the electronic medical record and amended it to reflect my history, review of systems, physical exam, assessment and plan.    66F h/o osteogenesis imperfecta, bipolar disorder, presents with 3-4 weeks of worsening mental status.  Increasing paranoia, anger, disorganized speech, refusing to take her psych meds.  She's also been noted to have 30min long conversations with the TV remote.  Of note, patient was admitted last month for altered mental status after recent open reduction & internal fixation of left humerus; she was found to have bacteremic (staph epi).  At that time, ID recommended vancomycin 1g q12 until 6/30 with doxy 100mg BID.  However, patient pulled out her PICC 6/24 (to prevent self-harm).   reports that she's never had this behavior prior to the past few weeks.      Exam:  - nad, disorganized thoughts  - rrr  - ctab   -abd soft ntnd    A/P  - bizarre behavior, paranoia; possibly related to bipolar disorder, eval toxic-metabolic cause  - cbc ,cmp, tox labs, blood cultures, ua, urine culture, CT head, CXR

## 2022-07-02 NOTE — ED PROVIDER NOTE - PHYSICAL EXAMINATION
PHYSICAL EXAM:  CONSTITUTIONAL: Well appearing, awake, alert, oriented to person, place, time/situation and in no apparent distress.  HEAD: Atraumatic  EYES: Clear bilaterally, pupils equal, round and reactive to light.  ENMT: Airway patent, Nasal mucosa clear. Mouth with normal mucosa. Uvula is midline.   CARDIAC: Normal rate, regular rhythm. +S1/S2. No murmurs, rubs or gallops.  RESPIRATORY: Breathing unlabored. Breath sounds clear and equal bilaterally.  ABDOMEN:  Soft, nontender, nondistended. No rebound tenderness or guarding.  NEUROLOGICAL: Alert and oriented, no focal deficits, no motor or sensory deficits.  SKIN: Skin warm and dry. No evidence of rashes or lesions.

## 2022-07-02 NOTE — H&P ADULT - NSHPPHYSICALEXAM_GEN_ALL_CORE
Vital Signs Last 24 Hrs  T(C): 36.7 (02 Jul 2022 20:35), Max: 36.9 (02 Jul 2022 10:26)  T(F): 98 (02 Jul 2022 20:35), Max: 98.4 (02 Jul 2022 10:26)  HR: 97 (02 Jul 2022 20:35) (85 - 97)  BP: 113/65 (02 Jul 2022 20:35) (113/65 - 165/99)  BP(mean): --  RR: 18 (02 Jul 2022 20:35) (16 - 18)  SpO2: 97% (02 Jul 2022 20:35) (97% - 100%)    CONSTITUTIONAL: NAD  EYES: PERRLA  NECK: Supple  RESPIRATORY: Normal respiratory effort; lungs are clear to auscultation bilaterally  CARDIOVASCULAR: Regular rate and rhythm, normal S1 and S2, no murmur/rub/gallop; Trace lower extremity edema   ABDOMEN: Nontender to palpation, no rebound/guarding  MUSCULOSKELETAL:  No clubbing or cyanosis of digits; no joint swelling or tenderness to palpation  PSYCH: A+O to person, place, and time; tangential speech   NEUROLOGY: grossly intact   SKIN: No rashes; no palpable lesions Vital Signs Last 24 Hrs  T(C): 36.7 (02 Jul 2022 20:35), Max: 36.9 (02 Jul 2022 10:26)  T(F): 98 (02 Jul 2022 20:35), Max: 98.4 (02 Jul 2022 10:26)  HR: 97 (02 Jul 2022 20:35) (85 - 97)  BP: 113/65 (02 Jul 2022 20:35) (113/65 - 165/99)  BP(mean): --  RR: 18 (02 Jul 2022 20:35) (16 - 18)  SpO2: 97% (02 Jul 2022 20:35) (97% - 100%)    CONSTITUTIONAL: NAD  EYES: PERRLA  NECK: Supple  RESPIRATORY: Normal respiratory effort; lungs are clear to auscultation bilaterally  CARDIOVASCULAR: Regular rate and rhythm, normal S1 and S2, no murmur/rub/gallop; Trace lower extremity edema   ABDOMEN: Nontender to palpation, no rebound/guarding  MUSCULOSKELETAL:  No clubbing or cyanosis of digits; no joint swelling or tenderness to palpation  PSYCH: A+O to person, place, and time; tangential speech   NEUROLOGY: CN grossly intact, patient with word finding difficulty, intact strength and sensation bilaterally   SKIN: No rashes; no palpable lesions

## 2022-07-02 NOTE — H&P ADULT - NSHPOUTPATIENTPROVIDERS_GEN_ALL_CORE
Dr. Brenden Mckeon [phone] Dr. Brenden Mckeon [phone]  Dr. Monroe outpatient psychiatrist [phone]

## 2022-07-02 NOTE — ED BEHAVIORAL HEALTH ASSESSMENT NOTE - DESCRIPTION
Osteogenesis imperfecta Pt in behavioral control, has not required PRNs or restraints. Replenishing potassium.  Vital Signs Last 24 Hrs  T(C): 36.9 (02 Jul 2022 15:50), Max: 36.9 (02 Jul 2022 10:26)  T(F): 98.4 (02 Jul 2022 15:50), Max: 98.4 (02 Jul 2022 10:26)  HR: 85 (02 Jul 2022 15:50) (85 - 89)  BP: 165/99 (02 Jul 2022 15:50) (148/70 - 165/99)  BP(mean): --  RR: 18 (02 Jul 2022 15:50) (16 - 18)  SpO2: 99% (02 Jul 2022 15:50) (97% - 100%) See HPI
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
## 2022-07-02 NOTE — H&P ADULT - PROBLEM SELECTOR PLAN 3
Patient with systolic BPs 140s-150s since admission, no history of hypertension   - patient mildly agitated, continue to monitor BP and assess need for anti-hypertensives Patient s/p ORIF   - continue with home pain medications for now to avoid withdrawal, consider tapering during admission as tolerated Patient s/p ORIF   - continue with home pain medications for now to avoid withdrawal   - taper over course of admission as tolerated

## 2022-07-02 NOTE — ED PROVIDER NOTE - OBJECTIVE STATEMENT
66 year old female with PMH osteogenesis imperfecta, bipolar disorder presents with weeks of worsening altered mental status, paranoia, disorganized speech 66 year old female with PMH osteogenesis imperfecta, bipolar disorder presents with weeks of worsening altered mental status, paranoia, disorganized speech, refusing to take her psych meds. Having 30 minute long conversations with TV remote. [facility] of L humerus, found to be staph epi bacteremic. Was discharged with picc line and recommended vanc 1g q12 until 6/30 with doxy 100 mg bid but patient had picc line discontinued on 6/24 to prevent her from pulling it out. Per  never had similar behavior before past month.

## 2022-07-02 NOTE — ED ADULT NURSE NOTE - OBJECTIVE STATEMENT
A&Ox3. ambulatory. c/o brought in by  for mental status changes.  at bedside.  states pt has not taken her medications and was talking to the remote control. NAD. pt denies SOB, chest pain, dizziness, weakness, urinary symptoms, HA, n/v/d, fevers, chills. respirations are even and un labored. abd is soft and non tender. skin intact. 20g placed to Left ac. labs drawn and sent. safety precautions maintained. call bell at bedside.

## 2022-07-02 NOTE — ED BEHAVIORAL HEALTH ASSESSMENT NOTE - ADDITIONAL DETAILS / COMMENTS
A&Ox2 to self and to general setting ("emergency main") but not to date, context of ED visit  Registration 2/3, recall 2/3  When asked how many quarters are in $1.75, pt states "42"  Cannot recall name of president

## 2022-07-02 NOTE — H&P ADULT - HISTORY OF PRESENT ILLNESS
66 year old female with PMH osteogenesis imperfecta, bipolar disorder presents with weeks of worsening altered mental status, paranoia, disorganized speech, refusing to take her psych meds. Having 30 minute long conversations with TV remote. Patient was admitted last month for AMS after recent ORIF of L humerus, found to be staph epi bacteremic. Was discharged with picc line and recommended vanc 1g q12 until 6/30 with doxy 100 mg bid but patient had picc line discontinued on 6/24 to prevent her from pulling it out. Per  never had similar behavior before past month. 66 year old female with PMH osteogenesis imperfecta, bipolar disorder, JO-ANN, panic disorder presenting for increasing AMS and paranoia in the setting of a L humerus ORIF 1 month ago complicated by bacteremia and incomplete antibiotic course (PICC removed as pt was attempting to pull it out) and Hydromorphone ~60mg per day use for pain after ORIF. Patient with tangential speech, states she is here as her house was difficult to get around.     Pt reports she was in the hallway because she was upset at , that home is difficult to get around due to living on "15, 18, 20" floor, then soon becomes incomprehensible, difficult to follow. Speaks at length about enjoying spending time with friends. At one point becomes tearful about her dog that ;  states this is baseline level of emotional response to mention of dog. She is oriented to being in "emergency main" and states year is , month is July, unable to state date. States mood is "good." When prompted to repeat back a sentence that contains the word "train", she talks about people being sent back on trains to [place] because they are difference race, soon incomprehensible. Denies being paranoid. Denies SI/HI. Denies alcohol, substance use.     at bedside reports pt has been increasingly confused, paranoid against him and sleeping with wallet + purse, fatigued, refusing/inconsistent with medications for past 3-4 weeks after ORIF. Reports she has been "elated" in mood. Sleep has been poor as usual, pt needs Ambien 5mg almost every night for sleep. Reports she has been forgetful of words, unable to recall recent events. Reports pt has had episodes in the past usually during summer where she will be euphoric to irritable in mood, pacing, unable to sleep with disturbance in usual sleep cycle, "fussing" and busy with cleaning or pointless activities around the house, and needs to be "exhausted" before such an episode resolves; however  feels current symptoms during the past month is unusual, accompanied by confusion and difficulty communicating that is not typical for her.  reports pt has not been taking meds consistently, including Lamictal. Reports Buspar was discontinued several months ago. Reports pt used to drink socially but has not drank alcohol recently, including weeks leading up to or after the ORIF. He reports pt used to take Oxycontin longterm but discontinued this 1.5 years ago and has not been on opiates until again recently with ORIF; pt has been taking Hydromorphone (per [facility], 8mg tabs x 120 tabs prescribed for 2 week period recently) and last used Hydromorphone in the past week. He denies pt displaying memory issues, other forgetfulness prior to ORIF or recent symptoms.      Per outpatient psychiatrist Dr. Monroe who has been seeing pt for many years, confirms bipolar disorder and JO-ANN, panic disorder. Confirms pt is on Klonopin 0.5mg TID, Lamictal, Prozac, Ambien for ~10 years. Confirms Buspar was discontinued several months ago. Reports she spoke with her several days ago and felt she was "clear" in communication. No prior SI, HI. No prior hospitalizations. Advises that pt's meds not be changed, recommends she at least be on Klonopin 0.5mg BID, Lamictal 200mg at least (despite writer communicating that pt has not been on Lamictal consistently). Expresses concern that pt will have depressive episode without Lamictal. Reports she has had manic episodes "couple times."     66 year old female with PMH osteogenesis imperfecta, bipolar disorder, JO-ANN, panic disorder presenting for increasing AMS and paranoia in the setting of a L humerus ORIF 1 month ago complicated by bacteremia and incomplete antibiotic course (PICC removed as pt was attempting to pull it out) and Hydromorphone ~60mg per day use for pain after ORIF. Patient with tangential speech, states she is here as her house was difficult to get around. Patient is A+Ox3. Denies being paranoid. Denies SI/HI. Denies alcohol, substance use.     reports pt has been increasingly confused, paranoid and sleeping with wallet and purse, fatigued, refusing/inconsistent with medications for past 3-4 weeks after ORIF.     Reports she has been "elated" in mood. Sleep has been poor as usual, pt needs Ambien 5mg almost every night for sleep. Reports she has been forgetful of words, unable to recall recent events. Reports pt has had episodes in the past usually during summer where she will be euphoric to irritable in mood, pacing, unable to sleep with disturbance in usual sleep cycle, "fussing" and busy with cleaning or pointless activities around the house, and needs to be "exhausted" before such an episode resolves; however  feels current symptoms during the past month is unusual, accompanied by confusion and difficulty communicating that is not typical for her.  reports pt has not been taking meds consistently, including Lamictal. Reports Buspar was discontinued several months ago. Reports pt used to drink socially but has not drank alcohol recently, including weeks leading up to or after the ORIF. He reports pt used to take Oxycontin longterm but discontinued this 1.5 years ago and has not been on opiates until again recently with ORIF; pt has been taking Hydromorphone (per [facility], 8mg tabs x 120 tabs prescribed for 2 week period recently) and last used Hydromorphone in the past week. He denies pt displaying memory issues, other forgetfulness prior to ORIF or recent symptoms.      Per outpatient psychiatrist Dr. Monroe who has been seeing pt for many years, confirms bipolar disorder and JO-ANN, panic disorder. Confirms pt is on Klonopin 0.5mg TID, Lamictal, Prozac, Ambien for ~10 years. Confirms Buspar was discontinued several months ago. Reports she spoke with her several days ago and felt she was "clear" in communication. No prior SI, HI. No prior hospitalizations. Advises that pt's meds not be changed, recommends she at least be on Klonopin 0.5mg BID, Lamictal 200mg at least (despite writer communicating that pt has not been on Lamictal consistently). Expresses concern that pt will have depressive episode without Lamictal. Reports she has had manic episodes "couple times."     66 year old female with PMH osteogenesis imperfecta, bipolar disorder, JO-ANN, panic disorder presenting for increasing AMS and paranoia in the setting of a L humerus ORIF 1 month ago complicated by bacteremia and incomplete antibiotic course (PICC removed as pt was attempting to pull it out) and Hydromorphone ~60mg per day use for pain after ORIF. Patient with tangential speech, states she is here as her house was difficult to get around. Patient is A+Ox3. Denies being paranoid. Denies SI/HI. Denies alcohol, substance use.     reports pt has been increasingly confused, paranoid and sleeping with wallet and purse, fatigued, refusing/inconsistent with medications for past 3-4 weeks after ORIF. Sleep has been poor as usual, pt needs Ambien 5mg almost every night for sleep.  reports pt has not been taking meds consistently, including Lamictal. Reports Buspar was discontinued several months ago. Reports pt used to drink socially but has not drank alcohol recently, including weeks leading up to or after the ORIF. He reports pt used to take Oxycontin longterm but discontinued this 1.5 years ago and has not been on opiates until again recently with ORIF; pt has been taking Hydromorphone (per [facility], 8mg tabs x 120 tabs prescribed for 2 week period recently) and last used Hydromorphone in the past week.    Per outpatient psychiatrist Dr. Monroe who has been seeing pt for many years, confirms bipolar disorder and JO-ANN, panic disorder. Confirms pt is on Klonopin 0.5mg TID, Lamictal, Prozac, Ambien for ~10 years. Confirms Buspar was discontinued several months ago. Reports she spoke with her several days ago and felt she was "clear" in communication. No prior SI, HI. No prior hospitalizations. Advises that pt's meds not be changed, recommends she at least be on Klonopin 0.5mg BID, Lamictal 200mg at least (despite writer communicating that pt has not been on Lamictal consistently). Expresses concern that pt will have depressive episode without Lamictal. Reports she has had manic episodes "couple times."    ED course: afebrile, HR 87, /70, RR 16, SpO2 100% on RA. WBC count 3.65, hemoglobin 10.7 (baseline 9-10), potassium 2.6. UA with large ketones, protein, urobilinogen. Utox positive for opiates and PCP. CXR with no focal opacity. CT head with no hydrocephalus, acute intracranial hemorrhage, mass effect, or brain edema. 66 year old female with PMH osteogenesis imperfecta, bipolar disorder, JO-ANN, panic disorder presenting for increasing AMS and paranoia in the setting of a L humerus ORIF 1 month ago complicated by bacteremia and incomplete antibiotic course (PICC removed as pt was attempting to pull it out) and Hydromorphone ~60mg per day use for pain after ORIF. Patient with tangential speech, states she is here as her house was difficult to get around. Patient is A+Ox3. Denies being paranoid. Denies SI/HI. Denies alcohol, substance use.     reports pt has been increasingly confused, paranoid and sleeping with wallet and purse, fatigued, refusing/inconsistent with medications for past 3-4 weeks after ORIF. Sleep has been poor as usual, pt needs Ambien 5mg almost every night for sleep.  reports pt has not been taking meds consistently, including Lamictal. Reports Buspar was discontinued several months ago. Reports pt used to drink socially but has not drank alcohol recently, including weeks leading up to or after the ORIF. He reports pt used to take Oxycontin longterm but discontinued this 1.5 years ago and has not been on opiates until again recently with ORIF; pt has been taking Hydromorphone (per [facility], 8mg tabs x 120 tabs prescribed for 2 week period recently) and last used Hydromorphone in the past week.    Per outpatient psychiatrist Dr. Monroe who has been seeing pt for many years, confirms bipolar disorder and JO-ANN, panic disorder. Confirms pt is on Klonopin 0.5mg TID, Lamictal, Prozac, Ambien for ~10 years. Confirms Buspar was discontinued several months ago. Reports she spoke with her several days ago and felt she was "clear" in communication. No prior SI, HI. No prior hospitalizations. Advises that pt's meds not be changed, recommends she at least be on Klonopin 0.5mg BID, Lamictal 200mg at least (despite writer communicating that pt has not been on Lamictal consistently). Expresses concern that pt will have depressive episode without Lamictal. Reports she has had manic episodes "couple times."    ED course: afebrile, HR 87, /70, RR 16, SpO2 100% on RA. WBC count 3.65, hemoglobin 10.7 (baseline 9-10), potassium 2.6. UA with large ketones, protein, urobilinogen. Utox positive for opiates and PCP. CXR with no focal opacity. CT head with no hydrocephalus, acute intracranial hemorrhage, mass effect, or brain edema. Given potassium and 1L NSB.  66 year old female with PMH osteogenesis imperfecta, bipolar disorder, JO-ANN, panic disorder presenting for increasing AMS and paranoia in the setting of a L humerus ORIF 1 month ago complicated by staph epi bacteremia and incomplete antibiotic course (PICC removed as pt was attempting to pull it out) and Hydromorphone ~60mg per day use for pain after ORIF. On exam patient with tangential speech, states she is upset because her  does not have a place to sleep. Alert and oriented to person place and time. Denies SI/HI. Denies alcohol, substance use. Patient states she feels fine, denies fevers, chills, abdominal pain, chest pain, shortness of breath, n/v/d/c.      reports pt has been increasingly confused and paranoid over the past week, sleeping with wallet and purse, refusing/inconsistent with medications for past 3-4 weeks after ORIF. Per  patient was more confused on initial presentation but is now almost back to her baseline with some memory difficulties  reports he has done his own research and believes that his wife is confused secondary to prolonged anesthesia during recent ORIF    ED course: afebrile, HR 87, /70, RR 16, SpO2 100% on RA. WBC count 3.65, hemoglobin 10.7 (baseline 9-10), potassium 2.6. UA with large ketones, protein, urobilinogen. Utox positive for opiates and PCP. CXR with no focal opacity. CT head with no hydrocephalus, acute intracranial hemorrhage, mass effect, or brain edema. Given potassium and 1L NSB.  66 year old female with PMH osteogenesis imperfecta, bipolar disorder, JO-ANN, panic disorder presenting for increasing AMS and paranoia in the setting of a L humerus ORIF 1 month ago complicated by staph epi bacteremia and incomplete antibiotic course (PICC removed as pt was attempting to pull it out) and Hydromorphone ~60mg per day use for pain after ORIF. On exam patient with tangential speech, states she is upset because her  does not have a place to sleep. Alert and oriented to person place and time. Denies SI/HI. Denies alcohol, substance use. Patient states she feels fine, denies fevers, chills, abdominal pain, chest pain, shortness of breath, n/v/d/c.      reports pt has been increasingly confused and paranoid over the past week, sleeping with wallet and purse, refusing/inconsistent with medications for past 3-4 weeks after ORIF. Per  patient was more confused on initial presentation but is now almost back to her baseline with some memory difficulties  reports he has done his own research and believes that his wife is confused secondary to prolonged anesthesia during recent ORIF    ED course: afebrile, HR 87, /70, RR 16, SpO2 100% on RA. WBC count 3.65, hemoglobin 10.7 (baseline 9-10), potassium 2.6. UA with large ketones, protein, urobilinogen. Utox positive for opiates and PCP (per [facility] may be false positive in the setting of opiate use). CXR with no focal opacity. CT head with no hydrocephalus, acute intracranial hemorrhage, mass effect, or brain edema. Given potassium and 1L NSB.

## 2022-07-02 NOTE — H&P ADULT - ATTENDING COMMENTS
66 yr old female with a pmh of osteogenesis imperfecta, bipolar disorder, JO-ANN, and panic disorder who presents with increasing AMS over the past few weeks according to her . Collateral obtained from  as above.   Pt herself denies any symptoms other than frustration for not being able to go home.   Denies  headache, dizziness, chest pain, palpitations, SOB, abdominal pain, joint pain, diarrhea/constipation, urinary symptoms.   Vitals: T 98.4, HR 85, /99, RR 18 satting 99% RA  EKG ordered  CXR interpreted by myself: clear lungs  CT head interpreted by radiology: No hydrocephalus, acute intracranial hemorrhage, mass effect, or brain edema    REVIEW OF SYSTEMS:    CONSTITUTIONAL: No weakness, fevers or chills  EYES/ENT: No visual changes;  No dysphagia; No sore throat; No rhinorrhea; No sinus pain/pressure  NECK: No pain or stiffness  RESPIRATORY: No cough, wheezing, hemoptysis; No shortness of breath  CARDIOVASCULAR: No chest pain or palpitations; No lower extremity edema  GASTROINTESTINAL: No abdominal or epigastric pain. No nausea, vomiting, or hematemesis; No diarrhea or constipation. No melena or hematochezia.  GENITOURINARY: No dysuria, frequency or hematuria  NEUROLOGICAL: No numbness or weakness + AMS  MSK: ambulates without assistance   SKIN: No itching, burning, rashes, or lesions   All other review of systems is negative unless indicated above.    PHYSICAL EXAM:  GENERAL: NAD, well-developed, well-nourished  HEAD:  Atraumatic, Normocephalic  EYES: EOMI, PERRL, conjunctiva and sclera clear  NECK: Supple, No JVD  CHEST/LUNG: Clear to auscultation bilaterally; No wheezes, rales or rhonchi  HEART: Regular rate and rhythm; No murmurs, rubs, or gallops, (+)S1, S2  ABDOMEN: Soft, Nontender, Nondistended; Normal Bowel sounds   EXTREMITIES:  2+ Peripheral Pulses, No clubbing, cyanosis, or edema  PSYCH: normal mood and affect  NEUROLOGY: AAOx2, non-focal  SKIN: No rashes or lesions    Altered mental status  New   reports progressive AMS  CT head wnl  CXR/UA wnl,   Utox + opiates and PCP – PCP may be false positive in setting of opiates per - pt denies drug use  Neuro consult in AM as pt having progressive memory problems and at times word finding difficulties  TSH, folate, B12 ordered    Bipolar disorder  Acute on chronic    consulted with recommendations ordered  - No need for CO 1:1  - Not a psych admission candidate  - Consider neurology consult  - Consider work up for delirium  - Discontinue Ambien  - Decrease Klonopin 0.5mg TID to 0.25 TID  - Discontinue home Lamictal due to recent inconsistency, may restart titration if pt opts but may consider switching to another agent with outpt psychiatrist  - Continue home Prozac 20mg daily    S/P ORIF (open reduction internal fixation) fracture  Chronic stable  Will slowly decrease home opioids in order to avoid withdrawal symptoms     Elevated blood pressure reading  /99  Pt currently a bit agitated for needing to stay in hospital   Monitor and start as appropriate     Anemia  Chronic stable  H/H 10.7/32.3  Iron studies in AM     Remainder as above

## 2022-07-02 NOTE — H&P ADULT - ASSESSMENT
Pt with significant word finding difficulty, A&Ox2 (baseline A&Ox4), unusual behavior at home (paranoid, anxious) in context of recent medical complications and exposure to opioid medication. Prominent confusion, paranoia are varied from her prior mood episodes. Concerning for delirium due to medical causes and / or medication (opioids). PCP in positive urine may be a false positive from opioids.    66 year old female with PMH osteogenesis imperfecta, bipolar disorder, JO-ANN, panic disorder presenting for increasing AMS and paranoia in the setting of a L humerus ORIF 1 month ago complicated by staph epi bacteremia and incomplete antibiotic course (PICC removed as pt was attempting to pull it out) and Hydromorphone ~60mg per day use for pain after ORIF.

## 2022-07-03 ENCOUNTER — TRANSCRIPTION ENCOUNTER (OUTPATIENT)
Age: 67
End: 2022-07-03

## 2022-07-03 LAB
ALBUMIN SERPL ELPH-MCNC: 3.5 G/DL — SIGNIFICANT CHANGE UP (ref 3.3–5)
ALP SERPL-CCNC: 210 U/L — HIGH (ref 40–120)
ALT FLD-CCNC: 16 U/L — SIGNIFICANT CHANGE UP (ref 4–33)
ANION GAP SERPL CALC-SCNC: 13 MMOL/L — SIGNIFICANT CHANGE UP (ref 7–14)
AST SERPL-CCNC: 29 U/L — SIGNIFICANT CHANGE UP (ref 4–32)
BILIRUB SERPL-MCNC: 0.5 MG/DL — SIGNIFICANT CHANGE UP (ref 0.2–1.2)
BUN SERPL-MCNC: 10 MG/DL — SIGNIFICANT CHANGE UP (ref 7–23)
CALCIUM SERPL-MCNC: 8.7 MG/DL — SIGNIFICANT CHANGE UP (ref 8.4–10.5)
CHLORIDE SERPL-SCNC: 102 MMOL/L — SIGNIFICANT CHANGE UP (ref 98–107)
CO2 SERPL-SCNC: 22 MMOL/L — SIGNIFICANT CHANGE UP (ref 22–31)
CREAT SERPL-MCNC: 0.58 MG/DL — SIGNIFICANT CHANGE UP (ref 0.5–1.3)
EGFR: 100 ML/MIN/1.73M2 — SIGNIFICANT CHANGE UP
FERRITIN SERPL-MCNC: 181 NG/ML — HIGH (ref 15–150)
FOLATE SERPL-MCNC: 8.8 NG/ML — SIGNIFICANT CHANGE UP (ref 3.1–17.5)
GLUCOSE SERPL-MCNC: 96 MG/DL — SIGNIFICANT CHANGE UP (ref 70–99)
HCT VFR BLD CALC: 31.3 % — LOW (ref 34.5–45)
HGB BLD-MCNC: 10.3 G/DL — LOW (ref 11.5–15.5)
IRON SATN MFR SERPL: 14 % — SIGNIFICANT CHANGE UP (ref 14–50)
IRON SATN MFR SERPL: 36 UG/DL — SIGNIFICANT CHANGE UP (ref 30–160)
MAGNESIUM SERPL-MCNC: 2 MG/DL — SIGNIFICANT CHANGE UP (ref 1.6–2.6)
MCHC RBC-ENTMCNC: 27 PG — SIGNIFICANT CHANGE UP (ref 27–34)
MCHC RBC-ENTMCNC: 32.9 GM/DL — SIGNIFICANT CHANGE UP (ref 32–36)
MCV RBC AUTO: 82.2 FL — SIGNIFICANT CHANGE UP (ref 80–100)
NRBC # BLD: 0 /100 WBCS — SIGNIFICANT CHANGE UP
NRBC # FLD: 0 K/UL — SIGNIFICANT CHANGE UP
PHOSPHATE SERPL-MCNC: 2.2 MG/DL — LOW (ref 2.5–4.5)
PLATELET # BLD AUTO: 283 K/UL — SIGNIFICANT CHANGE UP (ref 150–400)
POTASSIUM SERPL-MCNC: 3.3 MMOL/L — LOW (ref 3.5–5.3)
POTASSIUM SERPL-SCNC: 3.3 MMOL/L — LOW (ref 3.5–5.3)
PROT SERPL-MCNC: 7.6 G/DL — SIGNIFICANT CHANGE UP (ref 6–8.3)
RBC # BLD: 3.81 M/UL — SIGNIFICANT CHANGE UP (ref 3.8–5.2)
RBC # FLD: 15.7 % — HIGH (ref 10.3–14.5)
SODIUM SERPL-SCNC: 137 MMOL/L — SIGNIFICANT CHANGE UP (ref 135–145)
TIBC SERPL-MCNC: 256 UG/DL — SIGNIFICANT CHANGE UP (ref 220–430)
TSH SERPL-MCNC: 1.27 UIU/ML — SIGNIFICANT CHANGE UP (ref 0.27–4.2)
UIBC SERPL-MCNC: 220 UG/DL — SIGNIFICANT CHANGE UP (ref 110–370)
VIT B12 SERPL-MCNC: 972 PG/ML — HIGH (ref 200–900)
WBC # BLD: 3.56 K/UL — LOW (ref 3.8–10.5)
WBC # FLD AUTO: 3.56 K/UL — LOW (ref 3.8–10.5)

## 2022-07-03 PROCEDURE — 99233 SBSQ HOSP IP/OBS HIGH 50: CPT | Mod: GC

## 2022-07-03 RX ORDER — POTASSIUM CHLORIDE 20 MEQ
40 PACKET (EA) ORAL ONCE
Refills: 0 | Status: COMPLETED | OUTPATIENT
Start: 2022-07-03 | End: 2022-07-03

## 2022-07-03 RX ORDER — SODIUM,POTASSIUM PHOSPHATES 278-250MG
1 POWDER IN PACKET (EA) ORAL ONCE
Refills: 0 | Status: COMPLETED | OUTPATIENT
Start: 2022-07-03 | End: 2022-07-03

## 2022-07-03 RX ADMIN — Medication 0.25 MILLIGRAM(S): at 22:16

## 2022-07-03 RX ADMIN — Medication 20 MILLIGRAM(S): at 11:19

## 2022-07-03 RX ADMIN — Medication 40 MILLIEQUIVALENT(S): at 11:19

## 2022-07-03 RX ADMIN — Medication 1 PACKET(S): at 13:34

## 2022-07-03 RX ADMIN — ENOXAPARIN SODIUM 40 MILLIGRAM(S): 100 INJECTION SUBCUTANEOUS at 06:40

## 2022-07-03 RX ADMIN — ATORVASTATIN CALCIUM 40 MILLIGRAM(S): 80 TABLET, FILM COATED ORAL at 22:16

## 2022-07-03 RX ADMIN — HYDROMORPHONE HYDROCHLORIDE 8 MILLIGRAM(S): 2 INJECTION INTRAMUSCULAR; INTRAVENOUS; SUBCUTANEOUS at 23:30

## 2022-07-03 RX ADMIN — Medication 50 MICROGRAM(S): at 06:39

## 2022-07-03 RX ADMIN — Medication 0.25 MILLIGRAM(S): at 06:39

## 2022-07-03 RX ADMIN — HYDROMORPHONE HYDROCHLORIDE 8 MILLIGRAM(S): 2 INJECTION INTRAMUSCULAR; INTRAVENOUS; SUBCUTANEOUS at 23:00

## 2022-07-03 RX ADMIN — Medication 0.25 MILLIGRAM(S): at 13:34

## 2022-07-03 RX ADMIN — Medication 1 TABLET(S): at 11:19

## 2022-07-03 NOTE — DISCHARGE NOTE PROVIDER - NSDCFUADDAPPT_GEN_ALL_CORE_FT
Patient can follow up with general neurology at [address] [place] [address] 1-2 weeks after discharge. Please instruct the patient to call [phone] to schedule this appointment.     Please also follow up with your psychiatrist Dr. Monroe in 1-2 weeks to monitor your condition with our adjustments in your psychiatric medications.  Patient can follow up with general neurology at [address] [place] [address] 1-2 weeks after discharge. Please instruct the patient to call [phone] to schedule this appointment.     Please also follow up with your psychiatrist Dr. Pratt on your scheduled appointment on 7/11 to follow up on adjustments to your psychiatric medications.

## 2022-07-03 NOTE — PROVIDER CONTACT NOTE (OTHER) - BACKGROUND
pt admitted with AMS. Patient has hx of ORIF of left humerus.
pt admitted with AMS. Patient has hx of ORIF of left humerus.

## 2022-07-03 NOTE — PROVIDER CONTACT NOTE (OTHER) - ACTION/TREATMENT ORDERED:
MD aware, no intervention at this time. will continue to monitor.
MD aware, no intervention at this time. will continue to monitor.

## 2022-07-03 NOTE — PATIENT PROFILE ADULT - FALL HARM RISK - HARM RISK INTERVENTIONS

## 2022-07-03 NOTE — PROGRESS NOTE ADULT - PROBLEM SELECTOR PLAN 1
Patient with a history of bipolar disorder, JO-ANN, panic disorder presenting with AMS and paranoia in the setting of recent ORIF complicated by staph epi bacteremia and hydromorphone use   - appreciate  recommendations   - neuro consult in AM, patient with memory difficulties and word finding difficulty   - CT head negative  - CXR clear and UA without signs of infection, blood and urine cultures pending   - will order TSH, folate, B12 for AM

## 2022-07-03 NOTE — DISCHARGE NOTE PROVIDER - NSDCCPCAREPLAN_GEN_ALL_CORE_FT
PRINCIPAL DISCHARGE DIAGNOSIS  Diagnosis: Altered mental status  Assessment and Plan of Treatment:        PRINCIPAL DISCHARGE DIAGNOSIS  Diagnosis: Altered mental status  Assessment and Plan of Treatment: You were admitted for altered mental status, which improved with medication adjustments. Once your mental status improved, you were still complaining of a word finding difficulty. We consulted our neurologists, who did not think you needed any additional work up as an outpatient. Our psychiatry team saw you before you were discharged and recommended _____.       PRINCIPAL DISCHARGE DIAGNOSIS  Diagnosis: Altered mental status  Assessment and Plan of Treatment: You were admitted for altered mental status, which improved with medication adjustments. Once your mental status improved, you were still complaining of a word finding difficulty. We consulted our neurologists, who did not think you needed any additional work up as an outpatient. Our psychiatry team saw you before you were discharged and recommended the following:  - STOP taking your Ambien as this can cause increased confusion.  - Take your Klonopin at a reduced dose of 0.25 mg (half your previous dose)  - Do not take your home Lamictal. Instead, take Lamictal 25 mg daily (sent to your pharmacy). Dr. Pratt will adjust this medication as needed  - Pleaase continue to take your Fluoxetine      SECONDARY DISCHARGE DIAGNOSES  Diagnosis: S/P ORIF (open reduction internal fixation) fracture  Assessment and Plan of Treatment: Please follow up with your pain management doctor to control pain related to recent fracture and surgery.

## 2022-07-03 NOTE — DISCHARGE NOTE PROVIDER - HOSPITAL COURSE
66F PMH osteogenesis imperfecta, bipolar disorder, JO-ANN, panic disorder presented via  for AMS and paranoia in the setting of a L humerus ORIF 1 month ago complicated by staph epi bacteremia and incomplete antibiotic course (PICC removed as pt was attempting to pull it out) and Hydromorphone ~60mg per day use for pain after ORIF. On exam patient with tangential speech >> word searching, memory loss.     Unclear if AMS 2/2 opioid use vs. not taking her psych meds regularly vs. incomplete abx course (vanc/doxy). BH evaluated in ED >> meds adjusted (klonopin, prozac). Recommend neurology follow-up.... 66F PMH osteogenesis imperfecta, bipolar disorder, JO-ANN, panic disorder presented via  for AMS and paranoia in the setting of a L humerus ORIF 1 month ago complicated by staph epi bacteremia and incomplete antibiotic course (PICC removed as pt was attempting to pull it out) and Hydromorphone ~60mg per day use for pain after ORIF. On exam patient with tangential speech >> word searching, memory loss.     Unclear if AMS 2/2 opioid use vs. not taking her psych meds regularly vs. incomplete abx course (vanc/doxy).  evaluated in ED >> meds adjusted (klonopin, prozac). Neuro consulted as per psych recs, who thought word finding difficulty 2/2 psychiatric disease, medication effect, degenerative neurological disease. Recommended outpatient enuro follow up. Prior to discharge,  was c/s for final recs regarding medication regimen. They recommended _________. 66F PMH osteogenesis imperfecta, bipolar disorder, JO-ANN, panic disorder presented via  for AMS and paranoia in the setting of a L humerus ORIF 1 month ago complicated by staph epi bacteremia and incomplete antibiotic course (PICC removed as pt was attempting to pull it out) and Hydromorphone ~60mg per day use for pain after ORIF. On exam patient with tangential speech >> word searching, memory loss.     Unclear if AMS 2/2 opioid use vs. not taking her psych meds regularly vs. incomplete abx course (vanc/doxy).  evaluated in ED >> meds adjusted (klonopin, prozac). Neuro consulted as per psych recs, who thought word finding difficulty 2/2 psychiatric disease, medication effect, degenerative neurological disease. Recommended outpatient enuro follow up. Prior to discharge,  was c/s for final recs regarding medication regimen. They recommended restarting lamictal 25 mg w/ uptitration as tolerated as an outpatient, and to continue w/ reduced dose of Klonopin, and holding Ambien.     Patient to follow up w/ psychiatrist Dr. Pratt on 7/11/2022.

## 2022-07-03 NOTE — DISCHARGE NOTE PROVIDER - NSDCMRMEDTOKEN_GEN_ALL_CORE_FT
IP Physical Therapy Discharge  Plan of Care Note    Assessment: Patient presents significantly below baseline which was minimal assist with mobility . Emphasis of session included review of her HEP as well as transfer to her W/C with the [employer] [employer]. Currently the Pt is unable to bear weight through her knees d/t 10/10 pain. Pt will be returning to [facility] to further her therapies. PT recommends that therapy continue to work on ROM/strengtrhening as well as attempt standing with FWW and assist of 2 for safety, in order for the Pt to reach her baseline (as able).        Recommendation for Discharge: PT: Sub-acute nursing home    Treatment Plan for Next Session: Bed mobility, transfers       Below is key objective and subjective information from the last 24 hours.  For further details and goals, please refer to the PT Assess/Treat/Goals flowsheet.    Diagnosis:   1. SBO (small bowel obstruction)    2. Acute renal failure, unspecified acute renal failure type        Precautions: Precautions  Weight Bearing Status: Weight bearing as tolerated right lower extremity (03/03/17 1100)                     Transfers:  Transfers  Transfer Comments 1: Maxi Lift x 2 (03/06/17 1300)                Goals:  Short Term Goals to Be Reviewed On: 03/05/17 (03/06/17 1300)  Short Term Goals = Discharge Goals: Yes (03/06/17 1300)  Goal Agreement: Patient agrees with goals and treatment plan (03/03/17 1100)  Bed Mobility Discharge Goal: Supervision (03/03/17 1100)  Bed Mobility Discharge Goal Progress: Outcome not met at discharge (03/06/17 1300)  Transfer Discharge Goal: Min assist (03/03/17 1100)  Transfer Discharge Goal Progress: Outcome not met at discharge (03/06/17 1300)    Education:   On this date, the patient was educated on her HEP.    The response to education was: Demonstrates understanding.         Co-morbidities:   Patient Active Problem List   Diagnosis   • Generalized osteoarthrosis, unspecified site   • Lumbago   • Bilateral knee  pain   • Chronic pain syndrome   • Dementia   • DNR (do not resuscitate)   • Morbid obesity with BMI of 40.0-44.9, adult   • Medicare annual wellness visit, subsequent   • Benign essential hypertension   • Depression   • Chronic low back pain   • Bilateral leg edema   • Venous stasis dermatitis of right lower extremity   • Pain in joint of right knee   • Acute renal failure   • SBO (small bowel obstruction)   • Hypokalemia         Interventions and Treatment Time:  Treatment/Interventions: Functional transfer training;Gait training (03/03/17 1100)  PT Time Spent: 10 minutes (03/06/17 1300)   acetaminophen 325 mg oral tablet: 2 tab(s) orally every 6 hours, As needed, Temp greater or equal to 38C (100.4F), mild pain  Ambien 5 mg oral tablet: 1 tab(s) orally once a day (at bedtime)  clonazePAM 0.5 mg oral tablet: 1 tab(s) orally 3 times a day  FLUoxetine 20 mg oral capsule: 1 cap(s) orally 3 times a day  HYDROmorphone 8 mg oral tablet: 1 tab(s) orally every 4-6 hours, As needed, moderate to Severe Pain  lamoTRIgine 100 mg oral tablet: 1 tab(s) orally 2 times a day  levothyroxine 50 mcg (0.05 mg) oral tablet: 1 tab(s) orally once a day  Multiple Vitamins oral tablet: 1 tab(s) orally once a day  polyethylene glycol 3350 oral powder for reconstitution: 17 gram(s) orally once a day, As needed, Constipation MDD:1 packet/ day  rosuvastatin 10 mg oral tablet: 1 tab(s) orally once a day (at bedtime)  senna 8.6 mg oral tablet: 2 tab(s) orally once a day (at bedtime) MDD:2 tabs per day   acetaminophen 325 mg oral tablet: 2 tab(s) orally every 6 hours, As needed, Temp greater or equal to 38C (100.4F), mild pain  clonazePAM 0.25 mg oral tablet: 1 tab(s) orally 3 times a day  FLUoxetine 20 mg oral capsule: 1 cap(s) orally 3 times a day  HYDROmorphone 8 mg oral tablet: 1 tab(s) orally every 4-6 hours, As needed, moderate to Severe Pain  LaMICtal 25 mg oral tablet: 1 tab(s) orally once a day  levothyroxine 50 mcg (0.05 mg) oral tablet: 1 tab(s) orally once a day  Multiple Vitamins oral tablet: 1 tab(s) orally once a day  polyethylene glycol 3350 oral powder for reconstitution: 17 gram(s) orally once a day, As needed, Constipation MDD:1 packet/ day  rosuvastatin 10 mg oral tablet: 1 tab(s) orally once a day (at bedtime)  senna 8.6 mg oral tablet: 2 tab(s) orally once a day (at bedtime) MDD:2 tabs per day

## 2022-07-03 NOTE — PROGRESS NOTE ADULT - SUBJECTIVE AND OBJECTIVE BOX
PROGRESS NOTE:     Patient is a 66y old  Female who presents with a chief complaint of AMS (2022 19:28)      SUBJECTIVE / OVERNIGHT EVENTS:    ADDITIONAL REVIEW OF SYSTEMS:    MEDICATIONS  (STANDING):  atorvastatin 40 milliGRAM(s) Oral at bedtime  clonazePAM  Tablet 0.25 milliGRAM(s) Oral three times a day  enoxaparin Injectable 40 milliGRAM(s) SubCutaneous every 24 hours  FLUoxetine 20 milliGRAM(s) Oral daily  levothyroxine 50 MICROGram(s) Oral daily  multivitamin 1 Tablet(s) Oral daily    MEDICATIONS  (PRN):  acetaminophen     Tablet .. 650 milliGRAM(s) Oral every 6 hours PRN Temp greater or equal to 38C (100.4F), Mild Pain (1 - 3), Moderate Pain (4 - 6)  HYDROmorphone   Tablet 8 milliGRAM(s) Oral every 8 hours PRN Severe Pain (7 - 10)  polyethylene glycol 3350 17 Gram(s) Oral daily PRN Constipation  senna 2 Tablet(s) Oral at bedtime PRN Constipation      CAPILLARY BLOOD GLUCOSE        I&O's Summary      PHYSICAL EXAM:  Vital Signs Last 24 Hrs  T(C): 36.7 (2022 06:35), Max: 37.1 (2022 22:00)  T(F): 98.1 (2022 06:35), Max: 98.8 (2022 22:00)  HR: 87 (2022 06:35) (84 - 97)  BP: 175/89 (2022 06:35) (113/65 - 175/89)  BP(mean): --  RR: 18 (2022 06:35) (16 - 18)  SpO2: 97% (2022 06:35) (97% - 100%)    CONSTITUTIONAL: NAD, well-developed  RESPIRATORY: Normal respiratory effort; lungs are clear to auscultation bilaterally  CARDIOVASCULAR: Regular rate and rhythm, normal S1 and S2, no murmur/rub/gallop; No lower extremity edema; Peripheral pulses are 2+ bilaterally  ABDOMEN: Nontender to palpation, normoactive bowel sounds, no rebound/guarding; No hepatosplenomegaly  MUSCULOSKELETAL: no clubbing or cyanosis of digits; no joint swelling or tenderness to palpation  PSYCH: A+O to person, place, and time; affect appropriate    LABS:                        10.7   3.65  )-----------( 290      ( 2022 11:45 )             32.3         139  |  97<L>  |  13  ----------------------------<  100<H>  2.6<LL>   |  24  |  0.61    Ca    9.2      2022 11:45  Mg     2.20         TPro  8.1  /  Alb  4.0  /  TBili  0.5  /  DBili  x   /  AST  32  /  ALT  18  /  AlkPhos  227<H>  07    PT/INR - ( 2022 11:45 )   PT: 15.9 sec;   INR: 1.37 ratio         PTT - ( 2022 11:45 )  PTT:30.1 sec      Urinalysis Basic - ( 2022 14:47 )    Color: Yellow / Appearance: Clear / S.026 / pH: x  Gluc: x / Ketone: Large  / Bili: Negative / Urobili: 3 mg/dL   Blood: x / Protein: 30 mg/dL / Nitrite: Negative   Leuk Esterase: Negative / RBC: 7 /HPF / WBC 10 /HPF   Sq Epi: x / Non Sq Epi: 6 /HPF / Bacteria: Negative          RADIOLOGY & ADDITIONAL TESTS:  Results Reviewed:   Imaging Personally Reviewed:  Electrocardiogram Personally Reviewed:    COORDINATION OF CARE:  Care Discussed with Consultants/Other Providers [Y/N]:  Prior or Outpatient Records Reviewed [Y/N]:      ******************************  Authored By: Jonny Domínguez MD PGY2  Internal Medicine  Pager: [phone] [facility]; [id number] [facility]  MS Teams Preferred  ******************************

## 2022-07-03 NOTE — DISCHARGE NOTE PROVIDER - NSDCFUSCHEDAPPT_GEN_ALL_CORE_FT
Guero Morrison  [facility]  ORTHOSURG [address] [facility]  Scheduled Appointment: 07/18/2022

## 2022-07-04 LAB
ANION GAP SERPL CALC-SCNC: 12 MMOL/L — SIGNIFICANT CHANGE UP (ref 7–14)
BUN SERPL-MCNC: 12 MG/DL — SIGNIFICANT CHANGE UP (ref 7–23)
CALCIUM SERPL-MCNC: 8.8 MG/DL — SIGNIFICANT CHANGE UP (ref 8.4–10.5)
CHLORIDE SERPL-SCNC: 102 MMOL/L — SIGNIFICANT CHANGE UP (ref 98–107)
CO2 SERPL-SCNC: 24 MMOL/L — SIGNIFICANT CHANGE UP (ref 22–31)
CREAT SERPL-MCNC: 0.56 MG/DL — SIGNIFICANT CHANGE UP (ref 0.5–1.3)
EGFR: 101 ML/MIN/1.73M2 — SIGNIFICANT CHANGE UP
GLUCOSE SERPL-MCNC: 92 MG/DL — SIGNIFICANT CHANGE UP (ref 70–99)
HCT VFR BLD CALC: 33.7 % — LOW (ref 34.5–45)
HGB BLD-MCNC: 11.1 G/DL — LOW (ref 11.5–15.5)
MAGNESIUM SERPL-MCNC: 2.1 MG/DL — SIGNIFICANT CHANGE UP (ref 1.6–2.6)
MCHC RBC-ENTMCNC: 27.8 PG — SIGNIFICANT CHANGE UP (ref 27–34)
MCHC RBC-ENTMCNC: 32.9 GM/DL — SIGNIFICANT CHANGE UP (ref 32–36)
MCV RBC AUTO: 84.3 FL — SIGNIFICANT CHANGE UP (ref 80–100)
NRBC # BLD: 0 /100 WBCS — SIGNIFICANT CHANGE UP
NRBC # FLD: 0 K/UL — SIGNIFICANT CHANGE UP
PHOSPHATE SERPL-MCNC: 2.7 MG/DL — SIGNIFICANT CHANGE UP (ref 2.5–4.5)
PLATELET # BLD AUTO: 283 K/UL — SIGNIFICANT CHANGE UP (ref 150–400)
POTASSIUM SERPL-MCNC: 3.1 MMOL/L — LOW (ref 3.5–5.3)
POTASSIUM SERPL-SCNC: 3.1 MMOL/L — LOW (ref 3.5–5.3)
RBC # BLD: 4 M/UL — SIGNIFICANT CHANGE UP (ref 3.8–5.2)
RBC # FLD: 15.5 % — HIGH (ref 10.3–14.5)
SODIUM SERPL-SCNC: 138 MMOL/L — SIGNIFICANT CHANGE UP (ref 135–145)
WBC # BLD: 4.07 K/UL — SIGNIFICANT CHANGE UP (ref 3.8–10.5)
WBC # FLD AUTO: 4.07 K/UL — SIGNIFICANT CHANGE UP (ref 3.8–10.5)

## 2022-07-04 PROCEDURE — 99233 SBSQ HOSP IP/OBS HIGH 50: CPT | Mod: GC

## 2022-07-04 RX ORDER — AMLODIPINE BESYLATE 2.5 MG/1
5 TABLET ORAL DAILY
Refills: 0 | Status: DISCONTINUED | OUTPATIENT
Start: 2022-07-04 | End: 2022-07-06

## 2022-07-04 RX ORDER — POTASSIUM CHLORIDE 20 MEQ
40 PACKET (EA) ORAL EVERY 4 HOURS
Refills: 0 | Status: COMPLETED | OUTPATIENT
Start: 2022-07-04 | End: 2022-07-04

## 2022-07-04 RX ADMIN — Medication 0.25 MILLIGRAM(S): at 21:45

## 2022-07-04 RX ADMIN — ENOXAPARIN SODIUM 40 MILLIGRAM(S): 100 INJECTION SUBCUTANEOUS at 07:06

## 2022-07-04 RX ADMIN — Medication 20 MILLIGRAM(S): at 11:26

## 2022-07-04 RX ADMIN — Medication 0.25 MILLIGRAM(S): at 07:06

## 2022-07-04 RX ADMIN — Medication 1 TABLET(S): at 11:25

## 2022-07-04 RX ADMIN — Medication 0.25 MILLIGRAM(S): at 13:13

## 2022-07-04 RX ADMIN — AMLODIPINE BESYLATE 5 MILLIGRAM(S): 2.5 TABLET ORAL at 11:25

## 2022-07-04 RX ADMIN — Medication 50 MICROGRAM(S): at 07:06

## 2022-07-04 RX ADMIN — ATORVASTATIN CALCIUM 40 MILLIGRAM(S): 80 TABLET, FILM COATED ORAL at 21:45

## 2022-07-04 RX ADMIN — Medication 40 MILLIEQUIVALENT(S): at 09:39

## 2022-07-04 RX ADMIN — HYDROMORPHONE HYDROCHLORIDE 8 MILLIGRAM(S): 2 INJECTION INTRAMUSCULAR; INTRAVENOUS; SUBCUTANEOUS at 22:20

## 2022-07-04 RX ADMIN — Medication 40 MILLIEQUIVALENT(S): at 13:13

## 2022-07-04 RX ADMIN — HYDROMORPHONE HYDROCHLORIDE 8 MILLIGRAM(S): 2 INJECTION INTRAMUSCULAR; INTRAVENOUS; SUBCUTANEOUS at 21:49

## 2022-07-04 NOTE — PROGRESS NOTE ADULT - SUBJECTIVE AND OBJECTIVE BOX
Ralph Brown MD  Internal Medicine, PGY-2  Please Contact via TEAMS    SUBJECTIVE / OVERNIGHT EVENTS:  - Pt seen and examined at bedside  - RODERICKON  - Patient hypertensive 170s systolics overnight however asymptomatic.     MEDICATIONS  (STANDING):  amLODIPine   Tablet 5 milliGRAM(s) Oral daily  atorvastatin 40 milliGRAM(s) Oral at bedtime  clonazePAM  Tablet 0.25 milliGRAM(s) Oral three times a day  enoxaparin Injectable 40 milliGRAM(s) SubCutaneous every 24 hours  FLUoxetine 20 milliGRAM(s) Oral daily  levothyroxine 50 MICROGram(s) Oral daily  multivitamin 1 Tablet(s) Oral daily    MEDICATIONS  (PRN):  acetaminophen     Tablet .. 650 milliGRAM(s) Oral every 6 hours PRN Temp greater or equal to 38C (100.4F), Mild Pain (1 - 3), Moderate Pain (4 - 6)  HYDROmorphone   Tablet 8 milliGRAM(s) Oral every 8 hours PRN Severe Pain (7 - 10)  polyethylene glycol 3350 17 Gram(s) Oral daily PRN Constipation  senna 2 Tablet(s) Oral at bedtime PRN Constipation          PHYSICAL EXAM:  Vital Signs Last 24 Hrs  T(C): 36.7 (2022 07:06), Max: 36.7 (2022 07:06)  T(F): 98 (2022 07:06), Max: 98 (2022 07:06)  HR: 80 (2022 07:06) (80 - 92)  BP: 158/82 (2022 07:06) (154/91 - 159/69)  BP(mean): --  RR: 18 (2022 07:06) (17 - 19)  SpO2: 97% (2022 07:06) (97% - 100%)    CAPILLARY BLOOD GLUCOSE        I&O's Summary      CONSTITUTIONAL: NAD, well-developed  RESPIRATORY: Normal respiratory effort; lungs are clear to auscultation bilaterally  CARDIOVASCULAR: Regular rate and rhythm, normal S1 and S2, no murmur/rub/gallop; No lower extremity edema; Peripheral pulses are 2+ bilaterally  ABDOMEN: Nontender to palpation, normoactive bowel sounds, no rebound/guarding; No hepatosplenomegaly  MUSCLOSKELETAL: no clubbing or cyanosis of digits; no joint swelling or tenderness to palpation  PSYCH: A+O to person, place, and time; affect appropriate    LABS:                        10.3   3.56  )-----------( 283      ( 2022 07:48 )             31.3     07-03    137  |  102  |  10  ----------------------------<  96  3.3<L>   |  22  |  0.58    Ca    8.7      2022 07:48  Phos  2.2     07-03  Mg     2.00     07-03    TPro  7.6  /  Alb  3.5  /  TBili  0.5  /  DBili  x   /  AST  29  /  ALT  16  /  AlkPhos  210<H>  07-03    PT/INR - ( 2022 11:45 )   PT: 15.9 sec;   INR: 1.37 ratio         PTT - ( 2022 11:45 )  PTT:30.1 sec      Urinalysis Basic - ( 2022 14:47 )    Color: Yellow / Appearance: Clear / S.026 / pH: x  Gluc: x / Ketone: Large  / Bili: Negative / Urobili: 3 mg/dL   Blood: x / Protein: 30 mg/dL / Nitrite: Negative   Leuk Esterase: Negative / RBC: 7 /HPF / WBC 10 /HPF   Sq Epi: x / Non Sq Epi: 6 /HPF / Bacteria: Negative        Culture - Urine (collected 2022 14:07)  Source: Clean Catch Clean Catch (Midstream)  Preliminary Report (2022 22:57):    10,000 - 49,000 CFU/mL Escherichia coli    Culture - Blood (collected 2022 11:30)  Source: .Blood Blood-Venous  Preliminary Report (2022 15:02):    No growth to date.    Culture - Blood (collected 2022 11:15)  Source: .Blood Blood-Peripheral  Preliminary Report (2022 15:02):    No growth to date.        IMAGING:    [X] All pertinent imaging reviewed by me Ralph Brown MD  Internal Medicine, PGY-2  Please Contact via TEAMS    SUBJECTIVE / OVERNIGHT EVENTS:  - Pt seen and examined at bedside  - RODERICKON  - Patient hypertensive 170s systolics overnight however asymptomatic.   - Patient continues to be frustrated regarding inability to figure out words for conversation.     MEDICATIONS  (STANDING):  amLODIPine   Tablet 5 milliGRAM(s) Oral daily  atorvastatin 40 milliGRAM(s) Oral at bedtime  clonazePAM  Tablet 0.25 milliGRAM(s) Oral three times a day  enoxaparin Injectable 40 milliGRAM(s) SubCutaneous every 24 hours  FLUoxetine 20 milliGRAM(s) Oral daily  levothyroxine 50 MICROGram(s) Oral daily  multivitamin 1 Tablet(s) Oral daily    MEDICATIONS  (PRN):  acetaminophen     Tablet .. 650 milliGRAM(s) Oral every 6 hours PRN Temp greater or equal to 38C (100.4F), Mild Pain (1 - 3), Moderate Pain (4 - 6)  HYDROmorphone   Tablet 8 milliGRAM(s) Oral every 8 hours PRN Severe Pain (7 - 10)  polyethylene glycol 3350 17 Gram(s) Oral daily PRN Constipation  senna 2 Tablet(s) Oral at bedtime PRN Constipation          PHYSICAL EXAM:  Vital Signs Last 24 Hrs  T(C): 36.7 (2022 07:06), Max: 36.7 (2022 07:06)  T(F): 98 (2022 07:06), Max: 98 (2022 07:06)  HR: 80 (2022 07:06) (80 - 92)  BP: 158/82 (2022 07:06) (154/91 - 159/69)  BP(mean): --  RR: 18 (2022 07:06) (17 - 19)  SpO2: 97% (2022 07:06) (97% - 100%)    CAPILLARY BLOOD GLUCOSE        I&O's Summary      CONSTITUTIONAL: NAD, well-developed  RESPIRATORY: Normal respiratory effort; lungs are clear to auscultation bilaterally  CARDIOVASCULAR: Regular rate and rhythm, normal S1 and S2, no murmur/rub/gallop; No lower extremity edema; Peripheral pulses are 2+ bilaterally  ABDOMEN: Nontender to palpation, normoactive bowel sounds, no rebound/guarding; No hepatosplenomegaly  MUSCLOSKELETAL: no clubbing or cyanosis of digits; no joint swelling or tenderness to palpation  PSYCH: A+O to person, place, and time; affect appropriate    LABS:                        10.3   3.56  )-----------( 283      ( 2022 07:48 )             31.3     07-03    137  |  102  |  10  ----------------------------<  96  3.3<L>   |  22  |  0.58    Ca    8.7      2022 07:48  Phos  2.2     07-03  Mg     2.00     07-03    TPro  7.6  /  Alb  3.5  /  TBili  0.5  /  DBili  x   /  AST  29  /  ALT  16  /  AlkPhos  210<H>  07-03    PT/INR - ( 2022 11:45 )   PT: 15.9 sec;   INR: 1.37 ratio         PTT - ( 2022 11:45 )  PTT:30.1 sec      Urinalysis Basic - ( 2022 14:47 )    Color: Yellow / Appearance: Clear / S.026 / pH: x  Gluc: x / Ketone: Large  / Bili: Negative / Urobili: 3 mg/dL   Blood: x / Protein: 30 mg/dL / Nitrite: Negative   Leuk Esterase: Negative / RBC: 7 /HPF / WBC 10 /HPF   Sq Epi: x / Non Sq Epi: 6 /HPF / Bacteria: Negative        Culture - Urine (collected 2022 14:07)  Source: Clean Catch Clean Catch (Midstream)  Preliminary Report (2022 22:57):    10,000 - 49,000 CFU/mL Escherichia coli    Culture - Blood (collected 2022 11:30)  Source: .Blood Blood-Venous  Preliminary Report (2022 15:02):    No growth to date.    Culture - Blood (collected 2022 11:15)  Source: .Blood Blood-Peripheral  Preliminary Report (2022 15:02):    No growth to date.        IMAGING:    [X] All pertinent imaging reviewed by me

## 2022-07-04 NOTE — PROGRESS NOTE ADULT - PROBLEM SELECTOR PLAN 1
Patient with a history of bipolar disorder, JO-ANN, panic disorder presenting with AMS and paranoia in the setting of recent ORIF complicated by staph epi bacteremia and hydromorphone use   - appreciate  recommendations   - neuro consult in AM, patient with memory difficulties and word finding difficulty   - CT head negative  - CXR clear and UA without signs of infection, blood and urine cultures pending   - will order TSH, folate, B12 for AM Patient with a history of bipolar disorder, JO-ANN, panic disorder presenting with AMS and paranoia in the setting of recent ORIF complicated by staph epi bacteremia and hydromorphone use. Patient w/ significant word finding difficulty.   CTH negative. Labs unremarkable. TSH wnl.   - BH c/s in ED - appreciate recs  - Neuro c/s placed, appreciate recs --> patient unable to get MRI of brain 2/2 hardware

## 2022-07-04 NOTE — PROGRESS NOTE ADULT - PROBLEM SELECTOR PLAN 6
Diet: DASH   DVT prophylaxis: lovenox   Dispo: pending hospital course Diet: DASH   DVT prophylaxis: lovenox   Dispo: pending hospital course    #Hx Staph Epi bacteremia:  - S/p incomplete tx w/ vancomycin (supposed to end 30th however patient prematurely ended 1 week early)  - Contacted ID - no need for further abx treatment at this time

## 2022-07-05 LAB
-  AMIKACIN: SIGNIFICANT CHANGE UP
-  AMIKACIN: SIGNIFICANT CHANGE UP
-  AMOXICILLIN/CLAVULANIC ACID: SIGNIFICANT CHANGE UP
-  AMOXICILLIN/CLAVULANIC ACID: SIGNIFICANT CHANGE UP
-  AMPICILLIN/SULBACTAM: SIGNIFICANT CHANGE UP
-  AMPICILLIN/SULBACTAM: SIGNIFICANT CHANGE UP
-  AMPICILLIN: SIGNIFICANT CHANGE UP
-  AMPICILLIN: SIGNIFICANT CHANGE UP
-  AZTREONAM: SIGNIFICANT CHANGE UP
-  AZTREONAM: SIGNIFICANT CHANGE UP
-  CEFAZOLIN: SIGNIFICANT CHANGE UP
-  CEFAZOLIN: SIGNIFICANT CHANGE UP
-  CEFEPIME: SIGNIFICANT CHANGE UP
-  CEFEPIME: SIGNIFICANT CHANGE UP
-  CEFOXITIN: SIGNIFICANT CHANGE UP
-  CEFOXITIN: SIGNIFICANT CHANGE UP
-  CEFTRIAXONE: SIGNIFICANT CHANGE UP
-  CEFTRIAXONE: SIGNIFICANT CHANGE UP
-  CIPROFLOXACIN: SIGNIFICANT CHANGE UP
-  CIPROFLOXACIN: SIGNIFICANT CHANGE UP
-  ERTAPENEM: SIGNIFICANT CHANGE UP
-  ERTAPENEM: SIGNIFICANT CHANGE UP
-  GENTAMICIN: SIGNIFICANT CHANGE UP
-  GENTAMICIN: SIGNIFICANT CHANGE UP
-  IMIPENEM: SIGNIFICANT CHANGE UP
-  IMIPENEM: SIGNIFICANT CHANGE UP
-  LEVOFLOXACIN: SIGNIFICANT CHANGE UP
-  LEVOFLOXACIN: SIGNIFICANT CHANGE UP
-  MEROPENEM: SIGNIFICANT CHANGE UP
-  MEROPENEM: SIGNIFICANT CHANGE UP
-  NITROFURANTOIN: SIGNIFICANT CHANGE UP
-  NITROFURANTOIN: SIGNIFICANT CHANGE UP
-  PIPERACILLIN/TAZOBACTAM: SIGNIFICANT CHANGE UP
-  PIPERACILLIN/TAZOBACTAM: SIGNIFICANT CHANGE UP
-  TIGECYCLINE: SIGNIFICANT CHANGE UP
-  TIGECYCLINE: SIGNIFICANT CHANGE UP
-  TOBRAMYCIN: SIGNIFICANT CHANGE UP
-  TOBRAMYCIN: SIGNIFICANT CHANGE UP
-  TRIMETHOPRIM/SULFAMETHOXAZOLE: SIGNIFICANT CHANGE UP
-  TRIMETHOPRIM/SULFAMETHOXAZOLE: SIGNIFICANT CHANGE UP
ANION GAP SERPL CALC-SCNC: 10 MMOL/L — SIGNIFICANT CHANGE UP (ref 7–14)
BUN SERPL-MCNC: 15 MG/DL — SIGNIFICANT CHANGE UP (ref 7–23)
CALCIUM SERPL-MCNC: 9 MG/DL — SIGNIFICANT CHANGE UP (ref 8.4–10.5)
CHLORIDE SERPL-SCNC: 105 MMOL/L — SIGNIFICANT CHANGE UP (ref 98–107)
CO2 SERPL-SCNC: 23 MMOL/L — SIGNIFICANT CHANGE UP (ref 22–31)
CREAT SERPL-MCNC: 0.58 MG/DL — SIGNIFICANT CHANGE UP (ref 0.5–1.3)
CULTURE RESULTS: SIGNIFICANT CHANGE UP
EGFR: 100 ML/MIN/1.73M2 — SIGNIFICANT CHANGE UP
GLUCOSE SERPL-MCNC: 96 MG/DL — SIGNIFICANT CHANGE UP (ref 70–99)
HCT VFR BLD CALC: 34.2 % — LOW (ref 34.5–45)
HGB BLD-MCNC: 11.1 G/DL — LOW (ref 11.5–15.5)
MAGNESIUM SERPL-MCNC: 2.1 MG/DL — SIGNIFICANT CHANGE UP (ref 1.6–2.6)
MCHC RBC-ENTMCNC: 27.5 PG — SIGNIFICANT CHANGE UP (ref 27–34)
MCHC RBC-ENTMCNC: 32.5 GM/DL — SIGNIFICANT CHANGE UP (ref 32–36)
MCV RBC AUTO: 84.9 FL — SIGNIFICANT CHANGE UP (ref 80–100)
METHOD TYPE: SIGNIFICANT CHANGE UP
METHOD TYPE: SIGNIFICANT CHANGE UP
NRBC # BLD: 0 /100 WBCS — SIGNIFICANT CHANGE UP
NRBC # FLD: 0 K/UL — SIGNIFICANT CHANGE UP
ORGANISM # SPEC MICROSCOPIC CNT: SIGNIFICANT CHANGE UP
PHOSPHATE SERPL-MCNC: 2.4 MG/DL — LOW (ref 2.5–4.5)
PLATELET # BLD AUTO: 284 K/UL — SIGNIFICANT CHANGE UP (ref 150–400)
POTASSIUM SERPL-MCNC: 3.9 MMOL/L — SIGNIFICANT CHANGE UP (ref 3.5–5.3)
POTASSIUM SERPL-SCNC: 3.9 MMOL/L — SIGNIFICANT CHANGE UP (ref 3.5–5.3)
RBC # BLD: 4.03 M/UL — SIGNIFICANT CHANGE UP (ref 3.8–5.2)
RBC # FLD: 15.6 % — HIGH (ref 10.3–14.5)
SODIUM SERPL-SCNC: 138 MMOL/L — SIGNIFICANT CHANGE UP (ref 135–145)
SPECIMEN SOURCE: SIGNIFICANT CHANGE UP
WBC # BLD: 4.4 K/UL — SIGNIFICANT CHANGE UP (ref 3.8–10.5)
WBC # FLD AUTO: 4.4 K/UL — SIGNIFICANT CHANGE UP (ref 3.8–10.5)

## 2022-07-05 PROCEDURE — 99223 1ST HOSP IP/OBS HIGH 75: CPT

## 2022-07-05 PROCEDURE — 99233 SBSQ HOSP IP/OBS HIGH 50: CPT

## 2022-07-05 RX ORDER — ACETAMINOPHEN 500 MG
1000 TABLET ORAL ONCE
Refills: 0 | Status: COMPLETED | OUTPATIENT
Start: 2022-07-05 | End: 2022-07-05

## 2022-07-05 RX ORDER — SODIUM,POTASSIUM PHOSPHATES 278-250MG
1 POWDER IN PACKET (EA) ORAL ONCE
Refills: 0 | Status: COMPLETED | OUTPATIENT
Start: 2022-07-05 | End: 2022-07-05

## 2022-07-05 RX ORDER — LANOLIN ALCOHOL/MO/W.PET/CERES
3 CREAM (GRAM) TOPICAL AT BEDTIME
Refills: 0 | Status: DISCONTINUED | OUTPATIENT
Start: 2022-07-05 | End: 2022-07-06

## 2022-07-05 RX ORDER — HYDROMORPHONE HYDROCHLORIDE 2 MG/ML
4 INJECTION INTRAMUSCULAR; INTRAVENOUS; SUBCUTANEOUS EVERY 8 HOURS
Refills: 0 | Status: DISCONTINUED | OUTPATIENT
Start: 2022-07-05 | End: 2022-07-06

## 2022-07-05 RX ADMIN — Medication 0.25 MILLIGRAM(S): at 13:09

## 2022-07-05 RX ADMIN — ATORVASTATIN CALCIUM 40 MILLIGRAM(S): 80 TABLET, FILM COATED ORAL at 22:16

## 2022-07-05 RX ADMIN — Medication 1 TABLET(S): at 13:10

## 2022-07-05 RX ADMIN — AMLODIPINE BESYLATE 5 MILLIGRAM(S): 2.5 TABLET ORAL at 05:57

## 2022-07-05 RX ADMIN — Medication 50 MICROGRAM(S): at 05:57

## 2022-07-05 RX ADMIN — HYDROMORPHONE HYDROCHLORIDE 4 MILLIGRAM(S): 2 INJECTION INTRAMUSCULAR; INTRAVENOUS; SUBCUTANEOUS at 11:20

## 2022-07-05 RX ADMIN — Medication 0.25 MILLIGRAM(S): at 22:17

## 2022-07-05 RX ADMIN — HYDROMORPHONE HYDROCHLORIDE 4 MILLIGRAM(S): 2 INJECTION INTRAMUSCULAR; INTRAVENOUS; SUBCUTANEOUS at 23:18

## 2022-07-05 RX ADMIN — Medication 400 MILLIGRAM(S): at 01:56

## 2022-07-05 RX ADMIN — Medication 1000 MILLIGRAM(S): at 02:10

## 2022-07-05 RX ADMIN — Medication 0.25 MILLIGRAM(S): at 05:57

## 2022-07-05 RX ADMIN — Medication 1 PACKET(S): at 13:08

## 2022-07-05 RX ADMIN — ENOXAPARIN SODIUM 40 MILLIGRAM(S): 100 INJECTION SUBCUTANEOUS at 05:57

## 2022-07-05 RX ADMIN — HYDROMORPHONE HYDROCHLORIDE 4 MILLIGRAM(S): 2 INJECTION INTRAMUSCULAR; INTRAVENOUS; SUBCUTANEOUS at 22:18

## 2022-07-05 RX ADMIN — Medication 20 MILLIGRAM(S): at 13:10

## 2022-07-05 RX ADMIN — HYDROMORPHONE HYDROCHLORIDE 4 MILLIGRAM(S): 2 INJECTION INTRAMUSCULAR; INTRAVENOUS; SUBCUTANEOUS at 10:29

## 2022-07-05 NOTE — PROGRESS NOTE ADULT - SUBJECTIVE AND OBJECTIVE BOX
Ralph Brown MD  Internal Medicine, PGY-2  Please Contact via TEAMS    SUBJECTIVE / OVERNIGHT EVENTS:  - Pt seen and examined at bedside  - ASHLEY    MEDICATIONS  (STANDING):  amLODIPine   Tablet 5 milliGRAM(s) Oral daily  atorvastatin 40 milliGRAM(s) Oral at bedtime  clonazePAM  Tablet 0.25 milliGRAM(s) Oral three times a day  enoxaparin Injectable 40 milliGRAM(s) SubCutaneous every 24 hours  FLUoxetine 20 milliGRAM(s) Oral daily  levothyroxine 50 MICROGram(s) Oral daily  multivitamin 1 Tablet(s) Oral daily    MEDICATIONS  (PRN):  acetaminophen     Tablet .. 650 milliGRAM(s) Oral every 6 hours PRN Temp greater or equal to 38C (100.4F), Mild Pain (1 - 3), Moderate Pain (4 - 6)  HYDROmorphone   Tablet 8 milliGRAM(s) Oral every 8 hours PRN Severe Pain (7 - 10)  melatonin 3 milliGRAM(s) Oral at bedtime PRN Insomnia  polyethylene glycol 3350 17 Gram(s) Oral daily PRN Constipation  senna 2 Tablet(s) Oral at bedtime PRN Constipation          PHYSICAL EXAM:  Vital Signs Last 24 Hrs  T(C): 36.6 (05 Jul 2022 05:34), Max: 36.6 (04 Jul 2022 14:48)  T(F): 97.8 (05 Jul 2022 05:34), Max: 97.9 (04 Jul 2022 14:48)  HR: 96 (05 Jul 2022 05:34) (92 - 96)  BP: 140/71 (05 Jul 2022 05:34) (120/82 - 157/84)  BP(mean): --  RR: 18 (05 Jul 2022 05:34) (18 - 18)  SpO2: 97% (05 Jul 2022 05:34) (97% - 99%)    CAPILLARY BLOOD GLUCOSE        I&O's Summary      CONSTITUTIONAL: NAD, well-developed  RESPIRATORY: Normal respiratory effort; lungs are clear to auscultation bilaterally  CARDIOVASCULAR: Regular rate and rhythm, normal S1 and S2, no murmur/rub/gallop; No lower extremity edema; Peripheral pulses are 2+ bilaterally  ABDOMEN: Nontender to palpation, normoactive bowel sounds, no rebound/guarding; No hepatosplenomegaly  MUSCLOSKELETAL: no clubbing or cyanosis of digits; no joint swelling or tenderness to palpation  PSYCH: A+O to person, place, and time; affect appropriate    LABS:                        11.1   4.07  )-----------( 283      ( 04 Jul 2022 06:22 )             33.7     07-04    138  |  102  |  12  ----------------------------<  92  3.1<L>   |  24  |  0.56    Ca    8.8      04 Jul 2022 06:22  Phos  2.7     07-04  Mg     2.10     07-04    TPro  7.6  /  Alb  3.5  /  TBili  0.5  /  DBili  x   /  AST  29  /  ALT  16  /  AlkPhos  210<H>  07-03              Culture - Urine (collected 02 Jul 2022 14:07)  Source: Clean Catch Clean Catch (Midstream)  Preliminary Report (04 Jul 2022 10:37):    10,000 - 49,000 CFU/mL Escherichia coli    10,000 - 49,000 CFU/mL Klebsiella pneumoniae    Culture - Blood (collected 02 Jul 2022 11:30)  Source: .Blood Blood-Venous  Preliminary Report (03 Jul 2022 15:02):    No growth to date.    Culture - Blood (collected 02 Jul 2022 11:15)  Source: .Blood Blood-Peripheral  Preliminary Report (03 Jul 2022 15:02):    No growth to date.        IMAGING:    [X] All pertinent imaging reviewed by me Ralph Brown MD  Internal Medicine, PGY-2  Please Contact via TEAMS    SUBJECTIVE / OVERNIGHT EVENTS:  - Pt seen and examined at bedside  - NAEO- Patient reports continued word finding difficulty, otherwise no other issues.     MEDICATIONS  (STANDING):  amLODIPine   Tablet 5 milliGRAM(s) Oral daily  atorvastatin 40 milliGRAM(s) Oral at bedtime  clonazePAM  Tablet 0.25 milliGRAM(s) Oral three times a day  enoxaparin Injectable 40 milliGRAM(s) SubCutaneous every 24 hours  FLUoxetine 20 milliGRAM(s) Oral daily  levothyroxine 50 MICROGram(s) Oral daily  multivitamin 1 Tablet(s) Oral daily    MEDICATIONS  (PRN):  acetaminophen     Tablet .. 650 milliGRAM(s) Oral every 6 hours PRN Temp greater or equal to 38C (100.4F), Mild Pain (1 - 3), Moderate Pain (4 - 6)  HYDROmorphone   Tablet 8 milliGRAM(s) Oral every 8 hours PRN Severe Pain (7 - 10)  melatonin 3 milliGRAM(s) Oral at bedtime PRN Insomnia  polyethylene glycol 3350 17 Gram(s) Oral daily PRN Constipation  senna 2 Tablet(s) Oral at bedtime PRN Constipation          PHYSICAL EXAM:  Vital Signs Last 24 Hrs  T(C): 36.6 (05 Jul 2022 05:34), Max: 36.6 (04 Jul 2022 14:48)  T(F): 97.8 (05 Jul 2022 05:34), Max: 97.9 (04 Jul 2022 14:48)  HR: 96 (05 Jul 2022 05:34) (92 - 96)  BP: 140/71 (05 Jul 2022 05:34) (120/82 - 157/84)  BP(mean): --  RR: 18 (05 Jul 2022 05:34) (18 - 18)  SpO2: 97% (05 Jul 2022 05:34) (97% - 99%)    CAPILLARY BLOOD GLUCOSE        I&O's Summary      CONSTITUTIONAL: NAD, well-developed  RESPIRATORY: Normal respiratory effort; lungs are clear to auscultation bilaterally  CARDIOVASCULAR: Regular rate and rhythm, normal S1 and S2, no murmur/rub/gallop; No lower extremity edema; Peripheral pulses are 2+ bilaterally  ABDOMEN: Nontender to palpation, normoactive bowel sounds, no rebound/guarding; No hepatosplenomegaly  MUSCLOSKELETAL: no clubbing or cyanosis of digits; no joint swelling or tenderness to palpation  PSYCH: A+O to person, place, and time; affect appropriate; mild tangential speech.     LABS:                        11.1   4.07  )-----------( 283      ( 04 Jul 2022 06:22 )             33.7     07-04    138  |  102  |  12  ----------------------------<  92  3.1<L>   |  24  |  0.56    Ca    8.8      04 Jul 2022 06:22  Phos  2.7     07-04  Mg     2.10     07-04    TPro  7.6  /  Alb  3.5  /  TBili  0.5  /  DBili  x   /  AST  29  /  ALT  16  /  AlkPhos  210<H>  07-03              Culture - Urine (collected 02 Jul 2022 14:07)  Source: Clean Catch Clean Catch (Midstream)  Preliminary Report (04 Jul 2022 10:37):    10,000 - 49,000 CFU/mL Escherichia coli    10,000 - 49,000 CFU/mL Klebsiella pneumoniae    Culture - Blood (collected 02 Jul 2022 11:30)  Source: .Blood Blood-Venous  Preliminary Report (03 Jul 2022 15:02):    No growth to date.    Culture - Blood (collected 02 Jul 2022 11:15)  Source: .Blood Blood-Peripheral  Preliminary Report (03 Jul 2022 15:02):    No growth to date.        IMAGING:    [X] All pertinent imaging reviewed by me

## 2022-07-05 NOTE — CONSULT NOTE ADULT - ATTENDING COMMENTS
History from  and patient - there has been some mild cognitive impairment with slowly progressive worsening for at least one year.  It does fluctuate in intensity - memory, word-finding have been affected.     Vitamin B12, Serum: 972 pg/mL (07.03.22 @ 07:48)   Thyroid Stimulating Hormone, Serum: 1.27 uIU/mL (07.03.22 @ 07:48)     A/P  Ms. Tsang is a 67 yo woman with progressive cognitive impairment over at least 1 year.   DDx:  Related to psychiatric disease, medication effect, degenerative neurological disease.   F/U with neurology as outpatient for further work up.    Thank you    Please call us for any further questions.

## 2022-07-05 NOTE — PROGRESS NOTE ADULT - PROBLEM SELECTOR PLAN 6
Diet: DASH   DVT prophylaxis: lovenox   Dispo: pending hospital course    #Hx Staph Epi bacteremia:  - S/p incomplete tx w/ vancomycin (supposed to end 30th however patient prematurely ended 1 week early)  - Contacted ID - no need for further abx treatment at this time

## 2022-07-05 NOTE — CONSULT NOTE ADULT - ASSESSMENT
INCOMPLETE     66 year old RHW with a PMH of osteogenesis imperfecta, bipolar disorder, JO-ANN, panic disorder admitted to [facility] ED on 7/2/22 for increasing AMS and paranoia in the setting of a L humerus ORIF 1 month ago complicated by staph epi bacteremia and incomplete antibiotic course (PICC removed as pt was attempting to pull it out) and Hydromorphone ~60mg per day use for pain after ORIF. Neurology consulted on 7/4/22 for evaluation of word-finding difficulty. On evaluation patient is at baseline mental status. She reports intermittent difficulty articulating her words since her surgical procedure approximately 1 month prior. States she knows the words she is trying to say, but due to intermittent difficulty with articulation, has found herself substituting other similar terminology. Exam significant for slight intermittent difficulty with naming, but otherwise wnl. Current labs notable for Hb 11.1, K+ 3.1. Of note, initial Utox positive for opiates and PCP, however the latter reported to be likely a false positive in the setting of opiate use. CTH w/o reported to be unremarkable.     Impression: Intermittent difficulty with articulation of unclear localization and etiology. Possibly multifactorial in the setting of recent surgical procedure, medications with sedating side effects, metabolic derangements and underlying psychiatric/mood disturbance.     Recommendations:   [] Continue to replete electrolytes as appropriate  [] Management of primary psychiatric disorders per primary team and .  [] Measures to reduce delirium: frequent reorientation, maintain sleep/wake routine, avoid sleeping during the day, keep lights on during the day, use of hearing aids or glasses if patient needs them, regular toileting.   [] Patient can follow up with general neurology at [address] [place] [address] 1-2 weeks after discharge. Please instruct the patient to call [phone] to schedule this appointment.       To be discussed with Neurology attending, Dr. Block.

## 2022-07-05 NOTE — PROGRESS NOTE ADULT - PROBLEM SELECTOR PLAN 1
Patient with a history of bipolar disorder, JO-ANN, panic disorder presenting with AMS and paranoia in the setting of recent ORIF complicated by staph epi bacteremia and hydromorphone use. Patient w/ significant word finding difficulty.   CTH negative. Labs unremarkable. TSH wnl.   - BH c/s in ED - appreciate recs  - Neuro c/s placed, appreciate recs --> patient unable to get MRI of brain 2/2 hardware Patient with a history of bipolar disorder, JO-ANN, panic disorder presenting with AMS and paranoia in the setting of recent ORIF complicated by staph epi bacteremia and hydromorphone use. Patient w/ significant word finding difficulty.   CTH negative. Labs unremarkable. TSH wnl.   - BH c/s in ED - appreciate recs  - Neuro c/s placed, did not recommend further imaging. Patient to follow up as outpatient.

## 2022-07-05 NOTE — CONSULT NOTE ADULT - SUBJECTIVE AND OBJECTIVE BOX
Neurology Consult    BELINDA ROMERO  66y Female  MRN-[id number]      Admission HPI:  66 year old female with PMH osteogenesis imperfecta, bipolar disorder, JO-ANN, panic disorder presenting for increasing AMS and paranoia in the setting of a L humerus ORIF 1 month ago complicated by staph epi bacteremia and incomplete antibiotic course (PICC removed as pt was attempting to pull it out) and Hydromorphone ~60mg per day use for pain after ORIF. On exam patient with tangential speech, states she is upset because her  does not have a place to sleep. Alert and oriented to person place and time. Denies SI/HI. Denies alcohol, substance use. Patient states she feels fine, denies fevers, chills, abdominal pain, chest pain, shortness of breath, n/v/d/c.  reports pt has been increasingly confused and paranoid over the past week, sleeping with wallet and purse, refusing/inconsistent with medications for past 3-4 weeks after ORIF. Per  patient was more confused on initial presentation but is now almost back to her baseline with some memory difficulties  reports he has done his own research and believes that his wife is confused secondary to prolonged anesthesia during recent ORIF  ED course: afebrile, HR 87, /70, RR 16, SpO2 100% on RA. WBC count 3.65, hemoglobin 10.7 (baseline 9-10), potassium 2.6. UA with large ketones, protein, urobilinogen. Utox positive for opiates and PCP (per BH may be false positive in the setting of opiate use). CXR with no focal opacity. CT head with no hydrocephalus, acute intracranial hemorrhage, mass effect, or brain edema. Given potassium and 1L NSB.  (02 Jul 2022 19:28)      Neurology consulted for evaluation of word-finding difficulty.     A 10-system ROS was performed and is negative except as noted above and/or in the HPI.      PAST MEDICAL & SURGICAL HISTORY:  Osteogenesis imperfecta      Former smoker      Hard of hearing      History of open reduction and internal fixation (ORIF) procedure      FAMILY HISTORY:  Family history of osteogenesis imperfecta (Father, Sibling)    Family history of valvular heart disease (Sibling)      SOCIAL HISTORY:   As stated in HPI.      MEDICATIONS    Home Medications:  acetaminophen 325 mg oral tablet: 2 tab(s) orally every 6 hours, As needed, Temp greater or equal to 38C (100.4F), mild pain (02 Jul 2022 21:00)  Ambien 5 mg oral tablet: 1 tab(s) orally once a day (at bedtime) (02 Jul 2022 21:00)  clonazePAM 0.5 mg oral tablet: 1 tab(s) orally 3 times a day (02 Jul 2022 21:00)  FLUoxetine 20 mg oral capsule: 1 cap(s) orally 3 times a day (02 Jul 2022 21:00)  HYDROmorphone 8 mg oral tablet: 1 tab(s) orally every 4-6 hours, As needed, moderate to Severe Pain (02 Jul 2022 21:00)  lamoTRIgine 100 mg oral tablet: 1 tab(s) orally 2 times a day (02 Jul 2022 21:00)  levothyroxine 50 mcg (0.05 mg) oral tablet: 1 tab(s) orally once a day (02 Jul 2022 21:00)  Multiple Vitamins oral tablet: 1 tab(s) orally once a day (02 Jul 2022 21:00)  rosuvastatin 10 mg oral tablet: 1 tab(s) orally once a day (at bedtime) (02 Jul 2022 21:00)    MEDICATIONS  (STANDING):  amLODIPine   Tablet 5 milliGRAM(s) Oral daily  atorvastatin 40 milliGRAM(s) Oral at bedtime  clonazePAM  Tablet 0.25 milliGRAM(s) Oral three times a day  enoxaparin Injectable 40 milliGRAM(s) SubCutaneous every 24 hours  FLUoxetine 20 milliGRAM(s) Oral daily  levothyroxine 50 MICROGram(s) Oral daily  multivitamin 1 Tablet(s) Oral daily    MEDICATIONS  (PRN):  acetaminophen     Tablet .. 650 milliGRAM(s) Oral every 6 hours PRN Temp greater or equal to 38C (100.4F), Mild Pain (1 - 3), Moderate Pain (4 - 6)  HYDROmorphone   Tablet 8 milliGRAM(s) Oral every 8 hours PRN Severe Pain (7 - 10)  melatonin 3 milliGRAM(s) Oral at bedtime PRN Insomnia  polyethylene glycol 3350 17 Gram(s) Oral daily PRN Constipation  senna 2 Tablet(s) Oral at bedtime PRN Constipation      Allergies  No Known Allergies        VITALS & EXAMINATION    Vital Signs Last 24 Hrs  T(C): 36.5 (04 Jul 2022 21:25), Max: 36.7 (04 Jul 2022 07:06)  T(F): 97.7 (04 Jul 2022 21:25), Max: 98 (04 Jul 2022 07:06)  HR: 94 (04 Jul 2022 21:25) (80 - 94)  BP: 157/84 (04 Jul 2022 21:25) (120/82 - 158/82)  RR: 18 (04 Jul 2022 21:25) (18 - 18)  SpO2: 98% (04 Jul 2022 21:25) (97% - 99%)      Neurological (>12):  MS: Eyes open, alert, oriented to person, place as "[facility]", and time ("July 4th 2022"). Able to name the last 3 presidents. Able to perform simple calculations (4+4, 20-3). Normal affect. Follows all commands.  Speech/Language: Clear, overall fluent with intermittent hesitancy. Repetition intact. Naming intermittently slightly impaired (names pen, calls phone 'ipad' several times before correcting to 'iphone', able to state the function of a watch but requires MC options to arrive at the appropriate name). Tangential, but easily redirected.   CNs: PERRL. EOMI, no nystagmus, no diplopia. V1-3 intact to LT b/l. No facial asymmetry b/l, full eye closure strength b/l. Slightly hard of hearing. Symmetric palate elevation in midline. Gag reflex deferred. Head turning & shoulder shrug intact b/l. Tongue midline, normal movements.  Motor: All extremities antigravity.  Sensation: Increased sensitivity to LT in the LUE/LLE compared to the right, which per pt is baseline.    Cortical: Extinction on DSS (neglect): none.  Reflexes: Plantar response flexor b/l.   Coordination: No dysmetria to FTN/HTS.  Gait: Not assessed due to safety concerns/risk of fall.       LABS:    CBC                       11.1   4.07  )-----------( 283      ( 04 Jul 2022 06:22 )             33.7     Chem 07-04    138  |  102  |  12  ----------------------------<  92  3.1<L>   |  24  |  0.56    Ca    8.8      04 Jul 2022 06:22  Phos  2.7     07-04  Mg     2.10     07-04    TPro  7.6  /  Alb  3.5  /  TBili  0.5  /  DBili  x   /  AST  29  /  ALT  16  /  AlkPhos  210<H>  07-03    Coags   LFTs LIVER FUNCTIONS - ( 03 Jul 2022 07:48 )  Alb: 3.5 g/dL / Pro: 7.6 g/dL / ALK PHOS: 210 U/L / ALT: 16 U/L / AST: 29 U/L / GGT: x               STUDIES & IMAGING    CT Head No Cont (07.02.22 @ 13:53)   No hydrocephalus, mass effect, midline shift, acute intracranial hemorrhage, or brain edema.  Visualized paranasal sinuses and mastoid air cells are clear.   Neurology Consult    BELINDA ROMERO  66y Female  MRN-[id number]      Admission HPI:  66 year old female with PMH osteogenesis imperfecta, bipolar disorder, JO-ANN, panic disorder presenting for increasing AMS and paranoia in the setting of a L humerus ORIF 1 month ago complicated by staph epi bacteremia and incomplete antibiotic course (PICC removed as pt was attempting to pull it out) and Hydromorphone ~60mg per day use for pain after ORIF. On exam patient with tangential speech, states she is upset because her  does not have a place to sleep. Alert and oriented to person place and time. Denies SI/HI. Denies alcohol, substance use. Patient states she feels fine, denies fevers, chills, abdominal pain, chest pain, shortness of breath, n/v/d/c.  reports pt has been increasingly confused and paranoid over the past week, sleeping with wallet and purse, refusing/inconsistent with medications for past 3-4 weeks after ORIF. Per  patient was more confused on initial presentation but is now almost back to her baseline with some memory difficulties  reports he has done his own research and believes that his wife is confused secondary to prolonged anesthesia during recent ORIF  ED course: afebrile, HR 87, /70, RR 16, SpO2 100% on RA. WBC count 3.65, hemoglobin 10.7 (baseline 9-10), potassium 2.6. UA with large ketones, protein, urobilinogen. Utox positive for opiates and PCP (per BH may be false positive in the setting of opiate use). CXR with no focal opacity. CT head with no hydrocephalus, acute intracranial hemorrhage, mass effect, or brain edema. Given potassium and 1L NSB.  (02 Jul 2022 19:28)      Neurology consulted for evaluation of word-finding difficulty. Patient reports noting intermittent difficulty articulating her words since her surgical procedure approximately 1 month prior. States she knows the words she is trying to say, but due to intermittent difficulty with articulation, has found herself substituting other similar terminology. She states severity appears to fluctuate, but is uncertain of specific provoking factors. She does report associated c/o headache, dizziness, lightheadedness, difficulty swallowing. She reports feelings of sadness due to the complications associated with her condition and resulting limitations on her life.     A 10-system ROS was performed and is negative except as noted above and/or in the HPI.      PAST MEDICAL & SURGICAL HISTORY:  Osteogenesis imperfecta      Former smoker      Hard of hearing      History of open reduction and internal fixation (ORIF) procedure      FAMILY HISTORY:  Family history of osteogenesis imperfecta (Father, Sibling)    Family history of valvular heart disease (Sibling)      SOCIAL HISTORY:   As stated in HPI.      MEDICATIONS    Home Medications:  acetaminophen 325 mg oral tablet: 2 tab(s) orally every 6 hours, As needed, Temp greater or equal to 38C (100.4F), mild pain (02 Jul 2022 21:00)  Ambien 5 mg oral tablet: 1 tab(s) orally once a day (at bedtime) (02 Jul 2022 21:00)  clonazePAM 0.5 mg oral tablet: 1 tab(s) orally 3 times a day (02 Jul 2022 21:00)  FLUoxetine 20 mg oral capsule: 1 cap(s) orally 3 times a day (02 Jul 2022 21:00)  HYDROmorphone 8 mg oral tablet: 1 tab(s) orally every 4-6 hours, As needed, moderate to Severe Pain (02 Jul 2022 21:00)  lamoTRIgine 100 mg oral tablet: 1 tab(s) orally 2 times a day (02 Jul 2022 21:00)  levothyroxine 50 mcg (0.05 mg) oral tablet: 1 tab(s) orally once a day (02 Jul 2022 21:00)  Multiple Vitamins oral tablet: 1 tab(s) orally once a day (02 Jul 2022 21:00)  rosuvastatin 10 mg oral tablet: 1 tab(s) orally once a day (at bedtime) (02 Jul 2022 21:00)    MEDICATIONS  (STANDING):  amLODIPine   Tablet 5 milliGRAM(s) Oral daily  atorvastatin 40 milliGRAM(s) Oral at bedtime  clonazePAM  Tablet 0.25 milliGRAM(s) Oral three times a day  enoxaparin Injectable 40 milliGRAM(s) SubCutaneous every 24 hours  FLUoxetine 20 milliGRAM(s) Oral daily  levothyroxine 50 MICROGram(s) Oral daily  multivitamin 1 Tablet(s) Oral daily    MEDICATIONS  (PRN):  acetaminophen     Tablet .. 650 milliGRAM(s) Oral every 6 hours PRN Temp greater or equal to 38C (100.4F), Mild Pain (1 - 3), Moderate Pain (4 - 6)  HYDROmorphone   Tablet 8 milliGRAM(s) Oral every 8 hours PRN Severe Pain (7 - 10)  melatonin 3 milliGRAM(s) Oral at bedtime PRN Insomnia  polyethylene glycol 3350 17 Gram(s) Oral daily PRN Constipation  senna 2 Tablet(s) Oral at bedtime PRN Constipation      Allergies  No Known Allergies        VITALS & EXAMINATION    Vital Signs Last 24 Hrs  T(C): 36.5 (04 Jul 2022 21:25), Max: 36.7 (04 Jul 2022 07:06)  T(F): 97.7 (04 Jul 2022 21:25), Max: 98 (04 Jul 2022 07:06)  HR: 94 (04 Jul 2022 21:25) (80 - 94)  BP: 157/84 (04 Jul 2022 21:25) (120/82 - 158/82)  RR: 18 (04 Jul 2022 21:25) (18 - 18)  SpO2: 98% (04 Jul 2022 21:25) (97% - 99%)      Neurological (>12):  MS: Eyes open, alert, oriented to person, place as "[facility]", and time ("July 4th 2022"). Able to name the last 3 presidents. Able to perform simple calculations (4+4, 20-3). Normal affect. Follows all commands.  Speech/Language: Clear, overall fluent with intermittent hesitancy. Repetition intact. Naming intermittently slightly impaired (names pen, calls phone 'ipad' several times before correcting to 'iphone', able to state the function of a watch but requires MC options to arrive at the appropriate name). Tangential, but easily redirected.   CNs: PERRL. EOMI, no nystagmus, no diplopia. V1-3 intact to LT b/l. No facial asymmetry b/l, full eye closure strength b/l. Slightly hard of hearing. Symmetric palate elevation in midline. Gag reflex deferred. Head turning & shoulder shrug intact b/l. Tongue midline, normal movements.  Motor: All extremities antigravity.  Sensation: Increased sensitivity to LT in the LUE/LLE compared to the right, which per pt is baseline.    Cortical: Extinction on DSS (neglect): none.  Reflexes: Plantar response flexor b/l.   Coordination: No dysmetria to FTN/HTS.  Gait: Not assessed due to safety concerns/risk of fall.       LABS:    CBC                       11.1   4.07  )-----------( 283      ( 04 Jul 2022 06:22 )             33.7     Chem 07-04    138  |  102  |  12  ----------------------------<  92  3.1<L>   |  24  |  0.56    Ca    8.8      04 Jul 2022 06:22  Phos  2.7     07-04  Mg     2.10     07-04    TPro  7.6  /  Alb  3.5  /  TBili  0.5  /  DBili  x   /  AST  29  /  ALT  16  /  AlkPhos  210<H>  07-03    Coags   LFTs LIVER FUNCTIONS - ( 03 Jul 2022 07:48 )  Alb: 3.5 g/dL / Pro: 7.6 g/dL / ALK PHOS: 210 U/L / ALT: 16 U/L / AST: 29 U/L / GGT: x               STUDIES & IMAGING    CT Head No Cont (07.02.22 @ 13:53)   No hydrocephalus, mass effect, midline shift, acute intracranial hemorrhage, or brain edema.  Visualized paranasal sinuses and mastoid air cells are clear.   Neurology Consult    BELINDA ROMERO  66y Female  MRN-[id number]      Admission HPI:  66 year old female with PMH osteogenesis imperfecta, bipolar disorder, JO-ANN, panic disorder presenting for increasing AMS and paranoia in the setting of a L humerus ORIF 1 month ago complicated by staph epi bacteremia and incomplete antibiotic course (PICC removed as pt was attempting to pull it out) and Hydromorphone ~60mg per day use for pain after ORIF. On exam patient with tangential speech, states she is upset because her  does not have a place to sleep. Alert and oriented to person place and time. Denies SI/HI. Denies alcohol, substance use. Patient states she feels fine, denies fevers, chills, abdominal pain, chest pain, shortness of breath, n/v/d/c.  reports pt has been increasingly confused and paranoid over the past week, sleeping with wallet and purse, refusing/inconsistent with medications for past 3-4 weeks after ORIF. Per  patient was more confused on initial presentation but is now almost back to her baseline with some memory difficulties  reports he has done his own research and believes that his wife is confused secondary to prolonged anesthesia during recent ORIF  ED course: afebrile, HR 87, /70, RR 16, SpO2 100% on RA. WBC count 3.65, hemoglobin 10.7 (baseline 9-10), potassium 2.6. UA with large ketones, protein, urobilinogen. Utox positive for opiates and PCP (per BH may be false positive in the setting of opiate use). CXR with no focal opacity. CT head with no hydrocephalus, acute intracranial hemorrhage, mass effect, or brain edema. Given potassium and 1L NSB.  (02 Jul 2022 19:28)      Neurology consulted for evaluation of word-finding difficulty. Patient reports noting intermittent difficulty articulating her words since her surgical procedure approximately 1 month prior. States she knows the words she is trying to say, but due to intermittent difficulty with articulation, has found herself substituting other similar terminology. She states severity appears to fluctuate, but is uncertain of specific provoking factors. She does not report associated c/o headache, dizziness, lightheadedness, difficulty swallowing. She reports feelings of sadness due to the complications associated with her condition and resulting limitations on her life.     A 10-system ROS was performed and is negative except as noted above and/or in the HPI.      PAST MEDICAL & SURGICAL HISTORY:  Osteogenesis imperfecta      Former smoker      Hard of hearing      History of open reduction and internal fixation (ORIF) procedure      FAMILY HISTORY:  Family history of osteogenesis imperfecta (Father, Sibling)    Family history of valvular heart disease (Sibling)      SOCIAL HISTORY:   As stated in HPI.      MEDICATIONS    Home Medications:  acetaminophen 325 mg oral tablet: 2 tab(s) orally every 6 hours, As needed, Temp greater or equal to 38C (100.4F), mild pain (02 Jul 2022 21:00)  Ambien 5 mg oral tablet: 1 tab(s) orally once a day (at bedtime) (02 Jul 2022 21:00)  clonazePAM 0.5 mg oral tablet: 1 tab(s) orally 3 times a day (02 Jul 2022 21:00)  FLUoxetine 20 mg oral capsule: 1 cap(s) orally 3 times a day (02 Jul 2022 21:00)  HYDROmorphone 8 mg oral tablet: 1 tab(s) orally every 4-6 hours, As needed, moderate to Severe Pain (02 Jul 2022 21:00)  lamoTRIgine 100 mg oral tablet: 1 tab(s) orally 2 times a day (02 Jul 2022 21:00)  levothyroxine 50 mcg (0.05 mg) oral tablet: 1 tab(s) orally once a day (02 Jul 2022 21:00)  Multiple Vitamins oral tablet: 1 tab(s) orally once a day (02 Jul 2022 21:00)  rosuvastatin 10 mg oral tablet: 1 tab(s) orally once a day (at bedtime) (02 Jul 2022 21:00)    MEDICATIONS  (STANDING):  amLODIPine   Tablet 5 milliGRAM(s) Oral daily  atorvastatin 40 milliGRAM(s) Oral at bedtime  clonazePAM  Tablet 0.25 milliGRAM(s) Oral three times a day  enoxaparin Injectable 40 milliGRAM(s) SubCutaneous every 24 hours  FLUoxetine 20 milliGRAM(s) Oral daily  levothyroxine 50 MICROGram(s) Oral daily  multivitamin 1 Tablet(s) Oral daily    MEDICATIONS  (PRN):  acetaminophen     Tablet .. 650 milliGRAM(s) Oral every 6 hours PRN Temp greater or equal to 38C (100.4F), Mild Pain (1 - 3), Moderate Pain (4 - 6)  HYDROmorphone   Tablet 8 milliGRAM(s) Oral every 8 hours PRN Severe Pain (7 - 10)  melatonin 3 milliGRAM(s) Oral at bedtime PRN Insomnia  polyethylene glycol 3350 17 Gram(s) Oral daily PRN Constipation  senna 2 Tablet(s) Oral at bedtime PRN Constipation      Allergies  No Known Allergies        VITALS & EXAMINATION    Vital Signs Last 24 Hrs  T(C): 36.5 (04 Jul 2022 21:25), Max: 36.7 (04 Jul 2022 07:06)  T(F): 97.7 (04 Jul 2022 21:25), Max: 98 (04 Jul 2022 07:06)  HR: 94 (04 Jul 2022 21:25) (80 - 94)  BP: 157/84 (04 Jul 2022 21:25) (120/82 - 158/82)  RR: 18 (04 Jul 2022 21:25) (18 - 18)  SpO2: 98% (04 Jul 2022 21:25) (97% - 99%)      Neurological (>12):  MS: Eyes open, alert, oriented to person, place as "[facility]", and time ("July 4th 2022"). Able to name the last 3 presidents. Able to perform simple calculations (4+4, 20-3). Normal affect. Follows all commands.  Speech/Language: Clear, overall fluent with intermittent hesitancy. Repetition intact. Naming intermittently slightly impaired (names pen, calls phone 'ipad' several times before correcting to 'iphone', able to state the function of a watch but requires MC options to arrive at the appropriate name). Tangential, but easily redirected.   CNs: PERRL. EOMI, no nystagmus, no diplopia. V1-3 intact to LT b/l. No facial asymmetry b/l, full eye closure strength b/l. Slightly hard of hearing. Symmetric palate elevation in midline. Gag reflex deferred. Head turning & shoulder shrug intact b/l. Tongue midline, normal movements.  Motor: All extremities antigravity.  Sensation: Increased sensitivity to LT in the LUE/LLE compared to the right, which per pt is baseline.    Cortical: Extinction on DSS (neglect): none.  Reflexes: Plantar response flexor b/l.   Coordination: No dysmetria to FTN/HTS.  Gait: Not assessed due to safety concerns/risk of fall.       LABS:    CBC                       11.1   4.07  )-----------( 283      ( 04 Jul 2022 06:22 )             33.7     Chem 07-04    138  |  102  |  12  ----------------------------<  92  3.1<L>   |  24  |  0.56    Ca    8.8      04 Jul 2022 06:22  Phos  2.7     07-04  Mg     2.10     07-04    TPro  7.6  /  Alb  3.5  /  TBili  0.5  /  DBili  x   /  AST  29  /  ALT  16  /  AlkPhos  210<H>  07-03    Coags   LFTs LIVER FUNCTIONS - ( 03 Jul 2022 07:48 )  Alb: 3.5 g/dL / Pro: 7.6 g/dL / ALK PHOS: 210 U/L / ALT: 16 U/L / AST: 29 U/L / GGT: x               STUDIES & IMAGING    CT Head No Cont (07.02.22 @ 13:53)   No hydrocephalus, mass effect, midline shift, acute intracranial hemorrhage, or brain edema.  Visualized paranasal sinuses and mastoid air cells are clear.

## 2022-07-06 ENCOUNTER — TRANSCRIPTION ENCOUNTER (OUTPATIENT)
Age: 67
End: 2022-07-06

## 2022-07-06 VITALS
RESPIRATION RATE: 17 BRPM | TEMPERATURE: 98 F | SYSTOLIC BLOOD PRESSURE: 138 MMHG | OXYGEN SATURATION: 99 % | HEART RATE: 80 BPM | DIASTOLIC BLOOD PRESSURE: 70 MMHG

## 2022-07-06 DIAGNOSIS — F31.9 BIPOLAR DISORDER, UNSPECIFIED: ICD-10-CM

## 2022-07-06 DIAGNOSIS — Z86.59 PERSONAL HISTORY OF OTHER MENTAL AND BEHAVIORAL DISORDERS: ICD-10-CM

## 2022-07-06 PROCEDURE — 99233 SBSQ HOSP IP/OBS HIGH 50: CPT

## 2022-07-06 PROCEDURE — 99232 SBSQ HOSP IP/OBS MODERATE 35: CPT

## 2022-07-06 RX ORDER — LAMOTRIGINE 25 MG/1
1 TABLET, ORALLY DISINTEGRATING ORAL
Qty: 0 | Refills: 0 | DISCHARGE

## 2022-07-06 RX ORDER — LAMOTRIGINE 25 MG/1
1 TABLET, ORALLY DISINTEGRATING ORAL
Qty: 0 | Refills: 0 | DISCHARGE
Start: 2022-07-06

## 2022-07-06 RX ORDER — CLONAZEPAM 1 MG
1 TABLET ORAL
Qty: 0 | Refills: 0 | DISCHARGE

## 2022-07-06 RX ORDER — LAMOTRIGINE 25 MG/1
25 TABLET, ORALLY DISINTEGRATING ORAL DAILY
Refills: 0 | Status: DISCONTINUED | OUTPATIENT
Start: 2022-07-06 | End: 2022-07-06

## 2022-07-06 RX ORDER — HYDROMORPHONE HYDROCHLORIDE 2 MG/ML
4 INJECTION INTRAMUSCULAR; INTRAVENOUS; SUBCUTANEOUS ONCE
Refills: 0 | Status: DISCONTINUED | OUTPATIENT
Start: 2022-07-06 | End: 2022-07-06

## 2022-07-06 RX ORDER — LAMOTRIGINE 25 MG/1
1 TABLET, ORALLY DISINTEGRATING ORAL
Qty: 7 | Refills: 0
Start: 2022-07-06 | End: 2022-07-12

## 2022-07-06 RX ORDER — ZOLPIDEM TARTRATE 10 MG/1
1 TABLET ORAL
Qty: 0 | Refills: 0 | DISCHARGE

## 2022-07-06 RX ADMIN — Medication 50 MICROGRAM(S): at 07:20

## 2022-07-06 RX ADMIN — LAMOTRIGINE 25 MILLIGRAM(S): 25 TABLET, ORALLY DISINTEGRATING ORAL at 16:14

## 2022-07-06 RX ADMIN — HYDROMORPHONE HYDROCHLORIDE 4 MILLIGRAM(S): 2 INJECTION INTRAMUSCULAR; INTRAVENOUS; SUBCUTANEOUS at 08:10

## 2022-07-06 RX ADMIN — HYDROMORPHONE HYDROCHLORIDE 4 MILLIGRAM(S): 2 INJECTION INTRAMUSCULAR; INTRAVENOUS; SUBCUTANEOUS at 01:58

## 2022-07-06 RX ADMIN — ENOXAPARIN SODIUM 40 MILLIGRAM(S): 100 INJECTION SUBCUTANEOUS at 07:22

## 2022-07-06 RX ADMIN — Medication 20 MILLIGRAM(S): at 16:11

## 2022-07-06 RX ADMIN — Medication 0.25 MILLIGRAM(S): at 07:19

## 2022-07-06 RX ADMIN — AMLODIPINE BESYLATE 5 MILLIGRAM(S): 2.5 TABLET ORAL at 07:20

## 2022-07-06 RX ADMIN — HYDROMORPHONE HYDROCHLORIDE 4 MILLIGRAM(S): 2 INJECTION INTRAMUSCULAR; INTRAVENOUS; SUBCUTANEOUS at 02:58

## 2022-07-06 RX ADMIN — Medication 0.25 MILLIGRAM(S): at 16:09

## 2022-07-06 RX ADMIN — Medication 3 MILLIGRAM(S): at 00:15

## 2022-07-06 RX ADMIN — HYDROMORPHONE HYDROCHLORIDE 4 MILLIGRAM(S): 2 INJECTION INTRAMUSCULAR; INTRAVENOUS; SUBCUTANEOUS at 07:20

## 2022-07-06 RX ADMIN — Medication 1 TABLET(S): at 16:11
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
## 2022-07-06 NOTE — PROGRESS NOTE ADULT - PROBLEM SELECTOR PLAN 2
Patient with a history of bipolar disorder, JO-ANN, panic disorder   - patient seen and evaluated by    - no need for 1:1 at this time, patient calm  - Discontinue Ambien  - Decrease Klonopin 0.5mg TID to 0.25 TID  - Discontinue home Lamictal due to recent inconsistency  - Continue home Prozac 20mg daily
Patient with a history of bipolar disorder, JO-ANN, panic disorder   - patient seen and evaluated by    - no need for 1:1 at this time, patient calm  - Discontinue Ambien  - Decrease Klonopin 0.5mg TID to 0.25 TID  - Discontinue home Lamictal due to recent inconsistency  - Continue home Prozac 20mg daily
Pt w/ hx of bipolar disorder, JO-ANN, panic disorder  -Pending psychiatry consult, appreciate recs  -Seen by  in ED  -No need for 1:1, patient calm  -Continue Klonopin 0.25 mg po TID, home Prozac 20mg po daily
Patient with a history of bipolar disorder, JO-ANN, panic disorder   - patient seen and evaluated by    - no need for 1:1 at this time, patient calm  - Discontinue Ambien  - Decrease Klonopin 0.5mg TID to 0.25 TID  - Discontinue home Lamictal due to recent inconsistency  - Continue home Prozac 20mg daily

## 2022-07-06 NOTE — BH CONSULTATION LIAISON PROGRESS NOTE - NSBHADMITCOUNSELOTHER_PSY_A_CORE FT
more than 50% of the time was spent in chart review, coordinating with medicine team, coordinating with  and counselling him and patient. Pls see above for details.

## 2022-07-06 NOTE — PROGRESS NOTE ADULT - PROBLEM SELECTOR PROBLEM 3
S/P ORIF (open reduction internal fixation) fracture
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
## 2022-07-06 NOTE — DISCHARGE NOTE NURSING/CASE MANAGEMENT/SOCIAL WORK - NSDCPEFALRISK_GEN_ALL_CORE
For information on Fall & Injury Prevention, visit: [email or web address] OR  [email or web address] OR  https://www.cdc.gov/steadi/patient.html

## 2022-07-06 NOTE — PROGRESS NOTE ADULT - ASSESSMENT
Pt is a 67 y/o female with PMHx of osteogenesis imperfecta, bipolar disorder, JO-ANN, and panic disorder p/w increasing AMS and paranoia in context of L humerus ORIF 1m ago, c/b staph epi bacteremia w/ incomplete antibiotic course (PICC removed since pt attempting to pull it out), and hydromorphone ~60mg daily for pain s/p ORIF. Pt is ready for discharge pending psychiatric assessment.
66 year old female with PMH osteogenesis imperfecta, bipolar disorder, JO-ANN, panic disorder presenting for increasing AMS and paranoia in the setting of a L humerus ORIF 1 month ago complicated by staph epi bacteremia and incomplete antibiotic course (PICC removed as pt was attempting to pull it out) and Hydromorphone ~60mg per day use for pain after ORIF.       

## 2022-07-06 NOTE — BH CONSULTATION LIAISON PROGRESS NOTE - CURRENT MEDICATION
MEDICATIONS  (STANDING):  amLODIPine   Tablet 5 milliGRAM(s) Oral daily  atorvastatin 40 milliGRAM(s) Oral at bedtime  clonazePAM  Tablet 0.25 milliGRAM(s) Oral three times a day  enoxaparin Injectable 40 milliGRAM(s) SubCutaneous every 24 hours  FLUoxetine 20 milliGRAM(s) Oral daily  levothyroxine 50 MICROGram(s) Oral daily  multivitamin 1 Tablet(s) Oral daily    MEDICATIONS  (PRN):  acetaminophen     Tablet .. 650 milliGRAM(s) Oral every 6 hours PRN Temp greater or equal to 38C (100.4F), Mild Pain (1 - 3), Moderate Pain (4 - 6)  HYDROmorphone   Tablet 4 milliGRAM(s) Oral every 8 hours PRN Severe Pain (7 - 10)  melatonin 3 milliGRAM(s) Oral at bedtime PRN Insomnia  polyethylene glycol 3350 17 Gram(s) Oral daily PRN Constipation  senna 2 Tablet(s) Oral at bedtime PRN Constipation

## 2022-07-06 NOTE — DISCHARGE NOTE NURSING/CASE MANAGEMENT/SOCIAL WORK - PATIENT PORTAL LINK FT
You can access the FollowMyHealth Patient Portal offered by [facility] by registering at the following website: [email or web address]. By joining [employer]’s FollowMyHealth portal, you will also be able to view your health information using other applications (apps) compatible with our system.

## 2022-07-06 NOTE — PROGRESS NOTE ADULT - PROBLEM SELECTOR PLAN 4
Patient with systolic BPs 140s-150s since admission, no history of hypertension   - patient mildly agitated, continue to monitor BP and assess need for anti-hypertensives
Patient with systolic BPs 140s-150s since admission, no history of hypertension   - patient mildly agitated, continue to monitor BP and assess need for anti-hypertensives
Pt w/ systolic BPs 140-150s since admission, no hx of HTN  -Continue amlodipine 5 mg po daily
Patient with systolic BPs 140s-150s since admission, no history of hypertension   - patient mildly agitated, continue to monitor BP and assess need for anti-hypertensives

## 2022-07-06 NOTE — PROGRESS NOTE ADULT - ATTENDING COMMENTS
Pt seen and examined by me. 66F with osteogenesis imperfecta, bipolar disorder, JO-ANN, panic disorder and recent staph epi bacteremia who presents with AMS and word finding difficultly. AMS has improved but word finding difficulty persists. Pt and  mention that the patient did have a similar speech issue as a child which was treated with speech therapy. Within the past few months it has returned and worsened.  at bedside reports that she has returned to baseline mental status. Pt evalauted by neurology yesterday, awaiting attending recommendations.    # Word finding difficulty- ?speech impediment vs aphasia. Neurology consulted, will follow up final recommendations. AMS resolved. Unable to get MRI 2/2 multiple orthopedic hardware  # Bipolar disorder- follow up psych regarding d/c medication regimen.     d/c planning pending neuro and psych recommendations.
66 y.o F w/ a hx of osteogenesis imperfecta, bipolar d/o, JO-ANN, recent ORIF c/b Staph epi bacteremia who presents with confusion, word finding difficulty and bizarre behavior at home.     Patient reports she is hospitalized about potassium and believes both her and her  were offered admission. Disorganized, noted to have some word finding difficulty.     # Acute encephalopathy: C/s neuro given word finding difficulty and confusion. Psychiatry following- this episode is different from her usual manic episodes.   # Hx of staph epi bacteremia: Abx therapy discontinued at least 1 week early- d/w ID if they should be continued IP.
66 y.o F w/ a hx of osteogenesis imperfecta, bipolar d/o, JO-ANN, recent ORIF c/b Staph epi bacteremia who presents with confusion, word finding difficulty and bizarre behavior at home.     Examined with  at bedside. Continues to seem confused at times, tangential and disorganized, word finding difficulty and ?fluent aphasia.     # Acute encephalopathy: Awaiting neuro evaluation. Pt reporting metal hardware is not MRI compatible. Psychiatry following.   # Hx of staph epi bacteremia: Abx therapy discontinued at least 1 week early- per ID, do not need to continue abx.
Pt seen and examined by me. 66F with osteogenesis imperfecta, bipolar disorder, JO-ANN, panic disorder and recent staph epi bacteremia who presents with AMS and word finding difficultly. Pt remains at baseline. There was concern that the patient may have been taking home medications while inpatient, I discussed this with both the patient and her  separately and they deny additional medication doses while in the hospital. Plan is to d/c patient today. Pt has dilaudid and klonopin at home so no need to send refills.    # Word finding difficulty- ?speech impediment vs aphasia. outpatient neuro follow up  # Bipolar disorder- follow up psych regarding d/c medication regimen.     d/c today pending psych recommendations

## 2022-07-06 NOTE — BH CONSULTATION LIAISON PROGRESS NOTE - NSBHCONSULTFOLLOWAFTERCARE_PSY_A_CORE FT
Pt can resume care with outpatient provider. Psychiatry has reached out to Dr. Pratt, appt pending.     Can also offer referral to geriatric psychiatry clinic if family would like to seek alternate provider.

## 2022-07-06 NOTE — PROGRESS NOTE ADULT - PROBLEM SELECTOR PLAN 5
Patient presenting with hemoglobin 10, appears to be baseline per chart review   - will order iron studies in AM
Patient presenting with hemoglobin 10, appears to be baseline per chart review  - Iron studies wnl
Pt p/w hgb 10, appear sto be baseline per chart review, iron studies WNL
Patient presenting with hemoglobin 10, appears to be baseline per chart review   - will order iron studies in AM

## 2022-07-06 NOTE — BH CONSULTATION LIAISON PROGRESS NOTE - NSBHADMITCOUNSEL_PSY_A_CORE
risks and benefits of treatment options/instructions for management, treatment and follow up/importance of adherence to chosen treatment/risk factor reduction/client/family/caregiver education/other...

## 2022-07-06 NOTE — PROGRESS NOTE ADULT - PROBLEM SELECTOR PLAN 3
Patient s/p ORIF   - Tapering opioids as tolerated to hydromorphone 4 mg Q8 PRN
Patient s/p ORIF   - continue with home pain medications for now to avoid withdrawal   - taper over course of admission as tolerated
S/p L humerus ORIF  -Pt states pain well controlled w/ medication regimen  -Hydromorphone 4 mg po q8h PRN pain
Patient s/p ORIF   - continue with home pain medications for now to avoid withdrawal   - taper over course of admission as tolerated

## 2022-07-06 NOTE — PROGRESS NOTE ADULT - PROBLEM SELECTOR PLAN 1
Pt w/ hx of bipolar disorder, JO-ANN, panic disorder, now w/ AMS and paranoia in context of recent ORIF c/b staph epi bacteremia and hydromorphone use. Patient also w/ significant word finding difficulty, which per neuro arose in 1/2022.  -CTH negative, labs unremarkable  - c/s in ED, pending psychiatry consult for assessment, appreciate recs  -Per neuro recs no need for further imaging, follow up outpatient w/cognitive neuro at [address] [place] [address] Pt w/ hx of bipolar disorder, JO-ANN, panic disorder, now w/ AMS and paranoia in context of recent ORIF c/b staph epi bacteremia and hydromorphone use. Patient also w/ significant word finding difficulty, which per neuro arose in 1/2022.  -CTH negative, labs unremarkable  - c/s in ED  -Psych consult, appreciate recs: restart lamictal, schedule f/u outpatient with psychaitrist (Dr. Glenys Pratt)  -Per neuro recs no need for further imaging, follow up outpatient w/cognitive neuro at [address] [place] [address]

## 2022-07-06 NOTE — DISCHARGE NOTE NURSING/CASE MANAGEMENT/SOCIAL WORK - NSDCFUADDAPPT_GEN_ALL_CORE_FT
Patient can follow up with general neurology at [address] [place] [address] 1-2 weeks after discharge. Please instruct the patient to call [phone] to schedule this appointment.     Please also follow up with your psychiatrist Dr. Pratt on your scheduled appointment on 7/11 to follow up on adjustments to your psychiatric medications.

## 2022-07-06 NOTE — BH CONSULTATION LIAISON PROGRESS NOTE - NSBHCHARTREVIEWVS_PSY_A_CORE FT
Vital Signs Last 24 Hrs  T(C): 36.7 (06 Jul 2022 07:25), Max: 37.2 (05 Jul 2022 15:05)  T(F): 98.1 (06 Jul 2022 07:25), Max: 99 (05 Jul 2022 15:05)  HR: 80 (06 Jul 2022 07:25) (80 - 91)  BP: 138/70 (06 Jul 2022 07:25) (130/92 - 145/86)  BP(mean): --  RR: 17 (06 Jul 2022 07:25) (17 - 20)  SpO2: 99% (06 Jul 2022 07:25) (98% - 99%)

## 2022-07-06 NOTE — DISCHARGE NOTE NURSING/CASE MANAGEMENT/SOCIAL WORK - NSDCVIVACCINE_GEN_ALL_CORE_FT
COVID-19 vaccine, vector-nr, rS-Ad26, PF, 0.5 mL ([facility]); 15-Mar-2021 15:30; Adelia Lora (RN); [employer]; [id number] (Exp. Date: 15-Mar-2021); IntraMuscular; Deltoid Right.; 0.5 milliLiter(s);   Tdap; 30-Mar-2022 17:24; Rabia Cabello (SHLOMO); [employer]; [id number] (Exp. Date: 18-Jan-2024); IntraMuscular; Deltoid Right.; 0.5 milliLiter(s); VIS (VIS Published: 09-May-2013, VIS Presented: 30-Mar-2022);

## 2022-07-06 NOTE — BH CONSULTATION LIAISON PROGRESS NOTE - NSBHCHARTREVIEWLAB_PSY_A_CORE FT
11.1   4.40  )-----------( 284      ( 05 Jul 2022 08:15 )             34.2   07-05    138  |  105  |  15  ----------------------------<  96  3.9   |  23  |  0.58    Ca    9.0      05 Jul 2022 08:15  Phos  2.4     07-05  Mg     2.10     07-05    Vitamin B12, Serum: 972 pg/mL (07.03.22 @ 07:48)   Thyroid Stimulating Hormone, Serum: 1.27 uIU/mL (07.03.22 @ 07:48)
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
## 2022-07-06 NOTE — BH CONSULTATION LIAISON PROGRESS NOTE - NSBHFUPINTERVALCCFT_PSY_A_CORE
FU delirium FU delirium  reviewed ed beh assessment upon ed admission  chart, labs reviewed  no prn needs, calm, compliant  coordinated with medicine team-- aware of below plan.

## 2022-07-06 NOTE — PROGRESS NOTE ADULT - PROBLEM SELECTOR PLAN 6
-Diet: DASH  -DVT prophylaxis: lovenox  -Dispo: home pending psychiatry consult -Diet: DASH  -DVT prophylaxis: lovenox  -Dispo: home pending psychiatry consult            #Hx Staph Epi bacteremia:  - S/p incomplete tx w/ vancomycin (supposed to end 30th however patient prematurely ended 1 week early)  - Contacted ID - no need for further abx treatment at this time.

## 2022-07-06 NOTE — PROGRESS NOTE ADULT - SUBJECTIVE AND OBJECTIVE BOX
_____ PROGRESS NOTE     |     BELINDA ROMERO | [id number] |  | LOS 4d    Attending Physician:    CC Patient is a 66y old  Female who presents with a chief complaint of AMS (05 Jul 2022 07:42)      OVERNIGHT:    SUBJECTIVE:    ______      Allergies    No Known Allergies    Intolerances        MEDICATIONS  (STANDING):  amLODIPine   Tablet 5 milliGRAM(s) Oral daily  atorvastatin 40 milliGRAM(s) Oral at bedtime  clonazePAM  Tablet 0.25 milliGRAM(s) Oral three times a day  enoxaparin Injectable 40 milliGRAM(s) SubCutaneous every 24 hours  FLUoxetine 20 milliGRAM(s) Oral daily  levothyroxine 50 MICROGram(s) Oral daily  multivitamin 1 Tablet(s) Oral daily    MEDICATIONS  (PRN):  acetaminophen     Tablet .. 650 milliGRAM(s) Oral every 6 hours PRN Temp greater or equal to 38C (100.4F), Mild Pain (1 - 3), Moderate Pain (4 - 6)  HYDROmorphone   Tablet 4 milliGRAM(s) Oral every 8 hours PRN Severe Pain (7 - 10)  melatonin 3 milliGRAM(s) Oral at bedtime PRN Insomnia  polyethylene glycol 3350 17 Gram(s) Oral daily PRN Constipation  senna 2 Tablet(s) Oral at bedtime PRN Constipation      OBJECTIVE:    Vital Signs Last 24 Hrs  T(C): 36.7 (06 Jul 2022 07:25), Max: 37.2 (05 Jul 2022 15:05)  T(F): 98.1 (06 Jul 2022 07:25), Max: 99 (05 Jul 2022 15:05)  HR: 80 (06 Jul 2022 07:25) (80 - 91)  BP: 138/70 (06 Jul 2022 07:25) (130/92 - 145/86)  BP(mean): --  RR: 17 (06 Jul 2022 07:25) (17 - 20)  SpO2: 99% (06 Jul 2022 07:25) (98% - 99%)    I&O's Detail    05 Jul 2022 07:01  -  06 Jul 2022 07:00  --------------------------------------------------------  IN:    Oral Fluid: 1100 mL  Total IN: 1100 mL    OUT:    Voided (mL): 600 mL  Total OUT: 600 mL    Total NET: 500 mL          Physical exam:  ________    LABS:                        11.1   4.40  )-----------( 284      ( 05 Jul 2022 08:15 )             34.2     07-05    138  |  105  |  15  ----------------------------<  96  3.9   |  23  |  0.58    Ca    9.0      05 Jul 2022 08:15  Phos  2.4     07-05  Mg     2.10     07-05                     INTERNAL MEDICINE PROGRESS NOTE  Olayinka Ricardo, MS4  Please contact via TEAMS  Attending Physician: Dr. Karlie Rahman MD     SUBJECTIVE/OVERNIGHT EVENTS:  -Pt seen and examined at bedside  -No overnight events, 8/10 pain treated with Dilaudid 4mg overnight, reports pain well controlled with medication regimen    Allergies: No Known Allergies    MEDICATIONS  (STANDING):  amLODIPine   Tablet 5 milliGRAM(s) Oral daily  atorvastatin 40 milliGRAM(s) Oral at bedtime  clonazePAM  Tablet 0.25 milliGRAM(s) Oral three times a day  enoxaparin Injectable 40 milliGRAM(s) SubCutaneous every 24 hours  FLUoxetine 20 milliGRAM(s) Oral daily  levothyroxine 50 MICROGram(s) Oral daily  multivitamin 1 Tablet(s) Oral daily    MEDICATIONS  (PRN):  acetaminophen     Tablet .. 650 milliGRAM(s) Oral every 6 hours PRN Temp greater or equal to 38C (100.4F), Mild Pain (1 - 3), Moderate Pain (4 - 6)  HYDROmorphone   Tablet 4 milliGRAM(s) Oral every 8 hours PRN Severe Pain (7 - 10)  melatonin 3 milliGRAM(s) Oral at bedtime PRN Insomnia  polyethylene glycol 3350 17 Gram(s) Oral daily PRN Constipation  senna 2 Tablet(s) Oral at bedtime PRN Constipation    OBJECTIVE:    Vital Signs Last 24 Hrs  T(C): 36.7 (06 Jul 2022 07:25), Max: 37.2 (05 Jul 2022 15:05)  T(F): 98.1 (06 Jul 2022 07:25), Max: 99 (05 Jul 2022 15:05)  HR: 80 (06 Jul 2022 07:25) (80 - 91)  BP: 138/70 (06 Jul 2022 07:25) (130/92 - 145/86)  BP(mean): --  RR: 17 (06 Jul 2022 07:25) (17 - 20)  SpO2: 99% (06 Jul 2022 07:25) (98% - 99%)    Physical exam:  Gen: laying recumbent, well-appearing  CV: Regular rate and rhythm, normal S1/S2, no murmurs/rub/gallops  Pulm: normal respiratory effort, lungs CTA BL  Abd: normoactive BS, nontender to palpation, no guarding/rebound tenderness/rigidity, no hepatosplenomegaly  Ext: mild LE pitting edema BL   Psych: alert and oriented x3, appropriate affect, verbose and tangential speech    I&O's Detail    05 Jul 2022 07:01  -  06 Jul 2022 07:00  --------------------------------------------------------  IN:    Oral Fluid: 1100 mL  Total IN: 1100 mL    OUT:    Voided (mL): 600 mL  Total OUT: 600 mL    Total NET: 500 mL    CAPILLARY BLOOD GLUCOSE    LABS:                         11.1   4.40  )-----------( 284      ( 05 Jul 2022 08:15 )             34.2     07-05    138  |  105  |  15  ----------------------------<  96  3.9   |  23  |  0.58    Ca    9.0      05 Jul 2022 08:15  Phos  2.4     07-05  Mg     2.10     07-05    RADIOLOGY, EKG & ADDITIONAL TESTS: Reviewed.

## 2022-07-06 NOTE — BH CONSULTATION LIAISON PROGRESS NOTE - NSBHATTESTCOMMENTATTENDFT_PSY_A_CORE
met with the patient. case discussed with resident md, impression and plan discussed and agreed upon. Patient was sitting comfortably eating lunch,  by her side. She seemed elated to see md, and greets with an exaggerated smile. Affect was not labile, nor was speech pressured during this interview. TP was at times loose in ass, but not disorganized. She was oriented x 3, and denies any depressive symptoms, or si or hi, or ah or vh or paranoia. She and her  were keen on going home today. Counselled them that Lamictal was not restarted as per ed reccs, and since discontinuation, it has to be restarted at a low dose of 25mg q am po.  and patient in agreement.  states that he has no safety concerns, is off from work for 2-3 weeks, and will monitor her.   I have given written instructions to f.u with dr mauro meredith in 5 days upon d/c for monitoring of mood. Gave information for the [facility] crisis center if needed.   Safety, discharge and treatment planning done xtensively with  and patient.

## 2022-07-07 LAB
CULTURE RESULTS: SIGNIFICANT CHANGE UP
CULTURE RESULTS: SIGNIFICANT CHANGE UP
SPECIMEN SOURCE: SIGNIFICANT CHANGE UP
SPECIMEN SOURCE: SIGNIFICANT CHANGE UP

## 2022-07-18 ENCOUNTER — APPOINTMENT (OUTPATIENT)
Dept: ORTHOPEDIC SURGERY | Facility: CLINIC | Age: 67
End: 2022-07-18

## 2022-07-18 PROCEDURE — 73060 X-RAY EXAM OF HUMERUS: CPT | Mod: LT

## 2022-07-18 PROCEDURE — 99024 POSTOP FOLLOW-UP VISIT: CPT

## 2022-07-18 PROCEDURE — 73080 X-RAY EXAM OF ELBOW: CPT | Mod: LT

## 2022-08-07 NOTE — ED BEHAVIORAL HEALTH ASSESSMENT NOTE - NSSUICPROTFACT_PSY_ALL_CORE
Negative Responsibility to children, family, or others/Supportive social network of family or friends/Engaged in work or school

## 2022-08-08 ENCOUNTER — APPOINTMENT (OUTPATIENT)
Dept: ORTHOPEDIC SURGERY | Facility: CLINIC | Age: 67
End: 2022-08-08

## 2022-08-08 PROCEDURE — 99024 POSTOP FOLLOW-UP VISIT: CPT

## 2022-08-08 PROCEDURE — 73080 X-RAY EXAM OF ELBOW: CPT | Mod: LT

## 2022-09-19 NOTE — ED ADULT NURSE NOTE - NS_SISCREENINGSR_GEN_ALL_ED
Negative Suturegard Intro: Intraoperative tissue expansion was performed, utilizing the SUTUREGARD device, in order to reduce wound tension.

## 2022-09-27 ENCOUNTER — APPOINTMENT (OUTPATIENT)
Dept: ORTHOPEDIC SURGERY | Facility: CLINIC | Age: 67
End: 2022-09-27

## 2022-09-27 DIAGNOSIS — Q78.0 OSTEOGENESIS IMPERFECTA: ICD-10-CM

## 2022-09-27 DIAGNOSIS — S42.322K: ICD-10-CM

## 2022-09-27 PROCEDURE — 73080 X-RAY EXAM OF ELBOW: CPT | Mod: LT

## 2022-09-27 PROCEDURE — 99213 OFFICE O/P EST LOW 20 MIN: CPT

## 2022-09-30 PROBLEM — S42.322K: Status: ACTIVE | Noted: 2022-06-06

## 2022-09-30 PROBLEM — Q78.0 OSTEOGENESIS IMPERFECTA: Status: ACTIVE | Noted: 2019-11-19

## 2022-10-12 NOTE — PROGRESS NOTE ADULT - PROBLEM SELECTOR PLAN 3
Patient notified of results
Pain has been better controlled on current dose of dilaudid  will try to taper down as tolerated  follow for oversedation   GI regime to prevent constipation
Pain has been better controlled on current dose of dilaudid  will try to taper down as tolerated  follow for oversedation   GI regime to prevent constipation
Patient with a hx of chronic pain  continue current pain management  May need a chronic pain management consult post op  follow for oversedation  GI regime to prevent constipation
Patient with a hx of chronic pain  continue current pain management  follow for oversedation  GI regime to prevent constipation
Pain has been better controlled on current dose of dilaudid  will try to taper down as tolerated  follow for oversedation   GI regime to prevent constipation
Patient with a hx of chronic pain  continue current pain management  May need a chronic pain management consult post op  follow for oversedation  GI regime to prevent constipation
Patient with a hx of chronic pain  continue current pain management  May need a chronic pain management consult post op  will increase dilaudid to 4mg q4h  follow for oversedation  GI regime to prevent constipation
Patient with a hx of chronic pain  continue current pain management  May need a chronic pain management consult post op  will increase dilaudid to 4mg q4h  follow for oversedation  Pain has been better contrlled   GI regime to prevent constipation
Patient with a hx of chronic pain  continue current pain management  follow for oversedation  GI regime to prevent constipation
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
## 2022-11-17 NOTE — ED BEHAVIORAL HEALTH ASSESSMENT NOTE - DOMICILED WITH
Detail Level: Simple Previous Infection Text: The patient has recovered from a previous COVID-19 infection. Has The Patient Been Vaccinated For Covid-19?: Yes Has The Patient Been Infected With Covid-19 In The Past?: No Family

## 2022-12-02 NOTE — ED PROVIDER NOTE - CHILD ABUSE FACILITY
Spoke with Memo Hernandez at [place] [address]. Stated patient had last OV on 9/20/22. Patient also had 2nd A1c done on 9/16/22 which was 6.6%. No urine microalbumin was done this year yet but Lipid Panel done back in June. Asked her if she could fax this information and she agreed. Will document DM OV and A1c since obtained already over phone. Spoke to patient and advised him that office will send documentation spoken to about. He stated he is seeing provider next week and will ask to get urine microalbumin done at that time. He also stated he does not receive flu shot due to Mandaen reason. Stated he will have provider send in note stating this.        [facility]ides, [address] [facility]   Phone: [phone]     For Pharmacy [address] in place: No  Gap Closed?: Yes  Time Spent (min): 15 H

## 2023-01-03 ENCOUNTER — APPOINTMENT (OUTPATIENT)
Dept: ORTHOPEDIC SURGERY | Facility: CLINIC | Age: 68
End: 2023-01-03
Payer: COMMERCIAL

## 2023-01-03 DIAGNOSIS — R26.9 UNSPECIFIED ABNORMALITIES OF GAIT AND MOBILITY: ICD-10-CM

## 2023-01-03 PROCEDURE — 99213 OFFICE O/P EST LOW 20 MIN: CPT

## 2023-01-03 PROCEDURE — 73060 X-RAY EXAM OF HUMERUS: CPT | Mod: LT

## 2023-01-03 PROCEDURE — 73080 X-RAY EXAM OF ELBOW: CPT | Mod: LT

## 2023-03-16 NOTE — OCCUPATIONAL THERAPY INITIAL EVALUATION ADULT - LEVEL OF INDEPENDENCE: SIT/STAND, REHAB EVAL
[Initial Evaluation] : an initial evaluation [FreeTextEntry1] : Intoeing and flatfeet [Mother] : mother minimum assist (75% patients effort)

## 2023-03-23 NOTE — PATIENT PROFILE ADULT - NS PRO AD NO ADVANCE DIRECTIVE
Warfarin 2.5mg daily EXCEPT 1.25mg every Tuesday and Friday    Keep greens consistent. Usually eats about 2 salads per week at most.     Call [phone] with signs or symptoms of bleeding or ANY medication changes (including over-the-counter medications or herbal supplements). Especially if you begin oral steroids and/or antibiotics. If significant bleeding occurs please seek immediate medical attention. Keep the number of servings and portion size of vitamin K containing foods (dark green, leafy vegetables) the same each week. Vegetables high in vitamin K : broccoli, spinach, leaf lettuce, keke lettuce, kale, collards, asparagus, brussel sprouts. Please call if you routine diet changes. Limit alcohol intake. Please call if this changes. Allow 72 hours for warfarin refills. Immunization History   Administered Date(s) Administered    COVID-19, MODERNA BLUE border, Primary or Immunocompromised, (age 12y+), IM, 100 mcg/0.5mL 2021, 2021, 2021, 2022    Influenza Vaccine, unspecified formulation 2012, 2013, 2014, 2015, 10/04/2016, 2017, 10/01/2018, 10/10/2019    Influenza, FLUZONE (age 72 y+), High Dose, 0.7mL 10/19/2021    Pneumococcal Conjugate 7-valent ([facility]anna) 2016    Pneumococcal, PPSV23, PNEUMOVAX 23, (age 2y+), SC/IM, 0.5mL 2004           Orders Placed This Encounter   Procedures    Protime-INR     This external order was created through the results console. No orders of the defined types were placed in this encounter. Reviewed AVS with patient / caregiver.     Billing Points:  0 billing points this visit     For Pharmacy Admin Tracking Only    Intervention Detail: Adherence Monitorin  Total # of Interventions Recommended: 0  Total # of Interventions Accepted: 0  Time Spent (min): 20     Lorri GonzalezD Candidate 2023 3/23/2023 3:45 PM No

## 2023-05-11 NOTE — PATIENT PROFILE ADULT - FALL HARM RISK - CONCLUSION
Fall with Harm Risk Erythromycin Pregnancy And Lactation Text: This medication is Pregnancy Category B and is considered safe during pregnancy. It is also excreted in breast milk.

## 2023-05-21 NOTE — ED PROVIDER NOTE - ATTENDING CONTRIBUTION TO CARE
show
I have reviewed this note, the presenting symptoms, and the Chief Complaint and the History of Present Illness as documented, with the other care provider(s) including resident, ACP, and nurses on the patient care team. I have also reviewed this patient's past medical/surgical history and social/family history as reviewed and listed in this electronic medical record.  I agree with the resident/ACP documentation except where noted otherwise in my personal documentation.  See MDM.  --BMM

## 2023-07-17 ENCOUNTER — APPOINTMENT (OUTPATIENT)
Dept: ORTHOPEDIC SURGERY | Facility: CLINIC | Age: 68
End: 2023-07-17

## 2023-08-14 NOTE — PROGRESS NOTE ADULT - PROBLEM/PLAN-2
Spoke to patient at 9:22 am this morning to follow her ostomy photos sent to writer via portal. Utilized [employer] provided by patient's video phone.     Writer inquired in any changes to bowel habits, complaints, or bleeding prior to discussion/review with her MDs. She expresses there are no major/worsening changes to her symptoms, noting abdominal discomfort/pain is still present (her baseline is no pain), bleeding is not as severe as last week.   She reports he was currently at work at time of call.  We discussed she has not rescheduled her OP appointment with Dr. Tellez.    She reports her availability to talk further is after 1 pm today.  She has limited availability for OV this week, but reports some availability tomorrow.    She notes her INR visit tomorrow and her EGD with Dr. Stevens later this week 8/17.    We discussed I would forward her photos to the requested physicians. Multidisciplinary perfectserve initiated between Dr. Tellez, RN Navigator, and Dr. Stevens regarding next steps. Dr. Tellez supportive of patient returning to meet LUCHO Huitron.  To note patient has seen ostomy CNS/APN in past ([facility] 8/2022).    Writer will arrange.  
DISPLAY PLAN FREE TEXT

## 2023-08-21 ENCOUNTER — APPOINTMENT (OUTPATIENT)
Dept: ORTHOPEDIC SURGERY | Facility: CLINIC | Age: 68
End: 2023-08-21
Payer: COMMERCIAL

## 2023-08-21 DIAGNOSIS — S42.342D DISPLACED SPIRAL FRACTURE OF SHAFT OF HUMERUS, LEFT ARM, SUBSEQUENT ENCOUNTER FOR FRACTURE WITH ROUTINE HEALING: ICD-10-CM

## 2023-08-21 PROCEDURE — 73060 X-RAY EXAM OF HUMERUS: CPT | Mod: LT

## 2023-08-21 PROCEDURE — 73080 X-RAY EXAM OF ELBOW: CPT | Mod: LT

## 2023-08-21 PROCEDURE — 99214 OFFICE O/P EST MOD 30 MIN: CPT

## 2023-08-25 PROBLEM — S42.342D: Status: ACTIVE | Noted: 2022-04-18
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
## 2024-04-07 NOTE — H&P ADULT - NSHPPOAURINARYCATHETER_GEN_ALL_CORE
RT Inhaler-Nebulizer Bronchodilator Protocol Note    There is a bronchodilator order in the chart from a provider indicating to follow the RT Bronchodilator Protocol and there is an “Initiate RT Inhaler-Nebulizer Bronchodilator Protocol” order as well (see protocol at bottom of note).    CXR Findings:  No results found.    The findings from the last RT Protocol Assessment were as follows:   History Pulmonary Disease: None or smoker <15 pack years  Respiratory Pattern: Regular pattern and RR 12-20 bpm  Breath Sounds: Clear breath sounds  Cough: Strong, spontaneous, non-productive  Indication for Bronchodilator Therapy: Decreased or absent breath sounds, On home bronchodilators  Bronchodilator Assessment Score: 0    Aerosolized bronchodilator medication orders have been revised according to the RT Inhaler-Nebulizer Bronchodilator Protocol below.    Respiratory Therapist to perform RT Therapy Protocol Assessment initially then follow the protocol.  Repeat RT Therapy Protocol Assessment PRN for score 0-3 or on second treatment, BID, and PRN for scores above 3.    No Indications - adjust the frequency to every 6 hours PRN wheezing or bronchospasm, if no treatments needed after 48 hours then discontinue using Per Protocol order mode.     If indication present, adjust the RT bronchodilator orders based on the Bronchodilator Assessment Score as indicated below.  Use Inhaler orders unless patient has one or more of the following: on home nebulizer, not able to hold breath for 10 seconds, is not alert and oriented, cannot activate and use MDI correctly, or respiratory rate 25 breaths per minute or more, then use the equivalent nebulizer order(s) with same Frequency and PRN reasons based on the score.  If a patient is on this medication at home then do not decrease Frequency below that used at home.    0-3 - enter or revise RT bronchodilator order(s) to equivalent RT Bronchodilator order with Frequency of every 4 hours PRN for  no

## 2024-07-11 NOTE — ED PROVIDER NOTE - NS ED MD DISPO ADMITTING SERVICE
Subjective: Pt agreeable to therapeutic plan of care.  Cognition: oriented to Person, Place, Time, and Situation    Objective:     Bed Mobility: Max-A and Assist x 2 supine to sit  Functional Transfers: Mod-A, Assist x 2, and with rolling walker     Balance: supported, static, dynamic, and standing Mod-A and Assist x 2  Functional Ambulation: Mod-A, Assist x 2, and with rolling walker    Lower Body Dressing: Max-A  ADL Position: edge of bed sitting  ADL Comments: Donning socks    Toileting: Dependent  ADL Position: supported standing  ADL Comments: Pt external cath leaking and bloody discharge present. Dependent for hygiene in standing.     Therapeutic Exercise - 10 Reps B UE AROM supported sitting / chair    This OT returned at a later time ([id number]) to assist pt back to bed with BRENT De Los Santos's assistance. Max A x1 for stand pivot to bed without RW, RN provided renee-care. Max Ax2 to return to supine.    Vitals: WNL    Pain: Generalized chronic pain 6/10  Interventions for pain: Repositioned and Increased Activity  Education: Provided education on the importance of mobility in the acute care setting, Verbal/Tactile Cues, ADL training, and Transfer Training      Assessment: Ban Brennan presents with ADL impairments affecting function including balance, endurance / activity tolerance, pain, and strength. Demonstrated functioning below baseline abilities indicate the need for continued skilled intervention while inpatient. Tolerating session today without incident. PT with overall improved function this date, demoing ability to transfer to chair from bed and to complete BUE exercises while in chair. Ed provided to complete exercises 3-5x/day. OT returned to assist pt back to bed as outlined above. Pt remains unsafe for return home, requiring significant assist with ADL care and mobility. Will continue to follow and progress as tolerated.     Plan/Recommendations:   Moderate Intensity Therapy recommended post-acute  "care. This is recommended as therapy feels the patient would require 3-4 days per week and wouldn't tolerate \"3 hour daily\" rehab intensity. SNF would be the preferred choice. If the patient does not agree to SNF, arrange HH or OP depending on home bound status. If patient is medically complex, consider LTACH.. Pt requires no DME at discharge.     Pt desires Skilled Rehab placement at discharge. Pt cooperative; agreeable to therapeutic recommendations and plan of care.     Modified Zach: N/A = No pre-op stroke/TIA    Post-Tx Position: Supine with HOB Elevated, Alarms activated, and Call light and personal items within reach  PPE: gloves and surgical mask    " SURG

## 2024-07-17 NOTE — ED ADULT NURSE NOTE - TEMPLATE LIST FOR HEAD TO TOE ASSESSMENT
"Pessary follow-up visit.    Caitlyn Wasserman is presents after her pessary fell out at home.  She did well with it for two days, then had a hard bowel movement and it came out.  She has been having problems with constipation.  She uses metamucil twice daily and eats high fiber foods including apples and prunes.  She tries to drink water with it.    /84 (BP Location: Left arm, Patient Position: Chair, Cuff Size: Adult Regular)   Pulse 61   Temp 96.9  F (36.1  C) (Tympanic)   Resp 16   Ht 1.575 m (5' 2\")   Wt 67.1 kg (148 lb)   LMP  (LMP Unknown)   BMI 27.07 kg/m       EXAM:    General Appearance: Pleasant, alert, appropriate appearance for age. No acute distress  Head Exam: Normocephalic, without obvious abnormality.  Genitourinary Exam Female:   External genitalia: atrophic vulva  Urinary system: urethral meatus normal  Vagina: mucosa atrophic and pale, no excoriations or ulcerations noted.    Procedure: Pessary Fitting  Indication:    Encounter Diagnoses   Name Primary?    Uterovaginal prolapse, complete Yes    Constipation, unspecified constipation type            The risks and benefits were discussed.  The patient was placed in the dorsal-lithotomy position.  Exam revealed the above findings.  A size 2 3/4 in gellhorn pessary was initially tried.  She was allowed to ambulate and void. She did tolerate the initial pessary.   She  was discharged with the new pessary.  She is to return in 2 weeks for recheck.  She was advised to return sooner if she is having any discomfort, bleeding or concerns.      IMP:   Encounter Diagnoses   Name Primary?    Uterovaginal prolapse, complete Yes    Constipation, unspecified constipation type      She was refitted for a size 2 3/4 in gellhorn pessary today.    Discussed constipation.  Advised decreasing fiber as she is using more that recommended and adding colace.  She requested a prescription which was sent today.  Discussed occasional use of laxatives if " needed.    Plan: Follow up in 2-4 weeks.    Jemima Porras MD on 7/17/2024 at 9:51 AM        Orthopedic

## 2024-08-22 NOTE — ED ADULT NURSE NOTE - PRO INTERPRETER NEED 2
Reason for call / Patient's question: Patient called requesting for a follow up with Dr. Martinez. Writer explained to patient he would need an injection prior to following up with Dr. Martinez. Patient states he had one before that gave no relief, writer transferred patient to RN.    Call back number: N/A    Fax # (if applicable): N/A    Pharmacy (if applicable): N/A    Does patient require a call back: No  
Writer spoke with patient regarding injections Dr. Martinez is requesting. Informed patient that the injections are diagnostic only (numbing medication). Explained that if he receives relief from these injections it confirms location of pain. Further explained his last injections were SI joint injections and Dr. Martinez is requesting diagnostic L2/3 transforaminal injection on the right. Informed patient that the order will be placed and Dr. Madrigal's office will be contacting him. Explained that once the injections are scheduled he can call our office back to schedule a follow up with Dr. Martinez to discuss further. Patient verbalized understanding and all questions answered.   
English
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
## 2025-02-11 NOTE — PROGRESS NOTE ADULT - PROBLEM/PLAN-4
Price (Use Numbers Only, No Special Characters Or $): 0.00
Detail Level: Simple
DISPLAY PLAN FREE TEXT

## 2025-04-29 NOTE — PATIENT PROFILE ADULT - HEALTH LITERACY
Patient returned call to office. Patient scheduled for IVFs and post hydration CMP today at 1430. Patient verbalized understanding.    no
